# Patient Record
Sex: MALE | Race: WHITE | NOT HISPANIC OR LATINO | URBAN - METROPOLITAN AREA
[De-identification: names, ages, dates, MRNs, and addresses within clinical notes are randomized per-mention and may not be internally consistent; named-entity substitution may affect disease eponyms.]

---

## 2017-05-16 ENCOUNTER — APPOINTMENT (OUTPATIENT)
Dept: URBAN - METROPOLITAN AREA CLINIC 200 | Age: 67
Setting detail: DERMATOLOGY
End: 2017-05-24

## 2017-05-16 DIAGNOSIS — L57.8 OTHER SKIN CHANGES DUE TO CHRONIC EXPOSURE TO NONIONIZING RADIATION: ICD-10-CM

## 2017-05-16 DIAGNOSIS — L57.0 ACTINIC KERATOSIS: ICD-10-CM

## 2017-05-16 DIAGNOSIS — B07.8 OTHER VIRAL WARTS: ICD-10-CM

## 2017-05-16 DIAGNOSIS — D22 MELANOCYTIC NEVI: ICD-10-CM

## 2017-05-16 DIAGNOSIS — D485 NEOPLASM OF UNCERTAIN BEHAVIOR OF SKIN: ICD-10-CM

## 2017-05-16 PROBLEM — D22.5 MELANOCYTIC NEVI OF TRUNK: Status: ACTIVE | Noted: 2017-05-16

## 2017-05-16 PROBLEM — D48.5 NEOPLASM OF UNCERTAIN BEHAVIOR OF SKIN: Status: ACTIVE | Noted: 2017-05-16

## 2017-05-16 PROCEDURE — 99213 OFFICE O/P EST LOW 20 MIN: CPT | Mod: 25

## 2017-05-16 PROCEDURE — OTHER COUNSELING: OTHER

## 2017-05-16 PROCEDURE — OTHER BIOPSY BY SHAVE METHOD: OTHER

## 2017-05-16 PROCEDURE — 17000 DESTRUCT PREMALG LESION: CPT | Mod: 59

## 2017-05-16 PROCEDURE — OTHER LIQUID NITROGEN: OTHER

## 2017-05-16 PROCEDURE — 69100 BIOPSY OF EXTERNAL EAR: CPT | Mod: 59

## 2017-05-16 PROCEDURE — 17110 DESTRUCT B9 LESION 1-14: CPT

## 2017-05-16 ASSESSMENT — LOCATION SIMPLE DESCRIPTION DERM
LOCATION SIMPLE: RIGHT ZYGOMA
LOCATION SIMPLE: CHEST
LOCATION SIMPLE: LEFT PRETIBIAL REGION
LOCATION SIMPLE: RIGHT EAR
LOCATION SIMPLE: LEFT THIGH

## 2017-05-16 ASSESSMENT — LOCATION ZONE DERM
LOCATION ZONE: EAR
LOCATION ZONE: FACE
LOCATION ZONE: TRUNK
LOCATION ZONE: LEG

## 2017-05-16 ASSESSMENT — LOCATION DETAILED DESCRIPTION DERM
LOCATION DETAILED: RIGHT CENTRAL ZYGOMA
LOCATION DETAILED: LEFT DISTAL PRETIBIAL REGION
LOCATION DETAILED: UPPER STERNUM
LOCATION DETAILED: RIGHT SUPERIOR HELIX
LOCATION DETAILED: LEFT ANTERIOR DISTAL THIGH

## 2017-05-16 NOTE — PROCEDURE: LIQUID NITROGEN
Number Of Freeze-Thaw Cycles: 2 freeze-thaw cycles
Consent: The patient's consent was obtained including but not limited to risks of crusting, scabbing, blistering, scarring, darker or lighter pigmentary change, recurrence, incomplete removal and infection.
Post-Care Instructions: I reviewed with the patient in detail post-care instructions. Patient is to wear sunprotection, and avoid picking at any of the treated lesions. Pt may apply Vaseline to crusted or scabbing areas.
Medical Necessity Clause: This procedure was medically necessary because the lesions that were treated were: causing pain
Add 52 Modifier (Optional): no
Medical Necessity Information: It is in your best interest to select a reason for this procedure from the list below. All of these items fulfill various CMS LCD requirements except the new and changing color options.
Detail Level: Detailed
Include Z78.9 (Other Specified Conditions Influencing Health Status) As An Associated Diagnosis?: Yes
Duration Of Freeze Thaw-Cycle (Seconds): 10

## 2017-05-16 NOTE — PROCEDURE: BIOPSY BY SHAVE METHOD
X Size Of Lesion In Cm: 0
Destruction After The Procedure: No
Biopsy Type: H and E
Wound Care: Vaseline
Biopsy Method: Personna blade
Post-Care Instructions: I reviewed with the patient in detail post-care instructions. Patient is to keep the biopsy site dry overnight, and then apply bacitracin twice daily until healed. Patient may apply hydrogen peroxide soaks to remove any crusting.
Hemostasis: Drysol
Anesthesia Volume In Cc (Will Not Render If 0): 0.1
Size Of Lesion In Cm: 0.2
Billing Type: Third-Party Bill
Dressing: bandage
Detail Level: Detailed
Type Of Destruction Used: Curettage
Notification Instructions: Patient will be notified of biopsy results. However, patient instructed to call the office if not contacted within 2 weeks.
consent was obtained and risks were reviewed including but not limited to scarring, infection, bleeding, scabbing, incomplete removal, nerve damage and allergy to anesthesia.
Anesthesia Type: 1% lidocaine with epinephrine

## 2017-11-14 ENCOUNTER — APPOINTMENT (OUTPATIENT)
Dept: URBAN - METROPOLITAN AREA CLINIC 200 | Age: 67
Setting detail: DERMATOLOGY
End: 2017-11-17

## 2017-11-14 DIAGNOSIS — L57.0 ACTINIC KERATOSIS: ICD-10-CM

## 2017-11-14 DIAGNOSIS — L82.0 INFLAMED SEBORRHEIC KERATOSIS: ICD-10-CM

## 2017-11-14 DIAGNOSIS — L57.8 OTHER SKIN CHANGES DUE TO CHRONIC EXPOSURE TO NONIONIZING RADIATION: ICD-10-CM

## 2017-11-14 PROBLEM — D48.5 NEOPLASM OF UNCERTAIN BEHAVIOR OF SKIN: Status: ACTIVE | Noted: 2017-11-14

## 2017-11-14 PROCEDURE — OTHER BIOPSY BY SHAVE METHOD: OTHER

## 2017-11-14 PROCEDURE — 99213 OFFICE O/P EST LOW 20 MIN: CPT | Mod: 25

## 2017-11-14 PROCEDURE — OTHER LIQUID NITROGEN: OTHER

## 2017-11-14 PROCEDURE — 11100: CPT | Mod: 59

## 2017-11-14 PROCEDURE — OTHER COUNSELING: OTHER

## 2017-11-14 PROCEDURE — 17000 DESTRUCT PREMALG LESION: CPT

## 2017-11-14 ASSESSMENT — LOCATION DETAILED DESCRIPTION DERM
LOCATION DETAILED: RIGHT LATERAL UPPER BACK
LOCATION DETAILED: LEFT CENTRAL ZYGOMA
LOCATION DETAILED: LEFT MID DORSAL INDEX FINGER

## 2017-11-14 ASSESSMENT — LOCATION SIMPLE DESCRIPTION DERM
LOCATION SIMPLE: RIGHT UPPER BACK
LOCATION SIMPLE: LEFT INDEX FINGER
LOCATION SIMPLE: LEFT ZYGOMA

## 2017-11-14 ASSESSMENT — LOCATION ZONE DERM
LOCATION ZONE: FINGER
LOCATION ZONE: FACE
LOCATION ZONE: TRUNK

## 2017-11-14 NOTE — PROCEDURE: LIQUID NITROGEN
Render Post-Care Instructions In Note?: no
Consent: The patient's consent was obtained including but not limited to risks of crusting, scabbing, blistering, scarring, darker or lighter pigmentary change, recurrence, incomplete removal and infection.
Duration Of Freeze Thaw-Cycle (Seconds): 10
Post-Care Instructions: I reviewed with the patient in detail post-care instructions. Patient is to wear sunprotection, and avoid picking at any of the treated lesions. Pt may apply Vaseline to crusted or scabbing areas.
Detail Level: Detailed
Number Of Freeze-Thaw Cycles: 2 freeze-thaw cycles

## 2017-11-14 NOTE — PROCEDURE: BIOPSY BY SHAVE METHOD
Type Of Destruction Used: Curettage
Wound Care: Vaseline
Anesthesia Type: 1% lidocaine with epinephrine
consent was obtained and risks were reviewed including but not limited to scarring, infection, bleeding, scabbing, incomplete removal, nerve damage and allergy to anesthesia.
X Size Of Lesion In Cm: 0
Render Post-Care Instructions In Note?: no
Size Of Lesion In Cm: 0.6
Biopsy Method: Personna blade
Notification Instructions: Patient will be notified of biopsy results. However, patient instructed to call the office if not contacted within 2 weeks.
Anesthesia Volume In Cc (Will Not Render If 0): 0.1
Biopsy Type: H and E
Detail Level: Detailed
Billing Type: Third-Party Bill
Hemostasis: Drysol
Dressing: bandage
Post-Care Instructions: I reviewed with the patient in detail post-care instructions. Patient is to keep the biopsy site dry overnight, and then apply bacitracin twice daily until healed. Patient may apply hydrogen peroxide soaks to remove any crusting.

## 2018-05-15 ENCOUNTER — APPOINTMENT (OUTPATIENT)
Dept: URBAN - METROPOLITAN AREA CLINIC 200 | Age: 68
Setting detail: DERMATOLOGY
End: 2018-05-15

## 2018-05-15 DIAGNOSIS — Z85.828 PERSONAL HISTORY OF OTHER MALIGNANT NEOPLASM OF SKIN: ICD-10-CM

## 2018-05-15 DIAGNOSIS — L57.0 ACTINIC KERATOSIS: ICD-10-CM

## 2018-05-15 DIAGNOSIS — L57.8 OTHER SKIN CHANGES DUE TO CHRONIC EXPOSURE TO NONIONIZING RADIATION: ICD-10-CM

## 2018-05-15 PROBLEM — Z85.46 PERSONAL HISTORY OF MALIGNANT NEOPLASM OF PROSTATE: Status: ACTIVE | Noted: 2018-05-15

## 2018-05-15 PROCEDURE — 99213 OFFICE O/P EST LOW 20 MIN: CPT | Mod: 25

## 2018-05-15 PROCEDURE — OTHER LIQUID NITROGEN: OTHER

## 2018-05-15 PROCEDURE — 17003 DESTRUCT PREMALG LES 2-14: CPT

## 2018-05-15 PROCEDURE — 17000 DESTRUCT PREMALG LESION: CPT

## 2018-05-15 PROCEDURE — OTHER COUNSELING: OTHER

## 2018-05-15 ASSESSMENT — LOCATION ZONE DERM
LOCATION ZONE: FACE
LOCATION ZONE: NECK
LOCATION ZONE: TRUNK

## 2018-05-15 ASSESSMENT — LOCATION SIMPLE DESCRIPTION DERM
LOCATION SIMPLE: LEFT FOREHEAD
LOCATION SIMPLE: RIGHT LOWER BACK
LOCATION SIMPLE: LEFT ANTERIOR NECK
LOCATION SIMPLE: LEFT TEMPLE

## 2018-05-15 ASSESSMENT — LOCATION DETAILED DESCRIPTION DERM
LOCATION DETAILED: RIGHT SUPERIOR MEDIAL MIDBACK
LOCATION DETAILED: LEFT SUPERIOR MEDIAL FOREHEAD
LOCATION DETAILED: LEFT SUPERIOR TEMPLE
LOCATION DETAILED: LEFT INFERIOR ANTERIOR NECK

## 2018-05-15 ASSESSMENT — PAIN INTENSITY VAS: HOW INTENSE IS YOUR PAIN 0 BEING NO PAIN, 10 BEING THE MOST SEVERE PAIN POSSIBLE?: NO PAIN

## 2018-11-13 ENCOUNTER — APPOINTMENT (OUTPATIENT)
Dept: URBAN - METROPOLITAN AREA CLINIC 200 | Age: 68
Setting detail: DERMATOLOGY
End: 2018-11-13

## 2018-11-13 DIAGNOSIS — L57.0 ACTINIC KERATOSIS: ICD-10-CM

## 2018-11-13 DIAGNOSIS — Z85.828 PERSONAL HISTORY OF OTHER MALIGNANT NEOPLASM OF SKIN: ICD-10-CM

## 2018-11-13 DIAGNOSIS — L57.8 OTHER SKIN CHANGES DUE TO CHRONIC EXPOSURE TO NONIONIZING RADIATION: ICD-10-CM

## 2018-11-13 PROBLEM — E78.5 HYPERLIPIDEMIA, UNSPECIFIED: Status: ACTIVE | Noted: 2018-11-13

## 2018-11-13 PROBLEM — Z85.46 PERSONAL HISTORY OF MALIGNANT NEOPLASM OF PROSTATE: Status: ACTIVE | Noted: 2018-11-13

## 2018-11-13 PROCEDURE — OTHER MIPS QUALITY: OTHER

## 2018-11-13 PROCEDURE — OTHER COUNSELING: OTHER

## 2018-11-13 PROCEDURE — 17003 DESTRUCT PREMALG LES 2-14: CPT

## 2018-11-13 PROCEDURE — 99213 OFFICE O/P EST LOW 20 MIN: CPT | Mod: 25

## 2018-11-13 PROCEDURE — OTHER LIQUID NITROGEN: OTHER

## 2018-11-13 PROCEDURE — 17000 DESTRUCT PREMALG LESION: CPT

## 2018-11-13 ASSESSMENT — LOCATION ZONE DERM
LOCATION ZONE: NECK
LOCATION ZONE: ARM
LOCATION ZONE: FACE
LOCATION ZONE: TRUNK

## 2018-11-13 ASSESSMENT — LOCATION SIMPLE DESCRIPTION DERM
LOCATION SIMPLE: LEFT ANTERIOR NECK
LOCATION SIMPLE: LEFT TEMPLE
LOCATION SIMPLE: CHEST
LOCATION SIMPLE: LEFT FOREHEAD
LOCATION SIMPLE: RIGHT ZYGOMA
LOCATION SIMPLE: RIGHT FOREARM

## 2018-11-13 ASSESSMENT — LOCATION DETAILED DESCRIPTION DERM
LOCATION DETAILED: LEFT SUPERIOR TEMPLE
LOCATION DETAILED: LEFT MEDIAL SUPERIOR CHEST
LOCATION DETAILED: LEFT INFERIOR ANTERIOR NECK
LOCATION DETAILED: RIGHT PROXIMAL RADIAL DORSAL FOREARM
LOCATION DETAILED: RIGHT CENTRAL ZYGOMA
LOCATION DETAILED: LEFT SUPERIOR FOREHEAD

## 2019-05-14 ENCOUNTER — APPOINTMENT (OUTPATIENT)
Dept: URBAN - METROPOLITAN AREA CLINIC 200 | Age: 69
Setting detail: DERMATOLOGY
End: 2019-05-20

## 2019-05-14 DIAGNOSIS — L57.8 OTHER SKIN CHANGES DUE TO CHRONIC EXPOSURE TO NONIONIZING RADIATION: ICD-10-CM

## 2019-05-14 DIAGNOSIS — Z85.828 PERSONAL HISTORY OF OTHER MALIGNANT NEOPLASM OF SKIN: ICD-10-CM

## 2019-05-14 DIAGNOSIS — L57.0 ACTINIC KERATOSIS: ICD-10-CM

## 2019-05-14 PROBLEM — Z85.46 PERSONAL HISTORY OF MALIGNANT NEOPLASM OF PROSTATE: Status: ACTIVE | Noted: 2019-05-14

## 2019-05-14 PROBLEM — I10 ESSENTIAL (PRIMARY) HYPERTENSION: Status: ACTIVE | Noted: 2019-05-14

## 2019-05-14 PROCEDURE — OTHER MIPS QUALITY: OTHER

## 2019-05-14 PROCEDURE — 99213 OFFICE O/P EST LOW 20 MIN: CPT | Mod: 25

## 2019-05-14 PROCEDURE — OTHER LIQUID NITROGEN: OTHER

## 2019-05-14 PROCEDURE — 17003 DESTRUCT PREMALG LES 2-14: CPT

## 2019-05-14 PROCEDURE — 17000 DESTRUCT PREMALG LESION: CPT

## 2019-05-14 PROCEDURE — OTHER COUNSELING: OTHER

## 2019-05-14 ASSESSMENT — LOCATION ZONE DERM
LOCATION ZONE: FACE
LOCATION ZONE: NECK
LOCATION ZONE: TRUNK
LOCATION ZONE: SCALP

## 2019-05-14 ASSESSMENT — LOCATION SIMPLE DESCRIPTION DERM
LOCATION SIMPLE: LEFT ANTERIOR NECK
LOCATION SIMPLE: SCALP
LOCATION SIMPLE: LEFT TEMPLE
LOCATION SIMPLE: CHEST
LOCATION SIMPLE: LEFT FOREHEAD
LOCATION SIMPLE: RIGHT CHEEK

## 2019-05-14 ASSESSMENT — LOCATION DETAILED DESCRIPTION DERM
LOCATION DETAILED: LEFT SUPERIOR FOREHEAD
LOCATION DETAILED: LEFT MEDIAL SUPERIOR CHEST
LOCATION DETAILED: LEFT MEDIAL FOREHEAD
LOCATION DETAILED: LEFT INFERIOR ANTERIOR NECK
LOCATION DETAILED: LEFT CENTRAL TEMPLE
LOCATION DETAILED: RIGHT SUPERIOR LATERAL MALAR CHEEK
LOCATION DETAILED: RIGHT INFERIOR POSTAURICULAR SKIN
LOCATION DETAILED: LEFT FOREHEAD

## 2019-05-14 NOTE — PROCEDURE: LIQUID NITROGEN
Render Post-Care Instructions In Note?: no
Consent: The patient's consent was obtained including but not limited to risks of crusting, scabbing, blistering, scarring, darker or lighter pigmentary change, recurrence, incomplete removal and infection.
Detail Level: Simple
Duration Of Freeze Thaw-Cycle (Seconds): 2
Post-Care Instructions: I reviewed with the patient in detail post-care instructions. Patient is to wear sunprotection, and avoid picking at any of the treated lesions. Pt may apply Vaseline to crusted or scabbing areas.

## 2019-09-24 ENCOUNTER — APPOINTMENT (OUTPATIENT)
Dept: URBAN - METROPOLITAN AREA CLINIC 200 | Age: 69
Setting detail: DERMATOLOGY
End: 2019-09-29

## 2019-09-24 DIAGNOSIS — Z85.828 PERSONAL HISTORY OF OTHER MALIGNANT NEOPLASM OF SKIN: ICD-10-CM

## 2019-09-24 DIAGNOSIS — L57.8 OTHER SKIN CHANGES DUE TO CHRONIC EXPOSURE TO NONIONIZING RADIATION: ICD-10-CM

## 2019-09-24 PROCEDURE — OTHER COUNSELING: OTHER

## 2019-09-24 PROCEDURE — OTHER MIPS QUALITY: OTHER

## 2019-09-24 PROCEDURE — 99213 OFFICE O/P EST LOW 20 MIN: CPT

## 2019-09-24 ASSESSMENT — LOCATION DETAILED DESCRIPTION DERM
LOCATION DETAILED: LEFT MEDIAL SUPERIOR CHEST
LOCATION DETAILED: LEFT INFERIOR ANTERIOR NECK

## 2019-09-24 ASSESSMENT — LOCATION ZONE DERM
LOCATION ZONE: NECK
LOCATION ZONE: TRUNK

## 2019-09-24 ASSESSMENT — LOCATION SIMPLE DESCRIPTION DERM
LOCATION SIMPLE: LEFT ANTERIOR NECK
LOCATION SIMPLE: CHEST

## 2019-10-16 ENCOUNTER — OFFICE VISIT (OUTPATIENT)
Dept: CARDIOLOGY | Facility: CLINIC | Age: 69
End: 2019-10-16
Payer: MEDICARE

## 2019-10-16 ENCOUNTER — TRANSCRIBE ORDERS (OUTPATIENT)
Dept: CARDIOLOGY | Facility: HOSPITAL | Age: 69
End: 2019-10-16

## 2019-10-16 ENCOUNTER — HOSPITAL ENCOUNTER (OUTPATIENT)
Dept: CARDIOLOGY | Facility: HOSPITAL | Age: 69
Discharge: HOME | End: 2019-10-16
Attending: ORTHOPAEDIC SURGERY
Payer: MEDICARE

## 2019-10-16 ENCOUNTER — APPOINTMENT (OUTPATIENT)
Dept: PREADMISSION TESTING | Facility: HOSPITAL | Age: 69
End: 2019-10-16
Attending: ORTHOPAEDIC SURGERY
Payer: MEDICARE

## 2019-10-16 ENCOUNTER — TRANSCRIBE ORDERS (OUTPATIENT)
Dept: LAB | Facility: HOSPITAL | Age: 69
End: 2019-10-16

## 2019-10-16 ENCOUNTER — APPOINTMENT (OUTPATIENT)
Dept: LAB | Facility: HOSPITAL | Age: 69
End: 2019-10-16
Attending: ORTHOPAEDIC SURGERY
Payer: MEDICARE

## 2019-10-16 VITALS
BODY MASS INDEX: 35.03 KG/M2 | HEIGHT: 66 IN | HEART RATE: 63 BPM | DIASTOLIC BLOOD PRESSURE: 80 MMHG | SYSTOLIC BLOOD PRESSURE: 150 MMHG | OXYGEN SATURATION: 97 % | WEIGHT: 218 LBS

## 2019-10-16 VITALS
BODY MASS INDEX: 34.84 KG/M2 | SYSTOLIC BLOOD PRESSURE: 172 MMHG | WEIGHT: 216.8 LBS | HEART RATE: 63 BPM | RESPIRATION RATE: 16 BRPM | TEMPERATURE: 98 F | HEIGHT: 66 IN | DIASTOLIC BLOOD PRESSURE: 90 MMHG

## 2019-10-16 DIAGNOSIS — I10 ESSENTIAL (PRIMARY) HYPERTENSION: ICD-10-CM

## 2019-10-16 DIAGNOSIS — M17.0 BILATERAL PRIMARY OSTEOARTHRITIS OF KNEE: Primary | ICD-10-CM

## 2019-10-16 DIAGNOSIS — Z01.810 ENCOUNTER FOR PRE-OPERATIVE CARDIOVASCULAR CLEARANCE: ICD-10-CM

## 2019-10-16 DIAGNOSIS — Z01.818 PREOP TESTING: Primary | ICD-10-CM

## 2019-10-16 DIAGNOSIS — M17.0 BILATERAL PRIMARY OSTEOARTHRITIS OF KNEE: ICD-10-CM

## 2019-10-16 DIAGNOSIS — Z71.89 COUNSELING ON HEALTH PROMOTION AND DISEASE PREVENTION: ICD-10-CM

## 2019-10-16 DIAGNOSIS — E78.5 HYPERLIPIDEMIA, UNSPECIFIED HYPERLIPIDEMIA TYPE: Primary | ICD-10-CM

## 2019-10-16 LAB
ABO + RH BLD: NORMAL
ANION GAP SERPL CALC-SCNC: 7 MEQ/L (ref 3–15)
APTT PPP: 28 SEC (ref 23–35)
ATRIAL RATE: 65
BLD GP AB SCN SERPL QL: NEGATIVE
BUN SERPL-MCNC: 15 MG/DL (ref 8–20)
CALCIUM SERPL-MCNC: 9.3 MG/DL (ref 8.9–10.3)
CHLORIDE SERPL-SCNC: 104 MEQ/L (ref 98–109)
CO2 SERPL-SCNC: 26 MEQ/L (ref 22–32)
CREAT SERPL-MCNC: 0.9 MG/DL
D AG BLD QL: POSITIVE
ERYTHROCYTE [DISTWIDTH] IN BLOOD BY AUTOMATED COUNT: 13.1 % (ref 11.6–14.4)
EST. AVERAGE GLUCOSE BLD GHB EST-MCNC: 114 MG/DL
GFR SERPL CREATININE-BSD FRML MDRD: >60 ML/MIN/1.73M*2
GLUCOSE SERPL-MCNC: 131 MG/DL (ref 70–99)
HBA1C MFR BLD HPLC: 5.6 %
HCT VFR BLDCO AUTO: 41.8 % (ref 40.1–51)
HGB BLD-MCNC: 14.2 G/DL
INR PPP: 1 INR
LABORATORY COMMENT REPORT: NORMAL
MCH RBC QN AUTO: 31.9 PG (ref 28–33.2)
MCHC RBC AUTO-ENTMCNC: 34 G/DL (ref 32.2–36.5)
MCV RBC AUTO: 93.9 FL (ref 83–98)
P AXIS: 53
PDW BLD AUTO: 10.3 FL (ref 9.4–12.4)
PLATELET # BLD AUTO: 218 K/UL
POTASSIUM SERPL-SCNC: 4 MEQ/L (ref 3.6–5.1)
PR INTERVAL: 154
PROTHROMBIN TIME: 13 SEC (ref 12.2–14.5)
QRS DURATION: 96
QT INTERVAL: 380
QTC CALCULATION(BAZETT): 395
R AXIS: 21
RBC # BLD AUTO: 4.45 M/UL (ref 4.5–5.8)
SODIUM SERPL-SCNC: 137 MEQ/L (ref 136–144)
T WAVE AXIS: 23
VENTRICULAR RATE: 65
WBC # BLD AUTO: 6.14 K/UL

## 2019-10-16 PROCEDURE — 99204 OFFICE O/P NEW MOD 45 MIN: CPT | Performed by: INTERNAL MEDICINE

## 2019-10-16 PROCEDURE — 36415 COLL VENOUS BLD VENIPUNCTURE: CPT | Mod: GA

## 2019-10-16 PROCEDURE — 85027 COMPLETE CBC AUTOMATED: CPT | Mod: GA

## 2019-10-16 PROCEDURE — 86900 BLOOD TYPING SEROLOGIC ABO: CPT

## 2019-10-16 PROCEDURE — 85730 THROMBOPLASTIN TIME PARTIAL: CPT | Mod: GZ

## 2019-10-16 PROCEDURE — 80048 BASIC METABOLIC PNL TOTAL CA: CPT

## 2019-10-16 PROCEDURE — 83036 HEMOGLOBIN GLYCOSYLATED A1C: CPT | Mod: GZ

## 2019-10-16 PROCEDURE — 93005 ELECTROCARDIOGRAM TRACING: CPT

## 2019-10-16 PROCEDURE — 85610 PROTHROMBIN TIME: CPT | Mod: GA

## 2019-10-16 PROCEDURE — 86850 RBC ANTIBODY SCREEN: CPT

## 2019-10-16 RX ORDER — IBUPROFEN 200 MG
400 TABLET ORAL EVERY 8 HOURS PRN
COMMUNITY
End: 2019-10-31 | Stop reason: HOSPADM

## 2019-10-16 RX ORDER — MUPIROCIN 20 MG/G
OINTMENT TOPICAL
Refills: 0 | COMMUNITY
Start: 2019-09-12 | End: 2019-10-31 | Stop reason: HOSPADM

## 2019-10-16 RX ORDER — ATORVASTATIN CALCIUM 10 MG/1
10 TABLET, FILM COATED ORAL DAILY
COMMUNITY
Start: 2017-05-10

## 2019-10-16 RX ORDER — VALSARTAN 80 MG/1
360 TABLET ORAL DAILY
COMMUNITY

## 2019-10-16 SDOH — HEALTH STABILITY: MENTAL HEALTH: HOW OFTEN DO YOU HAVE A DRINK CONTAINING ALCOHOL?: 2-3 TIMES A WEEK

## 2019-10-16 SDOH — HEALTH STABILITY: MENTAL HEALTH: HOW OFTEN DO YOU HAVE SIX OR MORE DRINKS ON ONE OCCASION?: LESS THAN MONTHLY

## 2019-10-16 SDOH — HEALTH STABILITY: MENTAL HEALTH: HOW MANY DRINKS CONTAINING ALCOHOL DO YOU HAVE ON A TYPICAL DAY WHEN YOU ARE DRINKING?: 3 OR 4

## 2019-10-16 ASSESSMENT — ENCOUNTER SYMPTOMS
DIFFICULTY URINATING: 0
APNEA: 0
PALPITATIONS: 0
INSOMNIA: 0
ABDOMINAL PAIN: 0
LIGHT-HEADEDNESS: 0
FEVER: 0
SHORTNESS OF BREATH: 0
ORTHOPNEA: 0
COUGH: 0
PALPITATIONS: 0
HEARTBURN: 0
NAUSEA: 0
WEAKNESS: 0
CHEST TIGHTNESS: 0
ARTHRALGIAS: 1
VOMITING: 0
HEMOPTYSIS: 0
HEMATURIA: 0
HEMATURIA: 0
DIZZINESS: 0
FREQUENCY: 0
JOINT SWELLING: 0
PND: 0
DYSURIA: 0
FEVER: 0
NUMBNESS: 0
DYSPNEA ON EXERTION: 0
BRUISES/BLEEDS EASILY: 0
SNORING: 1
DECREASED APPETITE: 0
SHORTNESS OF BREATH: 0
WOUND: 0
CHILLS: 0

## 2019-10-16 ASSESSMENT — PAIN SCALES - GENERAL: PAINLEVEL: 1

## 2019-10-16 NOTE — ASSESSMENT & PLAN NOTE
Tolerating statin.  Continue atorvastatin.  Recommend followup with primary care physician for LFTs, lipid profile check and medication adjustments.

## 2019-10-16 NOTE — H&P (VIEW-ONLY)
Chief complaint: bilateral knee pain  HPI: 68 year old male with bilateral knee pain. He states that his knee pain began about 6-7 years ago without an inciting event.He states that the pain has progressively worsened. Treatment has included Synvisc injections without relief.He states that cortisone injection provided short term relief. He rates the pain 1/5 today. He states that he takes Advil prn for pain. He states that he has difficulty walking down stairs/hills.        Allergies: No Known Allergies  Patient's Meds:   Current Outpatient Medications:   •  atorvastatin (LIPITOR) 10 mg tablet, Take 10 mg by mouth daily., Disp: , Rfl:   •  docosahexanoic acid/epa (FISH OIL ORAL), Take 1 tablet by mouth daily., Disp: , Rfl:   •  ibuprofen (ADVIL) 200 mg tablet, Take 400 mg by mouth every 8 (eight) hours as needed for mild pain., Disp: , Rfl:   •  multivitamin/iron/folic acid (CENTRUM ORAL), Take 1 tablet by mouth daily., Disp: , Rfl:   •  valsartan (DIOVAN) 80 mg tablet, Take 80 mg by mouth daily., Disp: , Rfl:   •  mupirocin (BACTROBAN) 2 % ointment, APPLY TWICE DAILY 5 DAYS PRIOR TO SURGERY INSIDE BOTH NOSTRILS, Disp: , Rfl: 0  Medical History:   Past Medical History:   Diagnosis Date   • Essential (primary) hypertension    • Hyperlipidemia, unspecified    • Impaired fasting glucose    • Osteoarthritis     Knees, hands    • Prostate cancer (CMS/[facility]) 2011    Prostatectomy     Surgical History:   Past Surgical History:   Procedure Laterality Date   • COLONOSCOPY W/ POLYPECTOMY  2015   • KNEE SURGERY Right     MCL Repair   • MOHS SURGERY      Right finger   • PROSTATECTOMY  2011     Social History:   Social History     Socioeconomic History   • Marital status:      Spouse name: None   • Number of children: None   • Years of education: None   • Highest education level: None   Occupational History   • Occupation: retired   Social Needs   • Financial resource strain: None   • Food insecurity:     Worry: None      Inability: None   • Transportation needs:     Medical: None     Non-medical: None   Tobacco Use   • Smoking status: Former Smoker     Last attempt to quit: 1979     Years since quittin.8   • Smokeless tobacco: Never Used   • Tobacco comment: quit 40 years ago   Substance and Sexual Activity   • Alcohol use: Yes     Frequency: 2-3 times a week     Drinks per session: 3 or 4     Binge frequency: Less than monthly   • Drug use: Never   • Sexual activity: None   Lifestyle   • Physical activity:     Days per week: None     Minutes per session: None   • Stress: None   Relationships   • Social connections:     Talks on phone: None     Gets together: None     Attends Orthodox service: None     Active member of club or organization: None     Attends meetings of clubs or organizations: None     Relationship status: None   • Intimate partner violence:     Fear of current or ex partner: None     Emotionally abused: None     Physically abused: None     Forced sexual activity: None   Other Topics Concern   • None   Social History Narrative   • None     Family History:   Family History   Problem Relation Age of Onset   • Liver cancer Biological Mother    • Early death Biological Father    • Heart disease Biological Father    • No Known Problems Biological Sister    • No Known Problems Biological Brother      Review of Systems   Constitutional: Negative for chills and fever.   HENT: Negative for dental problem and hearing loss.    Eyes: Negative for visual disturbance.   Respiratory: Negative for apnea, chest tightness and shortness of breath.         Stop bang-3   Cardiovascular: Negative for chest pain, palpitations and leg swelling.        HTN,HLD>4MET activity   Gastrointestinal: Negative for abdominal pain, nausea and vomiting.        Colon polyp history   Endocrine:        BMI-34.9. History of elevated BG;reports HgbA1c 3/2019-5.4   Genitourinary: Negative for difficulty urinating, dysuria, frequency and hematuria.         Prostate cancer s/p prostatectomy 2011   Musculoskeletal: Positive for arthralgias (bilateral knee) and gait problem (occasional limp). Negative for joint swelling.        History of right knee meniscus surgery 1960's. OA-knees/hands   Skin: Negative for wound.        History of Moh's   Neurological: Negative for weakness and numbness.     Vitals:   Vitals:    10/16/19 0911   BP: (!) 172/90   Pulse:    Resp:    Temp:      Body mass index is 34.99 kg/m².  Physical Exam   Constitutional: He is oriented to person, place, and time. He appears well-developed and well-nourished.   HENT:   Head: Normocephalic.   Eyes: Pupils are equal, round, and reactive to light.   Neck: Normal range of motion. Neck supple.   Cardiovascular: Normal rate, regular rhythm, normal heart sounds and intact distal pulses.   Pulmonary/Chest: Effort normal and breath sounds normal.   Abdominal: Soft. Bowel sounds are normal. There is no tenderness.   Genitourinary:   Genitourinary Comments: deferred   Musculoskeletal: Normal range of motion. He exhibits no edema or tenderness.   BLE DF/PF/EHL 5/5   Neurological: He is alert and oriented to person, place, and time. No sensory deficit.   Skin: Skin is warm and dry.   Psychiatric: He has a normal mood and affect.     Patient Active Problem List   Diagnosis   • Hyperlipidemia, unspecified   • Essential (primary) hypertension   • Osteoarthritis of both knees     Assessment/Plan:bilateral knee DJD  Plan: Bilateral total knee replacement  Cardiac clearance Dr Ku

## 2019-10-16 NOTE — H&P
Chief complaint: bilateral knee pain  HPI: 68 year old male with bilateral knee pain. He states that his knee pain began about 6-7 years ago without an inciting event.He states that the pain has progressively worsened. Treatment has included Synvisc injections without relief.He states that cortisone injection provided short term relief. He rates the pain 1/5 today. He states that he takes Advil prn for pain. He states that he has difficulty walking down stairs/hills.        Allergies: No Known Allergies  Patient's Meds:   Current Outpatient Medications:   •  atorvastatin (LIPITOR) 10 mg tablet, Take 10 mg by mouth daily., Disp: , Rfl:   •  docosahexanoic acid/epa (FISH OIL ORAL), Take 1 tablet by mouth daily., Disp: , Rfl:   •  ibuprofen (ADVIL) 200 mg tablet, Take 400 mg by mouth every 8 (eight) hours as needed for mild pain., Disp: , Rfl:   •  multivitamin/iron/folic acid (CENTRUM ORAL), Take 1 tablet by mouth daily., Disp: , Rfl:   •  valsartan (DIOVAN) 80 mg tablet, Take 80 mg by mouth daily., Disp: , Rfl:   •  mupirocin (BACTROBAN) 2 % ointment, APPLY TWICE DAILY 5 DAYS PRIOR TO SURGERY INSIDE BOTH NOSTRILS, Disp: , Rfl: 0  Medical History:   Past Medical History:   Diagnosis Date   • Essential (primary) hypertension    • Hyperlipidemia, unspecified    • Impaired fasting glucose    • Osteoarthritis     Knees, hands    • Prostate cancer (CMS/[facility]) 2011    Prostatectomy     Surgical History:   Past Surgical History:   Procedure Laterality Date   • COLONOSCOPY W/ POLYPECTOMY  2015   • KNEE SURGERY Right     MCL Repair   • MOHS SURGERY      Right finger   • PROSTATECTOMY  2011     Social History:   Social History     Socioeconomic History   • Marital status:      Spouse name: None   • Number of children: None   • Years of education: None   • Highest education level: None   Occupational History   • Occupation: retired   Social Needs   • Financial resource strain: None   • Food insecurity:     Worry: None      Inability: None   • Transportation needs:     Medical: None     Non-medical: None   Tobacco Use   • Smoking status: Former Smoker     Last attempt to quit: 1979     Years since quittin.8   • Smokeless tobacco: Never Used   • Tobacco comment: quit 40 years ago   Substance and Sexual Activity   • Alcohol use: Yes     Frequency: 2-3 times a week     Drinks per session: 3 or 4     Binge frequency: Less than monthly   • Drug use: Never   • Sexual activity: None   Lifestyle   • Physical activity:     Days per week: None     Minutes per session: None   • Stress: None   Relationships   • Social connections:     Talks on phone: None     Gets together: None     Attends Muslim service: None     Active member of club or organization: None     Attends meetings of clubs or organizations: None     Relationship status: None   • Intimate partner violence:     Fear of current or ex partner: None     Emotionally abused: None     Physically abused: None     Forced sexual activity: None   Other Topics Concern   • None   Social History Narrative   • None     Family History:   Family History   Problem Relation Age of Onset   • Liver cancer Biological Mother    • Early death Biological Father    • Heart disease Biological Father    • No Known Problems Biological Sister    • No Known Problems Biological Brother      Review of Systems   Constitutional: Negative for chills and fever.   HENT: Negative for dental problem and hearing loss.    Eyes: Negative for visual disturbance.   Respiratory: Negative for apnea, chest tightness and shortness of breath.         Stop bang-3   Cardiovascular: Negative for chest pain, palpitations and leg swelling.        HTN,HLD>4MET activity   Gastrointestinal: Negative for abdominal pain, nausea and vomiting.        Colon polyp history   Endocrine:        BMI-34.9. History of elevated BG;reports HgbA1c 3/2019-5.4   Genitourinary: Negative for difficulty urinating, dysuria, frequency and hematuria.         Prostate cancer s/p prostatectomy 2011   Musculoskeletal: Positive for arthralgias (bilateral knee) and gait problem (occasional limp). Negative for joint swelling.        History of right knee meniscus surgery 1960's. OA-knees/hands   Skin: Negative for wound.        History of Moh's   Neurological: Negative for weakness and numbness.     Vitals:   Vitals:    10/16/19 0911   BP: (!) 172/90   Pulse:    Resp:    Temp:      Body mass index is 34.99 kg/m².  Physical Exam   Constitutional: He is oriented to person, place, and time. He appears well-developed and well-nourished.   HENT:   Head: Normocephalic.   Eyes: Pupils are equal, round, and reactive to light.   Neck: Normal range of motion. Neck supple.   Cardiovascular: Normal rate, regular rhythm, normal heart sounds and intact distal pulses.   Pulmonary/Chest: Effort normal and breath sounds normal.   Abdominal: Soft. Bowel sounds are normal. There is no tenderness.   Genitourinary:   Genitourinary Comments: deferred   Musculoskeletal: Normal range of motion. He exhibits no edema or tenderness.   BLE DF/PF/EHL 5/5   Neurological: He is alert and oriented to person, place, and time. No sensory deficit.   Skin: Skin is warm and dry.   Psychiatric: He has a normal mood and affect.     Patient Active Problem List   Diagnosis   • Hyperlipidemia, unspecified   • Essential (primary) hypertension   • Osteoarthritis of both knees     Assessment/Plan:bilateral knee DJD  Plan: Bilateral total knee replacement  Cardiac clearance Dr Ku

## 2019-10-16 NOTE — ASSESSMENT & PLAN NOTE
There is no evidence of any active or ongoing cardiac condition at this time.   The proposed procedure in general carries an intermediate risk of cardiac complications.  The patient has intermediate clinical predictors of increased risk of periprocedural cardiac complications. There have been no recent episodes of angina, CHF, or clinical arrhythmias. The overall risk is intermediate but acceptable. Further testing or interventions are unlikely to improve the patient's risk. Routine perioperative care. The patient was instructed not to take any meds the morning of surgery. We will resume the routine medications when oral intake is tolerated post-operatively. DVT prophylaxis according to the surgical service's protocol. We will assist you with the management of any medical problems during the hospitalization.

## 2019-10-16 NOTE — PROGRESS NOTES
Pre-Operative   Consult Note         Reason for visit:   Chief Complaint   Patient presents with   • preoperative clearance       HPI     Chapincito Yoon comes to the office today for preoperative cardiac evaluation prior to a bilateral total knee replacement with Dr. Ted Zheng on 10/29/19.    He has been having bilateral knee pain within the last 5-6 years. It progressively worsened. He has difficulty going down stairs. He conitinues to play golf but he uses the golf cart. He will take Advil to be able to do the activity.     Patient denies any history of cardiac disease. Patient denies any chest pain, shortness of breath, dizziness or palpitations. Patient denies any PND or orthopnea, he uses one pillow at night for comfort.  He reports being able to easily walk more than 5 blocks without any cardiac symptoms.    Outside records reviewed..    Past Medical History:   Diagnosis Date   • Essential (primary) hypertension    • Hyperlipidemia, unspecified    • Impaired fasting glucose    • Osteoarthritis     Knees, hands    • Prostate cancer (CMS/HCC)     Prostatectomy       Past Surgical History:   Procedure Laterality Date   • COLONOSCOPY W/ POLYPECTOMY     • KNEE SURGERY Right     MCL Repair   • MOHS SURGERY      Right finger   • PROSTATECTOMY         Social History     Tobacco Use   Smoking Status Former Smoker   • Last attempt to quit:    • Years since quittin.8   Tobacco Comment    quit 40 years ago     Social History     Tobacco Use   • Smoking status: Former Smoker     Last attempt to quit:      Years since quittin.8   • Smokeless tobacco: Never Used   • Tobacco comment: quit 40 years ago   Substance Use Topics   • Alcohol use: Yes     Frequency: 2-3 times a week     Drinks per session: 3 or 4     Binge frequency: Less than monthly   • Drug use: Never       Family History   Problem Relation Age of Onset   • Liver cancer Biological Mother    • Early death Biological Father    •  Heart disease Biological Father    • No Known Problems Biological Sister    • No Known Problems Biological Brother        Allergies:  Patient has no known allergies.    Current Outpatient Medications   Medication Sig Dispense Refill   • atorvastatin (LIPITOR) 10 mg tablet Take 10 mg by mouth daily.     • docosahexanoic acid/epa (FISH OIL ORAL) Take 1 tablet by mouth daily.     • ibuprofen (ADVIL) 200 mg tablet Take 400 mg by mouth every 8 (eight) hours as needed for mild pain.     • multivitamin/iron/folic acid (CENTRUM ORAL) Take 1 tablet by mouth daily.     • valsartan (DIOVAN) 80 mg tablet Take 80 mg by mouth daily.     • mupirocin (BACTROBAN) 2 % ointment APPLY TWICE DAILY 5 DAYS PRIOR TO SURGERY INSIDE BOTH NOSTRILS  0     No current facility-administered medications for this visit.        Review of Systems   Constitution: Negative for decreased appetite and fever.   Cardiovascular: Negative for chest pain, dyspnea on exertion, leg swelling, orthopnea, palpitations and paroxysmal nocturnal dyspnea.   Respiratory: Positive for snoring. Negative for cough, hemoptysis and shortness of breath.    Hematologic/Lymphatic: Does not bruise/bleed easily.   Skin: Negative.    Musculoskeletal: Positive for joint pain.   Gastrointestinal: Negative for heartburn and melena.   Genitourinary: Negative for hematuria and nocturia.   Neurological: Negative for dizziness and light-headedness.   Psychiatric/Behavioral: The patient does not have insomnia.        Objective     Vitals:    10/16/19 0937   BP: (!) 150/80   Pulse: 63   SpO2: 97%       Wt Readings from Last 1 Encounters:   10/16/19 98.9 kg (218 lb)       Physical Exam   Constitutional: He is oriented to person, place, and time. He appears well-nourished.   Eyes: Lids are normal.   Neck: Normal range of motion.   Cardiovascular: Normal rate and regular rhythm.   Pulmonary/Chest: Effort normal and breath sounds normal.   Abdominal: Soft. Bowel sounds are normal.    Musculoskeletal: He exhibits no edema.   Neurological: He is alert and oriented to person, place, and time.   Skin: Skin is warm and dry.   Psychiatric: He has a normal mood and affect.        ECG   Normal sinus rhythm  Normal ECG    Labs   Lab Results   Component Value Date    WBC 6.14 10/16/2019    HGB 14.2 10/16/2019    HCT 41.8 10/16/2019     10/16/2019       Assessment/Plan     Hyperlipidemia, unspecified  Tolerating statin.  Continue atorvastatin.  Recommend followup with primary care physician for LFTs, lipid profile check and medication adjustments.    Encounter for pre-operative cardiovascular clearance  There is no evidence of any active or ongoing cardiac condition at this time.   The proposed procedure in general carries an intermediate risk of cardiac complications.  The patient has intermediate clinical predictors of increased risk of periprocedural cardiac complications. There have been no recent episodes of angina, CHF, or clinical arrhythmias. The overall risk is intermediate but acceptable. Further testing or interventions are unlikely to improve the patient's risk. Routine perioperative care. The patient was instructed not to take any meds the morning of surgery. We will resume the routine medications when oral intake is tolerated post-operatively. DVT prophylaxis according to the surgical service's protocol. We will assist you with the management of any medical problems during the hospitalization.       Essential (primary) hypertension  Blood pressure slightly elevated today.  Continue efforts for pain control.  Minimize the use of NSAIDs.  Follow with his primary care physician after surgery for monitoring blood pressure.    Counseling on health promotion and disease prevention  We discussed about heart disease and cardiac risk factors.  Patient was extensively counseled regarding lifestyle changes and risk factor modifications. Patient understands that noncompliance with these  recommendations may increase the risk of cardiovascular complications.  Follow-up with primary care physician to continue preventive measures for coronary artery disease, risk factor modification and lifestyle changes.  Recommend follow a cardiac diet and exercise as tolerated.           I, Ameena Hamilton , am scribing for, and in the presence of, Nestor Ku MD.    INestor MD, personally performed the services described in this documentation as scribed by Ameena Hamilton  in my presence, and it is both accurate and complete.       Nestor Ku MD  10/16/2019

## 2019-10-16 NOTE — ASSESSMENT & PLAN NOTE
Blood pressure slightly elevated today.  Continue efforts for pain control.  Minimize the use of NSAIDs.  Follow with his primary care physician after surgery for monitoring blood pressure.

## 2019-10-16 NOTE — ASSESSMENT & PLAN NOTE
We discussed about heart disease and cardiac risk factors.  Patient was extensively counseled regarding lifestyle changes and risk factor modifications. Patient understands that noncompliance with these recommendations may increase the risk of cardiovascular complications.  Follow-up with primary care physician to continue preventive measures for coronary artery disease, risk factor modification and lifestyle changes.  Recommend follow a cardiac diet and exercise as tolerated.

## 2019-10-29 ENCOUNTER — ANESTHESIA EVENT (OUTPATIENT)
Dept: OPERATING ROOM | Facility: HOSPITAL | Age: 69
Setting detail: SURGERY ADMIT
DRG: 462 | End: 2019-10-29
Payer: MEDICARE

## 2019-10-29 ENCOUNTER — HOSPITAL ENCOUNTER (INPATIENT)
Facility: HOSPITAL | Age: 69
LOS: 2 days | DRG: 462 | End: 2019-10-31
Attending: ORTHOPAEDIC SURGERY | Admitting: ORTHOPAEDIC SURGERY
Payer: MEDICARE

## 2019-10-29 ENCOUNTER — BMR PREADMISSION ASSESSMENT (OUTPATIENT)
Dept: ADMISSIONS | Facility: REHABILITATION | Age: 69
End: 2019-10-29

## 2019-10-29 ENCOUNTER — APPOINTMENT (INPATIENT)
Dept: RADIOLOGY | Facility: HOSPITAL | Age: 69
DRG: 462 | End: 2019-10-29
Attending: NURSE PRACTITIONER
Payer: MEDICARE

## 2019-10-29 PROBLEM — M19.90 OSTEOARTHROSIS: Status: ACTIVE | Noted: 2019-10-29

## 2019-10-29 LAB
ABO + RH BLD: NORMAL
BLD GP AB SCN SERPL QL: NEGATIVE
D AG BLD QL: POSITIVE
LABORATORY COMMENT REPORT: NORMAL

## 2019-10-29 PROCEDURE — 25000000 HC PHARMACY GENERAL: Performed by: ANESTHESIOLOGY

## 2019-10-29 PROCEDURE — 12000000 HC ROOM AND CARE MED/SURG

## 2019-10-29 PROCEDURE — 27200000 HC STERILE SUPPLY: Performed by: ORTHOPAEDIC SURGERY

## 2019-10-29 PROCEDURE — 37000002 HC ANESTHESIA MAC: Performed by: ORTHOPAEDIC SURGERY

## 2019-10-29 PROCEDURE — 25000000 HC PHARMACY GENERAL: Performed by: NURSE ANESTHETIST, CERTIFIED REGISTERED

## 2019-10-29 PROCEDURE — 99232 SBSQ HOSP IP/OBS MODERATE 35: CPT | Performed by: INTERNAL MEDICINE

## 2019-10-29 PROCEDURE — 25800000 HC PHARMACY IV SOLUTIONS: Performed by: NURSE PRACTITIONER

## 2019-10-29 PROCEDURE — 0SRC0JZ REPLACEMENT OF RIGHT KNEE JOINT WITH SYNTHETIC SUBSTITUTE, OPEN APPROACH: ICD-10-PCS | Performed by: ORTHOPAEDIC SURGERY

## 2019-10-29 PROCEDURE — 63700000 HC SELF-ADMINISTRABLE DRUG: Performed by: NURSE PRACTITIONER

## 2019-10-29 PROCEDURE — 71000001 HC PACU PHASE 1 INITIAL 30MIN: Performed by: ORTHOPAEDIC SURGERY

## 2019-10-29 PROCEDURE — 97161 PT EVAL LOW COMPLEX 20 MIN: CPT

## 2019-10-29 PROCEDURE — 63600000 HC DRUGS/DETAIL CODE: Performed by: NURSE PRACTITIONER

## 2019-10-29 PROCEDURE — 63600000 HC DRUGS/DETAIL CODE: Mod: JW | Performed by: NURSE PRACTITIONER

## 2019-10-29 PROCEDURE — 36415 COLL VENOUS BLD VENIPUNCTURE: CPT | Performed by: ORTHOPAEDIC SURGERY

## 2019-10-29 PROCEDURE — 25000000 HC PHARMACY GENERAL: Performed by: NURSE PRACTITIONER

## 2019-10-29 PROCEDURE — 63600000 HC DRUGS/DETAIL CODE: Performed by: ORTHOPAEDIC SURGERY

## 2019-10-29 PROCEDURE — 36000015 HC OR LEVEL 5 EA ADDL MIN: Performed by: ORTHOPAEDIC SURGERY

## 2019-10-29 PROCEDURE — C1713 ANCHOR/SCREW BN/BN,TIS/BN: HCPCS | Performed by: ORTHOPAEDIC SURGERY

## 2019-10-29 PROCEDURE — 86901 BLOOD TYPING SEROLOGIC RH(D): CPT

## 2019-10-29 PROCEDURE — 71000011 HC PACU PHASE 1 EA ADDL MIN: Performed by: ORTHOPAEDIC SURGERY

## 2019-10-29 PROCEDURE — 73560 X-RAY EXAM OF KNEE 1 OR 2: CPT | Mod: 50

## 2019-10-29 PROCEDURE — 25800000 HC PHARMACY IV SOLUTIONS: Performed by: ORTHOPAEDIC SURGERY

## 2019-10-29 PROCEDURE — 36000005 HC OR LEVEL 5 INITIAL 30MIN: Performed by: ORTHOPAEDIC SURGERY

## 2019-10-29 PROCEDURE — C1776 JOINT DEVICE (IMPLANTABLE): HCPCS | Performed by: ORTHOPAEDIC SURGERY

## 2019-10-29 PROCEDURE — 63600000 HC DRUGS/DETAIL CODE: Performed by: NURSE ANESTHETIST, CERTIFIED REGISTERED

## 2019-10-29 PROCEDURE — 0SRD0JZ REPLACEMENT OF LEFT KNEE JOINT WITH SYNTHETIC SUBSTITUTE, OPEN APPROACH: ICD-10-PCS | Performed by: ORTHOPAEDIC SURGERY

## 2019-10-29 PROCEDURE — 63600000 HC DRUGS/DETAIL CODE: Performed by: ANESTHESIOLOGY

## 2019-10-29 PROCEDURE — 25000000 HC PHARMACY GENERAL: Performed by: ORTHOPAEDIC SURGERY

## 2019-10-29 DEVICE — LOSPA FEMORAL COMPONENT (CR)(LEFT)
Type: IMPLANTABLE DEVICE | Site: KNEE | Status: FUNCTIONAL
Brand: LOSPA TOTAL KNEE SYSTEM

## 2019-10-29 DEVICE — LOSPA TIBIAL BASEPLATE(STD)
Type: IMPLANTABLE DEVICE | Site: KNEE | Status: FUNCTIONAL
Brand: LOSPA TOTAL KNEE SYSTEM

## 2019-10-29 DEVICE — CEMENT BONE SIMPLEX FULL DOSE: Type: IMPLANTABLE DEVICE | Site: KNEE | Status: FUNCTIONAL

## 2019-10-29 DEVICE — LOSPA TIBIAL INSERT (CR)
Type: IMPLANTABLE DEVICE | Site: KNEE | Status: FUNCTIONAL
Brand: LOSPA TOTAL KNEE SYSTEM

## 2019-10-29 DEVICE — LOSPA FEMORAL COMPONENT (CR)(RIGHT)
Type: IMPLANTABLE DEVICE | Site: KNEE | Status: FUNCTIONAL
Brand: LOSPA TOTAL KNEE SYSTEM

## 2019-10-29 RX ORDER — VANCOMYCIN HYDROCHLORIDE 1 G/20ML
INJECTION, POWDER, LYOPHILIZED, FOR SOLUTION INTRAVENOUS AS NEEDED
Status: DISCONTINUED | OUTPATIENT
Start: 2019-10-29 | End: 2019-10-29 | Stop reason: HOSPADM

## 2019-10-29 RX ORDER — ONDANSETRON HYDROCHLORIDE 2 MG/ML
4 INJECTION, SOLUTION INTRAVENOUS EVERY 8 HOURS PRN
Status: DISCONTINUED | OUTPATIENT
Start: 2019-10-29 | End: 2019-10-31 | Stop reason: HOSPADM

## 2019-10-29 RX ORDER — SODIUM CHLORIDE, SODIUM LACTATE, POTASSIUM CHLORIDE, CALCIUM CHLORIDE 600; 310; 30; 20 MG/100ML; MG/100ML; MG/100ML; MG/100ML
INJECTION, SOLUTION INTRAVENOUS CONTINUOUS
Status: ACTIVE | OUTPATIENT
Start: 2019-10-29 | End: 2019-10-30

## 2019-10-29 RX ORDER — MIDAZOLAM HYDROCHLORIDE 2 MG/2ML
INJECTION, SOLUTION INTRAMUSCULAR; INTRAVENOUS
Status: COMPLETED
Start: 2019-10-29 | End: 2019-10-29

## 2019-10-29 RX ORDER — AMOXICILLIN 250 MG
1 CAPSULE ORAL 2 TIMES DAILY
Status: DISCONTINUED | OUTPATIENT
Start: 2019-10-29 | End: 2019-10-31 | Stop reason: HOSPADM

## 2019-10-29 RX ORDER — ATORVASTATIN CALCIUM 10 MG/1
10 TABLET, FILM COATED ORAL
Status: DISCONTINUED | OUTPATIENT
Start: 2019-10-29 | End: 2019-10-31 | Stop reason: HOSPADM

## 2019-10-29 RX ORDER — NAPROXEN SODIUM 220 MG/1
81 TABLET, FILM COATED ORAL 2 TIMES DAILY
Status: DISCONTINUED | OUTPATIENT
Start: 2019-10-29 | End: 2019-10-31 | Stop reason: HOSPADM

## 2019-10-29 RX ORDER — FENTANYL CITRATE 50 UG/ML
INJECTION, SOLUTION INTRAMUSCULAR; INTRAVENOUS
Status: COMPLETED
Start: 2019-10-29 | End: 2019-10-29

## 2019-10-29 RX ORDER — SODIUM CHLORIDE, SODIUM LACTATE, POTASSIUM CHLORIDE, CALCIUM CHLORIDE 600; 310; 30; 20 MG/100ML; MG/100ML; MG/100ML; MG/100ML
INJECTION, SOLUTION INTRAVENOUS CONTINUOUS
Status: ACTIVE | OUTPATIENT
Start: 2019-10-29 | End: 2019-10-29

## 2019-10-29 RX ORDER — VALSARTAN 80 MG/1
80 TABLET ORAL DAILY
Status: DISCONTINUED | OUTPATIENT
Start: 2019-10-30 | End: 2019-10-31 | Stop reason: HOSPADM

## 2019-10-29 RX ORDER — FENTANYL CITRATE 50 UG/ML
INJECTION, SOLUTION INTRAMUSCULAR; INTRAVENOUS AS NEEDED
Status: DISCONTINUED | OUTPATIENT
Start: 2019-10-29 | End: 2019-10-29 | Stop reason: SURG

## 2019-10-29 RX ORDER — DIPHENHYDRAMINE HCL 25 MG
25 CAPSULE ORAL EVERY 6 HOURS PRN
Status: DISCONTINUED | OUTPATIENT
Start: 2019-10-29 | End: 2019-10-31 | Stop reason: HOSPADM

## 2019-10-29 RX ORDER — ALUMINUM HYDROXIDE, MAGNESIUM HYDROXIDE, AND SIMETHICONE 1200; 120; 1200 MG/30ML; MG/30ML; MG/30ML
30 SUSPENSION ORAL EVERY 4 HOURS PRN
Status: DISCONTINUED | OUTPATIENT
Start: 2019-10-29 | End: 2019-10-31 | Stop reason: HOSPADM

## 2019-10-29 RX ORDER — DEXAMETHASONE SODIUM PHOSPHATE 4 MG/ML
INJECTION, SOLUTION INTRA-ARTICULAR; INTRALESIONAL; INTRAMUSCULAR; INTRAVENOUS; SOFT TISSUE AS NEEDED
Status: DISCONTINUED | OUTPATIENT
Start: 2019-10-29 | End: 2019-10-29 | Stop reason: SURG

## 2019-10-29 RX ORDER — OXYCODONE HYDROCHLORIDE 5 MG/1
5-10 TABLET ORAL EVERY 4 HOURS PRN
Status: DISCONTINUED | OUTPATIENT
Start: 2019-10-29 | End: 2019-10-31 | Stop reason: HOSPADM

## 2019-10-29 RX ORDER — ACETAMINOPHEN 325 MG/1
650 TABLET ORAL
Status: COMPLETED | OUTPATIENT
Start: 2019-10-29 | End: 2019-10-31

## 2019-10-29 RX ORDER — BUPIVACAINE HYDROCHLORIDE 7.5 MG/ML
INJECTION, SOLUTION INTRASPINAL
Status: COMPLETED | OUTPATIENT
Start: 2019-10-29 | End: 2019-10-29

## 2019-10-29 RX ORDER — HYDROMORPHONE HYDROCHLORIDE 1 MG/ML
0.5 INJECTION, SOLUTION INTRAMUSCULAR; INTRAVENOUS; SUBCUTANEOUS
Status: DISCONTINUED | OUTPATIENT
Start: 2019-10-29 | End: 2019-10-29 | Stop reason: HOSPADM

## 2019-10-29 RX ORDER — KETAMINE HYDROCHLORIDE 10 MG/ML
INJECTION, SOLUTION INTRAMUSCULAR; INTRAVENOUS AS NEEDED
Status: DISCONTINUED | OUTPATIENT
Start: 2019-10-29 | End: 2019-10-29 | Stop reason: SURG

## 2019-10-29 RX ORDER — ONDANSETRON 4 MG/1
4 TABLET, ORALLY DISINTEGRATING ORAL EVERY 8 HOURS PRN
Status: DISCONTINUED | OUTPATIENT
Start: 2019-10-29 | End: 2019-10-31 | Stop reason: HOSPADM

## 2019-10-29 RX ORDER — CEFAZOLIN SODIUM 2 G/50ML
2 SOLUTION INTRAVENOUS
Status: COMPLETED | OUTPATIENT
Start: 2019-10-29 | End: 2019-10-30

## 2019-10-29 RX ORDER — CEFAZOLIN SODIUM 2 G/50ML
2 SOLUTION INTRAVENOUS
Status: DISCONTINUED | OUTPATIENT
Start: 2019-10-29 | End: 2019-10-29

## 2019-10-29 RX ORDER — DIPHENHYDRAMINE HCL 50 MG/ML
12.5 VIAL (ML) INJECTION
Status: DISCONTINUED | OUTPATIENT
Start: 2019-10-29 | End: 2019-10-29 | Stop reason: HOSPADM

## 2019-10-29 RX ORDER — DIPHENHYDRAMINE HCL 50 MG/ML
25 VIAL (ML) INJECTION EVERY 6 HOURS PRN
Status: DISCONTINUED | OUTPATIENT
Start: 2019-10-29 | End: 2019-10-31 | Stop reason: HOSPADM

## 2019-10-29 RX ORDER — DEXAMETHASONE SODIUM PHOSPHATE 4 MG/ML
10 INJECTION, SOLUTION INTRA-ARTICULAR; INTRALESIONAL; INTRAMUSCULAR; INTRAVENOUS; SOFT TISSUE ONCE
Status: COMPLETED | OUTPATIENT
Start: 2019-10-30 | End: 2019-10-30

## 2019-10-29 RX ORDER — PANTOPRAZOLE SODIUM 40 MG/1
40 TABLET, DELAYED RELEASE ORAL
Status: DISCONTINUED | OUTPATIENT
Start: 2019-10-30 | End: 2019-10-31 | Stop reason: HOSPADM

## 2019-10-29 RX ORDER — SODIUM CHLORIDE 0.9 G/100ML
INJECTION, SOLUTION IRRIGATION AS NEEDED
Status: DISCONTINUED | OUTPATIENT
Start: 2019-10-29 | End: 2019-10-29 | Stop reason: HOSPADM

## 2019-10-29 RX ORDER — PROPOFOL 10 MG/ML
INJECTION, EMULSION INTRAVENOUS CONTINUOUS PRN
Status: DISCONTINUED | OUTPATIENT
Start: 2019-10-29 | End: 2019-10-29 | Stop reason: SURG

## 2019-10-29 RX ORDER — MIDAZOLAM HYDROCHLORIDE 2 MG/2ML
INJECTION, SOLUTION INTRAMUSCULAR; INTRAVENOUS AS NEEDED
Status: DISCONTINUED | OUTPATIENT
Start: 2019-10-29 | End: 2019-10-29 | Stop reason: SURG

## 2019-10-29 RX ORDER — HEPARIN SODIUM 1000 [USP'U]/ML
INJECTION, SOLUTION INTRAVENOUS; SUBCUTANEOUS AS NEEDED
Status: DISCONTINUED | OUTPATIENT
Start: 2019-10-29 | End: 2019-10-29 | Stop reason: SURG

## 2019-10-29 RX ORDER — ACETAMINOPHEN 325 MG/1
975 TABLET ORAL ONCE
Status: COMPLETED | OUTPATIENT
Start: 2019-10-29 | End: 2019-10-29

## 2019-10-29 RX ORDER — KETOROLAC TROMETHAMINE 30 MG/ML
15 INJECTION, SOLUTION INTRAMUSCULAR; INTRAVENOUS EVERY 6 HOURS
Status: COMPLETED | OUTPATIENT
Start: 2019-10-29 | End: 2019-10-31

## 2019-10-29 RX ORDER — CEFAZOLIN SODIUM/WATER 2 G/20 ML
2 SYRINGE (ML) INTRAVENOUS
Status: COMPLETED | OUTPATIENT
Start: 2019-10-29 | End: 2019-10-29

## 2019-10-29 RX ORDER — FENTANYL CITRATE 50 UG/ML
50 INJECTION, SOLUTION INTRAMUSCULAR; INTRAVENOUS
Status: DISCONTINUED | OUTPATIENT
Start: 2019-10-29 | End: 2019-10-29 | Stop reason: HOSPADM

## 2019-10-29 RX ORDER — MORPHINE SULFATE 2 MG/ML
1 INJECTION, SOLUTION INTRAMUSCULAR; INTRAVENOUS
Status: DISCONTINUED | OUTPATIENT
Start: 2019-10-29 | End: 2019-10-31 | Stop reason: HOSPADM

## 2019-10-29 RX ORDER — SODIUM CHLORIDE 9 MG/ML
INJECTION, SOLUTION INTRAVENOUS CONTINUOUS
Status: ACTIVE | OUTPATIENT
Start: 2019-10-29 | End: 2019-10-30

## 2019-10-29 RX ORDER — ONDANSETRON HYDROCHLORIDE 2 MG/ML
4 INJECTION, SOLUTION INTRAVENOUS
Status: DISCONTINUED | OUTPATIENT
Start: 2019-10-29 | End: 2019-10-29 | Stop reason: HOSPADM

## 2019-10-29 RX ORDER — BISACODYL 10 MG/1
10 SUPPOSITORY RECTAL DAILY PRN
Status: DISCONTINUED | OUTPATIENT
Start: 2019-10-29 | End: 2019-10-31 | Stop reason: HOSPADM

## 2019-10-29 RX ORDER — POLYETHYLENE GLYCOL 3350 17 G/17G
17 POWDER, FOR SOLUTION ORAL DAILY
Status: DISCONTINUED | OUTPATIENT
Start: 2019-10-29 | End: 2019-10-31 | Stop reason: HOSPADM

## 2019-10-29 RX ORDER — ONDANSETRON HYDROCHLORIDE 2 MG/ML
INJECTION, SOLUTION INTRAVENOUS AS NEEDED
Status: DISCONTINUED | OUTPATIENT
Start: 2019-10-29 | End: 2019-10-29 | Stop reason: SURG

## 2019-10-29 RX ADMIN — ACETAMINOPHEN 650 MG: 325 TABLET ORAL at 13:00

## 2019-10-29 RX ADMIN — Medication 25 MG: at 08:45

## 2019-10-29 RX ADMIN — SENNOSIDES AND DOCUSATE SODIUM 1 TABLET: 8.6; 5 TABLET ORAL at 19:46

## 2019-10-29 RX ADMIN — ACETAMINOPHEN 650 MG: 325 TABLET ORAL at 19:45

## 2019-10-29 RX ADMIN — ONDANSETRON 4 MG: 2 INJECTION INTRAMUSCULAR; INTRAVENOUS at 08:44

## 2019-10-29 RX ADMIN — MIDAZOLAM HYDROCHLORIDE 2 MG: 1 INJECTION, SOLUTION INTRAMUSCULAR; INTRAVENOUS at 08:45

## 2019-10-29 RX ADMIN — OXYCODONE HYDROCHLORIDE 10 MG: 5 TABLET ORAL at 17:42

## 2019-10-29 RX ADMIN — SENNOSIDES AND DOCUSATE SODIUM 1 TABLET: 8.6; 5 TABLET ORAL at 13:00

## 2019-10-29 RX ADMIN — SODIUM CHLORIDE 250 ML: 9 INJECTION, SOLUTION INTRAVENOUS at 15:47

## 2019-10-29 RX ADMIN — PROPOFOL 50 MCG/KG/MIN: 10 INJECTION, EMULSION INTRAVENOUS at 08:45

## 2019-10-29 RX ADMIN — Medication 2 G: at 08:32

## 2019-10-29 RX ADMIN — SODIUM CHLORIDE, SODIUM LACTATE, POTASSIUM CHLORIDE, CALCIUM CHLORIDE: 600; 310; 30; 20 INJECTION, SOLUTION INTRAVENOUS at 08:06

## 2019-10-29 RX ADMIN — MIDAZOLAM HYDROCHLORIDE 2 MG: 1 INJECTION, SOLUTION INTRAMUSCULAR; INTRAVENOUS at 07:54

## 2019-10-29 RX ADMIN — FENTANYL CITRATE 50 MCG: 50 INJECTION INTRAMUSCULAR; INTRAVENOUS at 07:54

## 2019-10-29 RX ADMIN — SODIUM CHLORIDE: 9 INJECTION, SOLUTION INTRAVENOUS at 12:32

## 2019-10-29 RX ADMIN — SODIUM CHLORIDE, SODIUM LACTATE, POTASSIUM CHLORIDE, CALCIUM CHLORIDE: 600; 310; 30; 20 INJECTION, SOLUTION INTRAVENOUS at 10:44

## 2019-10-29 RX ADMIN — THERA TABS 1 TABLET: TAB at 13:00

## 2019-10-29 RX ADMIN — POLYETHYLENE GLYCOL 3350 17 G: 17 POWDER, FOR SOLUTION ORAL at 13:00

## 2019-10-29 RX ADMIN — BUPIVACAINE HYDROCHLORIDE IN DEXTROSE 2 ML: 7.5 INJECTION, SOLUTION SUBARACHNOID at 08:03

## 2019-10-29 RX ADMIN — OXYCODONE HYDROCHLORIDE 10 MG: 5 TABLET ORAL at 21:52

## 2019-10-29 RX ADMIN — ASPIRIN 81 MG 81 MG: 81 TABLET ORAL at 17:43

## 2019-10-29 RX ADMIN — FENTANYL CITRATE 50 MCG: 50 INJECTION INTRAMUSCULAR; INTRAVENOUS at 08:34

## 2019-10-29 RX ADMIN — OXYCODONE HYDROCHLORIDE 5 MG: 5 TABLET ORAL at 14:24

## 2019-10-29 RX ADMIN — ACETAMINOPHEN 975 MG: 325 TABLET ORAL at 07:06

## 2019-10-29 RX ADMIN — TRANEXAMIC ACID 1 G: 100 INJECTION, SOLUTION INTRAVENOUS at 08:06

## 2019-10-29 RX ADMIN — ATORVASTATIN CALCIUM 10 MG: 10 TABLET, FILM COATED ORAL at 17:43

## 2019-10-29 RX ADMIN — DEXAMETHASONE SODIUM PHOSPHATE 10 MG: 4 INJECTION, SOLUTION INTRA-ARTICULAR; INTRALESIONAL; INTRAMUSCULAR; INTRAVENOUS; SOFT TISSUE at 08:44

## 2019-10-29 RX ADMIN — TRANEXAMIC ACID 1 G: 100 INJECTION, SOLUTION INTRAVENOUS at 11:22

## 2019-10-29 RX ADMIN — Medication 25 MG: at 08:59

## 2019-10-29 RX ADMIN — SODIUM CHLORIDE, SODIUM LACTATE, POTASSIUM CHLORIDE, CALCIUM CHLORIDE: 600; 310; 30; 20 INJECTION, SOLUTION INTRAVENOUS at 09:13

## 2019-10-29 RX ADMIN — CEFAZOLIN SODIUM 2 G: 2 SOLUTION INTRAVENOUS at 16:36

## 2019-10-29 RX ADMIN — HEPARIN SODIUM 1000 UNITS: 1000 INJECTION, SOLUTION INTRAVENOUS; SUBCUTANEOUS at 08:44

## 2019-10-29 RX ADMIN — KETOROLAC TROMETHAMINE 15 MG: 30 INJECTION, SOLUTION INTRAMUSCULAR at 17:43

## 2019-10-29 RX ADMIN — OXYCODONE HYDROCHLORIDE 5 MG: 5 TABLET ORAL at 13:09

## 2019-10-29 RX ADMIN — KETOROLAC TROMETHAMINE 15 MG: 30 INJECTION, SOLUTION INTRAMUSCULAR at 12:32

## 2019-10-29 ASSESSMENT — COGNITIVE AND FUNCTIONAL STATUS - GENERAL
DRESSING REGULAR LOWER BODY CLOTHING: 3 - A LITTLE
DRESSING REGULAR UPPER BODY CLOTHING: 4 - NONE
MOVING TO AND FROM BED TO CHAIR: 3 - A LITTLE
CLIMB 3 TO 5 STEPS WITH RAILING: 1 - TOTAL
AFFECT: WFL
WALKING IN HOSPITAL ROOM: 3 - A LITTLE
HELP NEEDED FOR BATHING: 3 - A LITTLE
STANDING UP FROM CHAIR USING ARMS: 2 - A LOT
DRESSING REGULAR LOWER BODY CLOTHING: 2 - A LOT
HELP NEEDED FOR PERSONAL GROOMING: 4 - NONE
MOVING TO AND FROM BED TO CHAIR: 2 - A LOT
STANDING UP FROM CHAIR USING ARMS: 3 - A LITTLE
EATING MEALS: 4 - NONE
HELP NEEDED FOR BATHING: 3 - A LITTLE
EATING MEALS: 4 - NONE
WALKING IN HOSPITAL ROOM: 2 - A LOT
HELP NEEDED FOR PERSONAL GROOMING: 4 - NONE
DRESSING REGULAR UPPER BODY CLOTHING: 4 - NONE
TOILETING: 4 - NONE
CLIMB 3 TO 5 STEPS WITH RAILING: 3 - A LITTLE
TOILETING: 3 - A LITTLE

## 2019-10-29 NOTE — PERIOPERATIVE NURSING NOTE
Vancomycin 2gram with tranexamic acid 3 gram in 70ml nss pour bottle, 100ml total.  50ml administer to right knee    Pericapsular injection 50 ml administered to right knee

## 2019-10-29 NOTE — PLAN OF CARE
Problem: Adult Inpatient Plan of Care  Goal: Plan of Care Review  Outcome: Progressing  Flowsheets (Taken 10/29/2019 1127)  Plan of Care Reviewed With: patient  Outcome Summary: Min/mod assist of 2 for mobility, recommend acute rehab     Problem: Joint Function Impaired (Knee Arthroplasty)  Goal: Optimal Functional Ability  Outcome: Progressing

## 2019-10-29 NOTE — ANESTHESIA PROCEDURE NOTES
Spinal Block    Patient location during procedure: pre-op  Start time: 10/29/2019 7:54 AM  End time: 10/29/2019 8:00 AM  Staffing  Anesthesiologist: Dorene Sepulveda MD  Performed: anesthesiologist   Reason for block: primary anesthetic  Preanesthetic Checklist  Completed: patient identified, surgical consent, pre-op evaluation, timeout performed, IV checked, risks and benefits discussed, monitors and equipment checked and sterile field maintained during procedure  Spinal Block  Patient position: sitting  Prep: Betadine and site prepped and draped  Patient monitoring: heart rate, cardiac monitor, continuous pulse ox and blood pressure  Approach: midline  Location: L3-4  Injection technique: single-shot  Needle  Needle type: Sprotte   Needle gauge: 24 G  Needle length: 3.5 in  Assessment  Sensory level: T4  Events: cerebrospinal fluid  Additional Notes  Procedure well tolerated. Vital signs stable.    Medications Administered  Bupivacaine PF (MARCAINE SPINAL) 0.75% intrathecal injection, 2 mL

## 2019-10-29 NOTE — NURSING NOTE
Pt out of bed to chair with PT/OT. Per PT, BP 70 systolic upon transfer to chair. Pt reclined with legs elevated. /55. RODRIGUE Skelton notified. Orders given and carried out.

## 2019-10-29 NOTE — PROGRESS NOTES
Inpatient Cardiology   Daily Progress Note       Overview:    - S/P bilateral TKA on 10/29/19     Assessment/Plan   Essential (primary) hypertension  Assessment & Plan  - Valsartan on 10/30 AM with holds    Hyperlipidemia, unspecified  Assessment & Plan  - Atorvastatin  - Hold Fish Oil. Resume post op when ok per ortho    Osteoarthrosis  Overview  - S/P bilateral TKA on 10/29/19    Assessment & Plan  - DVT prophylaxis and pain management per ortho service  - Pulmonary toilet  - Ambulate  - Hold all natural supplements. Resume post op when ok to do so per ortho             Subjective   Bilateral knee pain. Otherwise no complaints. No cp, dyspnea, headache, nausea. Planning on d/c to [facility] after acute hospitalization.       Current Medications:    Scheduled:  • acetaminophen  650 mg oral q6h INT   • aspirin  81 mg oral BID   • atorvastatin  10 mg oral Daily (6p)   • ceFAZolin  2 g intravenous q8h INT   • [START ON 10/30/2019] dexamethasone  10 mg intravenous Once   • ketorolac  15 mg intravenous q6h ELIZABETH   • multivitamin  1 tablet oral Daily   • [START ON 10/30/2019] pantoprazole  40 mg oral Daily before breakfast   • polyethylene glycol  17 g oral Daily   • sennosides-docusate sodium  1 tablet oral BID   • [START ON 10/30/2019] valsartan  80 mg oral Daily       Infusions:  • lactated ringer's   125 mL/hr at 10/29/19 0806   • lactated ringer's   Stopped (10/29/19 1234)   • sodium chloride 0.9 %   125 mL/hr at 10/29/19 1232       PRN:  alum-mag hydroxide-simeth  •  bisacodyl  •  diphenhydrAMINE **OR** diphenhydrAMINE  •  oxyCODONE **OR** morphine  •  ondansetron ODT **OR** ondansetron     Objective   Vital signs in last 24 hours:  Temp:  [36.6 °C (97.8 °F)-36.8 °C (98.3 °F)] 36.6 °C (97.8 °F)  Heart Rate:  [66-86] 86  Resp:  [16-30] 18  BP: (120-141)/() 131/62    Wt Readings from Last 3 Encounters:   10/29/19 97 kg (213 lb 12.8 oz)   10/16/19 98.9 kg (218 lb)   10/16/19 98.3 kg (216 lb 12.8 oz)       Physical  Exam   Constitutional: He is oriented to person, place, and time. He appears well-developed and well-nourished.   HENT:   Head: Normocephalic.   Eyes: No scleral icterus.   Neck: No JVD present.   Cardiovascular: Normal rate, regular rhythm and normal heart sounds.   No murmur heard.  + pedal pulses   Pulmonary/Chest: Effort normal and breath sounds normal. He has no wheezes.   Abdominal: Soft. Bowel sounds are normal. There is no tenderness.   Musculoskeletal: He exhibits no edema.   Neurological: He is alert and oriented to person, place, and time.   Skin: Skin is warm and dry.   B/l knee aquacel c/d/i    Psychiatric: He has a normal mood and affect.          Labs and Data:      Hematology  Lab Results   Component Value Date    WBC 6.14 10/16/2019    HGB 14.2 10/16/2019    HCT 41.8 10/16/2019     10/16/2019    INR 1.0 10/16/2019       Chemistries  Lab Results   Component Value Date     10/16/2019    K 4.0 10/16/2019     10/16/2019    CREATININE 0.9 10/16/2019    BUN 15 10/16/2019    CO2 26 10/16/2019    GLUCOSE 131 (H) 10/16/2019    CALCIUM 9.3 10/16/2019       Biomarkers  No results found for: CKTOTAL, TROPONINI, BNP    Cholesterol  No results found for: CHOL, TRIG, HDL, LDLCALC, NONHDLCALC    Endocrine  Lab Results   Component Value Date    HGBA1C 5.6 10/16/2019      Radiology:  I have independently reviewed the pertinent imaging from the last 24 hrs.    X-ray Knee Bilateral 1 Or 2 Views    Result Date: 10/29/2019  IMPRESSION: Bilateral total knee SD see in good position. COMMENT: AP and lateral portable views of the right and left knee show a total knee prosthesis in place. The components are in good position and alignment. No abnormalities are seen.                      RODRIGUE Briggs  10/29/2019

## 2019-10-29 NOTE — HOSPITAL COURSE
68 year old male with bilateral knee pain x 5-6 years. It has progressively worsened.  Pt s/p  bilateral TKA's on 10/29/19.  WBAT B/L LE's.   Decadron 10mg IV x1 POD 1.      At baseline, pt is independent. He now requires assistance with PT/OT.  Pt will benefit from acute rehab at d/c prior to returning home.

## 2019-10-29 NOTE — HOSPITAL COURSE
Chris is a 68 y.o. male admitted on 10/29/2019 with Osteoarthritis of both knees, unspecified osteoarthritis type [M17.0]  Osteoarthrosis [M19.90]. Principal problem is No Principal Problem: There is no principal problem currently on the Problem List. Please update the Problem List and refresh..    Chris has a past medical history of Essential (primary) hypertension, Hyperlipidemia, unspecified, Impaired fasting glucose, Osteoarthritis, and Prostate cancer (CMS/[facility]) (2011).     B/L TKA

## 2019-10-29 NOTE — OR SURGEON
Pre-Procedure patient identification:  I am the primary operating surgeon/proceduralist and I have identified the patient on 10/29/19 at 6:20 AM Ted Zheng MD  Phone Number: [phone]

## 2019-10-29 NOTE — ANESTHESIA PREPROCEDURE EVALUATION
Relevant Problems   ANESTHESIA (within normal limits)      CARDIOVASCULAR   (+) Essential (primary) hypertension      GASTROINTESTINAL (within normal limits)      MUSCULOSKELETAL   (+) Osteoarthritis of both knees       Past Surgical History:   Procedure Laterality Date   • COLONOSCOPY W/ POLYPECTOMY  2015   • KNEE SURGERY Right     MCL Repair   • MOHS SURGERY      Right finger   • PROSTATECTOMY  2011       Physical Exam    Airway   Mallampati: II   TM distance: >3 FB   Neck ROM: full  Cardiovascular - normal   Rhythm: regular   Rate: normalPulmonary - normal   clear to auscultation  Dental - normal        Anesthesia Plan    Plan: spinal and MAC    Technique: spinal and MAC     Lines and Monitors: PIV     Airway: natural airway / supplemental oxygen   ASA 3  Anesthetic plan and risks discussed with: patient  Postop Plan:   Patient Disposition: inpatient floor planned admission   Pain Management: spinal and IV analgesics

## 2019-10-29 NOTE — PROGRESS NOTES
Pt seen & examined in PACU  No complaints, pain well controlled  Denies Cp, SOB, palpitations, nausea & vomiting    Alert, VSS, NAD  BLE aquacell dressing c/d/i with ice pack in place  BLE DP/PT intact, DF/PF/EHL intact, SILT  Calves soft, nontender    A/P: s/p  Bilateral TKA POD 0 d/w Attending  Pain control  Post op xray  DVT ppx ASA 81mg BID x4 weeks  PT/OT  WBAT  Med management: LHG  IVF/plata until POD1   Decadron 10mg IV x1 POD 1   dispo planing likely to rehab thursday

## 2019-10-29 NOTE — ANESTHESIA POSTPROCEDURE EVALUATION
Patient: Chapincito Yoon    Procedure Summary     Date:  10/29/19 Room / Location:   OR 6 /  OR    Anesthesia Start:  0833 Anesthesia Stop:  1116    Procedure:  Bilateral total knee replacement (Bilateral Knee) Diagnosis:       Osteoarthritis of both knees, unspecified osteoarthritis type      (bilateral knee djd)    Surgeon:  Ted Zheng MD Responsible Provider:  Dorene Sepulveda MD    Anesthesia Type:  spinal, MAC ASA Status:  3          Anesthesia Type: spinal, MAC  PACU Vitals  10/29/2019 1103 - 10/29/2019 1156      10/29/2019  1110 10/29/2019  1115 10/29/2019  1120 10/29/2019  1130    BP:  --  120/73  (!) 141/71  138/63    Temp:  --  36.7 °C (98.1 °F)  --  --    Pulse:  70  75  68  68    Resp:  (!) 30  19  19  18    SpO2:  98 %  99 %  99 %  99 %              10/29/2019  1140 10/29/2019  1150          BP:  134/75  (!) 132/118      Temp:  --  --      Pulse:  73  68      Resp:  20  (!) 22      SpO2:  100 %  100 %              Anesthesia Post Evaluation    Pain management: adequate  Mode of pain management: IV medication and additional block medications  Patient participation: complete - patient participated  Level of consciousness: awake and alert  Cardiovascular status: acceptable  Airway Patency: adequate  Respiratory status: acceptable  Hydration status: acceptable  Anesthetic complications: no

## 2019-10-29 NOTE — PROGRESS NOTES
Patient: Chapincito Yoon  Location: [place] [address]  MRN: [id number]  Today's date: 10/29/2019    Pt received supine.  Pt left in chair on sheet and incontinence pad with alarm on.  Needs in place and call bell in reach.  RN aware.    Hospital Course  Chris is a 68 y.o. male admitted on 10/29/2019 with Osteoarthritis of both knees, unspecified osteoarthritis type [M17.0]  Osteoarthrosis [M19.90]. Principal problem is No Principal Problem: There is no principal problem currently on the Problem List. Please update the Problem List and refresh..    Chris has a past medical history of Essential (primary) hypertension, Hyperlipidemia, unspecified, Impaired fasting glucose, Osteoarthritis, and Prostate cancer (CMS/[facility]) (2011).        10/29/19 1520   Pain/Comfort/Sleep   Pain Charting Type Pain Assessment   Preferred Pain Scale number (Numeric Rating Pain Scale)   Pain Body Location - Side Bilateral   Pain Body Location knee   Pain Rating (0-10): Rest 4   Pain Rating (0-10): Activity 5   Pain Management Interventions position adjusted;relaxation techniques promoted   Vital Signs   Heart Rate 70   SpO2 98 %   Patient Activity At rest   Oxygen Therapy None (Room air)   BP (!) 163/82   Patient Position Lying         Therapy Pain/Vitals - 10/29/19 1530        Vital Signs    BP  (!) 73/37     Patient Position  Sitting        Patient Observation    Observations  Post-ambulation           Prior Living Environment      Most Recent Value   Living Arrangements  house   Living Environment Comment  spouse, 2SH, steps to enter, B&B 2fl, tub/shower          Prior Level of Function      Most Recent Value   Ambulation  independent   Transferring  independent   Toileting  independent   Bathing  independent   Dressing  independent   Eating  independent   Communication  understands/communicates without difficulty   Swallowing  swallows foods/liquids without difficulty   Equipment Currently Used at Home  none          OT Evaluation and  Treatment - 10/29/19 1525        Time Calculation    Start Time  1525     Stop Time  1545     Time Calculation (min)  20 min        Session Details    Document Type  initial evaluation     Mode of Treatment  occupational therapy        General Information    Patient Profile Reviewed?  yes     Onset of Illness/Injury or Date of Surgery  10/29/19     Referring Physician  Dr. Zheng     Existing Precautions/Restrictions  fall;weight bearing        Weight-Bearing Status    Left LE Weight-Bearing Status  weight-bearing as tolerated (WBAT)     Right LE Weight-Bearing Status  weight-bearing as tolerated (WBAT)        Cognition/Psychosocial    Affect/Mental Status (Cognitive)  WFL        Range of Motion (ROM)    Range of Motion  bilateral upper extremities;ROM is WFL        Strength (Manual Muscle Testing)    Strength (Manual Muscle Testing)  bilateral upper extremities;strength is WFL        Bed Mobility    [place], Supine to Sit  supervision     Assistive Device (Bed Mobility)  bed rails;head of bed elevated        Transfers    Transfers  stand pivot/stand step transfer        Sit to Stand Transfer    [place], Sit to Stand Transfer  moderate assist (50% patient effort);2 person assist     Verbal Cues  hand placement;safety;technique     Assistive Device  walker, front-wheeled        Stand to Sit Transfer    [place], Stand to Sit Transfer  minimum assist (75% patient effort)     Verbal Cues  safety;hand placement;technique     Assistive Device  walker, front-wheeled        Stand Pivot/Stand Step Transfer    [place], Stand Pivot/Stand Step Transfer  minimum assist (75% or more patient effort)     Assistive Device  walker, front-wheeled        AM-PAC (TM) - ADL (Current Function)    Putting on and taking off regular lower body clothing?  2 - A Lot     Bathing?  3 - A Little     Toileting?  3 - A Little     Putting on/taking off regular upper body clothing?  4 - None     How much help for taking care of  personal grooming?  4 - None     Eating meals?  4 - None     AM-PAC (TM) ADL Score  20        Therapy Assessment/Plan (OT)    Rehab Potential (OT)  good, to achieve stated therapy goals     Therapy Frequency (OT)  5-7 times/wk     Activity Limitations Related to Problem List  BADLs not performed adequately or safely;unable to ambulate safely;unable to transfer safely;IADLs not performed adequately or safely        Progress Summary (OT)    Daily Outcome Statement (OT)  Min A- Mod A x2 for transfers, Min A for funx mob via RW.  Rec IRF.        Therapy Plan Review/Discharge Plan (OT)    Anticipated Equipment Needs At Discharge (OT)  --    TBD at IRF    OT Recommended Discharge Disposition  inpatient rehabilitation facility                    Education provided this session. See the Patient Education summary report for full details.         OT Goals      Most Recent Value   Transfer Goal 1   Activity/Assistive Device  toilet at 10/29/2019 1525   [place]  supervision required at 10/29/2019 1525   Time Frame  by discharge at 10/29/2019 1525   Progress/Outcome  goal ongoing at 10/29/2019 1525   Transfer Goal 2   Activity/Assistive Device  sit-to-stand/stand-to-sit at 10/29/2019 1525   [place]  supervision required at 10/29/2019 1525   Time Frame  by discharge at 10/29/2019 1525   Progress/Outcome  goal ongoing at 10/29/2019 1525   Dressing Goal 1   Activity/Adaptive Equipment  lower body dressing at 10/29/2019 1525   [place]  minimum assist (75% or more patient effort) at 10/29/2019 1525   Time Frame  by discharge at 10/29/2019 1525   Progress/Outcome  goal ongoing at 10/29/2019 1525

## 2019-10-29 NOTE — PLAN OF CARE
Problem: Adult Inpatient Plan of Care  Goal: Readiness for Transition of Care  Intervention: Mutually Develop Transition Plan  Flowsheets (Taken 10/29/2019 1401)  Anticipated Discharge Disposition: inpatient rehabilitation facility/acute rehab (interested in [facility])  Equipment Needed After Discharge: walker, rolling  Discharge Coordination/Progress: See CM note below,  Equipment Currently Used at Home: none  Anticipated Changes Related to Illness: none  Readmission Within the Last 30 Days: no previous admission in last 30 days  Patient/Family Anticipated Services at Transition: rehabilitation services  Patient/Family Anticipates Transition to: inpatient rehabilitation facility  Transportation Anticipated: family or friend will provide  Concerns to be Addressed: adjustment to diagnosis/illness; discharge planning  Offered/Gave Vendor List: no   Notes:  CURRENT LIVING SITUATION: met patient and several family members in the room, introduced self verified ID, PCP Dr Kelley and demographic info  They live in a two story home with powder room on the first floor  PLOF: independent and ambulatory  DME/OXYGEN: has no DME, will need walker prior to d/c to home  Home Care Services: N/A  Transportation: discussed with family, will determine later  Disposition: plan is to [facility] per navigator log and per patient who believes he will benefit from acute rehab prior to returning to home, will make the appropriate referrals.           Explained the role of the care coordination team, business card given, will follow and assist as necessary.

## 2019-10-29 NOTE — PLAN OF CARE
Problem: Adult Inpatient Plan of Care  Goal: Plan of Care Review  Outcome: Progressing  Flowsheets  Taken 10/29/2019 1551 by Casa Starks PT  Plan of Care Reviewed With: patient  Taken 10/29/2019 1601 by Garima Mejia OT  Outcome Summary: Min A- Mod A x2 for transfers, Min A for funx mob via RW.  Rec IRF.     Problem: Adult Inpatient Plan of Care  Goal: Patient-Specific Goal (Individualization)  Outcome: Progressing  Flowsheets (Taken 10/29/2019 1601)  Patient-Specific Goals (Include Timeframe): I want to go home     Problem: Joint Function Impaired (Knee Arthroplasty)  Goal: Optimal Functional Ability  Outcome: Progressing

## 2019-10-29 NOTE — PROGRESS NOTES
Patient: Chapincito Yoon  Location: [place] [address]  MRN: [id number]  Today's date: 10/29/2019    Left patient supine in recliner, call bell within reach, vss/NAD, SCD's applied, RN aware      Hospital Course  Chris is a 68 y.o. male admitted on 10/29/2019 with Osteoarthritis of both knees, unspecified osteoarthritis type [M17.0]  Osteoarthrosis [M19.90]. Principal problem is No Principal Problem: There is no principal problem currently on the Problem List. Please update the Problem List and refresh..    Chris has a past medical history of Essential (primary) hypertension, Hyperlipidemia, unspecified, Impaired fasting glucose, Osteoarthritis, and Prostate cancer (CMS/HCC) (2011).    Therapy Pain/Vitals - 10/29/19 1530        Vital Signs    BP  (!) 73/37     Patient Position  Sitting        Patient Observation    Observations  Post-ambulation           Prior Living Environment      Most Recent Value   Living Arrangements  house   Living Environment Comment  2SH with GENE          Prior Level of Function      Most Recent Value   Ambulation  independent   Transferring  independent   Toileting  independent   Bathing  independent   Dressing  independent   Eating  independent   Communication  understands/communicates without difficulty   Swallowing  swallows foods/liquids without difficulty   Prior Level of Function Comment  No DME use PTA   Equipment Currently Used at Home  none          PT Evaluation and Treatment - 10/29/19 1537        Time Calculation    Start Time  1518     Stop Time  1537     Time Calculation (min)  19 min        Session Details    Document Type  initial evaluation     Mode of Treatment  physical therapy        General Information    Patient Profile Reviewed?  yes     Onset of Illness/Injury or Date of Surgery  10/29/19     Referring Physician  Marce     General Observations of Patient  Presented supine in bed     Existing Precautions/Restrictions  fall;weight bearing        Range of Motion  Comprehensive    Comment: Range of Motion  AROM knees 10-70 degrees        Bed Mobility    [place], Supine to Sit  minimum assist (75% patient effort)        Sit to Stand Transfer    [place], Sit to Stand Transfer  moderate assist (50% patient effort);2 person assist     Verbal Cues  hand placement;preparatory posture;proper use of assistive device;safety;technique     Assistive Device  walker, front-wheeled        Stand to Sit Transfer    [place], Stand to Sit Transfer  moderate assist (50% patient effort);2 person assist     Verbal Cues  hand placement;preparatory posture;proper use of assistive device;safety;technique     Assistive Device  walker, front-wheeled        Gait Training    [place], Gait  minimum assist (75% or more patient effort);2 person assist     Assistive Device  walker, front-wheeled     Distance in Feet  15 feet     Gait Pattern Utilized  step-through     Deviations/Abnormal Patterns (Gait)  base of support, wide;willian decreased;festinating/shuffling;gait speed decreased     Comment  Distance limited by lightheadedness, vitals as noted, RN aware        AM-PAC (TM) - Mobility (Current Function)    Turning from your back to your side while in a flat bed without using bedrails?  3 - A Little     Moving from lying on your back to sitting on the side of a flat bed without using bedrails?  3 - A Little     Moving to and from a bed to a chair?  2 - A Lot     Standing up from a chair using your arms?  2 - A Lot     To walk in a hospital room?  2 - A Lot     Climbing 3-5 steps with a railing?  1 - Total     AM-PAC (TM) Mobility Score  13        Therapy Assessment/Plan (PT)    Patient/Family Therapy Goals Statement (PT)  To go to [facility]     Functional Level at Time of Evaluation (PT)  Min/mod assist     PT Diagnosis  Knee pain     Rehab Potential (PT)  good, to achieve stated therapy goals     Criteria for Skilled Interventions Met (PT)  yes     Therapy Frequency (PT)  5-7  times/wk     Planned Therapy Interventions (PT)  balance training;gait training;home exercise program;ROM (range of motion)     Problem List  problems related to;balance;pain     Activity Limitations Related to Problem List  unable to ambulate safely;unable to transfer safely        Therapy Plan Review/Discharge Plan (PT)    Therapy Plan Review (PT)  evaluation/treatment results reviewed;care plan/treatment goals reviewed;risks/benefits reviewed;current/potential barriers reviewed;participants voiced agreement with care plan;participants included;patient     Anticipated Equipment Needs at Discharge (PT Eval)  walker, front-wheeled     PT Recommended Discharge Disposition  inpatient rehabilitation facility        Plan of Care Review    Plan of Care Reviewed With  patient           Pain Assessment/Intervention  Pain Charting Type: Pain Assessment             Education provided this session. See the Patient Education summary report for full details.    PT Goals      Most Recent Value   Bed Mobility Goal 1   Activity/Assistive Device  sit to supine/supine to sit at 10/29/2019 1537   [place]  modified independence at 10/29/2019 1537   Time Frame  2 weeks at 10/29/2019 1537   Transfer Goal 1   Activity/Assistive Device  sit-to-stand/stand-to-sit, car transfer, walker, front-wheeled at 10/29/2019 1537   [place]  modified independence at 10/29/2019 1537   Time Frame  2 weeks at 10/29/2019 1537   Gait Training Goal 1   Activity/Assistive Device  gait (walking locomotion), walker, rolling at 10/29/2019 1537   [place]  modified independence at 10/29/2019 1537   Distance  200 at 10/29/2019 1537   Time Frame  2 weeks at 10/29/2019 1537   Stairs Goal 1   Activity/Assistive Device  ascending stairs, descending stairs, step-to-step, cane, straight at 10/29/2019 1537   [place]  modified independence at 10/29/2019 1537   Number of Stairs  12 at 10/29/2019 1537   Time Frame  2 weeks at 10/29/2019 1537

## 2019-10-30 LAB
ANION GAP SERPL CALC-SCNC: 6 MEQ/L (ref 3–15)
BUN SERPL-MCNC: 13 MG/DL (ref 8–20)
CALCIUM SERPL-MCNC: 8.1 MG/DL (ref 8.9–10.3)
CHLORIDE SERPL-SCNC: 105 MEQ/L (ref 98–109)
CO2 SERPL-SCNC: 23 MEQ/L (ref 22–32)
CREAT SERPL-MCNC: 0.9 MG/DL
ERYTHROCYTE [DISTWIDTH] IN BLOOD BY AUTOMATED COUNT: 12.7 % (ref 11.6–14.4)
GFR SERPL CREATININE-BSD FRML MDRD: >60 ML/MIN/1.73M*2
GLUCOSE SERPL-MCNC: 134 MG/DL (ref 70–99)
HCT VFR BLDCO AUTO: 32.9 % (ref 40.1–51)
HGB BLD-MCNC: 11.2 G/DL
MCH RBC QN AUTO: 31.5 PG (ref 28–33.2)
MCHC RBC AUTO-ENTMCNC: 34 G/DL (ref 32.2–36.5)
MCV RBC AUTO: 92.7 FL (ref 83–98)
PDW BLD AUTO: 10.6 FL (ref 9.4–12.4)
PLATELET # BLD AUTO: 167 K/UL
POTASSIUM SERPL-SCNC: 3.8 MEQ/L (ref 3.6–5.1)
RBC # BLD AUTO: 3.55 M/UL (ref 4.5–5.8)
SODIUM SERPL-SCNC: 134 MEQ/L (ref 136–144)
WBC # BLD AUTO: 7.65 K/UL

## 2019-10-30 PROCEDURE — 97110 THERAPEUTIC EXERCISES: CPT | Mod: GP

## 2019-10-30 PROCEDURE — 63700000 HC SELF-ADMINISTRABLE DRUG: Performed by: NURSE PRACTITIONER

## 2019-10-30 PROCEDURE — 36415 COLL VENOUS BLD VENIPUNCTURE: CPT | Performed by: NURSE PRACTITIONER

## 2019-10-30 PROCEDURE — 97535 SELF CARE MNGMENT TRAINING: CPT | Mod: GO

## 2019-10-30 PROCEDURE — 97116 GAIT TRAINING THERAPY: CPT | Mod: GP

## 2019-10-30 PROCEDURE — 80048 BASIC METABOLIC PNL TOTAL CA: CPT | Performed by: NURSE PRACTITIONER

## 2019-10-30 PROCEDURE — 12000000 HC ROOM AND CARE MED/SURG

## 2019-10-30 PROCEDURE — 63600000 HC DRUGS/DETAIL CODE: Mod: JW | Performed by: NURSE PRACTITIONER

## 2019-10-30 PROCEDURE — 99232 SBSQ HOSP IP/OBS MODERATE 35: CPT | Performed by: INTERNAL MEDICINE

## 2019-10-30 PROCEDURE — 85027 COMPLETE CBC AUTOMATED: CPT | Performed by: NURSE PRACTITIONER

## 2019-10-30 RX ADMIN — ACETAMINOPHEN 650 MG: 325 TABLET ORAL at 09:12

## 2019-10-30 RX ADMIN — OXYCODONE HYDROCHLORIDE 10 MG: 5 TABLET ORAL at 06:20

## 2019-10-30 RX ADMIN — ACETAMINOPHEN 650 MG: 325 TABLET ORAL at 02:03

## 2019-10-30 RX ADMIN — THERA TABS 1 TABLET: TAB at 09:13

## 2019-10-30 RX ADMIN — ACETAMINOPHEN 650 MG: 325 TABLET ORAL at 20:59

## 2019-10-30 RX ADMIN — ATORVASTATIN CALCIUM 10 MG: 10 TABLET, FILM COATED ORAL at 17:56

## 2019-10-30 RX ADMIN — SENNOSIDES AND DOCUSATE SODIUM 1 TABLET: 8.6; 5 TABLET ORAL at 21:00

## 2019-10-30 RX ADMIN — ACETAMINOPHEN 650 MG: 325 TABLET ORAL at 13:30

## 2019-10-30 RX ADMIN — OXYCODONE HYDROCHLORIDE 10 MG: 5 TABLET ORAL at 02:03

## 2019-10-30 RX ADMIN — PANTOPRAZOLE SODIUM 40 MG: 40 TABLET, DELAYED RELEASE ORAL at 06:20

## 2019-10-30 RX ADMIN — SENNOSIDES AND DOCUSATE SODIUM 1 TABLET: 8.6; 5 TABLET ORAL at 11:42

## 2019-10-30 RX ADMIN — KETOROLAC TROMETHAMINE 15 MG: 30 INJECTION, SOLUTION INTRAMUSCULAR at 00:17

## 2019-10-30 RX ADMIN — CEFAZOLIN SODIUM 2 G: 2 SOLUTION INTRAVENOUS at 00:20

## 2019-10-30 RX ADMIN — ONDANSETRON 4 MG: 4 TABLET, ORALLY DISINTEGRATING ORAL at 08:34

## 2019-10-30 RX ADMIN — KETOROLAC TROMETHAMINE 15 MG: 30 INJECTION, SOLUTION INTRAMUSCULAR at 11:39

## 2019-10-30 RX ADMIN — DEXAMETHASONE SODIUM PHOSPHATE 10 MG: 4 INJECTION, SOLUTION INTRA-ARTICULAR; INTRALESIONAL; INTRAMUSCULAR; INTRAVENOUS; SOFT TISSUE at 09:13

## 2019-10-30 RX ADMIN — KETOROLAC TROMETHAMINE 15 MG: 30 INJECTION, SOLUTION INTRAMUSCULAR at 06:20

## 2019-10-30 RX ADMIN — KETOROLAC TROMETHAMINE 15 MG: 30 INJECTION, SOLUTION INTRAMUSCULAR at 17:57

## 2019-10-30 RX ADMIN — ASPIRIN 81 MG 81 MG: 81 TABLET ORAL at 20:59

## 2019-10-30 RX ADMIN — VALSARTAN 80 MG: 80 TABLET, FILM COATED ORAL at 09:13

## 2019-10-30 RX ADMIN — OXYCODONE HYDROCHLORIDE 10 MG: 5 TABLET ORAL at 20:59

## 2019-10-30 RX ADMIN — ASPIRIN 81 MG 81 MG: 81 TABLET ORAL at 09:13

## 2019-10-30 ASSESSMENT — COGNITIVE AND FUNCTIONAL STATUS - GENERAL
DRESSING REGULAR UPPER BODY CLOTHING: 4 - NONE
EATING MEALS: 4 - NONE
WALKING IN HOSPITAL ROOM: 3 - A LITTLE
CLIMB 3 TO 5 STEPS WITH RAILING: 2 - A LOT
DRESSING REGULAR LOWER BODY CLOTHING: 3 - A LITTLE
HELP NEEDED FOR BATHING: 3 - A LITTLE
HELP NEEDED FOR PERSONAL GROOMING: 4 - NONE
STANDING UP FROM CHAIR USING ARMS: 3 - A LITTLE
TOILETING: 3 - A LITTLE
MOVING TO AND FROM BED TO CHAIR: 3 - A LITTLE

## 2019-10-30 NOTE — PLAN OF CARE
Problem: Adult Inpatient Plan of Care  Goal: Plan of Care Review  Flowsheets (Taken 10/30/2019 1211)  Progress: improving  Plan of Care Reviewed With: patient  Outcome Summary: RUCHI spoke with [facility] liasion, she was informed of referral and there will be a bed on Thursday. SW following.

## 2019-10-30 NOTE — PROGRESS NOTES
Pt seen & examined  With Dr. Zheng this AM.   No complaints except for nausea. Took pain mediation on empty stomach, otherwise pain well controlled     Alert, VSS, NAD  BLE aquacell dressing c/d/i with ice pack in place  BLE DP/PT intact, DF/PF/EHL intact, SILT  Calves soft, nontender   Hg 11.2  A/P: s/p  Bilateral TKA POD 1 d/w Attending  Pain control  Post op xray reviewed by Dr. Zheng   DVT ppx ASA 81mg BID x4 weeks  PT/OT  WBAT  Med management: [facility]  PMR consult for rehab placement  IVF/plata until POD1   Decadron 10mg IV x1 POD 1   dispo planing likely to [facility] thursday

## 2019-10-30 NOTE — CONSULTS
REPORT TYPE:  Consult Note    DATE OF CONSULTATION:  10/30/2019      PHYSICAL MEDICINE AND REHABILITATION CONSULTAION    REQUESTING PHYSICIAN:  Dr. Ted Zheng of orthopedics.    REASON FOR CONSULTATION:  Rehabilitation.    The patient was seen and examined today, 10/30/2019 at 1:30 p.m.    IMPRESSION:  Mobility and activities of daily living dysfunction secondary to severe degenerative joint disease of the bilateral knees, status post elective bilateral total knee arthroplasty performed by Dr. Ted Zheng of orthopedics on 10/29/2019   (postop day #1).    PLAN OF CARE:  The patient will be enrolled in a comprehensive inpatient physical therapy and occupational therapy program.  PT and OT will focus on bed mobility, trunk stability, transfer training, gait training, ADLs, as well as gait and balance   activities.  The patient is cleared for weightbearing as tolerated and placed on DVT prophylaxis as per orthopedics.  Medicine consultation has been noted.  The patient would benefit from continued inpatient rehabilitation at the acute rehabilitation   level to maximize his current functional status.  Transfer to ARU will occur when the patient has been deemed medically and surgically stable.    HISTORY OF PRESENT ILLNESS:  This is a pleasant 68-year-old gentleman admitted to the [facility] secondary to severe bilateral knee pain and functional decline, which has failed outpatient conservative treatment.  The patient underwent   elective bilateral total knee arthroplasty performed by Dr. Ted Zheng of orthopedics on 10/29/2019.  The patient is allowed weightbearing as tolerated and placed on DVT prophylaxis as per orthopedics.  Medicine consultation has been noted and   reviewed.  PT and OT evaluations and treatment program have been initiated.    MEDICATIONS:  Please see computer.    PAST MEDICAL HISTORY:  Includes osteoarthritis/degenerative joint disease, hyperlipidemia, hypertension, history of  prostate cancer.    ALLERGIES:  NO KNOWN DRUG ALLERGIES.    SOCIAL HISTORY:  The patient lives in a 2-story home with his spouse.  He does have steps to enter.    FAMILY HISTORY:  Noncontributory to this current admission.    REVIEW OF SYSTEMS:  Twelve-system review of system was completed and reviewed and essentially negative except for the above-stated history of present illness.    PHYSICAL EXAMINATION:  GENERAL:  We have a pleasant 68-year-old gentleman who is out of bed in a chair, resting comfortably.  He is awake, alert, and oriented x3.  VITAL SIGNS:  Stable.  HEENT:  Normocephalic, atraumatic.  HEART:  Regular rate and rhythm.  LUNGS:  Clear to auscultation bilaterally.  ABDOMEN:  Soft, nontender, nondistended.  EXTREMITIES:  Exam reveals bilateral knee incisions with postoperative dressings intact.  There is no calf tenderness bilaterally.  Homans sign is negative bilaterally.  Distal pulses are intact in the lower extremities and there is no evidence of distal   swelling noted today.  NEUROLOGIC:  The patient is awake, alert, and oriented x3.  Motor exam of the bilateral ankles reveals dorsiflexion and plantarflexion strength to be 5/5.  Dermatomal patterns are intact to light touch.  His speech is fluent today.      WINDY ESCOBAR DO        CC:     DD: 10/30/2019 15:45  DT: 10/30/2019 17:40  Voice ID: [id number]/Report ID: [id number]  [email or web address]

## 2019-10-30 NOTE — PROGRESS NOTES
Patient: Chapincito Yoon  Location: [place] [address]  MRN: [id number]  Today's date: 10/30/2019  Patient left in gym with reclining chair   Hospital Course  Chris is a 68 y.o. male admitted on 10/29/2019 with Osteoarthritis of both knees, unspecified osteoarthritis type [M17.0]  Osteoarthrosis [M19.90]. Principal problem is No Principal Problem: There is no principal problem currently on the Problem List. Please update the Problem List and refresh..    Chris has a past medical history of Essential (primary) hypertension, Hyperlipidemia, unspecified, Impaired fasting glucose, Osteoarthritis, and Prostate cancer (CMS/HCC) (2011).    Therapy Pain/Vitals - 10/30/19 1050        Pain/Comfort/Sleep    Pain Charting Type  Pain Assessment     Preferred Pain Scale  number (Numeric Rating Pain Scale)     Pain Body Location - Side  Bilateral     Pain Body Location  knee     Pain Rating (0-10): Rest  4     Pain Rating (0-10): Activity  7     Pain Management Interventions  position adjusted        Vital Signs    Pulse  74     SpO2  96 %     BP  (!) 171/80     BP Location  Left upper arm     BP Method  Automatic     Patient Position  Sitting           Prior Living Environment      Most Recent Value   Living Arrangements  house   Living Environment Comment  2SH with GENE          Prior Level of Function      Most Recent Value   Ambulation  independent   Transferring  independent   Toileting  independent   Bathing  independent   Dressing  independent   Eating  independent   Communication  understands/communicates without difficulty   Swallowing  swallows foods/liquids without difficulty   Prior Level of Function Comment  No DME use PTA   Equipment Currently Used at Home  none          OT Evaluation and Treatment - 10/30/19 1050        Time Calculation    Start Time  1050     Stop Time  1105     Time Calculation (min)  15 min        Session Details    Document Type  daily treatment/progress note     Mode of Treatment  individual  therapy;occupational therapy        General Information    Patient Profile Reviewed?  yes     Onset of Illness/Injury or Date of Surgery  10/29/19     Referring Physician  sangeeta     General Observations of Patient  patient in gym in recliner chair     Existing Precautions/Restrictions  fall;weight bearing        Sit to Stand Transfer    [place], Sit to Stand Transfer  verbal cues;minimum assist (75% patient effort)     Verbal Cues  hand placement     Assistive Device  walker, front-wheeled        Stand to Sit Transfer    [place], Stand to Sit Transfer  supervision     Verbal Cues  hand placement     Assistive Device  walker, front-wheeled        Lower Body Dressing Assessment/Training    [place]  verbal cues;minimum assist (75% patient effort)     Adaptive Equipment  dressing stick;reacher;sock-aid     Position  supported sitting        AM-PAC (TM) - ADL (Current Function)    Putting on and taking off regular lower body clothing?  3 - A Little     Bathing?  3 - A Little     Toileting?  3 - A Little     Putting on/taking off regular upper body clothing?  4 - None     How much help for taking care of personal grooming?  4 - None     Eating meals?  4 - None     AM-PAC (TM) ADL Score  21        Progress Summary (OT)    Symptoms Noted During/After Treatment  increased pain     Progress Toward Functional Goals (OT)  progressing toward functional goals as expected           Pain Assessment/Intervention  Pain Charting Type: Pain Assessment        Education provided this session. See the Patient Education summary report for full details.         OT Goals      Most Recent Value   Transfer Goal 1   Activity/Assistive Device  toilet at 10/29/2019 1525   [place]  supervision required at 10/29/2019 1525   Time Frame  by discharge at 10/29/2019 1525   Progress/Outcome  goal ongoing at 10/29/2019 1525   Transfer Goal 2   Activity/Assistive Device  sit-to-stand/stand-to-sit at 10/29/2019 1525   [place]   supervision required at 10/29/2019 1525   Time Frame  by discharge at 10/29/2019 1525   Progress/Outcome  goal ongoing at 10/29/2019 1525   Dressing Goal 1   Activity/Adaptive Equipment  lower body dressing at 10/29/2019 1525   [place]  minimum assist (75% or more patient effort) at 10/29/2019 1525   Time Frame  by discharge at 10/29/2019 1525   Progress/Outcome  goal ongoing at 10/29/2019 1525

## 2019-10-30 NOTE — PLAN OF CARE
Problem: Adult Inpatient Plan of Care  Goal: Plan of Care Review  Outcome: Progressing  Flowsheets (Taken 10/30/2019 1830)  Progress: improving  Plan of Care Reviewed With: patient; spouse  Outcome Summary: Pt is optimistic, has good pain control. appetite is good and mobility coming along.

## 2019-10-30 NOTE — PROGRESS NOTES
Inpatient Cardiology   Daily Progress Note       Overview:   Assessment/Plan   Osteoarthrosis  Overview  - S/P bilateral TKA on 10/29/19    Assessment & Plan  - DVT prophylaxis and pain management per ortho service  - Pulmonary toilet  - Ambulate  - Hold all natural supplements. Resume post op when ok to do so per ortho     Essential (primary) hypertension  Assessment & Plan  - Valsartan on 10/30 AM with holds    Hyperlipidemia, unspecified  Assessment & Plan  - Atorvastatin  - Hold Fish Oil. Resume post op when ok per ortho            Subjective   Feels well.  Expected postop pain.  No shortness of breath, chest pain, dizziness, nausea, or palpitations.      Current Medications:    Scheduled:  • acetaminophen  650 mg oral q6h INT   • aspirin  81 mg oral BID   • atorvastatin  10 mg oral Daily (6p)   • ketorolac  15 mg intravenous q6h [place]   • multivitamin  1 tablet oral Daily   • pantoprazole  40 mg oral Daily before breakfast   • polyethylene glycol  17 g oral Daily   • sennosides-docusate sodium  1 tablet oral BID   • valsartan  80 mg oral Daily       Infusions:  • sodium chloride 0.9 %   125 mL/hr at 10/29/19 1232       PRN:  alum-mag hydroxide-simeth  •  bisacodyl  •  diphenhydrAMINE **OR** diphenhydrAMINE  •  oxyCODONE **OR** morphine  •  ondansetron ODT **OR** ondansetron     Objective   Vital signs in last 24 hours:  Temp:  [36.6 °C (97.8 °F)-37.4 °C (99.4 °F)] 37.4 °C (99.4 °F)  Heart Rate:  [63-86] 74  Resp:  [16-30] 18  BP: ()/() 137/53    Wt Readings from Last 3 Encounters:   10/29/19 97 kg (213 lb 12.8 oz)   10/16/19 98.9 kg (218 lb)   10/16/19 98.3 kg (216 lb 12.8 oz)       Physical Exam  Physical Exam:  General Appearance:  No distress   Head:  Normocephalic, without obvious abnormality, atraumatic   Eyes:  Conjunctiva/corneas clear, EOM's intact   Neck: Jugular veins not well visualized. No bruits.     Lungs:   Clear to auscultation bilaterally.   Heart:  Regular rhythm,Normal rate,No  murmur heard.   Abdomen:   Soft, non-tender, no masses, no hepatomegaly, normal bowel sounds   Vascular: Pulses 2+ and symmetric all extremities   Musculoskeletal:  Skin: No injury or deformity  Skin color and texture normal.  No cyanosis    Extremities: No edema.   Behavior/Emotional: Appropriate, cooperative   Neurologic:                    Awake and alert. No focal deficit.       Labs and Data:      Hematology  Lab Results   Component Value Date    WBC 7.65 10/30/2019    HGB 11.2 (L) 10/30/2019    HCT 32.9 (L) 10/30/2019     10/30/2019    INR 1.0 10/16/2019       Chemistries  Lab Results   Component Value Date     (L) 10/30/2019    K 3.8 10/30/2019     10/30/2019    CREATININE 0.9 10/30/2019    BUN 13 10/30/2019    CO2 23 10/30/2019    GLUCOSE 134 (H) 10/30/2019    CALCIUM 8.1 (L) 10/30/2019       Biomarkers  No results found for: CKTOTAL, TROPONINI, BNP    Cholesterol  No results found for: CHOL, TRIG, HDL, LDLCALC, NONHDLCALC    Endocrine  Lab Results   Component Value Date    HGBA1C 5.6 10/16/2019      Radiology:      X-ray Knee Bilateral 1 Or 2 Views    Result Date: 10/29/2019  IMPRESSION: Bilateral total knee SD see in good position. COMMENT: AP and lateral portable views of the right and left knee show a total knee prosthesis in place. The components are in good position and alignment. No abnormalities are seen.       Cardiology:            David Grande MD  10/30/2019

## 2019-10-30 NOTE — PROGRESS NOTES
Patient: Chapincito Yoon  Location: [place] [address]  MRN: [id number]  Today's date: 10/30/2019     Pt left via volunteer     Hospital Course  Chris is a 68 y.o. male admitted on 10/29/2019 with Osteoarthritis of both knees, unspecified osteoarthritis type [M17.0]  Osteoarthrosis [M19.90]. Principal problem is No Principal Problem: There is no principal problem currently on the Problem List. Please update the Problem List and refresh..    Chris has a past medical history of Essential (primary) hypertension, Hyperlipidemia, unspecified, Impaired fasting glucose, Osteoarthritis, and Prostate cancer (CMS/[facility]) (2011).    Therapy Pain/Vitals - 10/30/19 1050        Pain/Comfort/Sleep    Pain Charting Type  Pain Assessment     Preferred Pain Scale  number (Numeric Rating Pain Scale)     Pain Body Location - Side  Bilateral     Pain Body Location  knee     Pain Rating (0-10): Rest  4     Pain Rating (0-10): Activity  7     Pain Management Interventions  position adjusted        Vital Signs    Pulse  74     SpO2  96 %     BP  (!) 171/80     BP Location  Left upper arm     BP Method  Automatic     Patient Position  Sitting           Prior Living Environment      Most Recent Value   Living Arrangements  house   Living Environment Comment  2SH with GENE          Prior Level of Function      Most Recent Value   Ambulation  independent   Transferring  independent   Toileting  independent   Bathing  independent   Dressing  independent   Eating  independent   Communication  understands/communicates without difficulty   Swallowing  swallows foods/liquids without difficulty   Prior Level of Function Comment  No DME use PTA   Equipment Currently Used at Home  none          PT Evaluation and Treatment - 10/30/19 1051        Time Calculation    Start Time  1051     Stop Time  1129     Time Calculation (min)  38 min        Session Details    Document Type  daily treatment/progress note     Mode of Treatment  individual  therapy;physical therapy        General Information    Existing Precautions/Restrictions  fall;weight bearing        Weight-Bearing Status    Left LE Weight-Bearing Status  weight-bearing as tolerated (WBAT)     Right LE Weight-Bearing Status  weight-bearing as tolerated (WBAT)        Sit to Stand Transfer    [place], Sit to Stand Transfer  minimum assist (75% patient effort);1 person assist;verbal cues     Verbal Cues  hand placement;technique        Stand to Sit Transfer    [place], Stand to Sit Transfer  minimum assist (75% patient effort);1 person assist;verbal cues     Verbal Cues  hand placement;technique        Gait Training    [place], Gait  minimum assist (75% or more patient effort);1 person assist;verbal cues     Assistive Device  walker, front-wheeled     Distance in Feet  40 feet     Gait Pattern Utilized  step-through     Maintains Weight-Bearing Status  able to maintain     Comment  40' X 2 RW min A, pt needs cues to take his time as well as occ cues to keep his head up, while trying to amb heel to toe if able        Therapeutic Exercise    Therapeutic Exercise  lower extremity        Lower Extremity (Therapeutic Exercise)    Exercise Position/Type (LE Therapeutic Exercise)  seated;supine     General Exercise (LE Therapeutic Exercise)  bilateral;quad sets;LAQ (long arc quad);gluteal sets;other (see comments)    AP's    Reps and Sets (LE Therapeutic Exercise)  15x        AM-PAC (TM) - Mobility (Current Function)    Turning from your back to your side while in a flat bed without using bedrails?  3 - A Little     Moving from lying on your back to sitting on the side of a flat bed without using bedrails?  3 - A Little     Moving to and from a bed to a chair?  3 - A Little     Standing up from a chair using your arms?  3 - A Little     To walk in a hospital room?  3 - A Little     Climbing 3-5 steps with a railing?  2 - A Lot     AM-PAC (TM) Mobility Score  17        Therapy Assessment/Plan  (PT)    Rehab Potential (PT)  good, to achieve stated therapy goals     Therapy Frequency (PT)  5-7 times/wk        Progress Summary (PT)    Daily Outcome Statement (PT)  pt min A with transfers and gait, cont PT POC        Therapy Plan Review/Discharge Plan (PT)    Anticipated Equipment Needs at Discharge (PT Eval)  walker, front-wheeled     PT Recommended Discharge Disposition  inpatient rehabilitation facility        Plan of Care Review    Plan of Care Reviewed With  patient                         Education provided this session. See the Patient Education summary report for full details.    PT Goals      Most Recent Value   Bed Mobility Goal 1   Activity/Assistive Device  sit to supine/supine to sit at 10/29/2019 1537   [place]  modified independence at 10/29/2019 1537   Time Frame  2 weeks at 10/29/2019 1537   Transfer Goal 1   Activity/Assistive Device  sit-to-stand/stand-to-sit, car transfer, walker, front-wheeled at 10/29/2019 1537   [place]  modified independence at 10/29/2019 1537   Time Frame  2 weeks at 10/29/2019 1537   Gait Training Goal 1   Activity/Assistive Device  gait (walking locomotion), walker, rolling at 10/29/2019 1537   [place]  modified independence at 10/29/2019 1537   Distance  200 at 10/29/2019 1537   Time Frame  2 weeks at 10/29/2019 1537   Stairs Goal 1   Activity/Assistive Device  ascending stairs, descending stairs, step-to-step, cane, straight at 10/29/2019 1537   [place]  modified independence at 10/29/2019 1537   Number of Stairs  12 at 10/29/2019 1537   Time Frame  2 weeks at 10/29/2019 1537

## 2019-10-30 NOTE — PATIENT CARE CONFERENCE
Care Progression Rounds Note  Date: 10/30/2019  Time: 11:48 AM     Patient Name: Chapincito Yoon     Medical Record Number: [id number]   YOB: 1950  Sex: Male      Room/Bed: 4610    Admitting Diagnosis: Osteoarthritis of both knees, unspecified osteoarthritis type [M17.0]  Osteoarthrosis [M19.90]   Admit Date/Time: 10/29/2019  6:07 AM    Primary Diagnosis: No Principal Problem: There is no principal problem currently on the Problem List. Please update the Problem List and refresh.  Principal Problem: No Principal Problem: There is no principal problem currently on the Problem List. Please update the Problem List and refresh.    GMLOS: pending  Anticipated Discharge Date: 10/31/2019    AM-PAC  Mobility Score: 17    Discharge Planning:  Living Arrangements: house  Anticipated Discharge Disposition: inpatient rehabilitation facility/acute rehab(interested in [facility])    Barriers to Discharge:  Barriers to Discharge: Medical issues not resolved  Comment: [facility] tomorrow    Participants:  advanced practice provider, , nursing, occupational therapy, physical therapy, social work/services

## 2019-10-30 NOTE — PLAN OF CARE
Problem: Adult Inpatient Plan of Care  Goal: Plan of Care Review  Outcome: Progressing  Flowsheets (Taken 10/30/2019 1136)  Progress: improving  Plan of Care Reviewed With: patient  Outcome Summary: pt min A of 1 with transfers and gait, cont PT POC     Problem: Joint Function Impaired (Knee Arthroplasty)  Goal: Optimal Functional Ability  Outcome: Progressing

## 2019-10-30 NOTE — BMR PREADMISSION NOTE
[facility]  Preadmission Assessment    Patient Name: Chapincito Yoon  YOB: 1950    Referral Date: 10/29/19  Evaluation Date: 10/30/19  Referring Facility Admission Date: 10/28/19  Referring Facility: [facility]   ReferringProvider: Ted Zheng MD     Reason for Referral: Chapincito Yoon is a 68 y.o. male whose primary indication for inpatient rehabilitation is Ortho.   Pertinent History of Current Functional Problem:  68 year old male with bilateral knee pain x 5-6 years. It has progressively worsened.   Pt s/p   bilateral TKA's on 10/29/19.  WBAT B/L LE's.    Decadron 10mg IV x1 POD 1.      At baseline, pt is independent. He now requires assistance with PT/OT.  Pt will benefit from acute rehab at d/c prior to returning home.    Active Medical Conditions:  Patient Active Problem List   Diagnosis   • Hyperlipidemia, unspecified   • Essential (primary) hypertension   • Osteoarthritis of both knees   • Encounter for pre-operative cardiovascular clearance   • Counseling on health promotion and disease prevention   • Osteoarthrosis       Active Medications:  Facility-Administered Medications Ordered in Other Visits   Medication Dose Route Frequency Provider Last Rate Last Dose   • acetaminophen (TYLENOL) tablet 650 mg  650 mg oral q6h INT Skinny Giron CRNP   650 mg at 10/30/19 1330   • alum-mag hydroxide-simeth (MAALOX) 200-200-20 mg/5 mL suspension 30 mL  30 mL oral q4h PRN Skinny Giron CRNP       • aspirin chewable tablet 81 mg  81 mg oral BID Skinny Giron CRNP   81 mg at 10/30/19 0913   • atorvastatin (LIPITOR) tablet 10 mg  10 mg oral Daily (6p) Janeth Pradhan CRNP   10 mg at 10/29/19 1743   • bisacodyl (DULCOLAX) 10 mg suppository 10 mg  10 mg rectal Daily PRN Skinny Giron CRNP       • diphenhydrAMINE (BENADRYL) capsule 25 mg  25 mg oral q6h PRN Skinny Giron CRNP        Or   • diphenhydrAMINE (BENADRYL) injection  25 mg  25 mg intravenous q6h PRN Skinny Giron CRNP       • ketorolac (TORADOL) injection 15 mg  15 mg intravenous q6h ELIZABETH Skinny Giron CRNP   15 mg at 10/30/19 1139   • oxyCODONE (ROXICODONE) immediate release tablet 5-10 mg  5-10 mg oral q4h PRN Skinny Giron CRNP   10 mg at 10/30/19 0620    Or   • morphine injection 1 mg  1 mg intravenous q3h PRN Skinny Giron CRNP       • multivitamin tablet 1 tablet  1 tablet oral Daily Skinny Giron CRNP   1 tablet at 10/30/19 0913   • ondansetron ODT (ZOFRAN-ODT) disintegrating tablet 4 mg  4 mg oral q8h PRN Skinny Giron CRNP   4 mg at 10/30/19 0834    Or   • ondansetron (ZOFRAN) injection 4 mg  4 mg intravenous q8h PRN Skinny Giorn CRNP       • pantoprazole (PROTONIX) tablet,delayed release (DR/EC) 40 mg  40 mg oral Daily before breakfast Skinny Giron CRNP   40 mg at 10/30/19 0620   • polyethylene glycol (MIRALAX) 17 gram packet 17 g  17 g oral Daily Skinny Giron CRNP   17 g at 10/29/19 1300   • sennosides-docusate sodium (SENOKOT-S) 8.6-50 mg per tablet 1 tablet  1 tablet oral BID Skinny Giron CRNP   1 tablet at 10/30/19 1142   • valsartan (DIOVAN) tablet 80 mg  80 mg oral Daily Janeth Pradhan CRNP   80 mg at 10/30/19 0913   No medication comments found.    HISTORY:    Past Medical History:   Diagnosis Date   • Essential (primary) hypertension    • Hyperlipidemia, unspecified    • Impaired fasting glucose    • Osteoarthritis     Knees, hands    • Prostate cancer (CMS/HCC) 2011    Prostatectomy     Past Surgical History:   Procedure Laterality Date   • COLONOSCOPY W/ POLYPECTOMY  2015   • KNEE SURGERY Right     MCL Repair   • MOHS SURGERY      Right finger   • PROSTATECTOMY  2011     Tobacco Use as of 10/29/2019     Smoking Status Smoking Start Date Smoking Quit Date Packs/Day Years Used    Former Smoker -- 1979 -- --    Types Comments  Smokeless Tobacco Status Smokeless Tobacco Quit Date Source    -- quit 40 years ago Never Used -- Provider            Alcohol Use as of 10/29/2019     Alcohol Use Drinks/Week Alcohol/Week Comments Source    Yes -- -- -- Provider    Frequency Standard Drinks Binge Drinking        2-3 times a week 3 or 4 Less than monthly              Drug Use as of 10/29/2019     Drug Use Types Frequency Comments Source    Never -- -- -- Provider            Sexual Activity as of 10/29/2019     Sexually Active Birth Control Partners Comments Source    -- -- -- -- Provider            Occupational as of 10/29/2019     Occupation Employer Comments Source    retired -- -- Provider            Socioeconomic as of 10/29/2019     Marital Status Spouse Name Number of Children Years Education Education Level Preferred Language Ethnicity Race Source     Christianne [phone] -- -- -- English Not , /a, or [place] origin White Provider    Financial Resource Strain Food Insecurity: Worry Food Insecurity: Inability Transportation Needs: Medical Transportation Needs: Non-medical    -- -- -- -- --             Allergies  No Known Allergies         Premorbid Functional Status:   Ambulation: independent  Transferring: independent  Toileting: independent  Bathing: independent  Dressing: independent  Eating: independent  Communication: understands/communicates without difficulty  Swallowing: swallows foods/liquids without difficulty  Baseline Diet/Method of Nutritional Intake: thin liquids;regular solids  Equipment Currently Used at Home: none  Prior Level of Function Comment: No DME use PTA           Living Environment:  Lives With: spouse  Living Arrangements: house  Living Environment Comment: 2SH with GENE      TEST RESULTS:     Chemistry (Up to last 3 results from the past 720 hours)      10/16 0926 10/30 0444    Sodium       137          134       Potassium       4.0          3.8       BUN       15          13       Creatinine        0.9          0.9       Glucose       131          134       CO2       26          23       Chloride       104          105         Hepatic (Up to last 3 results from the past 720 hours)    None      Metabolic (Up to last 3 results from the past 720 hours)      10/16 0926    Hemoglobin A1C       5.6         Hematologic (Up to last 3 results from the past 720 hours)      10/16 0926 10/30 0444    WBC       6.14          7.65       Hemoglobin       14.2          11.2       Hematocrit       41.8          32.9       Platelets       218          167       Protime       13.0          --       INR       1.0  Comment:  INR has no defined significance when PT is within Reference Range.          --         Other (Up to last 3 results from the past 720 hours)      10/16 0926    INR       1.0  Comment:  INR has no defined significance when PT is within Reference Range.                 ASSESSMENT:       Vitals: Temp:  [36.9 °C (98.4 °F)-37.4 °C (99.4 °F)] 37.2 °C (98.9 °F)  Heart Rate:  [63-86] 74  Resp:  [18-20] 18  BP: ()/(37-82) 141/71         Lines/Drains/Airways:             Risk for Clinical Complications:  Constipation: Moderate  Dehydration/Malnutrition: Moderate  DVT: Moderate  Falls: Moderate         Precautions:  Existing Precautions/Restrictions: fall;weight bearing  Left LE Weight-Bearing Status: weight-bearing as tolerated (WBAT)  Right LE Weight-Bearing Status: weight-bearing as tolerated (WBAT)           Current Diet:   Diet: thin liquids, regular solids         Current Functional Status:  Preadmission Current Function     Row Name 10/29/19 1525       Cognition/Psychosocial    Affect/Mental Status (Cognitive)  WFL       Bed Mobility    [place], Supine to Sit  supervision    Assistive Device (Bed Mobility)  bed rails;head of bed elevated       Sit to Stand Transfer    [place], Sit to Stand Transfer  moderate assist (50% patient effort);2 person assist    Verbal Cues  hand placement;safety;technique     Assistive Device  walker, front-wheeled       Stand to Sit Transfer    [place], Stand to Sit Transfer  minimum assist (75% patient effort)    Verbal Cues  safety;hand placement;technique    Assistive Device  walker, front-wheeled       Stand Pivot/Stand Step Transfer    [place], Stand Pivot/Stand Step Transfer  minimum assist (75% or more patient effort)    Assistive Device  walker, front-wheeled    Row Name 10/29/19 1537       Bed Mobility    [place], Supine to Sit  minimum assist (75% patient effort)       Sit to Stand Transfer    [place], Sit to Stand Transfer  moderate assist (50% patient effort);2 person assist    Verbal Cues  hand placement;preparatory posture;proper use of assistive device;safety;technique    Assistive Device  walker, front-wheeled       Stand to Sit Transfer    [place], Stand to Sit Transfer  moderate assist (50% patient effort);2 person assist    Verbal Cues  hand placement;preparatory posture;proper use of assistive device;safety;technique    Assistive Device  walker, front-wheeled       Gait Training    [place], Gait  minimum assist (75% or more patient effort);2 person assist    Assistive Device  walker, front-wheeled    Distance in Feet  15 feet    Gait Pattern Utilized  step-through    Deviations/Abnormal Patterns (Gait)  base of support, wide;willian decreased;festinating/shuffling;gait speed decreased    Comment  Distance limited by lightheadedness, vitals as noted, RN aware    Row Name 10/30/19 1050       Lower Body Dressing    Position  supported sitting       Sit to Stand Transfer    [place], Sit to Stand Transfer  verbal cues;minimum assist (75% patient effort)    Verbal Cues  hand placement    Assistive Device  walker, front-wheeled       Stand to Sit Transfer    [place], Stand to Sit Transfer  supervision    Verbal Cues  hand placement    Assistive Device  walker, front-wheeled    Row Name 10/30/19 1051       Sit to Stand Transfer     [place], Sit to Stand Transfer  minimum assist (75% patient effort);1 person assist;verbal cues    Verbal Cues  hand placement;technique       Stand to Sit Transfer    [place], Stand to Sit Transfer  minimum assist (75% patient effort);1 person assist;verbal cues    Verbal Cues  hand placement;technique       Gait Training    [place], Gait  minimum assist (75% or more patient effort);1 person assist;verbal cues    Assistive Device  walker, front-wheeled    Distance in Feet  40 feet    Gait Pattern Utilized  step-through    Maintains Weight-Bearing Status  able to maintain    Comment  40' X 2 RW min A, pt needs cues to take his time as well as occ cues to keep his head up, while trying to amb heel to toe if able               Support System:   Designated Primary Caregiver: Wife           Patient/Family Goals:             Educational Background:      RECOMMENDATIONS / PLAN:       Special Needs:  Is an  Needed/Used?: No  Spiritual, Cultural Beliefs, Caodaism Practices, Values that Affect Care: no           Plan:  Identified Referral Needs: physical therapy, occupational therapy  OT Frequency: 5-7 times per week  OT Intensity: 1.5 hours  PT Frequency: 5-7 times per week  PT Intensity: 1.5 hours  Impairments to be addressed: mobility, motor dysfunction, safety, self-care    Medical Necessity Admission Criteria: other active medical conditions (see comments), uncontrolled pain  Therapy Intensity: Requires, can tolerate and will benefit from 3 hours of therapy at least 5 days per week  Projected Length of Stay (days): 5 days  Patient is willing to participate in rehab program: yes         Expected Level of Function at Discharge:  Expected Functional Improvement: mobility;motor dysfunction;safety;self-care  Self-Care: Independent  Sphincter Control: Independent  Transfers: Independent  Locomotion: Independent  Communication: Independent  Social Cognition: Independent           Post-Discharge  Needs:  Anticipated Discharge Disposition: home with outpatient services  Type of Outpatient Services: physical therapy  Equipment Needed After Discharge: walker, rolling  Discharge Coordination/Progress: See CM note below,

## 2019-10-30 NOTE — PLAN OF CARE
Pt pain is consistent, but kept within manageable levels, pt is otherwise doing well.    Problem: Adult Inpatient Plan of Care  Goal: Plan of Care Review  Outcome: Progressing

## 2019-10-31 ENCOUNTER — HOSPITAL ENCOUNTER (INPATIENT)
Facility: REHABILITATION | Age: 69
LOS: 12 days | Discharge: HOME | DRG: 560 | End: 2019-11-12
Attending: PHYSICAL MEDICINE & REHABILITATION | Admitting: PHYSICAL MEDICINE & REHABILITATION
Payer: MEDICARE

## 2019-10-31 VITALS
DIASTOLIC BLOOD PRESSURE: 58 MMHG | HEART RATE: 81 BPM | SYSTOLIC BLOOD PRESSURE: 136 MMHG | BODY MASS INDEX: 33.56 KG/M2 | WEIGHT: 213.8 LBS | HEIGHT: 67 IN | RESPIRATION RATE: 16 BRPM | OXYGEN SATURATION: 98 % | TEMPERATURE: 98.4 F

## 2019-10-31 DIAGNOSIS — F43.22 ADJUSTMENT DISORDER WITH ANXIETY: ICD-10-CM

## 2019-10-31 PROBLEM — D72.823 LEUKEMOID REACTION: Status: ACTIVE | Noted: 2019-10-31

## 2019-10-31 PROBLEM — Z01.810 ENCOUNTER FOR PRE-OPERATIVE CARDIOVASCULAR CLEARANCE: Status: RESOLVED | Noted: 2019-10-16 | Resolved: 2019-10-31

## 2019-10-31 PROBLEM — Z96.653 S/P TKR (TOTAL KNEE REPLACEMENT), BILATERAL: Status: ACTIVE | Noted: 2019-10-31

## 2019-10-31 PROBLEM — D50.0 ANEMIA DUE TO BLOOD LOSS: Status: ACTIVE | Noted: 2019-10-31

## 2019-10-31 PROBLEM — Z96.653 STATUS POST BILATERAL KNEE REPLACEMENTS: Status: ACTIVE | Noted: 2019-10-31

## 2019-10-31 LAB
ANION GAP SERPL CALC-SCNC: 6 MEQ/L (ref 3–15)
BACTERIA URNS QL MICRO: ABNORMAL /HPF
BILIRUB UR QL STRIP.AUTO: NEGATIVE MG/DL
BUN SERPL-MCNC: 15 MG/DL (ref 8–20)
CALCIUM SERPL-MCNC: 8.8 MG/DL (ref 8.9–10.3)
CHLORIDE SERPL-SCNC: 108 MEQ/L (ref 98–109)
CLARITY UR REFRACT.AUTO: CLEAR
CO2 SERPL-SCNC: 24 MEQ/L (ref 22–32)
COLOR UR AUTO: YELLOW
CREAT SERPL-MCNC: 0.8 MG/DL
ERYTHROCYTE [DISTWIDTH] IN BLOOD BY AUTOMATED COUNT: 13 % (ref 11.6–14.4)
GFR SERPL CREATININE-BSD FRML MDRD: >60 ML/MIN/1.73M*2
GLUCOSE SERPL-MCNC: 142 MG/DL (ref 70–99)
GLUCOSE UR STRIP.AUTO-MCNC: NEGATIVE MG/DL
HCT VFR BLDCO AUTO: 31.3 % (ref 40.1–51)
HGB BLD-MCNC: 10.7 G/DL
HGB UR QL STRIP.AUTO: NEGATIVE
HYALINE CASTS #/AREA URNS LPF: ABNORMAL /LPF
KETONES UR STRIP.AUTO-MCNC: NEGATIVE MG/DL
LEUKOCYTE ESTERASE UR QL STRIP.AUTO: NEGATIVE
MCH RBC QN AUTO: 31.9 PG (ref 28–33.2)
MCHC RBC AUTO-ENTMCNC: 34.2 G/DL (ref 32.2–36.5)
MCV RBC AUTO: 93.4 FL (ref 83–98)
NITRITE UR QL STRIP.AUTO: NEGATIVE
PDW BLD AUTO: 11 FL (ref 9.4–12.4)
PH UR STRIP.AUTO: 6 [PH]
PLATELET # BLD AUTO: 190 K/UL
POTASSIUM SERPL-SCNC: 4.2 MEQ/L (ref 3.6–5.1)
PROT UR QL STRIP.AUTO: ABNORMAL
RBC # BLD AUTO: 3.35 M/UL (ref 4.5–5.8)
RBC #/AREA URNS HPF: ABNORMAL /HPF
SODIUM SERPL-SCNC: 138 MEQ/L (ref 136–144)
SP GR UR REFRACT.AUTO: 1.03
SQUAMOUS URNS QL MICRO: 1 /HPF
UROBILINOGEN UR STRIP-ACNC: 0.2 EU/DL
WBC # BLD AUTO: 11.05 K/UL
WBC #/AREA URNS HPF: ABNORMAL /HPF

## 2019-10-31 PROCEDURE — 97110 THERAPEUTIC EXERCISES: CPT | Mod: GP

## 2019-10-31 PROCEDURE — 36415 COLL VENOUS BLD VENIPUNCTURE: CPT | Performed by: NURSE PRACTITIONER

## 2019-10-31 PROCEDURE — 63700000 HC SELF-ADMINISTRABLE DRUG: Performed by: NURSE PRACTITIONER

## 2019-10-31 PROCEDURE — 80048 BASIC METABOLIC PNL TOTAL CA: CPT | Performed by: NURSE PRACTITIONER

## 2019-10-31 PROCEDURE — 85027 COMPLETE CBC AUTOMATED: CPT | Performed by: NURSE PRACTITIONER

## 2019-10-31 PROCEDURE — 99232 SBSQ HOSP IP/OBS MODERATE 35: CPT | Performed by: INTERNAL MEDICINE

## 2019-10-31 PROCEDURE — 97116 GAIT TRAINING THERAPY: CPT | Mod: GP

## 2019-10-31 PROCEDURE — 12800000 HC ROOM AND CARE SEMIPRIVATE REHAB

## 2019-10-31 PROCEDURE — 63700000 HC SELF-ADMINISTRABLE DRUG: Performed by: PHYSICAL MEDICINE & REHABILITATION

## 2019-10-31 PROCEDURE — 81003 URINALYSIS AUTO W/O SCOPE: CPT | Performed by: PHYSICAL MEDICINE & REHABILITATION

## 2019-10-31 PROCEDURE — 97535 SELF CARE MNGMENT TRAINING: CPT | Mod: GO

## 2019-10-31 PROCEDURE — 63600000 HC DRUGS/DETAIL CODE: Mod: JW | Performed by: NURSE PRACTITIONER

## 2019-10-31 PROCEDURE — 87086 URINE CULTURE/COLONY COUNT: CPT | Performed by: PHYSICAL MEDICINE & REHABILITATION

## 2019-10-31 RX ORDER — DOCUSATE SODIUM 100 MG/1
100 CAPSULE, LIQUID FILLED ORAL 2 TIMES DAILY
Status: DISCONTINUED | OUTPATIENT
Start: 2019-10-31 | End: 2019-11-10

## 2019-10-31 RX ORDER — ONDANSETRON 4 MG/1
4 TABLET, ORALLY DISINTEGRATING ORAL EVERY 6 HOURS PRN
Status: DISCONTINUED | OUTPATIENT
Start: 2019-10-31 | End: 2019-11-10

## 2019-10-31 RX ORDER — OXYCODONE HYDROCHLORIDE 5 MG/1
5-10 TABLET ORAL EVERY 4 HOURS PRN
Status: DISCONTINUED | OUTPATIENT
Start: 2019-10-31 | End: 2019-11-12 | Stop reason: HOSPADM

## 2019-10-31 RX ORDER — ACETAMINOPHEN 325 MG/1
650 TABLET ORAL EVERY 4 HOURS PRN
Status: DISCONTINUED | OUTPATIENT
Start: 2019-10-31 | End: 2019-11-12 | Stop reason: HOSPADM

## 2019-10-31 RX ORDER — ACETAMINOPHEN 325 MG/1
650 TABLET ORAL EVERY 4 HOURS PRN
Status: DISCONTINUED | OUTPATIENT
Start: 2019-10-31 | End: 2019-10-31 | Stop reason: SDUPTHER

## 2019-10-31 RX ORDER — ADHESIVE BANDAGE
30 BANDAGE TOPICAL DAILY PRN
Status: DISCONTINUED | OUTPATIENT
Start: 2019-10-31 | End: 2019-11-10

## 2019-10-31 RX ORDER — ATORVASTATIN CALCIUM 20 MG/1
20 TABLET, FILM COATED ORAL
Status: DISCONTINUED | OUTPATIENT
Start: 2019-10-31 | End: 2019-10-31

## 2019-10-31 RX ORDER — SENNOSIDES 8.6 MG/1
2 TABLET ORAL
Status: DISCONTINUED | OUTPATIENT
Start: 2019-11-01 | End: 2019-11-10

## 2019-10-31 RX ORDER — BISACODYL 10 MG/1
10 SUPPOSITORY RECTAL DAILY PRN
Status: DISCONTINUED | OUTPATIENT
Start: 2019-10-31 | End: 2019-11-10

## 2019-10-31 RX ORDER — ASPIRIN 81 MG/1
81 TABLET ORAL 2 TIMES DAILY
Status: DISCONTINUED | OUTPATIENT
Start: 2019-10-31 | End: 2019-11-12 | Stop reason: HOSPADM

## 2019-10-31 RX ORDER — OXYCODONE HYDROCHLORIDE 5 MG/1
5-10 TABLET ORAL EVERY 4 HOURS PRN
Refills: 0 | Status: ON HOLD
Start: 2019-10-31 | End: 2019-11-11

## 2019-10-31 RX ORDER — NAPROXEN SODIUM 220 MG/1
81 TABLET, FILM COATED ORAL 2 TIMES DAILY
Qty: 60 TABLET | Refills: 0 | Status: SHIPPED | OUTPATIENT
Start: 2019-10-31 | End: 2025-02-13

## 2019-10-31 RX ORDER — VALSARTAN 80 MG/1
80 TABLET ORAL DAILY
Status: DISCONTINUED | OUTPATIENT
Start: 2019-11-01 | End: 2019-11-12 | Stop reason: HOSPADM

## 2019-10-31 RX ORDER — ATORVASTATIN CALCIUM 10 MG/1
10 TABLET, FILM COATED ORAL
Status: DISCONTINUED | OUTPATIENT
Start: 2019-10-31 | End: 2019-11-12 | Stop reason: HOSPADM

## 2019-10-31 RX ORDER — ALUMINUM HYDROXIDE, MAGNESIUM HYDROXIDE, AND SIMETHICONE 1200; 120; 1200 MG/30ML; MG/30ML; MG/30ML
30 SUSPENSION ORAL EVERY 4 HOURS PRN
Status: DISCONTINUED | OUTPATIENT
Start: 2019-10-31 | End: 2019-11-10

## 2019-10-31 RX ORDER — AMOXICILLIN 250 MG
1 CAPSULE ORAL 2 TIMES DAILY
Qty: 60 TABLET | Refills: 0 | Status: ON HOLD | OUTPATIENT
Start: 2019-10-31 | End: 2019-11-11 | Stop reason: SDUPTHER

## 2019-10-31 RX ADMIN — ASPIRIN 81 MG 81 MG: 81 TABLET ORAL at 08:11

## 2019-10-31 RX ADMIN — ACETAMINOPHEN 650 MG: 325 TABLET, FILM COATED ORAL at 20:03

## 2019-10-31 RX ADMIN — ATORVASTATIN CALCIUM 10 MG: 10 TABLET, FILM COATED ORAL at 20:02

## 2019-10-31 RX ADMIN — OXYCODONE HYDROCHLORIDE 10 MG: 5 TABLET ORAL at 22:03

## 2019-10-31 RX ADMIN — OXYCODONE HYDROCHLORIDE 10 MG: 5 TABLET ORAL at 17:58

## 2019-10-31 RX ADMIN — ASPIRIN 81 MG: 81 TABLET, COATED ORAL at 20:02

## 2019-10-31 RX ADMIN — VALSARTAN 80 MG: 80 TABLET, FILM COATED ORAL at 08:12

## 2019-10-31 RX ADMIN — POLYETHYLENE GLYCOL 3350 17 G: 17 POWDER, FOR SOLUTION ORAL at 08:12

## 2019-10-31 RX ADMIN — PANTOPRAZOLE SODIUM 40 MG: 40 TABLET, DELAYED RELEASE ORAL at 08:11

## 2019-10-31 RX ADMIN — KETOROLAC TROMETHAMINE 15 MG: 30 INJECTION, SOLUTION INTRAMUSCULAR at 01:03

## 2019-10-31 RX ADMIN — SENNOSIDES AND DOCUSATE SODIUM 1 TABLET: 8.6; 5 TABLET ORAL at 08:12

## 2019-10-31 RX ADMIN — THERA TABS 1 TABLET: TAB at 08:11

## 2019-10-31 RX ADMIN — DOCUSATE SODIUM 100 MG: 100 CAPSULE, LIQUID FILLED ORAL at 20:02

## 2019-10-31 RX ADMIN — ACETAMINOPHEN 650 MG: 325 TABLET ORAL at 01:04

## 2019-10-31 RX ADMIN — OXYCODONE HYDROCHLORIDE 10 MG: 5 TABLET ORAL at 13:22

## 2019-10-31 RX ADMIN — OXYCODONE HYDROCHLORIDE 10 MG: 5 TABLET ORAL at 08:12

## 2019-10-31 ASSESSMENT — COGNITIVE AND FUNCTIONAL STATUS - GENERAL
CLIMB 3 TO 5 STEPS WITH RAILING: 2 - A LOT
TOILETING: 3 - A LITTLE
STANDING UP FROM CHAIR USING ARMS: 3 - A LITTLE
HELP NEEDED FOR BATHING: 2 - A LOT
EATING MEALS: 4 - NONE
WALKING IN HOSPITAL ROOM: 3 - A LITTLE
DRESSING REGULAR UPPER BODY CLOTHING: 4 - NONE
DRESSING REGULAR LOWER BODY CLOTHING: 2 - A LOT
HELP NEEDED FOR PERSONAL GROOMING: 4 - NONE
MOVING TO AND FROM BED TO CHAIR: 3 - A LITTLE

## 2019-10-31 NOTE — PROGRESS NOTES
Pt seen & examined his AM.   Notes overall improvement compared to yesterday  No complaints except for nausea,  pain well controlled     Alert, VSS, NAD  BLE aquacell dressing c/d/i with ice pack in place  BLE DP/PT intact, DF/PF/EHL intact, SILT  Calves soft, nontender   Hg 10.7    A/P: s/p  Bilateral TKA POD 2 d/w Attending  Pain control, oxy IR prn   Post op xray reviewed by Dr. Zheng   DVT ppx ASA 81mg BID x4 weeks  PT/OT  WBAT  Med management: [facility]  PMR consult for rehab placement    dispo planing likely to [facility] Thursday  Follow up with Dr. Zheng in 6 weeks, call for appointment [phone]

## 2019-10-31 NOTE — ASSESSMENT & PLAN NOTE
DVT prophylaxis and pain management per ortho service  - Pulmonary toilet  - Ambulate  - Hold all natural supplements. Resume post op when ok to do so per ortho

## 2019-10-31 NOTE — PLAN OF CARE
Problem: Joint Function Impaired (Knee Arthroplasty)  Goal: Optimal Functional Ability  Outcome: Progressing     Problem: Adult Inpatient Plan of Care  Goal: Plan of Care Review  Flowsheets (Taken 10/31/2019 1218)  Progress: improving  Plan of Care Reviewed With: patient  Outcome Summary: Min A with transfers and Mod/Max A with transfers

## 2019-10-31 NOTE — PROGRESS NOTES
Patient: Chapincito Yoon  Location: [place] [address]  MRN: [id number]  Today's date: 10/31/2019    Hospital Course  Chris is a 68 y.o. male admitted on 10/29/2019 with Osteoarthritis of both knees, unspecified osteoarthritis type [M17.0]  Osteoarthrosis [M19.90]. Principal problem is No Principal Problem: There is no principal problem currently on the Problem List. Please update the Problem List and refresh..    Chris has a past medical history of Essential (primary) hypertension, Hyperlipidemia, unspecified, Impaired fasting glucose, Osteoarthritis, and Prostate cancer (CMS/[facility]) (2011).     B/L TKA  Returned to room in w/c with volunteer, WINIFRED elevated.    Therapy Pain/Vitals - 10/31/19 1024        Pain/Comfort/Sleep    Pain Charting Type  Pain Assessment     Preferred Pain Scale  number (Numeric Rating Pain Scale)     Pain Body Location  knee     Pain Rating (0-10): Rest  5     Pain Rating (0-10): Activity  6     Pain Management Interventions  position adjusted;prescribed exercises encouraged        Vital Signs    Pulse  81     SpO2  98 %     Oxygen Therapy  None (Room air)     BP  (!) 136/58     BP Location  Right upper arm     BP Method  Automatic     Patient Position  Sitting           Prior Living Environment      Most Recent Value   Living Arrangements  house   Living Environment Comment  2SH with GENE          Prior Level of Function      Most Recent Value   Ambulation  independent   Transferring  independent   Toileting  independent   Bathing  independent   Dressing  independent   Eating  independent   Communication  understands/communicates without difficulty   Swallowing  swallows foods/liquids without difficulty   Prior Level of Function Comment  No DME use PTA   Equipment Currently Used at Home  none          PT Evaluation and Treatment - 10/31/19 1016        Time Calculation    Start Time  1016     Stop Time  1055     Time Calculation (min)  39 min        Session Details    Document Type  daily  treatment/progress note     Mode of Treatment  individual therapy;physical therapy        General Information    Patient Profile Reviewed?  yes     Existing Precautions/Restrictions  fall;weight bearing        Bed Mobility    Comment (Bed Mobility)  recieved in dept in recliner        Sit to Stand Transfer    [place], Sit to Stand Transfer  minimum assist (75% patient effort)     Verbal Cues  hand placement;safety;technique     Assistive Device  walker, front-wheeled        Stand to Sit Transfer    [place], Stand to Sit Transfer  minimum assist (75% patient effort)     Verbal Cues  hand placement;safety     Assistive Device  walker, front-wheeled        Gait Training    [place], Gait  minimum assist (75% or more patient effort)     Assistive Device  walker, front-wheeled     Gait Pattern Utilized  step-to     Deviations/Abnormal Patterns (Gait)  gait speed decreased     Maintains Weight-Bearing Status  able to maintain     Comment  40ft, slow, antalgic, RW, v/c for breathing        Stairs Training    [place], Stairs  not tested        Therapeutic Exercise    Therapeutic Exercise  lower extremity        Lower Extremity (Therapeutic Exercise)    Exercise Position/Type (LE Therapeutic Exercise)  supine     General Exercise (LE Therapeutic Exercise)  heel slides;gluteal sets;quad sets     Range of Motion Exercises (LE Therapeutic Exercise)  knee flexion/extension     Reps and Sets (LE Therapeutic Exercise)  1/15     Comment (LE Therapeutic Exercise)  good knee flexion         AM-PAC (TM) - Mobility (Current Function)    Turning from your back to your side while in a flat bed without using bedrails?  3 - A Little     Moving from lying on your back to sitting on the side of a flat bed without using bedrails?  3 - A Little     Moving to and from a bed to a chair?  3 - A Little     Standing up from a chair using your arms?  3 - A Little     To walk in a hospital room?  3 - A Little     Climbing 3-5 steps  with a railing?  2 - A Lot     AM-PAC (TM) Mobility Score  17        Therapy Assessment/Plan (PT)    Rehab Potential (PT)  good, to achieve stated therapy goals     Therapy Frequency (PT)  5-7 times/wk     Planned Therapy Interventions (PT)  balance training;gait training;transfer training;stair training     Problem List  range of motion (ROM);pain;strength     Activity Limitations Related to Problem List  unable to ambulate safely        Progress Summary (PT)    Symptoms Noted During/After Treatment  increased pain     Daily Outcome Statement (PT)  slow , antalgic, good safety awareness, MIN A  , 40 ft        Therapy Plan Review/Discharge Plan (PT)    Anticipated Equipment Needs at Discharge (PT Eval)  walker, front-wheeled     PT Recommended Discharge Disposition  inpatient rehabilitation facility        Plan of Care Review    Plan of Care Reviewed With  patient           Pain Assessment/Intervention  Pain Charting Type: Pain Assessment             Education provided this session. See the Patient Education summary report for full details.    PT Goals      Most Recent Value   Bed Mobility Goal 1   Activity/Assistive Device  sit to supine/supine to sit at 10/29/2019 1537   [place]  modified independence at 10/29/2019 1537   Time Frame  2 weeks at 10/29/2019 1537   Transfer Goal 1   Activity/Assistive Device  sit-to-stand/stand-to-sit, car transfer, walker, front-wheeled at 10/29/2019 1537   [place]  modified independence at 10/29/2019 1537   Time Frame  2 weeks at 10/29/2019 1537   Gait Training Goal 1   Activity/Assistive Device  gait (walking locomotion), walker, rolling at 10/29/2019 1537   [place]  modified independence at 10/29/2019 1537   Distance  200 at 10/29/2019 1537   Time Frame  2 weeks at 10/29/2019 1537   Stairs Goal 1   Activity/Assistive Device  ascending stairs, descending stairs, step-to-step, cane, straight at 10/29/2019 1537   [place]  modified independence at 10/29/2019 1537    Number of Stairs  12 at 10/29/2019 1537   Time Frame  2 weeks at 10/29/2019 1531

## 2019-10-31 NOTE — PLAN OF CARE
Problem: Rehabilitation (IRF) Plan of Care  Goal: Plan of Care Review  Outcome: Progressing  Flowsheets (Taken 10/31/2019 1707)  Plan of Care Reviewed With: patient  IRF Plan of Care Review: progress ongoing, continue  Outcome Summary: Oriented patient to rehab and fall prevention. Call bell use reviewed, pain management reviewed

## 2019-10-31 NOTE — PLAN OF CARE
Problem: Adult Inpatient Plan of Care  Goal: Plan of Care Review  Flowsheets (Taken 10/31/2019 1227)  Progress: improving  Plan of Care Reviewed With: patient; spouse  Outcome Summary: SW aware pt cleared for [facility] today, met with pt and spouse, would like larry rabago, aware of cost to be billed to him. SW requested larry rabago.  1241-Given 1400 [facility] larry rabago, Monica informed Mora with [facility], 4W UC and RN, RN updated pt.

## 2019-10-31 NOTE — DISCHARGE INSTRUCTIONS
POST-OPERATIVE INSTRUCTIONS -KNEE REPLACEMENT  Surgeon:  Dr. Ted Zheng Nurse:  Tiffany Rudolph    Phone: [phone]  [facility] Phone: [phone]   Use for emergencies (including after business hours and weekends)     Congratulations!  You’ve made a big step!  Our focus now is on your recovery and getting you back to a healthy, happy, pain-free lifestyle.  Every day, you will find you can do a bit more.  You are literally teaching yourself to walk normally again! Here are answers to some common questions:    1. ACTIVITY/REHABILITATION/PHYSICALTHERAPY (PT)  Now that your knee replacement surgery is complete, we focus on the rehabilitation of you and your new knee(s).  Your main goal is to get up and be active.  If you simply walk as much as you can and work on some simple stretches (the knee joint only straightens and bends), you will do great.  The plan is for you to go home directly from the hospital (there may be an exception, but it is rare).  Most people will be able to go home in 1 or 2 days after surgery.  The timing of your discharge will be determined by how well you do with the hospital physical therapy.  We want to make sure that you are safe to go home.  If you can move around the hospital without assistance, it’s unlikely that you’ll need extra assistance when you get home.  Home care services; such as physical therapy and nursing are rarely required, even if you live alone. Remember, we simply want you to be able to safely get around your home and WALK!  Inpatient rehabilitation is almost never required in this day and age. In the unusual situation that it is necessary, it will be arranged by the hospital .      When you get home, you will make arrangements to go to an outpatient physical therapy program.  Call a facility that is close to home and start your program 2 weeks after surgery.  The prescription for therapy can be found in your pre-op instruction packet on the  very last page (remember write your name and the date you are starting treatment on the prescription or it will not be valid).      Until then, the PT program for you to do at home is to get up and get going on your own.  We have provided 2 important stretches in these instructions for you to do on your own. Many people are also benefiting from a web based PT program (called [employer]) that you can do on a daily basis to supplement outpatient physical therapy.     We’ve learned that patients “taking it easy” for the first couple of weeks after surgery may have better outcomes. Doing intense PT right after surgery can cause pain and swelling.  If you avoid overdoing it, you will have less pain and swelling.  It is important, however, that you stretch your knee so it doesn’t tighten up.    Do the following…    1. Walk as much as you can without overdoing it.  2. Stretch your knee 5x/day: straighten and bend (see exercise pictures).  3. Take time to elevate your legs (see picture).  4. Never put a pillow just under your knee (this can lead to a “flexion contracture”)  5. Try to avoid sleeping with your knee in a bent position.          Every day you will feel stronger and more stable. You will use a walker at first and you should plan to advance to a cane sometime within a few weeks after surgery - you can do this whenever you feel comfortable.  When you go to outpatient PT, the physical therapist can guide you.  You can resume upper body exercises or start using a stationary bike after 2 weeks.    Knee Extension Stretches  Knee extension stretching will get your leg straight!    ? Sit forward on the edge of a chair.    ? Place your heel on the top of a step or a stool.    ? Place both hands on your knee just above your kneecap.    ? Push smoothly and firmly - put your weight into it!    ? Feel the stretch in the back of your knee!    ? Count to 12.    ? Do 5 sets at least 5 times/day = 25 times a day    Chair  Slide Exercise  This is the most important exercise to get your knee to bend properly!      ? Sit back in a chair with arm rests.      ? Slide your foot back and bend your knee as much as possible.      ? Keep your knee in the bent position with your foot on the ground.      ? Slide your bottom forward on the chair and lean into your knee.      ? Feel the stretch! Count to 12!      ? Do 5 sets at least 5 times/day = 25 times a day            2. SWELLING   It is NORMAL for your leg to swell after surgery.  If you’re having a lot of swelling, you must spend more time elevating your leg well above your heart (see image below).  You may need to elevate your leg on 4 or 5 pillows for 15-30 minutes at a time, 5 times a day.  If your leg swelling is not improving, please let us know. Some swelling can persist for weeks to months after the procedure.    This is How to Elevate Your Legs above Heart Level:        To Reduce Swelling and pain  Do this 30-60 mins at a time!  Do this 4 to 5 times a day!                3. MEDICINES  Typically, you are given prescriptions at discharge from the hospital. Let us know if you have any allergies to these medicines.  Your nurse will review your medicines upon your discharge.    Pain Medicine: Typically you will be given a prescription for oxycodone, hydrocodone, or tramadol.  Take this as necessary, but wean off of it to Tylenol as soon as you feel comfortable.  You should notice that you need less narcotic pain medicine as time passes. It is important, however, to have adequate pain control so that you can participate in PT and perform your stretches - proper pain control will make your recovery easier and faster!   Do NOT drive while you are taking narcotic pain medicines.  Stop taking the pain medicine if you become dizzy or disoriented.  Constipation is a major side effect of narcotics and many patients will avoid narcotics, as they prefer to deal with a bit of pain rather than  constipation.  If you stop taking the pain medicine, you can typically stop taking the stool softener. Gradually weaning off the narcotic pain medicines may be safer than abruptly stopping them.     Stool Softener: (usually Colace).  This helps to avoid constipation caused by the other medications. Take it 2 times per day for 2-4 weeks, or as long as you are taking the narcotics. It is fine to take over-the-counter laxatives in addition to Colace for constipation management.  If you are not constipated or you experience diarrhea, stop taking the stool softener!            Blood Thinners: Aspirin, Coumadin (warfarin), or Lovenox (enoxaparin) - ONLY ONE - you should not be on more than one of these medicines.  These medicines will help to reduce the risk of blood clots.  However, if your blood becomes too “thin”, you may see bleeding (at the surgical site, gums, in urine, rectum, etc.), severe bruising, or stomach upset.  Please call the office if this occurs.  Do not take vitamin K, vitamin E, or supplements until seen in the office.  It is fine to take a multivitamin. Please remember that the most important way to reduce the risk of clot formation is to get up and get going!        ASPIRIN/ECOTRIN (nearly all patients):     If you are discharged with aspirin (either 81mg or 325mg), take it twice a day x 4 weeks.       COUMADIN (Warfarin):  If you are taking Coumadin, you must get a blood test (INR) drawn 2x/week (you’ll have a prescription for this).  Your dose will be adjusted based on the lab values.  Stay on Coumadin for 4 weeks.  If you were taking Coumadin before surgery you may resume your normal dose at this time.  Typically the cardiologist or your medical doctor will follow your labs and adjust the Coumadin dose.  If you do not receive instruction from them or cannot get in touch with them, call our office.      LOVENOX (Enoxaparin):    This is an injection you will give to yourself for 14 days after  surgery.  NOTE: If you notice any excessive bruising or bleeding from your surgical wound (or elsewhere), call our office.    Other Blood Thinners:  If you are taking other types of blood thinners such as Xarelto, Eliquis, Plavix, Aggrenox, Arixtra, Heparin, etc. please confirm your specific dosing and when to resume these medicines prior to your discharge.  Please DO NOT leave the hospital without a clear understanding of the instructions regarding blood thinners!    If you are discharged on Aspirin, you may resume Non-Steroidal Anti-Inflammatory Drugs (NSAID’s) such as ibuprofen (Motrin), naproxen (Aleve), etc. at 2 weeks post op. Wait at least 1 hour after aspirin dose before taking the NSAID. Please be careful when taking NSAID’s and while taking aspirin because it can cause damage to your stomach or lead to bleeding. Do not take NSAID’s if you are discharged on Coumadin, Lovenox, or other blood thinners. Call our office if you have questions.    4. SURGICAL DRESSING/INCISION  A special dressing was placed over your wound at the time of surgery.  This has been shown to reduce the risk of infection. This protects your wound and can remain on for up to 1 week.  Blood spotting is normal, but should be contained in the padding of the dressing. You can shower without covering it. Remove the dressing 7 days after your surgery. It may be helpful to warm up the dressing by placing a warm towel over the dressing for 10 minutes prior to removal, or get in the shower and let warm water run over dressing.  This is done in an attempt to reduce the pull on your skin. Once removed, simply use soap and water to clean the incision daily and leave the incision open to air. Do not apply any lotions, creams, or ointments to your incision until fully closed and healed (4-6 weeks). Do not soak in a tub or swim until incision fully closed and healed (4-6 weeks). There usually are no staples or stitches that will need to be removed  from your incision (if you have them, call for an appointment to remove at 2 weeks post op).  There are deep sutures under the skin that will dissolve over time.                5. SLEEPING   It is NORMAL to have difficulty sleeping after orthopaedic surgery.  It may take several months until you are sleeping normally. Elevating your leg throughout the day will reduce swelling that builds up at night - this can help you get a better night’s rest. We generally advise against taking sleeping medication postoperatively, unless you were doing so prior to the surgery.    6. SLEEPING POSITION   There are no restrictions regarding position.  When lying on your side, you may place a pillow between your knees for comfort, if necessary. During the first few months, avoid placing a pillow under your knee when reclining or sleeping as this may cause your knee to stiffen in that position. Also, to keep the knee straight, it may be helpful to sleep on your back and avoid recliners that allow your knee to stay slightly bent.    7. DRIVING  You must have advanced to a cane, be off narcotics, and feel comfortable and safe to drive! If it was your left knee, you may be ready after 1 week.  For the right knee, it usually takes longer; you may be ready as early as 3 weeks. You should drive only if you feel safe! If in doubt, call us.     8. INFECTION PRECAUTIONS  You will need to take an antibiotic approximately one hour before any dental appointments. You will be given a prescription at your first post-op appointment. Current recommendations are to continue to do this for the first 2 years after your surgery. We recommend waiting 3 months from the date of your surgery before having any routine dental work done.    9. FLYING/TRIPS    By 6 weeks after surgery, you should be able to take just about any trip. Prior to that, it is likely that it will be uncomfortable, especially for longer trips, so we advise you to plan your travel  accordingly.  For patients who come from far away, you can typically fly home within the first week. During any long trip (plane, train, auto), we suggest that you to take time to get up, stretch your legs, and walk around. Take a 325mg Aspirin on the morning and night of your trip (if not on other blood thinners). Special cards stating that you have a knee replacement are no longer provided, as the [facility] no longer accepts them.  Plan to spend some extra time at airport security in case you set off the alarm when you pass through the metal detector!    10. DISABILITY FORMS  Please contact my nurse if you have any disability, work, or other forms that need to be completed. Forms can be processed via the  at our [facility]. Form processing requires a signed release of information form on file and form payment.    11. POST-OP APPOINTMENT: call [phone] if you do not have one scheduled.  Your appointment was made when you scheduled surgery. Routine post op visits are between 4- 6 weeks post op with my Fellow Doctor or Physician Assistant Jonnie Bustillo.      12. RETURN TO WORK   I expect my patients to return to work within 3 months from surgery.  Most patients return around 4-6 weeks post op. You can return when you feel ready. You can return with restrictions (if your job allows you to do so).  Call my nurse Allen if you need a note.                 Dr Zheng Team Tips:    • Call my Nurse Allen if you have questions or concerns when you are home.  Please call at any time if you experience worsening pain, new numbness/tingling or weakness, wound redness or drainage, bleeding, loss of motion, fevers (temp above 101 F), or chills.  Never hesitate to call if you have a concern.  [phone] for Non-Emergent needs: (Mon-Fri 8:30am to 4:00pm)  [phone] for Emergency needs during or after business hours     • Pain medication refills- Call my nurse at least 3 business days in advance of  running out of medication. Most narcotics can’t be called into or faxed to your pharmacy. Patients will need to make arrangements to  a prescription in an office (where my nurse is) or have it mailed to their home. We do not refill pain medication after 3 months from your surgery date. To prevent narcotic dependency, we recommend you start decreasing the amount of narcotic medication you are taking at 1 month from surgery (many patients do it sooner) and rely on just Tylenol or NSAID’s for pain management.    • It’s normal to have an elevated temperature or low grade fever that may persist for 6 to 8 weeks after surgery. Call if 101 degrees or higher.    • It’s normal for the knee to feel warm and can persist for up to 6 months.    • It’s normal to feel numbness around the incisional area (may resolve in 6 months to 1 year, but may be permanent). It’s normal to feel a burning pain around the knee or near the incision (this will calm down as the swelling and inflammation lessens in your leg).    • You might find it hard to raise or lift your leg on your own right after surgery. It might take up to 4 weeks before you regain full strength. The exercises you do daily will help strengthen the leg.    • It can be normal for some redness/pinkness to appear around the incision. Angry or bright redness should be a concern and call my nurse if this occurs.    • It is normal to have bruising up and down the operative leg and may take a long time to go away.    • It’s good to apply ice for about 20 minutes 3-4 times a day (not directly on your skin though) to help with pain and swelling.    • It’s normal to hear or feel clicking in the knee (which may go away as inflammation goes down).    • If you have increased swelling, you are likely overdoing it.    • It might take a few weeks to “turn the corner” after surgery. Try to stay positive during this time. You will be able to return to your normal activities at a gradual  pace in due time.      ENJOY YOUR NEW KNEE!

## 2019-10-31 NOTE — NURSING NOTE
Report called to RADHA Whitaker at [facility].  Pt is scheduled for 2pm pickup by [facility] van for transfer to [facility].  Peripheral IV removed prior to discharge.  Hard copy of pt's MAR, dc instructions, and interfacility documents printed.  Pt's wife packed his belongings at [facility].    1500Pt discharged to [facility] via [facility] van, accompanied by his wife.

## 2019-10-31 NOTE — PROGRESS NOTES
Inpatient Cardiology   Daily Progress Note       Overview: s/p elective BTKR. Medically stable for transfer to [facility]     Assessment/Plan   * Osteoarthritis of both knees  Overview  - s/p elective BTKR on October 29, 2019    Assessment & Plan   DVT prophylaxis and pain management per ortho service  - Pulmonary toilet  - Ambulate  - Hold all natural supplements. Resume post op when ok to do so per ortho     Essential (primary) hypertension  Assessment & Plan  - Continue routine dose of Valsartan on transfer to [facility]    Hyperlipidemia, unspecified  Assessment & Plan  - Continue Atorvastatin  - Hold Fish Oil. Resume post op when ok per ortho    Osteoarthrosis  Overview  - S/P bilateral TKA on 10/29/19    Assessment & Plan  -            Subjective   Patient seen and examined.  Surgical pain controlled.  Patient denies any chest pain, dyspnea, palpitations, or nausea.      Current Medications:    Scheduled:  • aspirin  81 mg oral BID   • atorvastatin  10 mg oral Daily (6p)   • multivitamin  1 tablet oral Daily   • pantoprazole  40 mg oral Daily before breakfast   • polyethylene glycol  17 g oral Daily   • sennosides-docusate sodium  1 tablet oral BID   • valsartan  80 mg oral Daily       Infusions:      PRN:  •  alum-mag hydroxide-simeth  •  bisacodyl  •  diphenhydrAMINE **OR** diphenhydrAMINE  •  oxyCODONE **OR** morphine  •  ondansetron ODT **OR** ondansetron     Objective   Vital signs in last 24 hours:  Temp:  [36.4 °C (97.6 °F)-37.2 °C (98.9 °F)] 36.9 °C (98.4 °F)  Heart Rate:  [74-82] 82  Resp:  [16-18] 16  BP: (124-155)/(47-71) 152/70    Wt Readings from Last 3 Encounters:   10/29/19 97 kg (213 lb 12.8 oz)   10/16/19 98.9 kg (218 lb)   10/16/19 98.3 kg (216 lb 12.8 oz)       Physical Exam   Constitutional: He is oriented to person, place, and time. He appears well-developed and well-nourished.   HENT:   Head: Normocephalic and atraumatic.   Eyes: EOM are normal. No scleral icterus.   Neck: No JVD present.    Cardiovascular: Normal rate, regular rhythm, S1 normal and S2 normal.   No murmur heard.  Pulses:       Dorsalis pedis pulses are 2+ on the right side, and 2+ on the left side.   Pulmonary/Chest: He has no wheezes. He has no rales.   Abdominal: Soft. Bowel sounds are normal.   Musculoskeletal: He exhibits no edema.        Right knee: He exhibits decreased range of motion.        Left knee: He exhibits decreased range of motion.   aquacel dsg dry   Neurological: He is alert and oriented to person, place, and time.   Skin: Skin is warm and dry.   Psychiatric: He has a normal mood and affect.          Labs and Data:      Hematology  Lab Results   Component Value Date    WBC 11.05 (H) 10/31/2019    HGB 10.7 (L) 10/31/2019    HCT 31.3 (L) 10/31/2019     10/31/2019    INR 1.0 10/16/2019       Chemistries  Lab Results   Component Value Date     10/31/2019    K 4.2 10/31/2019     10/31/2019    CREATININE 0.8 10/31/2019    BUN 15 10/31/2019    CO2 24 10/31/2019    GLUCOSE 142 (H) 10/31/2019    CALCIUM 8.8 (L) 10/31/2019              RODRIGUE Zapien  10/31/2019

## 2019-10-31 NOTE — PROGRESS NOTES
Patient: Chapincito Yoon  Location: [place] [address]  MRN: [id number]  Today's date: 10/31/2019  Patient returned to room in recliner chair via therapy volunteer  Hospital Course  Chris is a 68 y.o. male admitted on 10/29/2019 with Osteoarthritis of both knees, unspecified osteoarthritis type [M17.0]  Osteoarthrosis [M19.90]. Principal problem is No Principal Problem: There is no principal problem currently on the Problem List. Please update the Problem List and refresh..    Chris has a past medical history of Essential (primary) hypertension, Hyperlipidemia, unspecified, Impaired fasting glucose, Osteoarthritis, and Prostate cancer (CMS/[facility]) (2011).     B/L TKA    Therapy Pain/Vitals - 10/31/19 1020        Pain/Comfort/Sleep    Pain Charting Type  Pain Assessment     Preferred Pain Scale  number (Numeric Rating Pain Scale)     Pain Body Location - Side  Bilateral     Pain Body Location  knee     Pain Rating (0-10): Rest  5     Pain Rating (0-10): Activity  6        Vital Signs    Pulse  84     SpO2  97 %     BP  (!) 148/72           Prior Living Environment      Most Recent Value   Living Arrangements  house   Living Environment Comment  2SH with GENE          Prior Level of Function      Most Recent Value   Ambulation  independent   Transferring  independent   Toileting  independent   Bathing  independent   Dressing  independent   Eating  independent   Communication  understands/communicates without difficulty   Swallowing  swallows foods/liquids without difficulty   Prior Level of Function Comment  No DME use PTA   Equipment Currently Used at Home  none          OT Evaluation and Treatment - 10/31/19 1020        Time Calculation    Start Time  1020     Stop Time  1035     Time Calculation (min)  15 min        Session Details    Document Type  daily treatment/progress note     Mode of Treatment  individual therapy;occupational therapy        General Information    Patient Profile Reviewed?  yes     Onset of  Illness/Injury or Date of Surgery  10/29/19     Referring Physician  Marce        Weight-Bearing Status    Left LE Weight-Bearing Status  weight-bearing as tolerated (WBAT)     Right LE Weight-Bearing Status  weight-bearing as tolerated (WBAT)        Sit to Stand Transfer    [place], Sit to Stand Transfer  minimum assist (75% patient effort)     Verbal Cues  technique;safety;hand placement     Assistive Device  walker, front-wheeled        Stand to Sit Transfer    [place], Stand to Sit Transfer  minimum assist (75% patient effort)     Verbal Cues  hand placement;technique;safety     Assistive Device  walker, front-wheeled        Toilet Transfer    [place], Toilet Transfer  minimum assist (75% patient effort)     Assistive Device  commode, 3-in-1        AM-PAC (TM) - ADL (Current Function)    Putting on and taking off regular lower body clothing?  2 - A Lot     Bathing?  2 - A Lot     Toileting?  3 - A Little     Putting on/taking off regular upper body clothing?  4 - None     How much help for taking care of personal grooming?  4 - None     Eating meals?  4 - None     AM-PAC (TM) ADL Score  19        Therapy Assessment/Plan (OT)    Rehab Potential (OT)  good, to achieve stated therapy goals     Therapy Frequency (OT)  5 times/wk        Progress Summary (OT)    Daily Outcome Statement (OT)  Min A with transfers and Mod/Max A with LE ADLS due ROM limitations and pain        Therapy Plan Review/Discharge Plan (OT)    Anticipated Equipment Needs At Discharge (OT)  commode, 3-in-1;shower chair     OT Recommended Discharge Disposition  inpatient rehabilitation facility           Pain Assessment/Intervention  Pain Charting Type: Pain Assessment        Education provided this session. See the Patient Education summary report for full details.         OT Goals      Most Recent Value   Transfer Goal 1   Activity/Assistive Device  toilet at 10/29/2019 1525   [place]  supervision required at 10/29/2019  1525   Time Frame  by discharge at 10/29/2019 1525   Progress/Outcome  goal ongoing at 10/29/2019 1525   Transfer Goal 2   Activity/Assistive Device  sit-to-stand/stand-to-sit at 10/29/2019 1525   [place]  supervision required at 10/29/2019 1525   Time Frame  by discharge at 10/29/2019 1525   Progress/Outcome  goal ongoing at 10/29/2019 1525   Dressing Goal 1   Activity/Adaptive Equipment  lower body dressing at 10/29/2019 1525   [place]  minimum assist (75% or more patient effort) at 10/29/2019 1525   Time Frame  by discharge at 10/29/2019 1525   Progress/Outcome  goal ongoing at 10/29/2019 1525

## 2019-10-31 NOTE — PLAN OF CARE
Problem: Adult Inpatient Plan of Care  Goal: Plan of Care Review  Outcome: Progressing  Flowsheets (Taken 10/31/2019 1156)  Progress: improving  Plan of Care Reviewed With: patient  Outcome Summary: Pt oob, attended PT.  Pain controlled.  Pt for prob dc to [facility] today. Call light within reach.

## 2019-10-31 NOTE — PATIENT CARE CONFERENCE
Care Progression Rounds Note  Date: 10/31/2019  Time: 12:04 PM     Patient Name: Chapincito Yoon     Medical Record Number: [id number]   YOB: 1950  Sex: Male      Room/Bed: 4610    Admitting Diagnosis: Osteoarthritis of both knees, unspecified osteoarthritis type [M17.0]  Osteoarthrosis [M19.90]   Admit Date/Time: 10/29/2019  6:07 AM    Primary Diagnosis: Osteoarthritis of both knees  Principal Problem: Osteoarthritis of both knees    GMLOS: pending  Anticipated Discharge Date: 10/31/2019    AM-PAC  Mobility Score: 17    Discharge Planning:  Living Arrangements: house  Anticipated Discharge Disposition: inpatient rehabilitation facility/acute rehab(interested in [facility])    Barriers to Discharge:  Barriers to Discharge: None  Comment: discharged to [facility] today    Participants:  advanced practice provider, , physical therapy, social work/services

## 2019-10-31 NOTE — UM PHYSICIAN REVIEW NOTE
Utilization Secondary Review Note      Patient Name: Chapincito Yoon    MRN: [id number]    Insurance:  MEDICARE  Date of Admission:  10/29/2019  Initial order:    Inpatient  Planned admission: Yes  Post Discharge Review: Yes            Inpatient services remain appropriate for this 68 y.o. year old male with 2 midnight hospital stay s/p TKA requiring acute rehab on discharge.    Genny Miranda, DO  10/31/19

## 2019-11-01 ENCOUNTER — APPOINTMENT (INPATIENT)
Dept: OCCUPATIONAL THERAPY | Facility: REHABILITATION | Age: 69
DRG: 560 | End: 2019-11-01
Payer: MEDICARE

## 2019-11-01 ENCOUNTER — APPOINTMENT (INPATIENT)
Dept: CARDIOLOGY | Facility: REHABILITATION | Age: 69
DRG: 560 | End: 2019-11-01
Attending: PHYSICAL MEDICINE & REHABILITATION
Payer: MEDICARE

## 2019-11-01 ENCOUNTER — APPOINTMENT (INPATIENT)
Dept: PHYSICAL THERAPY | Facility: REHABILITATION | Age: 69
DRG: 560 | End: 2019-11-01
Payer: MEDICARE

## 2019-11-01 LAB
ALBUMIN SERPL-MCNC: 2.9 G/DL (ref 3.4–5)
ALP SERPL-CCNC: 32 IU/L (ref 35–126)
ALT SERPL-CCNC: 12 IU/L (ref 16–63)
ANION GAP SERPL CALC-SCNC: 9 MEQ/L (ref 3–15)
AST SERPL-CCNC: 17 IU/L (ref 15–41)
BASOPHILS # BLD: 0.04 K/UL (ref 0.01–0.1)
BASOPHILS NFR BLD: 0.4 %
BILIRUB SERPL-MCNC: 1 MG/DL (ref 0.3–1.2)
BSA FOR ECHO PROCEDURE: 2.2 M2
BUN SERPL-MCNC: 14 MG/DL (ref 8–20)
CALCIUM SERPL-MCNC: 8.3 MG/DL (ref 8.9–10.3)
CHLORIDE SERPL-SCNC: 103 MEQ/L (ref 98–109)
CO2 SERPL-SCNC: 25 MEQ/L (ref 22–32)
CREAT SERPL-MCNC: 1 MG/DL
DIFFERENTIAL METHOD BLD: NORMAL
EOSINOPHIL # BLD: 0.35 K/UL (ref 0.04–0.54)
EOSINOPHIL NFR BLD: 3.7 %
ERYTHROCYTE [DISTWIDTH] IN BLOOD BY AUTOMATED COUNT: 13 % (ref 11.6–14.4)
GFR SERPL CREATININE-BSD FRML MDRD: >60 ML/MIN/1.73M*2
GLUCOSE SERPL-MCNC: 90 MG/DL (ref 70–99)
HCT VFR BLDCO AUTO: 30.5 % (ref 40.1–51)
HGB BLD-MCNC: 10.7 G/DL
IMM GRANULOCYTES # BLD AUTO: 0.06 K/UL (ref 0–0.08)
IMM GRANULOCYTES NFR BLD AUTO: 0.6 %
LYMPHOCYTES # BLD: 1.44 K/UL (ref 1.2–3.5)
LYMPHOCYTES NFR BLD: 15.3 %
MCH RBC QN AUTO: 32.7 PG (ref 28–33.2)
MCHC RBC AUTO-ENTMCNC: 35.1 G/DL (ref 32.2–36.5)
MCV RBC AUTO: 93.3 FL (ref 83–98)
MONOCYTES # BLD: 0.96 K/UL (ref 0.3–1)
MONOCYTES NFR BLD: 10.2 %
NEUTROPHILS # BLD: 6.56 K/UL (ref 1.7–7)
NEUTS SEG NFR BLD: 69.8 %
NRBC BLD-RTO: 0 %
PDW BLD AUTO: 10.8 FL (ref 9.4–12.4)
PLATELET # BLD AUTO: 224 K/UL
POTASSIUM SERPL-SCNC: 4 MEQ/L (ref 3.6–5.1)
PROT SERPL-MCNC: 5.4 G/DL (ref 6–8.2)
RBC # BLD AUTO: 3.27 M/UL (ref 4.5–5.8)
SODIUM SERPL-SCNC: 137 MEQ/L (ref 136–144)
WBC # BLD AUTO: 9.41 K/UL

## 2019-11-01 PROCEDURE — 85025 COMPLETE CBC W/AUTO DIFF WBC: CPT | Performed by: PHYSICAL MEDICINE & REHABILITATION

## 2019-11-01 PROCEDURE — 97116 GAIT TRAINING THERAPY: CPT | Mod: GP

## 2019-11-01 PROCEDURE — 97535 SELF CARE MNGMENT TRAINING: CPT | Mod: GO

## 2019-11-01 PROCEDURE — 80053 COMPREHEN METABOLIC PANEL: CPT | Performed by: PHYSICAL MEDICINE & REHABILITATION

## 2019-11-01 PROCEDURE — 63700000 HC SELF-ADMINISTRABLE DRUG: Performed by: PHYSICAL MEDICINE & REHABILITATION

## 2019-11-01 PROCEDURE — 97530 THERAPEUTIC ACTIVITIES: CPT | Mod: GO

## 2019-11-01 PROCEDURE — 12800000 HC ROOM AND CARE SEMIPRIVATE REHAB

## 2019-11-01 PROCEDURE — 36415 COLL VENOUS BLD VENIPUNCTURE: CPT | Performed by: PHYSICAL MEDICINE & REHABILITATION

## 2019-11-01 PROCEDURE — 93970 EXTREMITY STUDY: CPT | Mod: 50

## 2019-11-01 PROCEDURE — 90791 PSYCH DIAGNOSTIC EVALUATION: CPT | Performed by: CLINICAL NEUROPSYCHOLOGIST

## 2019-11-01 PROCEDURE — 97166 OT EVAL MOD COMPLEX 45 MIN: CPT

## 2019-11-01 PROCEDURE — 97110 THERAPEUTIC EXERCISES: CPT | Mod: GP

## 2019-11-01 PROCEDURE — 97163 PT EVAL HIGH COMPLEX 45 MIN: CPT

## 2019-11-01 PROCEDURE — 97530 THERAPEUTIC ACTIVITIES: CPT | Mod: GP

## 2019-11-01 RX ADMIN — OXYCODONE HYDROCHLORIDE 10 MG: 5 TABLET ORAL at 02:12

## 2019-11-01 RX ADMIN — DOCUSATE SODIUM 100 MG: 100 CAPSULE, LIQUID FILLED ORAL at 07:53

## 2019-11-01 RX ADMIN — MULTIPLE VITAMINS W/ MINERALS TAB 1 TABLET: TAB at 07:54

## 2019-11-01 RX ADMIN — ACETAMINOPHEN 650 MG: 325 TABLET, FILM COATED ORAL at 15:58

## 2019-11-01 RX ADMIN — ASPIRIN 81 MG: 81 TABLET, COATED ORAL at 07:52

## 2019-11-01 RX ADMIN — ATORVASTATIN CALCIUM 10 MG: 10 TABLET, FILM COATED ORAL at 16:00

## 2019-11-01 RX ADMIN — VALSARTAN 80 MG: 80 TABLET, FILM COATED ORAL at 07:51

## 2019-11-01 RX ADMIN — OXYCODONE HYDROCHLORIDE 10 MG: 5 TABLET ORAL at 19:02

## 2019-11-01 RX ADMIN — OXYCODONE HYDROCHLORIDE 10 MG: 5 TABLET ORAL at 10:36

## 2019-11-01 RX ADMIN — ACETAMINOPHEN 650 MG: 325 TABLET, FILM COATED ORAL at 21:28

## 2019-11-01 RX ADMIN — DOCUSATE SODIUM 100 MG: 100 CAPSULE, LIQUID FILLED ORAL at 19:04

## 2019-11-01 RX ADMIN — OXYCODONE HYDROCHLORIDE 10 MG: 5 TABLET ORAL at 06:32

## 2019-11-01 RX ADMIN — OXYCODONE HYDROCHLORIDE 10 MG: 5 TABLET ORAL at 14:32

## 2019-11-01 RX ADMIN — ASPIRIN 81 MG: 81 TABLET, COATED ORAL at 19:02

## 2019-11-01 ASSESSMENT — COGNITIVE AND FUNCTIONAL STATUS - GENERAL: AFFECT: WFL

## 2019-11-01 NOTE — PROGRESS NOTES
11/01/19 1100   Discharge Needs Assessment (IRF)   Equipment Currently Used at Home none   Anticipated Changes Related to Illness none   Anticipated Discharge Disposition home with outpatient services   Offered/Gave Vendor List no   Patient's Choice of Community Agency(s) [facility]    Discharge Coordination/Progress None at this time    Team Conference   Next Team Conference Date 11/05/19   Final Note   Final Note Per patient wife is able to help with needs but would be limited pending on status. CM reviewed HHA and outpatient services. Patient eager to return to independ[facility].

## 2019-11-01 NOTE — PLAN OF CARE
Problem: Rehabilitation (IRF) Plan of Care  Goal: Plan of Care Review  Outcome: Progressing  Flowsheets (Taken 11/1/2019 1151)  Plan of Care Reviewed With: patient  IRF Plan of Care Review: progress ongoing, continue  Outcome Summary: PT evaluation completed.

## 2019-11-01 NOTE — CONSULTS
Internal Medicine Consult Note      Patient interviewed and examined  Attestation Notes: Face to face encounter completed      Chapincito Yoon is a 68 y.o. male who was admitted for Status post bilateral knee replacements [Z96.653].    Patient was referred by Dr Rob for Medical Evaluation and Management    Outside records reviewed.      HPI  Mr Yoon is a 69 yo M with hypertension, dyslipidemia, prostate CA, osteoarthritis who was evaluated as an outpatient for chronic progressive severe bilateral knee pain not alleviated by conservative measures; he was recommended bilateral total knee replacement  On 10/29, he was admitted to [facility], where he underwent bilateral total knee replacement secondary to osteoarthritis by Dr. Zheng  He utilized twice daily aspirin 81 mg for DVT prophylaxis  He continued on Diovan for blood pressure management  He is now referred to [facility] for acute inpatient rehabilitation    Subjective   Awake, alert, in no distress; multipel family members in visiting  Slept fairly well last night, fair control of knee pain  Good appetite, has been able to move bowels, denies GI distress  No fever, HA, orthostasis, cough, CP, dyspnea  +BLE swelling  We reviewed current medications and their indications,   we discussed plan for therapy    Review of Systems  All other systems reviewed and negative except as noted in the HPI.    Medical History:   Past Medical History:   Diagnosis Date   • Essential (primary) hypertension    • Hyperlipidemia, unspecified    • Impaired fasting glucose    • Osteoarthritis     Knees, hands    • Prostate cancer (CMS/HCC) 2011    Prostatectomy       Surgical History:   Past Surgical History:   Procedure Laterality Date   • COLONOSCOPY W/ POLYPECTOMY  2015   • KNEE SURGERY Right     MCL Repair   • MOHS SURGERY      Right finger   • PROSTATECTOMY  2011   • TOTAL KNEE ARTHROPLASTY Bilateral 10/29/2019       Allergies: Patient has no known  allergies.    Current Medications:  • aspirin  81 mg oral BID   • atorvastatin  10 mg oral Daily (6p)   • docusate sodium  100 mg oral BID   • multivitamin  1 tablet oral Daily   • senna  2 tablet oral Daily with lunch   • valsartan  80 mg oral Daily       Social History:   Social History     Socioeconomic History   • Marital status:      Spouse name: Christianne [phone]   • Number of children: None   • Years of education: None   • Highest education level: None   Occupational History   • Occupation: retired   Social Needs   • Financial resource strain: None   • Food insecurity:     Worry: None     Inability: None   • Transportation needs:     Medical: None     Non-medical: None   Tobacco Use   • Smoking status: Former Smoker     Last attempt to quit:      Years since quittin.8   • Smokeless tobacco: Never Used   • Tobacco comment: quit 40 years ago   Substance and Sexual Activity   • Alcohol use: Yes     Alcohol/week: 3.0 standard drinks     Types: 3 Cans of beer per week     Frequency: 2-3 times a week     Drinks per session: 3 or 4     Binge frequency: Less than monthly   • Drug use: Never   • Sexual activity: None   Lifestyle   • Physical activity:     Days per week: None     Minutes per session: None   • Stress: None   Relationships   • Social connections:     Talks on phone: None     Gets together: None     Attends Druze service: None     Active member of club or organization: None     Attends meetings of clubs or organizations: None     Relationship status: None   • Intimate partner violence:     Fear of current or ex partner: None     Emotionally abused: None     Physically abused: None     Forced sexual activity: None   Other Topics Concern   • None   Social History Narrative   • None       Family History:   Family History   Problem Relation Age of Onset   • Liver cancer Biological Mother    • Early death Biological Father    • Heart disease Biological Father    • No Known Problems  Biological Sister    • No Known Problems Biological Brother        Objective     Vital signs in last 24 hours:  Temp:  [36.8 °C (98.2 °F)-37.5 °C (99.5 °F)] 36.8 °C (98.2 °F)  Heart Rate:  [] 101  Resp:  [18-20] 20  BP: (134-173)/(64-79) 134/64    Physical Exam   Constitutional: He is oriented to person, place, and time. He appears well-developed and well-nourished. No distress.   HENT:   Head: Normocephalic and atraumatic.   Mouth/Throat: Oropharynx is clear and moist. No oropharyngeal exudate.   Eyes: Pupils are equal, round, and reactive to light. Conjunctivae and EOM are normal. No scleral icterus.   Neck: Normal range of motion. Neck supple.   Cardiovascular: Normal rate, regular rhythm, normal heart sounds and intact distal pulses.   Pulmonary/Chest: Effort normal and breath sounds normal. No respiratory distress. He has no wheezes.   Abdominal: Soft. Bowel sounds are normal. He exhibits no distension. There is no tenderness.   Musculoskeletal: He exhibits edema (Bilateral lower extremity edema) and deformity (s/p bilateral TKR).   Neurological: He is alert and oriented to person, place, and time. No cranial nerve deficit.   Skin: Skin is warm and dry. He is not diaphoretic.   Bilateral knee incisions dressed, without apparent drainage   Psychiatric: He has a normal mood and affect.   Nursing note and vitals reviewed.      Labs   Lab results reviewed, discussed with patient  Lab Results   Component Value Date    WBC 9.41 11/01/2019    HGB 10.7 (L) 11/01/2019    HCT 30.5 (L) 11/01/2019     11/01/2019     11/01/2019    K 4.0 11/01/2019     11/01/2019    CREATININE 1.0 11/01/2019    BUN 14 11/01/2019    CO2 25 11/01/2019    ALT 12 (L) 11/01/2019    AST 17 11/01/2019    INR 1.0 10/16/2019    HGBA1C 5.6 10/16/2019       Imaging    X-ray bilateral knees 10/29:  Prosthesis in place; the components are in good position and alignment  No abnormalities are seen      ECG/Telemetry    ECG  10/16:  Normal sinus rhythm, ventricular rate 65  Normal ECG        Assessment/Plan   68 y.o. male being consulted for medical evaluation and management    Patient Active Problem List    Diagnosis Date Noted   • Anemia due to blood loss 10/31/2019   • Leukemoid reaction 10/31/2019   • S/P TKR (total knee replacement), bilateral 10/31/2019   • Status post bilateral knee replacements 10/31/2019   • Osteoarthrosis 10/29/2019   • Osteoarthritis of both knees 10/16/2019   • Counseling on health promotion and disease prevention 10/16/2019   • Hyperlipidemia, unspecified    • Essential (primary) hypertension      Mr Yoon is a 67 yo M with hypertension, dyslipidemia, prostate CA, osteoarthritis who is admitted to [facility] on 10/31 from [facility] for acute inpatient rehabilitation s/p bilateral TKR by Dr. Zheng on 10/29    Ortho:  S/p B TKR-assess tolerance for, participation in, progress with rehab  Assess for pain relief with current pain management regimen  Local wound care    Cardiovascular:  Essential hypertension-monitor blood pressure control on Diovan  Maintain hold parameters and cardiac precautions    Pulmonary:  Encourage use of spirometry for atelectasis  Monitor for respiratory distress    Endocrine:  Dyslipidemia-continue statin therapy, low-fat diet  Monitor LFT    Renal:  BUN/Cr 14/1.0, GFR> 60  Monitor renal function, avoid nephrotoxins    GI:  Constipation-continue bowel program    :  Hx prostate CA  Monitor PVR, CIC PRN  Urinalysis 10/31 clear, leukocyte esterase negative, nitrite negative; culture not indicated at present    Heme:  Postoperative anemia-preop hemoglobin 14.2; hemoglobin today 10.7.  Normochromic, normocytic  Monitor trend hemoglobin and symptom complaint    F/E/N:  Regular/thin diet  Monitor electrolytes, correct as needed  Monitor hydration and nutritional status    Derm:  Local wound care    Prophylaxis:  -BID 81mg ASA, SCD for DVT prophylaxis  -Spirometry for  atelectasis  -Maintain fall, cardiac precautions    Disposition  -Expected length of stay 7-10 days  -I have queried the state Prescription Drug Monitoring Program [PDMP] and found no suspicious activity    Shalini Torres MD  11/1/2019

## 2019-11-01 NOTE — PLAN OF CARE
Problem: Rehabilitation (IRF) Plan of Care  Goal: Plan of Care Review  Outcome: Progressing  Flowsheets (Taken 10/31/2019 4791)  Plan of Care Reviewed With: patient  IRF Plan of Care Review: progress ongoing, continue  Outcome Summary: reviewed plan for pain management throughout evening and use of ice to assist with pain relief

## 2019-11-01 NOTE — H&P
Patient Name: Chapincito Yoon  MRN: [id number]  : 1950  Admission Date: 10/31/2019    Chief Complaint:    Dysfunction in ambulation, transfers and self-care status post bilateral total knee replacements. (Patient was admitted to [facility] on 10/31/19. Records reviewed on 10/31/19. Patient evaluated on 10/31/19 at about 6:05 PM. Full H&P done on 10/31/19. Orders put in CPOE on 10/31/19. Plan of care discussed with patient.)    History of Present Illness:    Mr. Chapincito Yoon  is a 68-year-old right-handed white male with chronic conditions significant for degenerative arthritis, hypertension, prostate cancer status post prostatectomy, dyslipidemia who had progressively worsening bilateral knee pain which failed conservative management and subsequently the patient underwent elective bilateral total knee replacements on 10/29/19 at [facility] by Dr. Ted Zheng.  Postoperatively, he is allowed weightbearing as tolerated on both lower extremities.  He is on Aspirin for deep vein thrombus prophylaxis and also on pneumatic compression boots. He has put on Oxycodone for pain control.  Postoperative course was significant for anemia, but he did not need transfusion. On 10/30/19, hemoglobin was 11.2, WBC count 7.65, BUN 13, creatinine 0.9.  He has been needing assistance for mobility, transfers, self-care postoperatively. I have reviewed the preadmission screening and concur with that information and there are no significant changes from patient’s preadmission screening. He is transferred to [facility] on 10/31/19 for further rehabilitation care.     Past Medical History:    Past Medical History:   Diagnosis Date   • Essential (primary) hypertension    • Hyperlipidemia, unspecified    • Impaired fasting glucose    • Osteoarthritis     Knees, hands    • Prostate cancer (CMS/[facility])     Prostatectomy       Past Surgical History:    Past Surgical History:   Procedure Laterality Date   •  COLONOSCOPY W/ POLYPECTOMY  2015   • KNEE SURGERY Right     MCL Repair   • MOHS SURGERY      Right finger   • PROSTATECTOMY  2011   • TOTAL KNEE ARTHROPLASTY Bilateral 10/29/2019       Medications:    Current Facility-Administered Medications   Medication Dose Route Frequency   • acetaminophen  650 mg oral q4h PRN   • alum-mag hydroxide-simeth  30 mL oral q4h PRN   • aspirin  81 mg oral BID   • atorvastatin  10 mg oral Daily (6p)   • bisacodyl  10 mg rectal Daily PRN   • docusate sodium  100 mg oral BID   • magnesium hydroxide  30 mL oral Daily PRN   • [START ON 11/1/2019] multivitamin  1 tablet oral Daily   • ondansetron ODT  4 mg oral q6h PRN   • oxyCODONE  5-10 mg oral q4h PRN   • [START ON 11/1/2019] senna  2 tablet oral Daily with lunch   • [START ON 11/1/2019] valsartan  80 mg oral Daily       Allergies:    No Known Allergies    Family History:    Family History   Problem Relation Age of Onset   • Liver cancer Biological Mother    • Early death Biological Father    • Heart disease Biological Father    • No Known Problems Biological Sister    • No Known Problems Biological Brother        Social History:    The patient is  and lives with his wife in a 2 level home. 4 steps to enter, bedroom and bathroom on the second level, full flight of stairs to reach up there. 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  He stopped smoking, drinks alcohol socially and denies using recreational drugs.    Functional History:    The patient is a right-hand dominant person. He was independent in ADLs and ambulation prior to his hospitalization. He drives a car.    Review Of Systems:    A complete and comprehensive review of systems was performed. The patient denies any chest pain, shortness of breath. He indicates last bowel movement was today.  He reports he is able to void.  We will monitor post void residuals  He has some pain in his knees. Otherwise, a complete and  "comprehensive review of systems is negative    Physical Examination:    Blood pressure (!) 173/79, pulse 95, temperature 37.5 °C (99.5 °F), temperature source Oral, resp. rate 18, height 1.702 m (5' 7\"), weight 102 kg (225 lb 1.4 oz), SpO2 95 %.    Body mass index is 35.25 kg/m².    General Appearance: Not in acute distress  Head/Ear/Nose/Throat: Normocephalic; Atraumatic.   Eye: EOMI; PERRL.   Neck: No JVD; No Bruits.   Respiratory: Decreased breath sounds at bases.   Cardiovascular: RRR; Normal S1, S2.   Gastrointestinal: Soft; NT; +BS.   Extremities: Bilateral lower extremity edema noted.  Healing incisions noted on anterior aspect of both knees.  Aquacel dressing in place on both incisions.    Musculoskeletal: Functional active range of motion in both upper extremities.  Some limitation of active range of motion in both hips and knees, limited by pain.    Neurological: AAO ×3. Speech is fluent. Cranial nerve examination does not reveal any gross facial asymmetry. Strength testing about 4+/5 strength in both upper extremities.  Bilateral hip flexion is less than 3/5.  Bilateral quadriceps are less than 3/5.  Bilateral ankle dorsi and plantar flexion are 4/5.  He is grossly able to localize touch and position sense in great toes.  Deep tendon reflexes are hypoactive and symmetric bilaterally.  Plantars are flexor.  Coordination is functional upper extremities.  Both knee jerks were not tested.  Behavior/Emotional: Appropriate; Cooperative.   Skin: No obvious rashes noted.  Bilateral knee incisions healing.  Aquacel dressing in place.      Assessment and Plan:    ASSESSMENT PLAN:  1. 68-year-old right-handed white male with chronic conditions significant for degenerative arthritis, hypertension, prostate cancer status post prostatectomy, dyslipidemia who had progressively worsening bilateral knee pain which failed conservative management and subsequently the patient underwent elective bilateral total knee " replacements on 10/29/19 at [facility] by Dr. Ted Zheng.  Postoperatively, he is allowed weightbearing as tolerated on both lower extremities.  He is on Aspirin for deep vein thrombus prophylaxis and also on pneumatic compression boots. He has put on Oxycodone for pain control.  Postoperative course was significant for anemia, but he did not need transfusion. On 10/30/19, hemoglobin was 11.2, WBC count 7.65, BUN 13, creatinine 0.9.  He has been needing assistance for mobility, transfers, self-care postoperatively. He is transferred to [facility] on 10/31/19 for further rehabilitation care.      2. DVT prophylaxis - on Aspirin.  On SCDs.      3.  Orthopedics - status post bilateral total knee replacements.  Continue PT, OT.  Monitor healing of incisions.    4. GI - On Colace, Senokot.  On Dulcolax.  On Zofran ODT for nausea.  On when necessary MOM, Maalox.    5.  - voiding.  Denies dysuria.  Monitor post void residuals.    6.  Hypertension - on Diovan.    7. Pain - on when necessary Oxycodone.  On Tylenol PRN.  Ice to knees when necessary.    8.  Hematology - monitor hemoglobin, platelets.  Anemia - on MVI.    9.  Dyslipidemia - on Lipitor.    10. Rehabilitation medicine - Continue comprehensive rehabilitation care. Continue PT, OT.  We will follow falls precautions, cardiac precautions, monitor pulse oximetry in therapy.    11. Reviewed labs today.    12. Consultations - Dr. Torres will be consulted from internal medicine standpoint.  Nutrition will be consulted.  Will consult psychology.      Estimated Length Of Stay:    Will be about 7 days, and will be adjusted in team meetings depending upon functional status and progress in therapy.     Goals Of This Stay:    Goals of this stay would be to increase his strength; improve his endurance; maximize his independence in transfers, ambulation, self care, keeping him weight-bearing as tolerated on both lower extremities; evaluate the need for  assistive devices and adaptive equipment; do patient and family education; work on improving knee range of motion, do caregiver training as appropriate; monitor him medically and try to control his pain.       Post Admission Physician Evaluation:    Chapincito Yoon is admitted to [facility] for comprehensive inpatient rehabilitation for Ortho status post bilateral total knee replacements with functional deficits in mobility;motor dysfunction;safety;self-care. Patient is receiving the following services: physical therapy;occupational therapy.    Active medical management is required for   Patient Active Problem List   Diagnosis   • Hyperlipidemia, unspecified   • Essential (primary) hypertension   • Osteoarthritis of both knees   • Counseling on health promotion and disease prevention   • Osteoarthrosis   • Anemia due to blood loss   • Leukemoid reaction   • S/P TKR (total knee replacement), bilateral   • Status post bilateral knee replacements       Premorbid Function  Ambulation: independent  Transferring: independent  Toileting: independent  Bathing: independent  Dressing: independent  Eating: independent  Communication: understands/communicates without difficulty  Swallowing: swallows foods/liquids without difficulty  Baseline Diet/Method of Nutritional Intake: thin liquids;regular solids  Equipment Currently Used at Home: none  Prior Level of Function Comment: No DME use PTA      Current Function  Mobility  Gait  [place], Gait: minimum assist (75% or more patient effort)  Assistive Device: walker, front-wheeled  Comment: 40ft, slow, antalgic, RW, v/c for breathing    Stairs  [place], Stairs: not tested    Wheelchair  No data recorded  Transfers  Assistive Device (Bed Mobility): bed rails;head of bed elevated  [place], Supine to Sit: minimum assist (75% patient effort)  [place], Sit to Stand Transfer: minimum assist (75% patient effort)  [place], Stand to Sit Transfer:  minimum assist (75% patient effort)  [place], Stand Pivot/Stand Step Transfer: minimum assist (75% or more patient effort)  Comment (Bed Mobility): recieved in dept in recliner    [place], Toilet Transfer: minimum assist (75% patient effort)  Assistive Device: commode, 3-in-1      Self Care     Cognition  Affect/Mental Status (Cognitive): WFL    Communication       Risk for Complications  Constipation: Moderate  Dehydration/Malnutrition: Moderate  DVT: Moderate  Falls: Moderate      Expected Level of Function  Expected Functional Improvement: mobility;motor dysfunction;safety;self-care  Self-Care: Independent  Sphincter Control: Independent  Transfers: Independent  Locomotion: Independent  Communication: Independent  Social Cognition: Independent      Anticipated Discharge Plan  Anticipated Discharge Disposition: home with outpatient services  Type of Outpatient Services: physical therapy      I have reviewed the pre-admission screening and there are no relevant changes.    Expected length of stay: 7 days      Vernon Rob MD  10/31/2019

## 2019-11-01 NOTE — PROGRESS NOTES
Inpatient Psychology Initial Intake    Duration:  30 minutes    Chapincito Yoon, : 1950, a 68 y.o. male, for initial evaluation visit to discuss Adjustment to Disability.    HPI: No chief complaint on file.         Past Medical History:   Diagnosis Date   • Essential (primary) hypertension    • Hyperlipidemia, unspecified    • Impaired fasting glucose    • Osteoarthritis     Knees, hands    • Prostate cancer (CMS/HCC)     Prostatectomy     Past Surgical History:   Procedure Laterality Date   • COLONOSCOPY W/ POLYPECTOMY     • KNEE SURGERY Right     MCL Repair   • MOHS SURGERY      Right finger   • PROSTATECTOMY     • TOTAL KNEE ARTHROPLASTY Bilateral 10/29/2019     Family History   Problem Relation Age of Onset   • Liver cancer Biological Mother    • Early death Biological Father    • Heart disease Biological Father    • No Known Problems Biological Sister    • No Known Problems Biological Brother      Social History     Socioeconomic History   • Marital status:      Spouse name: Christianne [phone]   • Number of children: None   • Years of education: None   • Highest education level: None   Occupational History   • Occupation: retired   Social Needs   • Financial resource strain: None   • Food insecurity:     Worry: None     Inability: None   • Transportation needs:     Medical: None     Non-medical: None   Tobacco Use   • Smoking status: Former Smoker     Last attempt to quit: 1979     Years since quittin.8   • Smokeless tobacco: Never Used   • Tobacco comment: quit 40 years ago   Substance and Sexual Activity   • Alcohol use: Yes     Alcohol/week: 3.0 standard drinks     Types: 3 Cans of beer per week     Frequency: 2-3 times a week     Drinks per session: 3 or 4     Binge frequency: Less than monthly   • Drug use: Never   • Sexual activity: None   Lifestyle   • Physical activity:     Days per week: None     Minutes per session: None   • Stress: None   Relationships   • Social  Notified of below   connections:     Talks on phone: None     Gets together: None     Attends Yazdanism service: None     Active member of club or organization: None     Attends meetings of clubs or organizations: None     Relationship status: None   • Intimate partner violence:     Fear of current or ex partner: None     Emotionally abused: None     Physically abused: None     Forced sexual activity: None   Other Topics Concern   • None   Social History Narrative   • None       Current Legal Status:       Number of Arrests:      Previous Mental Health History:   Previous Mental Health Treatment:  N/A  Previous Suicidal Behavior:  N/A  Previous Self-Injurious Behavior:  N/A  Previous Homicidal Behavior:  N/A  Previous Substance Abuse Treatment: N/A    Current Facility-Administered Medications   Medication Dose Route Frequency Provider Last Rate Last Dose   • acetaminophen (TYLENOL) tablet 650 mg  650 mg oral q4h PRN Vernon Rob MD   650 mg at 10/31/19 2003   • alum-mag hydroxide-simeth (MAALOX) 200-200-20 mg/5 mL suspension 30 mL  30 mL oral q4h PRN Vernon Rob MD       • aspirin enteric coated tablet 81 mg  81 mg oral BID Vernon Rob MD   81 mg at 11/01/19 0752   • atorvastatin (LIPITOR) tablet 10 mg  10 mg oral Daily (6p) Vernon Rob MD   10 mg at 10/31/19 2002   • bisacodyl (DULCOLAX) 10 mg suppository 10 mg  10 mg rectal Daily PRN Vernon Rob MD       • docusate sodium (COLACE) capsule 100 mg  100 mg oral BID Vernon Rob MD   100 mg at 11/01/19 0753   • magnesium hydroxide (M.O.M.) 400 mg/5 mL suspension 30 mL  30 mL oral Daily PRN Vernon Rob MD       • multivitamin tablet 1 tablet  1 tablet oral Daily Vernon Rob MD   1 tablet at 11/01/19 0754   • ondansetron ODT (ZOFRAN-ODT) disintegrating tablet 4 mg  4 mg oral q6h PRN Vernon Rob MD       • oxyCODONE (ROXICODONE) immediate release tablet 5-10 mg  5-10 mg oral q4h PRN Vernon Rob MD   10 mg at  11/01/19 1036   • senna (SENOKOT) tablet 2 tablet  2 tablet oral Daily with lunch Vernon Rob MD       • valsartan (DIOVAN) tablet 80 mg  80 mg oral Daily Vernon Rob MD   80 mg at 11/01/19 0751       Current Evaluation:   Mental Status Exam:       Assessments done this visit:       Comments:     Risk Assessment:   Suicidal Ideation: Not Present  Self Injurious Behavior:  Not Present  Irritability:  Not Present  Homicidal Behavior: Not Present  Estimate of Risk:  None  If risk identified have suicide precautions been implemented? N/A    Interventions  Acceptance & Adjustment  Pain Management    Psychoeducation provided on:  Orthopedic Recovery and pain management     Recommendations:   Individual Therapy   30 minutes 1 times weekly    Goals     • Increase adjustment to rehabilitation facility.           Visit Diagnosis:   No diagnosis found.    Diagnostic Impression:   Weekly Outcome Statement:  Pt presents to [facility] following bilateral TKR. He is pleasant and cooperative.  He does report anxiety since surgery. He denies any history of depression or anxiety. He reports pain and sleep disturbance since surgery.. Appetite is reduced but he is eating something at each meal. Highest level of education was college. 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 He enjoys traveling with his wife. He also enjoys golf and grandchildren (6 total). Mood is motivated and positive and affect congruent. Time was spent on rehab and recovery as well as pain management techniques. Psychology will follow. Pt presents to [facility] following bilateral TKR. He is pleasant and cooperative.  He does report anxiety since surgery. He denies any history of depression or anxiety. He reports pain and sleep disturbance since surgery.. Appetite is reduced but he is eating something at each meal. Highest level of education was college. 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 He enjoys traveling with his wife. He also  enjoys golf and grandchildren (6 total). Mood is motivated and positive and affect congruent. Time was spent on rehab and recovery as well as pain management techniques. Psychology will follow.         Angie Rosa PSY.D @ 2:30 PM

## 2019-11-01 NOTE — PLAN OF CARE
Problem: Ambulation and Stair Mobility Impairment (Functional Mobility Impairment)  Goal: Functional Mobility Impairment Goal: Ambulation and Stair Mobility [place] Increased  Outcome: Progressing     Problem: Bed Mobility Impairment (Functional Mobility Impairment)  Goal: Functional Mobility Impairment Goal: Bed Mobility [place] Increased  Outcome: Progressing     Problem: Functional Transfer Ability Impairment (Functional Mobility Impairment)  Goal: Functional Mobility Impairment Goal: Functional Transfer [place] Increased  Outcome: Progressing

## 2019-11-01 NOTE — CONSULTS
"Nutrition Note    Nutrition Charting Type: Nutrition Brief Assessment    Clinical Course: Patient is a 68 y.o. male who was admitted on 10/31/2019 with a diagnosis of Status post bilateral knee replacements [Z96.653].     Nutrition Interventions/ Recommendations:   1. Counseled nutrition needs for healing  2. Provide alternate meal list  3.monitor PO,weight, labs, skin    Nutrition risk level 1      Past Medical History:   Diagnosis Date   • Essential (primary) hypertension    • Hyperlipidemia, unspecified    • Impaired fasting glucose    • Osteoarthritis     Knees, hands    • Prostate cancer (CMS/HCC) 2011    Prostatectomy     Past Surgical History:   Procedure Laterality Date   • COLONOSCOPY W/ POLYPECTOMY  2015   • KNEE SURGERY Right     MCL Repair   • MOHS SURGERY      Right finger   • PROSTATECTOMY  2011   • TOTAL KNEE ARTHROPLASTY Bilateral 10/29/2019            Dietary Orders   (From admission, onward)             Start     Ordered    10/31/19 1732  Adult Diet Regular; Thin Liquids  Diet effective now     Question Answer Comment   Diet Texture Regular    Fluid Consistency: Thin Liquids        10/31/19 1731                Reason for Assessment  Reason For Assessment: physician consult  Reason for Consult: Provider order    MST Nutrition Screen Tool  Has patient lost weight without trying?: 0-->No  Has patient been eating poorly due to decreased appetite?: 0-->No  MST Nutrition Screen Score: 0    Nutrition/Diet History  Typical Food/Fluid Intake: (general diet, PO > 50%)  Factors Affecting Nutritional Intake: (not usually a big breakfast eater)    Physical Findings  Overall Physical Appearance: obese  Skin: (knee incisions)    RETS18 Physical Appearance  Overall Physical Appearance: obese  Last Bowel Movement: 10/31/19  Skin: (knee incisions)    Nutrition Order  Nutrition Order Review: meets nutritional requirements    Anthropometrics  Height: 170.2 cm (5' 7\")    Wt Readings from Last 3 Encounters:   10/31/19 " 102 kg (225 lb 1.4 oz)   10/29/19 97 kg (213 lb 12.8 oz)   10/16/19 98.9 kg (218 lb)       Weights (last 7 days)     Date/Time   Weight    10/31/19 1615   102 kg (225 lb 1.4 oz)                   Current Weight  Weight Method: Bed scale  Weight: 102 kg (225 lb 1.4 oz)    Ideal Body Weight (IBW)  Ideal Body Weight (IBW) (kg): 68.1  % Ideal Body Weight: 149.93         Body Mass Index (BMI)  BMI (Calculated): 35.2  BMI (kg/m2): 35.33  BMI Assessment: BMI 35-39.9: obesity grade II          Labs/Procedures/Meds  Lab Results Reviewed: reviewed    CMP Results       11/01/19 10/31/19 10/30/19                    0506 0513 0444          138 134         K 4.0 4.2 3.8         Cl 103 108 105         CO2 25 24 23         Glucose 90 142 134         BUN 14 15 13         Creatinine 1.0 0.8 0.9         Calcium 8.3 8.8 8.1         Anion Gap 9 6 6         AST 17 -- --         ALT 12 -- --         Albumin 2.9 -- --         EGFR >60.0 >60.0 >60.0                     Lab Results   Component Value Date    ALT 12 (L) 11/01/2019    AST 17 11/01/2019    ALKPHOS 32 (L) 11/01/2019    BILITOT 1.0 11/01/2019     No results found for: [id number]  Lab Results   Component Value Date    CALCIUM 8.3 (L) 11/01/2019     Lab Results   Component Value Date    WBC 9.41 11/01/2019    HGB 10.7 (L) 11/01/2019    HCT 30.5 (L) 11/01/2019    MCV 93.3 11/01/2019     11/01/2019     Glucose Results       10/16/19                          0926           HBG A1C 5.6           EST AVG GLUCOSE 114           Comment for EST AVG GLUCOSE at 0926 on 10/16/19:    Estimate of average glucose concentration continuously over 24 hours for previous 2 to 3 months(Per ADA Recommendation).            • aspirin  81 mg oral BID   • atorvastatin  10 mg oral Daily (6p)   • docusate sodium  100 mg oral BID   • multivitamin  1 tablet oral Daily   • senna  2 tablet oral Daily with lunch   • valsartan  80 mg oral Daily     Medications  Pertinent Medications Reviewed:  reviewed     PES  Statement: PES Statement  Nutritional Needs Met?: Yes  Nutrition Status Classification: Mild nutritional compromise(level 1)                                   Clinical Comments:     Pt seen at bedside, voices no nutrition related  concerns or special diet needs.      Date: 11/01/19  Signature: EVENS Brasher

## 2019-11-01 NOTE — PLAN OF CARE
Problem: Rehabilitation (IRF) Plan of Care  Goal: Plan of Care Review  Outcome: Progressing  Flowsheets (Taken 11/1/2019 1611)  Plan of Care Reviewed With: patient  IRF Plan of Care Review: progress ongoing, continue  Outcome Summary: IP OT IE completed. DME introduced for safe completion of transfers, pt able to perform STS c Mod A and SPTs c Mod A to/from DME surfaces. Improved noted in bathing using AD.

## 2019-11-01 NOTE — PLAN OF CARE
Problem: Rehabilitation (IRF) Plan of Care  Goal: Plan of Care Review  Outcome: Progressing  Flowsheets (Taken 11/1/2019 0557)  Plan of Care Reviewed With: patient  IRF Plan of Care Review: progress ongoing, continue  Outcome Summary: Slept well between care. Pain managed prn.

## 2019-11-01 NOTE — PROGRESS NOTES
Patient: Chapincito Yoon  Location: [facility] [address]  MRN: [id number]  Today's date: 11/1/2019    Hospital Course  Chris is a 68 y.o. male admitted on 10/31/2019 with Status post bilateral knee replacements [Z96.653]. Principal problem is S/P TKR (total knee replacement), bilateral.    Chris has a past medical history of Essential (primary) hypertension, Hyperlipidemia, unspecified, Impaired fasting glucose, Osteoarthritis, and Prostate cancer (CMS/[facility]) (2011).     Pt is a 68 year old male admitted to [facility] on 10/31/19 with h/o bilateral knee pain x 5-6 years. It has progressively worsened. Pt is now s/p bilateral TKA's on 10/29/19 and no complications post op.      Therapy Pain/Vitals     Row Name 11/01/19 1105        Pain/Comfort/Sleep    Pain Charting Type  Pain Assessment     Preferred Pain Scale  number (Numeric Rating Pain Scale)     Pain Body Location - Side  Bilateral     Pain Body Location - Orientation  incisional     Pain Body Location  knee     Pain Rating (0-10): Rest  7     Pain Rating (0-10): Activity  7     Pain Management Interventions  premedicated for activity        Vital Signs    Pulse  (!) 101     SpO2  97 %     BP  134/64     BP Location  Right upper arm     BP Method  Automatic     Patient Position  Sitting        Patient Observation    Observations  asymptomatic           Prior Living Environment      Most Recent Value   Lives With  spouse   Living Arrangements  house   Home Accessibility  stairs to enter home (Group), stairs within home (Group)   Living Environment Comment  2 SH, 3 GENE + 1 GENE without HR,  1st floor [place], FF to bed/bath   Number of Stairs, Main Entrance  4   Surface of Stairs, Main Entrance  concrete   Stair Railings, Main Entrance  none   Location, Patient Bedroom  second floor, must negotiate stairs to access   Location, Bathroom  first (main) floor, second floor, must negotiate stairs to access   Bathroom Access Comment  [place] 1st floor, stnd height toilet,   full bath on 2nd c walk-in shower with a low threshold entry and built-in seat, no grab bars,  stnd height toilet   Number of Stairs, Within Home, Primary  other (see comments) [15]   Surface of Stairs, Within Home, Primary  carpeting, hardwood   Stair Railings, Within Home, Primary  railings on both sides of stairs, railing on left side (ascending)   Stairs Comment, Within Home, Primary  B rails for 12 steps & 3          Prior Level of Function      Most Recent Value   Dominant Hand  right   Ambulation  independent   Transferring  independent   Toileting  independent   Bathing  independent   Dressing  independent   Eating  independent   Communication  understands/communicates without difficulty   Prior Level of Function Comment  No DME   Equipment Currently Used at Home  none          IRF PT Evaluation and Treatment - 11/01/19 1106        Time Calculation    Start Time  1105     Stop Time  1150     Time Calculation (min)  45 min        Session Details    Document Type  initial evaluation     Mode of Treatment  physical therapy;individual therapy        General Information    Patient Profile Reviewed?  yes     Existing Precautions/Restrictions  cardiac;fall;weight bearing;aspiration        Weight-Bearing Status    Left LE Weight-Bearing Status  weight-bearing as tolerated (WBAT)     Right LE Weight-Bearing Status  weight-bearing as tolerated (WBAT)        Sensory Assessment (Somatosensory)    Sensory Assessment (Somatosensory)  left-sided sensation intact;right-sided sensation intact;LE sensation intact    light touch & proprioception intact bilaterally       Range of Motion (ROM)    Range of Motion  bilateral upper extremities;ROM is WFL;left lower extremity ROM deficit;right lower extremity ROM deficit     Left Lower Extremity (ROM)  left LE ROM is WFL except;knee     Knee, Left (ROM)  10-60 degrees AROM in supine     Right Lower Extremity (ROM)  right LE ROM is WFL except;knee     Knee, Right (ROM)  7-67 degrees  AROM in supine        Strength (Manual Muscle Testing)    Strength (Manual Muscle Testing)  bilateral upper extremities;strength is WFL;left lower extremity strength deficit;right lower extremity strength deficit     Left Lower Extremity Strength  left LE strength is WFL except;hip;knee;ankle     Hip, Left (Strength)  ER 2+/5, IR 2+/5, 2+/5 flexion, 2+/5 extension, 2+/5 adduction, 2+/5 abduction     Knee, Left (Strength)  2+/5 ext, 2+/5 flex     Ankle, Left (Strength)  4/5 DF, 2/5 PF, 4/5 INV/EV     Right Lower Extremity Strength  right LE strength is WFL except;hip;knee;ankle     Hip, Right (Strength)  ER 2+/5, IR 2+/5, 2+/5 flexion, 2+/5 extension, 2+/5 adduction, 2+/5 abduction     Knee, Right (Strength)  2+/5 ext, 2+/5 flex     Ankle, Right (Strength)  4/5 DF, 2/5 PF, 4/5 INV/EV        Bed Mobility    [place], Roll Left  moderate assist (50% patient effort);verbal cues     Verbal Cues (Roll Left)  technique     [place], Roll Right  moderate assist (50% patient effort);verbal cues     Verbal Cues (Roll Right)  technique     [place], Supine to Sit  maximum assist (25% patient effort);verbal cues     Verbal Cues (Supine to Sit)  technique     [place], Sit to Supine  moderate assist (50% patient effort);verbal cues     Verbal Cues (Sit to Supine)  technique     Comment (Bed Mobility)  hospital bed flat; assist at pelvis for rolling R &L, mod A for sit to supine with BLE management; supine to sit with max A of 1 for trunk assist to rise & BLE management        Transfers    Transfers  stand pivot/stand step transfer;car transfer        Bed to Chair Transfer    [place], Bed to Chair  dependent (less than 25% patient effort)     Comment  unsafe to perform without intervention        Chair to Bed Transfer    [place], Chair to Bed  dependent (less than 25% patient effort)     Comment  unsafe to perform without intervention        Sit to Stand Transfer    [place], Sit to Stand Transfer   dependent (less than 25% patient effort)     Comment  unsafe to perform without intervention        Stand to Sit Transfer    [place], Stand to Sit Transfer  dependent (less than 25% patient effort)     Comment  unsafe to perform without intervention        Stand Pivot/Stand Step Transfer    [place], Stand Pivot/Stand Step Transfer  dependent (less than 25% patient effort);other (see comments)    unsafe to perform without intervention       Car Transfer    Type (Car Transfer)  stand pivot/stand step     [place], Car Transfer  unable to assess    due to medical condition       Gait Training    [place], Gait  dependent (less than 25% patient effort)     Distance in Feet  0 feet     Advanced Gait Activity  curb negotiation;sloped surfaces     Comment  unsafe to perform without intervention        Stairs Training    [place], Stairs  moderate assist (50% patient effort);verbal cues     Assistive Device  railing     Handrail Location  both sides     Number of Stairs  2     Stair Height  6 inches     Ascending Stairs Technique  step-to-step     Descending Stairs Technique  step-to-step     Comment  mod A of 1 for LLE management to ascend with hip extension & mod A of 1 for retro descend with maintaining eccentric hip extension control & verbal cues for sequencing        Wheelchair Mobility/Management    Method of Locomotion  bimanual (upper extremity) propulsion     Functional Mobility Training  wheelchair mobility skills, all     [place], All Mobility  dependent (less than 25% patient effort)     Distance Propelled in Feet  0 feet     Comment, Functional Mobility  not a discharge goal        Balance    Balance Assessment  sitting static balance;sitting dynamic balance;standing static balance;standing dynamic balance     Static Sitting Balance  WFL     Dynamic Sitting Balance  WFL     Static Standing Balance  mild impairment;standing;supported     Dynamic Standing Balance  mild  "impairment;standing;supported        Motor Skills    Motor Skills  coordination;muscle tone;postural deviations     Coordination  WFL;bilateral;lower extremity    CRICKET AP    Muscle Tone  bilateral;lower extremity(s);WNL        Postural Deviations    Postural Deviations  head and neck;shoulder;upper back;thorax;low back;pelvis     Head and Neck  forward head     Shoulder  left shoulder forward;right shoulder forward     Upper Back  kyphosis     Thorax  elongated     Low Back  flattened     Pelvis  posterior pelvic tilt        Gait/Walking Locomotion Goal 1    Distance  50 feet        Gait/Walking Locomotion Goal 2    Distance  200 feet        Patient/Family Goals    Patient's Goals For Discharge  take care of myself at home;return to all previous roles/activities;return home        Therapy Assessment/Plan (PT)    Functional Level at Time of Evaluation (PT)  Dependent     PT Diagnosis  Pt is a 68 year old male admitted to [facility] on 10/31/19 with h/o bilateral knee pain x 5-6 years. It has progressively worsened. Pt is now s/p bilateral TKA's on 10/29/19 and no complications post op.  PTA pt was independent without AD for ambulation & IADLs. Pt presents at max A for bed mobility, D level for transfer & ambulation this time, and 2 - 6\" steps with mod A for B rails. Pt presents with decreased functional mobility & increased dependence secondary to decreased BLE strength, decreased BLE ROM, increased BLE pain, decreased standing balance with both static & dynamic and decreased activity endurance. Pt would benefit from skilled inpatient rehabilitation in order to address functional deficits and impairments in order to promote functional independence along with safety training, family training, & falls education in order to return patient to least restrictive environment & to meet goals. ELOS to be approximately 2 weeks  & based upon discharge disposition.     Rehab Potential/Prognosis (PT)  good, to achieve stated therapy " goals     Frequency of Treatment (PT)  5-7 times per week;60-90 minutes per day     Problem List  problems related to;balance;mobility;motor control;range of motion (ROM);pain;strength     Activity Limitations Related to Problem List  IADLs not performed adequately or safely;unable to transfer safely;unable to ambulate safely;community activities not performed adequately or safely     Planned Therapy Interventions (PT)  balance training;gait training;bed mobility training;home exercise program;patient/family education;transfer training;stretching;ROM (range of motion);stair training;strengthening           Pain Assessment/Intervention  Pain Charting Type: Pain Reassessment             Education provided this session. See the Patient Education summary report for full details.    IRF PT Goals      Most Recent Value   Bed Mobility Goal 1   Activity/Assistive Device  rolling to left, rolling to right, sit to supine, supine to sit at 11/01/2019 1106   [place]  minimum assist (75% or more patient effort) at 11/01/2019 1106   Time Frame  5 - 7 days at 11/01/2019 1106   Bed Mobility Goal 2   Activity/Assistive Device  rolling to right, rolling to left, supine to sit, sit to supine at 11/01/2019 1106   [place]  modified independence at 11/01/2019 1106   Time Frame  14 days or less at 11/01/2019 1106   Transfer Goal 1   Activity/Assistive Device  sit-to-stand/stand-to-sit, bed-to-chair/chair-to-bed, car transfer at 11/01/2019 1106   [place]  minimum assist (75% or more patient effort) at 11/01/2019 1106   Time Frame  5 - 7 days at 11/01/2019 1106   Transfer Goal 2   Activity/Assistive Device  sit-to-stand/stand-to-sit, bed-to-chair/chair-to-bed at 11/01/2019 1106   [place]  modified independence at 11/01/2019 1106   Time Frame  14 days or less at 11/01/2019 1106   Gait/Walking Locomotion Goal 1   Activity/Assistive Device  gait (walking locomotion), assistive device use, walker, front-wheeled, increase  endurance/gait distance at 11/01/2019 1106   Distance  50 feet at 11/01/2019 1106   [place]  minimum assist (75% or more patient effort) at 11/01/2019 1106   Time Frame  5 - 7 days at 11/01/2019 1106   Gait/Walking Locomotion Goal 2   Activity/Assistive Device  gait (walking locomotion), assistive device use, improve balance and speed, increase endurance/gait distance, walker, front-wheeled at 11/01/2019 1106   Distance  200 feet at 11/01/2019 1106   [place]  modified independence at 11/01/2019 1106   Time Frame  14 days or less at 11/01/2019 1106   Stairs Goal 1   Activity/Assistive Device  stairs, all skills, assistive device use, ascending stairs, descending stairs, using handrail, left, cane, straight at 11/01/2019 1106   Number of Stairs  8 at 11/01/2019 1106   [place]  minimum assist (75% or more patient effort), verbal cues required at 11/01/2019 1106   Time Frame  5 - 7 days at 11/01/2019 1106   Stairs Goal 2   Activity/Assistive Device  assistive device use, stairs, all skills, ascending stairs, descending stairs, using handrail, left, cane, straight at 11/01/2019 1106   Number of Stairs  15 at 11/01/2019 1106   [place]  supervision required, set-up required at 11/01/2019 1106   Time Frame  14 days or less at 11/01/2019 1106

## 2019-11-01 NOTE — PROGRESS NOTES
Patient: Chapincito Yoon  Location: [facility] [address]  MRN: [id number]  Today's date: 11/1/2019    Hospital Course  Chris is a 68 y.o. male admitted on 10/31/2019 with Status post bilateral knee replacements [Z96.653]. Principal problem is S/P TKR (total knee replacement), bilateral.    Chris has a past medical history of Essential (primary) hypertension, Hyperlipidemia, unspecified, Impaired fasting glucose, Osteoarthritis, and Prostate cancer (CMS/[facility]) (2011).     Pt is a 68 year old male admitted to [facility] on 10/31/19 with h/o bilateral knee pain x 5-6 years. It has progressively worsened. Pt is now s/p bilateral TKA's on 10/29/19 and no complications post op.      Therapy Pain/Vitals - 11/01/19 1158        Pain/Comfort/Sleep    Pain Charting Type  Pain Reassessment     Preferred Pain Scale  number (Numeric Rating Pain Scale)     Pain Body Location - Side  Bilateral     Pain Body Location - Orientation  incisional     Pain Body Location  knee     Pain Rating (0-10): Rest  7     Pain Rating (0-10): Activity  7     Pain Management Interventions  position adjusted        Vital Signs    Pulse  (!) 112     BP  (!) 142/74     BP Location  Right upper arm     BP Method  Automatic     Patient Position  Sitting        Patient Observation    Observations  asymptomatic           Prior Living Environment      Most Recent Value   Lives With  spouse   Living Arrangements  house   Home Accessibility  stairs to enter home (Group), stairs within home (Group)   Living Environment Comment  2 SH, 3 GENE + 1 GENE without HR,  1st floor [place], FF to bed/bath   Number of Stairs, Main Entrance  4   Surface of Stairs, Main Entrance  concrete   Stair Railings, Main Entrance  none   Location, Patient Bedroom  second floor, must negotiate stairs to access   Location, Bathroom  first (main) floor, second floor, must negotiate stairs to access   Bathroom Access Comment  [place] 1st floor, stnd height toilet,  full bath on 2nd c walk-in  shower with a low threshold entry and built-in seat, no grab bars,  stnd height toilet   Number of Stairs, Within Home, Primary  other (see comments) [15]   Surface of Stairs, Within Home, Primary  carpeting, hardwood   Stair Railings, Within Home, Primary  railings on both sides of stairs, railing on left side (ascending)   Stairs Comment, Within Home, Primary  B rails for 12 steps & 3          Prior Level of Function      Most Recent Value   Dominant Hand  right   Ambulation  independent   Transferring  independent   Toileting  independent   Bathing  independent   Dressing  independent   Eating  independent   Communication  understands/communicates without difficulty   Prior Level of Function Comment  No DME   Equipment Currently Used at Home  none          IRF PT Evaluation and Treatment - 11/01/19 1249        Time Calculation    Start Time  1150     Stop Time  1205     Time Calculation (min)  15 min        Session Details    Document Type  daily treatment/progress note     Mode of Treatment  physical therapy;individual therapy        General Information    Patient Profile Reviewed?  yes     Existing Precautions/Restrictions  aspiration;cardiac;fall;weight bearing        Weight-Bearing Status    Left LE Weight-Bearing Status  weight-bearing as tolerated (WBAT)     Right LE Weight-Bearing Status  weight-bearing as tolerated (WBAT)        Transfers    Transfers  stand pivot/stand step transfer        Bed to Chair Transfer    [place], Bed to Chair  moderate assist (50% patient effort)     Assistive Device  walker, front-wheeled     Comment  for hip extension to rise & lateral weightshift at pelvis        Chair to Bed Transfer    [place], Chair to Bed  moderate assist (50% patient effort)     Verbal Cues  technique     Assistive Device  walker, front-wheeled     Comment  for eccentric hip extension to rise & lateral weightshift at pelvis        Sit to Stand Transfer    [place], Sit to Stand Transfer   moderate assist (50% patient effort);verbal cues     Verbal Cues  technique;hand placement     Assistive Device  walker, front-wheeled     Comment  mod A for hip extension to stand & verbal cues for hand placement and anterior weightshift        Stand to Sit Transfer    [place], Stand to Sit Transfer  minimum assist (75% patient effort)     Verbal Cues  technique     Assistive Device  walker, front-wheeled     Comment  for controlled posterior weightshift and verbal cues for LE sequencing        Stand Pivot/Stand Step Transfer    [place], Stand Pivot/Stand Step Transfer  moderate assist (50-74% patient effort)    lateral weightshift at pelvis    Assistive Device  walker, front-wheeled        Gait Training    [place], Gait  minimum assist (75% or more patient effort)     Assistive Device  walker, front-wheeled     Distance in Feet  4 feet     Gait Pattern Utilized  step-to     Deviations/Abnormal Patterns (Gait)  bilateral deviations;antalgic;stride length decreased;gait speed decreased;base of support, narrow     Bilateral Gait Deviations  heel strike decreased     Comment  for hip extension and lateral weightshift        Daily Progress Summary (PT)    Symptoms Noted During/After Treatment  fatigue;increased pain     Progress Toward Functional Goals (PT)  progressing toward functional goals as expected     Daily Outcome Statement (PT)  Completed interventions for IE. Perform mat program next session.           Pain Assessment/Intervention  Pain Charting Type: Pain Reassessment             Education provided this session. See the Patient Education summary report for full details.    IRF PT Goals      Most Recent Value   Bed Mobility Goal 1   Activity/Assistive Device  rolling to left, rolling to right, sit to supine, supine to sit at 11/01/2019 1106   [place]  minimum assist (75% or more patient effort) at 11/01/2019 1106   Time Frame  5 - 7 days at 11/01/2019 1106   Bed Mobility Goal 2    Activity/Assistive Device  rolling to right, rolling to left, supine to sit, sit to supine at 11/01/2019 1106   [place]  modified independence at 11/01/2019 1106   Time Frame  14 days or less at 11/01/2019 1106   Transfer Goal 1   Activity/Assistive Device  sit-to-stand/stand-to-sit, bed-to-chair/chair-to-bed, car transfer at 11/01/2019 1106   [place]  minimum assist (75% or more patient effort) at 11/01/2019 1106   Time Frame  5 - 7 days at 11/01/2019 1106   Transfer Goal 2   Activity/Assistive Device  sit-to-stand/stand-to-sit, bed-to-chair/chair-to-bed at 11/01/2019 1106   [place]  modified independence at 11/01/2019 1106   Time Frame  14 days or less at 11/01/2019 1106   Gait/Walking Locomotion Goal 1   Activity/Assistive Device  gait (walking locomotion), assistive device use, walker, front-wheeled, increase endurance/gait distance at 11/01/2019 1106   Distance  50 feet at 11/01/2019 1106   [place]  minimum assist (75% or more patient effort) at 11/01/2019 1106   Time Frame  5 - 7 days at 11/01/2019 1106   Gait/Walking Locomotion Goal 2   Activity/Assistive Device  gait (walking locomotion), assistive device use, improve balance and speed, increase endurance/gait distance, walker, front-wheeled at 11/01/2019 1106   Distance  200 feet at 11/01/2019 1106   [place]  modified independence at 11/01/2019 1106   Time Frame  14 days or less at 11/01/2019 1106   Stairs Goal 1   Activity/Assistive Device  stairs, all skills, assistive device use, ascending stairs, descending stairs, using handrail, left, cane, straight at 11/01/2019 1106   Number of Stairs  8 at 11/01/2019 1106   [place]  minimum assist (75% or more patient effort), verbal cues required at 11/01/2019 1106   Time Frame  5 - 7 days at 11/01/2019 1106   Stairs Goal 2   Activity/Assistive Device  assistive device use, stairs, all skills, ascending stairs, descending stairs, using handrail, left, cane, straight at 11/01/2019  1106   Number of Stairs  15 at 11/01/2019 1106   [place]  supervision required, set-up required at 11/01/2019 1106   Time Frame  14 days or less at 11/01/2019 1106

## 2019-11-01 NOTE — PROGRESS NOTES
Patient: Chapincito Yoon  Location: [facility] [address]  MRN: [id number]  Today's date: 11/1/2019    Hospital Course  Chris is a 68 y.o. male admitted on 10/31/2019 with Status post bilateral knee replacements [Z96.653]. Principal problem is S/P TKR (total knee replacement), bilateral.    Chris has a past medical history of Essential (primary) hypertension, Hyperlipidemia, unspecified, Impaired fasting glucose, Osteoarthritis, and Prostate cancer (CMS/[facility]) (2011).     Pt is a 68 year old male admitted to [facility] on 10/31/19 with h/o bilateral knee pain x 5-6 years. It has progressively worsened. Pt is now s/p bilateral TKA's on 10/29/19 and no complications post op.       Row Name 11/01/19 1301 11/01/19 1330       Pain/Comfort/Sleep    Pain Charting Type  Pain Assessment  Post Activity    Preferred Pain Scale  number (Numeric Rating Pain Scale)  number (Numeric Rating Pain Scale)    Pain Body Location - Side  Bilateral  Bilateral    Pain Body Location - Orientation  incisional  incisional    Pain Body Location  knee  knee    Pain Rating (0-10): Rest  5  7    Pain Management Interventions  premedicated for activity;position adjusted  pillow support provided;position adjusted       Vital Signs    Pulse  (!) 108  (!) 102    SpO2  98 %  96 %    BP  (!) 148/72  (!) 142/86    BP Location  Right upper arm  Right upper arm    BP Method  Automatic  Manual    Patient Position  Sitting  Sitting          Prior Living Environment      Most Recent Value   Lives With  spouse   Living Arrangements  house   Home Accessibility  stairs to enter home (Group), stairs within home (Group)   Living Environment Comment  2 SH, 3 GENE + 1 GENE without HR,  1st floor [place], FF to bed/bath   Number of Stairs, Main Entrance  4   Surface of Stairs, Main Entrance  concrete   Stair Railings, Main Entrance  none   Location, Patient Bedroom  second floor, must negotiate stairs to access   Location, Bathroom  first (main) floor, second floor,  must negotiate stairs to access   Bathroom Access Comment  NM 1st floor, stnd height toilet,  full bath on 2nd c walk-in shower with a low threshold entry and built-in seat, no grab bars,  stnd height toilet   Number of Stairs, Within Home, Primary  other (see comments) [15]   Surface of Stairs, Within Home, Primary  carpeting, hardwood   Stair Railings, Within Home, Primary  railings on both sides of stairs, railing on left side (ascending)   Stairs Comment, Within Home, Primary  B rails for 12 steps & 3          Prior Level of Function      Most Recent Value   Dominant Hand  right   Ambulation  independent   Transferring  independent   Toileting  independent   Bathing  independent   Dressing  independent   Eating  independent   Communication  understands/communicates without difficulty   Prior Level of Function Comment  No DME   Equipment Currently Used at Home  none          IRF PT Evaluation and Treatment - 11/01/19 1300        Time Calculation    Start Time  1300     Stop Time  1400     Time Calculation (min)  60 min        Session Details    Document Type  daily treatment/progress note     Mode of Treatment  physical therapy;individual therapy        General Information    Patient Profile Reviewed?  yes     Existing Precautions/Restrictions  aspiration;cardiac;fall;weight bearing        Weight-Bearing Status    Left LE Weight-Bearing Status  weight-bearing as tolerated (WBAT)     Right LE Weight-Bearing Status  weight-bearing as tolerated (WBAT)        Sit to Stand Transfer    [place], Sit to Stand Transfer  moderate assist (50% patient effort);1 person assist;verbal cues     Verbal Cues  hand placement;safety;technique;proper use of assistive device;preparatory posture     Assistive Device  walker, front-wheeled        Stand to Sit Transfer    [place], Stand to Sit Transfer  minimum assist (75% patient effort);verbal cues;1 person assist     Verbal Cues  proper use of assistive  device;safety;technique     Assistive Device  walker, front-wheeled        Stand Pivot/Stand Step Transfer    [place], Stand Pivot/Stand Step Transfer  moderate assist (50-74% patient effort);1 person assist;verbal cues     Verbal Cues  proper use of assistive device;safety;technique     Assistive Device  walker, front-wheeled    ambulatory approach to mat and w/c with RW       Gait Training    [place], Gait  minimum assist (75% or more patient effort);verbal cues;1 person assist     Assistive Device  walker, front-wheeled     Distance in Feet  25 feet    x 15    Gait Pattern Utilized  step-through     Deviations/Abnormal Patterns (Gait)  bilateral deviations;antalgic;base of support, narrow;gait speed decreased     Bilateral Gait Deviations  heel strike decreased    B    Maintains Weight-Bearing Status  able to maintain        Lower Extremity (Therapeutic Exercise)    Exercise Position/Type (LE Therapeutic Exercise)  supine;seated;AROM (active range of motion)     General Exercise (LE Therapeutic Exercise)  bilateral;quad sets;SAQ (short arc quad);heel slides;gluteal sets    hip abd/ add ( supine)    Reps and Sets (LE Therapeutic Exercise)  10 reps x 1 set B        Daily Progress Summary (PT)    Daily Outcome Statement (PT)  Pt tolerating increased distance with amb and the initiation of TKA mat program. Need to continue with POC as outlined.            Pain Assessment/Intervention  Pain Charting Type: Post Activity             Education provided this session. See the Patient Education summary report for full details.    IRF PT Goals      Most Recent Value   Bed Mobility Goal 1   Activity/Assistive Device  rolling to left, rolling to right, sit to supine, supine to sit at 11/01/2019 1106   [place]  minimum assist (75% or more patient effort) at 11/01/2019 1106   Time Frame  5 - 7 days at 11/01/2019 1106   Bed Mobility Goal 2   Activity/Assistive Device  rolling to right, rolling to left, supine to  sit, sit to supine at 11/01/2019 1106   [place]  modified independence at 11/01/2019 1106   Time Frame  14 days or less at 11/01/2019 1106   Transfer Goal 1   Activity/Assistive Device  sit-to-stand/stand-to-sit, bed-to-chair/chair-to-bed, car transfer at 11/01/2019 1106   [place]  minimum assist (75% or more patient effort) at 11/01/2019 1106   Time Frame  5 - 7 days at 11/01/2019 1106   Transfer Goal 2   Activity/Assistive Device  sit-to-stand/stand-to-sit, bed-to-chair/chair-to-bed at 11/01/2019 1106   [place]  modified independence at 11/01/2019 1106   Time Frame  14 days or less at 11/01/2019 1106   Gait/Walking Locomotion Goal 1   Activity/Assistive Device  gait (walking locomotion), assistive device use, walker, front-wheeled, increase endurance/gait distance at 11/01/2019 1106   Distance  50 feet at 11/01/2019 1106   [place]  minimum assist (75% or more patient effort) at 11/01/2019 1106   Time Frame  5 - 7 days at 11/01/2019 1106   Gait/Walking Locomotion Goal 2   Activity/Assistive Device  gait (walking locomotion), assistive device use, improve balance and speed, increase endurance/gait distance, walker, front-wheeled at 11/01/2019 1106   Distance  200 feet at 11/01/2019 1106   [place]  modified independence at 11/01/2019 1106   Time Frame  14 days or less at 11/01/2019 1106   Stairs Goal 1   Activity/Assistive Device  stairs, all skills, assistive device use, ascending stairs, descending stairs, using handrail, left, cane, straight at 11/01/2019 1106   Number of Stairs  8 at 11/01/2019 1106   [place]  minimum assist (75% or more patient effort), verbal cues required at 11/01/2019 1106   Time Frame  5 - 7 days at 11/01/2019 1106   Stairs Goal 2   Activity/Assistive Device  assistive device use, stairs, all skills, ascending stairs, descending stairs, using handrail, left, cane, straight at 11/01/2019 1106   Number of Stairs  15 at 11/01/2019 1106   [place]  supervision  required, set-up required at 11/01/2019 1106   Time Frame  14 days or less at 11/01/2019 1106

## 2019-11-01 NOTE — PROGRESS NOTES
Patient: Chapincito Yoon  Location: [facility] [address]  MRN: [id number]  Today's date: 11/1/2019    Hospital Course  Chris is a 68 y.o. male admitted on 10/31/2019 with Status post bilateral knee replacements [Z96.653]. Principal problem is S/P TKR (total knee replacement), bilateral.    Chris has a past medical history of Essential (primary) hypertension, Hyperlipidemia, unspecified, Impaired fasting glucose, Osteoarthritis, and Prostate cancer (CMS/[facility]) (2011).     Pt is a 68 year old male admitted to [facility] on 10/31/19 with h/o bilateral knee pain x 5-6 years. It has progressively worsened. Pt is now s/p bilateral TKA's on 10/29/19 and no complications post op.      Therapy Pain/Vitals     Row Name 11/01/19 0905 11/01/19 0958       Pain/Comfort/Sleep    Pain Charting Type  Pain Assessment  Pain Reassessment    Preferred Pain Scale  number (Numeric Rating Pain Scale)  number (Numeric Rating Pain Scale)    Pain Body Location - Side  Bilateral  Bilateral    Pain Body Location - Orientation  incisional  incisional    Pain Body Location  knee  knee    Pain Rating (0-10): Rest  4  7    Pain Management Interventions  premedicated for activity  premedicated for activity       Vital Signs    Pulse  86  (!) 111    Heart Rate Source  Monitor  Monitor    BP  (!) 153/68  (!) 154/67    BP Location  Left upper arm  Left upper arm    BP Method  Automatic  Automatic    Patient Position  Lying  Sitting       Prior Living Environment      Most Recent Value   Lives With  spouse   Living Arrangements  house   Home Accessibility  stairs to enter home (Group), stairs within home (Group)   Living Environment Comment  2 SH, 3 GENE + 1 GENE without HR,  1st floor [place], FF to bed/bath   Number of Stairs, Main Entrance  4   Surface of Stairs, Main Entrance  concrete   Stair Railings, Main Entrance  none   Location, Patient Bedroom  second floor, must negotiate stairs to access   Location, Bathroom  first (main) floor, second  "floor, must negotiate stairs to access   Bathroom Access Comment  SD 1st floor, stnd height toilet,  full bath on 2nd c walk-in shower with a low threshold entry and built-in seat, no grab bars,  stnd height toilet   Number of Stairs, Within Home, Primary  other (see comments) [15]   Surface of Stairs, Within Home, Primary  carpeting, hardwood   Stair Railings, Within Home, Primary  railings on both sides of stairs, railing on left side (ascending)   Stairs Comment, Within Home, Primary  B rails for 12 steps & 3          Prior Level of Function      Most Recent Value   Dominant Hand  right   Ambulation  independent   Transferring  independent   Toileting  independent   Bathing  independent   Dressing  independent   Eating  independent   Communication  understands/communicates without difficulty   Prior Level of Function Comment  No DME   Equipment Currently Used at Home  none          IRF OT Evaluation and Treatment - 11/01/19 0905        Time Calculation    Start Time  0900     Stop Time  0930     Time Calculation (min)  30 min        Session Details    Document Type  initial evaluation     Mode of Treatment  occupational therapy;individual therapy        General Information    Patient Profile Reviewed?  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; enjoys [employer]     Environmental Supports and Barriers (Occupational Profile)  , wife works part-time     Patient Goals (Occupational Profile)  \"My goal is that I can go home and function as normal as I can.\"        Cognition/Psychosocial    Affect/Mental Status (Cognitive)  WFL        Vision Assessment/Intervention    " Visual Impairment/Limitations  WFL        Sensory Assessment (Somatosensory)    Sensory Assessment (Somatosensory)  UE sensation intact        Range of Motion (ROM)    Range of Motion  bilateral upper extremities;ROM is WFL;bilateral lower extremities;left lower extremity ROM deficit;right lower extremity ROM deficit     Left Lower Extremity (ROM)  knee     Knee, Left (ROM)  limited in flexion and extension; pending formal PT eval     Right Lower Extremity (ROM)  knee     Knee, Right (ROM)  limited in flexion and extension; pending formal PT eval        Strength (Manual Muscle Testing)    Strength (Manual Muscle Testing)  bilateral upper extremities;strength is WFL        Mobility    Left LE Weight-Bearing Status  weight-bearing as tolerated (WBAT)     Right LE Weight-Bearing Status  weight-bearing as tolerated (WBAT)        Bed Mobility    [place], Supine to Sit  maximum assist (25% patient effort);verbal cues     Comment (Bed Mobility)  hospital bed flat, pt requires assist to manage BLE OOB and Mod A at trunk to rise        Transfers    Transfers  stand pivot/stand step transfer;toilet transfer;shower transfer     Comment  Transfers assessed, pt unable to attempt without intervention of RW; from EOB, pt requires UE support through footboard and Mod A x1 to rise to RW; toilet tx unable to be attempted without intervention of 3:1 commode; shower tx unable to be completed without intervention of grab bars and tub bench        Sit to Stand Transfer    [place], Sit to Stand Transfer  dependent (less than 25% patient effort)     Comment  unsafe to attempt without intervention of RW        Stand to Sit Transfer    [place], Stand to Sit Transfer  moderate assist (50% patient effort)     Comment  lowering assist required to sit on EOB        Stand Pivot/Stand Step Transfer    [place], Stand Pivot/Stand Step Transfer  dependent (less than 25% patient effort)        Toilet Transfer    Type (Toilet  Transfer)  stand pivot/stand step     [place], Toilet Transfer  dependent (less than 25% patient effort)        Shower Transfer    Type (Shower Transfer)  stand pivot/stand step     [place], Shower Transfer  dependent (less than 25% patient effort)        Balance    Balance Assessment  standing static balance;standing dynamic balance;sitting static balance;sitting dynamic balance     Static Sitting Balance  WFL     Dynamic Sitting Balance  WFL     Static Standing Balance  mild impairment;supported;standing     Dynamic Standing Balance  mild impairment;supported;standing        Motor Skills    Motor Skills  coordination;functional endurance     Coordination  bilateral;upper extremity;WFL     Functional Endurance  Decr fxl endurance overall, pt remarking fatigue from completion of AM self-care; fair overall.     Muscle Tone  bilateral;upper extremity(s);WNL        Basic Activities of Daily Living (BADLs)    Basic Activities of Daily Living  bathing;upper body dressing;lower body dressing;grooming;toileting;self-feeding        Bathing    [place]  minimum assist (75% patient effort);verbal cues;setup;bathing skills     Adaptive Equipment  tub bench;grab bar/tub rail;hand held shower spray hose     Position  supported sitting;supported standing     Setup Assistance  obtain supplies;adaptive equipment setup     Comment  Bathing performed seated for safety; pt requires assist to wash/dry bilateral lower LE/feet; pt able to wash all other areas seated. Set up of towels provided, pt standing c Min A for balance to dry buttocks.        Upper Body Dressing    [place]  setup;doff;don;threads left arm, shirt;threads right arm, shirt;pulls shirt over head/around back;pulls shirt down/adjusts     Position  unsupported sitting     Setup Assistance  obtain clothes        Lower Body Dressing    [place]  maximum assist (25% patient effort);minimum assist (75% patient effort);setup     Adaptive Equipment   sock-aid     Position  supported sitting;supported standing     Setup Assistance  obtain clothes;adaptive equipment setup     Comment  UNDRESSING: Min A to manage underwear down from hips, assist to unthread BLE, assist to doff bilateral socks. DRESSING: Assist to thread BLE through underwear and shorts, Min A for balance to hike underwear/shorts over hips. Unable to don slip-on shoes at this time due to edema.        Grooming    [place]  setup;washes, rinses and dries face;washes, rinses and dries hands;brushes/adam hair;oral care (brushing teeth, cleaning dentures);shaves face     Adaptive Equipment  electric razor     Position  supported sitting;sink side     Setup Assistance  obtain supplies     Comment  Set up at sink seated in w/c to complete grooming tasks, unable to complete in stance at this time.        Toileting    [place], Toileting  minimum assist (75% patient effort);adjust/manage clothing;set up;perform bladder hygiene     Adaptive Equipment  commode, 3-in-1;urinal     Position  supported sitting;supported standing     Setup Assistance  obtain supplies;adaptive equipment setup     Comment  Min A for clothing mgmt, set up for seated hygiene and urinal use        Self-Feeding    [place] Level  independent        Patient/Family Goals    Patient's Goals For Discharge  take care of myself at home;return to all previous roles/activities        Therapy Assessment/Plan (OT)    Functional Level at Time of Evaluation (OT)  Pt presents c self-care and fxl mobility deficits, requiring Min-Max A for various self-care tasks, and intervention of AD/DME to safely attempt fxl transfers.     OT Diagnosis  B TKA     Rehab Potential/Prognosis (OT)  good, to achieve stated therapy goals     Frequency of Treatment (OT)  60-90 minutes per day;5-7 times per week     Problem List (OT)  problems related to;balance;mobility;range of motion (ROM);strength;pain     Activity Limitations Related to Problem List   BADLs not performed adequately or safely;unable to ambulate safely;unable to transfer safely;IADLs not performed adequately or safely     Planned Therapy Interventions (OT)  BADL retraining;adaptive equipment training;activity tolerance training;edema control/reduction;functional balance retraining;IADL retraining;occupation/activity based interventions;passive ROM/stretching;ROM/therapeutic exercise;strengthening exercise;transfer/mobility retraining     Comment, Therapy Assessment/Plan (OT)  IP OT IE completed and POC established.        Therapy Plan Review/Discharge Plan (OT)    Therapy Plan Review (OT)  evaluation/treatment results reviewed;participants included;patient     Anticipated Equipment Needs At Discharge (OT)  commode, 3-in-1;shower chair           Pain Assessment/Intervention  Pain Charting Type: Pain Reassessment             Education provided this session. See the Patient Education summary report for full details.    IRF OT Goals      Most Recent Value   Transfer Goal 1   Activity/Assistive Device  toilet, commode, 3-in-1, walker, front-wheeled at 11/01/2019 0905   [place]  minimum assist (75% or more patient effort), set-up required at 11/01/2019 0905   Time Frame  short-term goal (STG), 1 week at 11/01/2019 0905   Strategies/Barriers  amb tx at 11/01/2019 0905   Transfer Goal 2   Activity/Assistive Device  toilet, commode, 3-in-1, walker, front-wheeled at 11/01/2019 0905   [place]  modified independence at 11/01/2019 0905   Time Frame  long-term goal (LTG), by discharge at 11/01/2019 0905   Strategies/Barriers  amb tx at 11/01/2019 0905   Transfer Goal 3   Activity/Assistive Device  walk-in shower, shower chair, walker, front-wheeled at 11/01/2019 0905   [place]  minimum assist (75% or more patient effort), set-up required at 11/01/2019 0905   Time Frame  short-term goal (STG), 1 week at 11/01/2019 0905   Strategies/Barriers  amb approach at 11/01/2019 0905   Transfer Goal 4    Activity/Assistive Device  walk-in shower, shower chair, walker, front-wheeled at 11/01/2019 0905   [place]  modified independence at 11/01/2019 0905   Time Frame  long-term goal (LTG), by discharge at 11/01/2019 0905   Strategies/Barriers  amb tx, step-over at 11/01/2019 0905   Bathing Goal 1   Activity/Assistive Device  bathing skills, all, shower chair, long-handled sponge at 11/01/2019 0905   [place]  supervision required, set-up required at 11/01/2019 0905   Time Frame  short-term goal (STG), 1 week at 11/01/2019 0905   Strategies/Barriers  seated at 11/01/2019 0905   Bathing Goal 2   Activity/Assistive Device  bathing skills, all, shower chair, long-handled sponge at 11/01/2019 0905   [place]  modified independence at 11/01/2019 0905   Time Frame  long-term goal (LTG), by discharge at 11/01/2019 0905   Strategies/Barriers  seated/standing PRN at 11/01/2019 0905   UB Dressing Goal 1   Activity/Assistive Device  upper body dressing at 11/01/2019 0905   [place]  minimum assist (75% or more patient effort) at 11/01/2019 0905   Time Frame  short-term goal (STG), 1 week at 11/01/2019 0905   Strategies/Barriers  Min A clothing retrieval at 11/01/2019 0905   UB Dressing Goal 2   Activity/Assistive Device  upper body dressing at 11/01/2019 0905   [place]  modified independence at 11/01/2019 0905   Time Frame  long-term goal (LTG), by discharge at 11/01/2019 0905   LB Dressing Goal 1   Activity/Assistive Device  lower body dressing, reacher, sock aid, long handled shoe horn at 11/01/2019 0905   [place]  moderate assist (50-74% patient effort), verbal cues required, set-up required at 11/01/2019 0905   Time Frame  short-term goal (STG), 1 week at 11/01/2019 0905   Strategies/Barriers  with LRAD at 11/01/2019 0905   LB Dressing Goal 2   Activity/Assistive Device  lower body dressing at 11/01/2019 0905   [place]  modified independence at 11/01/2019 0905   Time Frame  long-term goal  (LTG), by discharge at 11/01/2019 0905   Strategies/Barriers  with LRAD at 11/01/2019 0905   Wheelchair Locomotion Goal   Distance  50 feet at 11/01/2019 1106   Grooming Goal 1   Activity/Assistive Device  grooming skills, all at 11/01/2019 0905   [place]  supervision required at 11/01/2019 0905   Time Frame  short-term goal (STG), 1 week at 11/01/2019 0905   Strategies/Barriers  standing at sink at 11/01/2019 0905   Grooming Goal 2   Activity/Assistive Device  grooming skills, all at 11/01/2019 0905   [place]  modified independence at 11/01/2019 0905   Time Frame  long-term goal (LTG), by discharge at 11/01/2019 0905   Strategies/Barriers  seated/standing at 11/01/2019 0905   Toileting Goal 1   Activity/Assistive Device  toileting skills, all, commode chair at 11/01/2019 0905   [place]  supervision required at 11/01/2019 0905   Time Frame  short-term goal (STG), 1 week at 11/01/2019 0905   Toileting Goal 2   Activity/Assistive Device  toileting skills, all, commode chair at 11/01/2019 0905   [place]  modified independence at 11/01/2019 0905   Time Frame  long-term goal (LTG), by discharge at 11/01/2019 0905

## 2019-11-01 NOTE — PLAN OF CARE
Problem: Rehabilitation (IRF) Plan of Care  Goal: Plan of Care Review  Flowsheets  Taken 11/1/2019 1158 by Alice Yee, PT  Plan of Care Reviewed With: patient  IRF Plan of Care Review: progress ongoing, continue  Taken 11/1/2019 1443 by Heather Lopez, PT  Outcome Summary: Progressing with amb distance; min A for amb with RW; mod A for stand > sit with RW.

## 2019-11-01 NOTE — HOSPITAL COURSE
Chris is a 68 y.o. male admitted on 10/31/2019 with Status post bilateral knee replacements [Z96.653]. Principal problem is S/P TKR (total knee replacement), bilateral.    Chris has a past medical history of Essential (primary) hypertension, Hyperlipidemia, unspecified, Impaired fasting glucose, Osteoarthritis, and Prostate cancer (CMS/[facility]) (2011).     Pt is a 68 year old male admitted to [facility] on 10/31/19 with h/o bilateral knee pain x 5-6 years. It has progressively worsened. Pt is now s/p bilateral TKA's on 10/29/19 and no complications post op.

## 2019-11-01 NOTE — PROGRESS NOTES
Patient: Chapincito Yoon  Location: [facility] [address]  MRN: [id number]  Today's date: 11/1/2019    Hospital Course  Chris is a 68 y.o. male admitted on 10/31/2019 with Status post bilateral knee replacements [Z96.653]. Principal problem is S/P TKR (total knee replacement), bilateral.    Chris has a past medical history of Essential (primary) hypertension, Hyperlipidemia, unspecified, Impaired fasting glucose, Osteoarthritis, and Prostate cancer (CMS/[facility]) (2011).     Pt is a 68 year old male admitted to [facility] on 10/31/19 with h/o bilateral knee pain x 5-6 years. It has progressively worsened. Pt is now s/p bilateral TKA's on 10/29/19 and no complications post op.      Therapy Pain/Vitals     Row Name 11/01/19 0905 11/01/19 0958       Pain/Comfort/Sleep    Pain Charting Type  Pain Assessment  Pain Reassessment    Preferred Pain Scale  number (Numeric Rating Pain Scale)  number (Numeric Rating Pain Scale)    Pain Body Location - Side  Bilateral  Bilateral    Pain Body Location - Orientation  incisional  incisional    Pain Body Location  knee  knee    Pain Rating (0-10): Rest  4  7    Pain Management Interventions  premedicated for activity  premedicated for activity       Vital Signs    Pulse  86  (!) 111    Heart Rate Source  Monitor  Monitor    BP  (!) 153/68  (!) 154/67    BP Location  Left upper arm  Left upper arm    BP Method  Automatic  Automatic    Patient Position  Lying  Sitting       Prior Living Environment      Most Recent Value   Lives With  spouse   Living Arrangements  house   Home Accessibility  stairs to enter home (Group), stairs within home (Group)   Living Environment Comment  2 SH, 3 GENE + 1 GENE without HR,  1st floor [place], FF to bed/bath   Number of Stairs, Main Entrance  4   Surface of Stairs, Main Entrance  concrete   Stair Railings, Main Entrance  none   Location, Patient Bedroom  second floor, must negotiate stairs to access   Location, Bathroom  first (main) floor, second  floor, must negotiate stairs to access   Bathroom Access Comment  [place] 1st floor, stnd height toilet,  full bath on 2nd c walk-in shower with a low threshold entry and built-in seat, no grab bars,  stnd height toilet   Number of Stairs, Within Home, Primary  other (see comments) [15]   Surface of Stairs, Within Home, Primary  carpeting, hardwood   Stair Railings, Within Home, Primary  railings on both sides of stairs, railing on left side (ascending)   Stairs Comment, Within Home, Primary  B rails for 12 steps & 3          Prior Level of Function      Most Recent Value   Dominant Hand  right   Ambulation  independent   Transferring  independent   Toileting  independent   Bathing  independent   Dressing  independent   Eating  independent   Communication  understands/communicates without difficulty   Prior Level of Function Comment  No DME   Equipment Currently Used at Home  none          IRF OT Evaluation and Treatment - 11/01/19 0930        Time Calculation    Start Time  0930     Stop Time  1000     Time Calculation (min)  30 min        Session Details    Document Type  daily treatment/progress note     Mode of Treatment  occupational therapy;individual therapy        Transfers    Transfers  toilet transfer;shower transfer     Comment  DME introduced for toilet and shower transfers, pt completing amb approach c Min A using RW. 3:1 commode adjusted to max height and placed over toilet, pt completing tx c Mod A overall, requiring light lowering and lifting assist. Ext tub bench and grab bars introduced in barrier-free environment, pt completing tx c Mod A overall incl light lowering and lifting assist.        Sit to Stand Transfer    [place], Sit to Stand Transfer  moderate assist (50% patient effort);verbal cues     Verbal Cues  hand placement;technique;preparatory posture     Assistive Device  walker, front-wheeled     Comment  Mod A from EOB c LUE support on footboard of bed and vcs for technique, pt requiring  lifting assist to rise.        Stand to Sit Transfer    [place], Stand to Sit Transfer  minimum assist (75% patient effort);verbal cues     Verbal Cues  hand placement;technique     Assistive Device  walker, front-wheeled     Comment  for controlled descent        Stand Pivot/Stand Step Transfer    [place], Stand Pivot/Stand Step Transfer  minimum assist (75% or more patient effort)     Verbal Cues  proper use of assistive device;technique;hand placement     Assistive Device  walker, front-wheeled        Toilet Transfer    Type (Toilet Transfer)  stand pivot/stand step     [place], Toilet Transfer  moderate assist (50% patient effort)     Verbal Cues  technique;hand placement;preparatory posture     Assistive Device  commode, 3-in-1;walker, front-wheeled        Shower Transfer    Type (Shower Transfer)  stand pivot/stand step     [place], Shower Transfer  moderate assist (50% patient effort)     Assistive Device  tub bench;walker, front-wheeled;grab bars/tub rail        Safety Issues, Functional Mobility    Impairments Affecting Function (Mobility)  balance;endurance/activity tolerance;pain;range of motion;strength        Basic Activities of Daily Living (BADLs)    Basic Activities of Daily Living  bathing;lower body dressing        Bathing    [place]  minimum assist (75% patient effort)     Adaptive Equipment  tub bench;grab bar/tub rail;hand held shower spray hose;long-handled sponge     Position  supported sitting;supported standing     Setup Assistance  obtain supplies;adaptive equipment setup     Comment  LH sponge introduced, pt able to wash BLE/feet, though requiring assist to dry feet and steadying assist for balance in stance.        Lower Body Dressing    [place]  don;dons/doffs left sock;dons/doffs right sock     Adaptive Equipment  sock-aid     Position  supported sitting     Setup Assistance  obtain clothes;adaptive equipment setup     Comment  Pt presents c molded sock  "aid, stating he \"practiced\" with it prior to surgery. Pt requires Min vcs to successfully implement, pt able to don bilateral slipper socks using molded sock aide.        Daily Progress Summary (OT)    Daily Outcome Statement (OT)  DME introduced for safe completion of transfers, pt able to perform STS c Mod A and SPTs c Mod A to/from DME surfaces. Improved noted in bathing using AD.           Pain Assessment/Intervention  Pain Charting Type: Pain Reassessment             Education provided this session. See the Patient Education summary report for full details.    IRF OT Goals      Most Recent Value   Transfer Goal 1   Activity/Assistive Device  toilet, commode, 3-in-1, walker, front-wheeled at 11/01/2019 0905   [place]  minimum assist (75% or more patient effort), set-up required at 11/01/2019 0905   Time Frame  short-term goal (STG), 1 week at 11/01/2019 0905   Strategies/Barriers  amb tx at 11/01/2019 0905   Transfer Goal 2   Activity/Assistive Device  toilet, commode, 3-in-1, walker, front-wheeled at 11/01/2019 0905   [place]  modified independence at 11/01/2019 0905   Time Frame  long-term goal (LTG), by discharge at 11/01/2019 0905   Strategies/Barriers  amb tx at 11/01/2019 0905   Transfer Goal 3   Activity/Assistive Device  walk-in shower, shower chair, walker, front-wheeled at 11/01/2019 0905   [place]  minimum assist (75% or more patient effort), set-up required at 11/01/2019 0905   Time Frame  short-term goal (STG), 1 week at 11/01/2019 0905   Strategies/Barriers  amb approach at 11/01/2019 0905   Transfer Goal 4   Activity/Assistive Device  walk-in shower, shower chair, walker, front-wheeled at 11/01/2019 0905   [place]  modified independence at 11/01/2019 0905   Time Frame  long-term goal (LTG), by discharge at 11/01/2019 0905   Strategies/Barriers  amb tx, step-over at 11/01/2019 0905   Bathing Goal 1   Activity/Assistive Device  bathing skills, all, shower chair, long-handled " sponge at 11/01/2019 0905   [place]  supervision required, set-up required at 11/01/2019 0905   Time Frame  short-term goal (STG), 1 week at 11/01/2019 0905   Strategies/Barriers  seated at 11/01/2019 0905   Bathing Goal 2   Activity/Assistive Device  bathing skills, all, shower chair, long-handled sponge at 11/01/2019 0905   [place]  modified independence at 11/01/2019 0905   Time Frame  long-term goal (LTG), by discharge at 11/01/2019 0905   Strategies/Barriers  seated/standing PRN at 11/01/2019 0905   UB Dressing Goal 1   Activity/Assistive Device  upper body dressing at 11/01/2019 0905   [place]  minimum assist (75% or more patient effort) at 11/01/2019 0905   Time Frame  short-term goal (STG), 1 week at 11/01/2019 0905   Strategies/Barriers  Min A clothing retrieval at 11/01/2019 0905   UB Dressing Goal 2   Activity/Assistive Device  upper body dressing at 11/01/2019 0905   [place]  modified independence at 11/01/2019 0905   Time Frame  long-term goal (LTG), by discharge at 11/01/2019 0905   LB Dressing Goal 1   Activity/Assistive Device  lower body dressing, reacher, sock aid, long handled shoe horn at 11/01/2019 0905   [place]  moderate assist (50-74% patient effort), verbal cues required, set-up required at 11/01/2019 0905   Time Frame  short-term goal (STG), 1 week at 11/01/2019 0905   Strategies/Barriers  with LRAD at 11/01/2019 0905   LB Dressing Goal 2   Activity/Assistive Device  lower body dressing at 11/01/2019 0905   [place]  modified independence at 11/01/2019 0905   Time Frame  long-term goal (LTG), by discharge at 11/01/2019 0905   Strategies/Barriers  with LRAD at 11/01/2019 0905   Wheelchair Locomotion Goal   Distance  50 feet at 11/01/2019 1106   Grooming Goal 1   Activity/Assistive Device  grooming skills, all at 11/01/2019 0905   [place]  supervision required at 11/01/2019 0905   Time Frame  short-term goal (STG), 1 week at 11/01/2019 0905    Strategies/Barriers  standing at sink at 11/01/2019 0905   Grooming Goal 2   Activity/Assistive Device  grooming skills, all at 11/01/2019 0905   [place]  modified independence at 11/01/2019 0905   Time Frame  long-term goal (LTG), by discharge at 11/01/2019 0905   Strategies/Barriers  seated/standing at 11/01/2019 0905   Toileting Goal 1   Activity/Assistive Device  toileting skills, all, commode chair at 11/01/2019 0905   [place]  supervision required at 11/01/2019 0905   Time Frame  short-term goal (STG), 1 week at 11/01/2019 0905   Toileting Goal 2   Activity/Assistive Device  toileting skills, all, commode chair at 11/01/2019 0905   [place]  modified independence at 11/01/2019 0905   Time Frame  long-term goal (LTG), by discharge at 11/01/2019 0905

## 2019-11-02 ENCOUNTER — APPOINTMENT (INPATIENT)
Dept: PHYSICAL THERAPY | Facility: REHABILITATION | Age: 69
DRG: 560 | End: 2019-11-02
Payer: MEDICARE

## 2019-11-02 ENCOUNTER — APPOINTMENT (INPATIENT)
Dept: OCCUPATIONAL THERAPY | Facility: REHABILITATION | Age: 69
DRG: 560 | End: 2019-11-02
Payer: MEDICARE

## 2019-11-02 LAB — BACTERIA UR CULT: NORMAL

## 2019-11-02 PROCEDURE — 97530 THERAPEUTIC ACTIVITIES: CPT | Mod: GO

## 2019-11-02 PROCEDURE — 97535 SELF CARE MNGMENT TRAINING: CPT | Mod: GO

## 2019-11-02 PROCEDURE — 97110 THERAPEUTIC EXERCISES: CPT | Mod: GO

## 2019-11-02 PROCEDURE — 63700000 HC SELF-ADMINISTRABLE DRUG: Performed by: PHYSICAL MEDICINE & REHABILITATION

## 2019-11-02 PROCEDURE — 97110 THERAPEUTIC EXERCISES: CPT | Mod: GP

## 2019-11-02 PROCEDURE — 97116 GAIT TRAINING THERAPY: CPT | Mod: GP

## 2019-11-02 PROCEDURE — 12800000 HC ROOM AND CARE SEMIPRIVATE REHAB

## 2019-11-02 RX ADMIN — MULTIPLE VITAMINS W/ MINERALS TAB 1 TABLET: TAB at 07:52

## 2019-11-02 RX ADMIN — VALSARTAN 80 MG: 80 TABLET, FILM COATED ORAL at 07:52

## 2019-11-02 RX ADMIN — DOCUSATE SODIUM 100 MG: 100 CAPSULE, LIQUID FILLED ORAL at 21:37

## 2019-11-02 RX ADMIN — SENNOSIDES 2 TABLET: 8.6 TABLET, FILM COATED ORAL at 12:55

## 2019-11-02 RX ADMIN — OXYCODONE HYDROCHLORIDE 10 MG: 5 TABLET ORAL at 02:17

## 2019-11-02 RX ADMIN — OXYCODONE HYDROCHLORIDE 10 MG: 5 TABLET ORAL at 17:03

## 2019-11-02 RX ADMIN — ASPIRIN 81 MG: 81 TABLET, COATED ORAL at 21:36

## 2019-11-02 RX ADMIN — OXYCODONE HYDROCHLORIDE 10 MG: 5 TABLET ORAL at 12:58

## 2019-11-02 RX ADMIN — DOCUSATE SODIUM 100 MG: 100 CAPSULE, LIQUID FILLED ORAL at 07:52

## 2019-11-02 RX ADMIN — OXYCODONE HYDROCHLORIDE 10 MG: 5 TABLET ORAL at 07:52

## 2019-11-02 RX ADMIN — ACETAMINOPHEN 650 MG: 325 TABLET, FILM COATED ORAL at 17:02

## 2019-11-02 RX ADMIN — ACETAMINOPHEN 650 MG: 325 TABLET, FILM COATED ORAL at 12:57

## 2019-11-02 RX ADMIN — OXYCODONE HYDROCHLORIDE 10 MG: 5 TABLET ORAL at 21:37

## 2019-11-02 RX ADMIN — ACETAMINOPHEN 650 MG: 325 TABLET, FILM COATED ORAL at 07:52

## 2019-11-02 RX ADMIN — ASPIRIN 81 MG: 81 TABLET, COATED ORAL at 07:52

## 2019-11-02 RX ADMIN — ATORVASTATIN CALCIUM 10 MG: 10 TABLET, FILM COATED ORAL at 17:02

## 2019-11-02 NOTE — PROGRESS NOTES
Patient: Chapincito Yoon  Location: [facility] [address]  MRN: [id number]  Today's date: 11/2/2019    Hospital Course  Chris is a 68 y.o. male admitted on 10/31/2019 with Status post bilateral knee replacements [Z96.653]. Principal problem is S/P TKR (total knee replacement), bilateral.    Chris has a past medical history of Essential (primary) hypertension, Hyperlipidemia, unspecified, Impaired fasting glucose, Osteoarthritis, and Prostate cancer (CMS/[facility]) (2011).     Pt is a 68 year old male admitted to [facility] on 10/31/19 with h/o bilateral knee pain x 5-6 years. It has progressively worsened. Pt is now s/p bilateral TKA's on 10/29/19 and no complications post op.      Therapy Pain/Vitals - 11/02/19 1039        Pain/Comfort/Sleep    Pain Charting Type  Pain Reassessment     Preferred Pain Scale  number (Numeric Rating Pain Scale)     Pain Body Location - Side  Bilateral     Pain Body Location - Orientation  incisional     Pain Body Location  knee     Pain Rating (0-10): Rest  7     Pain Management Interventions  prescribed exercises encouraged        Vital Signs    Pulse  95     Heart Rate Source  Monitor     BP  (!) 143/64     BP Location  Left upper arm     BP Method  Automatic     Patient Position  Sitting    seated after 2 step          Prior Living Environment      Most Recent Value   Lives With  spouse   Living Arrangements  house   Home Accessibility  stairs to enter home (Group), stairs within home (Group)   Living Environment Comment  2 SH, 3 GENE + 1 GENE without HR,  1st floor [place], FF to bed/bath   Number of Stairs, Main Entrance  4   Surface of Stairs, Main Entrance  concrete   Stair Railings, Main Entrance  none   Location, Patient Bedroom  second floor, must negotiate stairs to access   Location, Bathroom  first (main) floor, second floor, must negotiate stairs to access   Bathroom Access Comment  [place] 1st floor, stnd height toilet,  full bath on 2nd c walk-in shower with a low threshold entry  and built-in seat, no grab bars,  stnd height toilet   Number of Stairs, Within Home, Primary  other (see comments) [15]   Surface of Stairs, Within Home, Primary  carpeting, hardwood   Stair Railings, Within Home, Primary  railings on both sides of stairs, railing on left side (ascending)   Stairs Comment, Within Home, Primary  B rails for 12 steps & 3          Prior Level of Function      Most Recent Value   Dominant Hand  right   Ambulation  independent   Transferring  independent   Toileting  independent   Bathing  independent   Dressing  independent   Eating  independent   Communication  understands/communicates without difficulty   Prior Level of Function Comment  No DME   Equipment Currently Used at Home  none          IRF OT Evaluation and Treatment - 11/02/19 0801        Time Calculation    Start Time  0759     Stop Time  0859     Time Calculation (min)  60 min        Session Details    Document Type  daily treatment/progress note     Mode of Treatment  individual therapy;occupational therapy        General Information    Patient Profile Reviewed?  yes     General Observations of Patient  B/P 143/61- HR 86 - 02  97 % on R.A manual        Sit to Stand Transfer    [place], Sit to Stand Transfer  minimum assist (75% patient effort)     Verbal Cues  safety;technique     Assistive Device  walker, front-wheeled     Comment  from w/c to table + R.W        Stand to Sit Transfer    [place], Stand to Sit Transfer  minimum assist (75% patient effort)     Verbal Cues  safety;technique     Assistive Device  walker, front-wheeled     Comment  to w/c from table + R.W        Stand Pivot/Stand Step Transfer    [place], Stand Pivot/Stand Step Transfer  minimum assist (75% or more patient effort)     Verbal Cues  hand placement;safety;technique     Assistive Device  walker, front-wheeled        Toilet Transfer    Transfer Technique  stand pivot/stand step     [place], Toilet Transfer  minimum assist (75%  patient effort)     Verbal Cues  safety;technique     Assistive Device  commode, 3-in-1;walker, front-wheeled     Comment  Min A SPT w/ R.W to commode over toilet         Safety Issues, Functional Mobility    Comment, Safety Issues/Impairments (Mobility)  OT: Min A w/ R.W short distance in therapy room.        Balance    Balance Assessment  sit to stand dynamic balance     Sit to Stand Dynamic Balance  mild impairment     Static Standing Balance  mild impairment     Comment, Balance  Pt stood at table 4 mins x 2 with unilateral support while engaged in table top activity with Close Supervision + rest break between each stand -standing limited due to B/L knee pain-per pt report feel safer having one hand on table for support.        Therapeutic Interventions    Comment, Therapeutic Intervention  Pt completed UB therapeutic exercises with 4 # dumbbell  3 x10 reps bciep curls ,chest press,and shld raises to increase UB strength + endurance to assist with functional txfers.        Aerobic Exercise    Type (Aerobic Exercise)  arm bike     Time Performed (Aerobic Exercise)  5     Comment (Aerobic Exercise)  Pt performaed UBE 1x 5mins seated in w/c to increase endurance        Upper Body Dressing    Self-Performance  don;threads left arm, shirt;threads right arm, shirt;pulls shirt over head/around back;pulls shirt down/adjusts     [place] Assistance  obtains clothes     Position  supported sitting     Comment  Pt requested to change shirt ,and therapist provided  with pull over shirt -pt able to katerin shirt with Supervision with set up seated in w/c.        Toileting    [place]  perform bowel hygiene;minimum assist (75% or more patient effort);adjust/manage clothing     Position  supported sitting     Setup Assistance  obtain supplies     Adaptive Equipment  commode, 3-in-1     Comment  Min A for balance with clothing mangement with R.W in front        Daily Progress Summary (OT)    Symptoms Noted During/After Treatment   increased pain     Daily Outcome Statement (OT)  Pt tolerated dynamic standing balance 4 mins x 2 w/ activity + rest break between each stand-standing limited due to  B/L knee pain.           Pain Assessment/Intervention  Pain Charting Type: Pain Assessment                  IRF OT Goals      Most Recent Value   Transfer Goal 1   Activity/Assistive Device  toilet, commode, 3-in-1, walker, front-wheeled at 11/01/2019 0905   [place]  minimum assist (75% or more patient effort), set-up required at 11/01/2019 0905   Time Frame  short-term goal (STG), 1 week at 11/01/2019 0905   Strategies/Barriers  amb tx at 11/01/2019 0905   Transfer Goal 2   Activity/Assistive Device  toilet, commode, 3-in-1, walker, front-wheeled at 11/01/2019 0905   [place]  modified independence at 11/01/2019 0905   Time Frame  long-term goal (LTG), by discharge at 11/01/2019 0905   Strategies/Barriers  amb tx at 11/01/2019 0905   Transfer Goal 3   Activity/Assistive Device  walk-in shower, shower chair, walker, front-wheeled at 11/01/2019 0905   [place]  minimum assist (75% or more patient effort), set-up required at 11/01/2019 0905   Time Frame  short-term goal (STG), 1 week at 11/01/2019 0905   Strategies/Barriers  amb approach at 11/01/2019 0905   Transfer Goal 4   Activity/Assistive Device  walk-in shower, shower chair, walker, front-wheeled at 11/01/2019 0905   [place]  modified independence at 11/01/2019 0905   Time Frame  long-term goal (LTG), by discharge at 11/01/2019 0905   Strategies/Barriers  amb tx, step-over at 11/01/2019 0905   Bathing Goal 1   Activity/Assistive Device  bathing skills, all, shower chair, long-handled sponge at 11/01/2019 0905   [place]  supervision required, set-up required at 11/01/2019 0905   Time Frame  short-term goal (STG), 1 week at 11/01/2019 0905   Strategies/Barriers  seated at 11/01/2019 0905   Bathing Goal 2   Activity/Assistive Device  bathing skills, all, shower chair,  long-handled sponge at 11/01/2019 0905   [place]  modified independence at 11/01/2019 0905   Time Frame  long-term goal (LTG), by discharge at 11/01/2019 0905   Strategies/Barriers  seated/standing PRN at 11/01/2019 0905   UB Dressing Goal 1   Activity/Assistive Device  upper body dressing at 11/01/2019 0905   [place]  minimum assist (75% or more patient effort) at 11/01/2019 0905   Time Frame  short-term goal (STG), 1 week at 11/01/2019 0905   Strategies/Barriers  Min A clothing retrieval at 11/01/2019 0905   UB Dressing Goal 2   Activity/Assistive Device  upper body dressing at 11/01/2019 0905   [place]  modified independence at 11/01/2019 0905   Time Frame  long-term goal (LTG), by discharge at 11/01/2019 0905   LB Dressing Goal 1   Activity/Assistive Device  lower body dressing, reacher, sock aid, long handled shoe horn at 11/01/2019 0905   [place]  moderate assist (50-74% patient effort), verbal cues required, set-up required at 11/01/2019 0905   Time Frame  short-term goal (STG), 1 week at 11/01/2019 0905   Strategies/Barriers  with LRAD at 11/01/2019 0905   LB Dressing Goal 2   Activity/Assistive Device  lower body dressing at 11/01/2019 0905   [place]  modified independence at 11/01/2019 0905   Time Frame  long-term goal (LTG), by discharge at 11/01/2019 0905   Strategies/Barriers  with LRAD at 11/01/2019 0905   Grooming Goal 1   Activity/Assistive Device  grooming skills, all at 11/01/2019 0905   [place]  supervision required at 11/01/2019 0905   Time Frame  short-term goal (STG), 1 week at 11/01/2019 0905   Strategies/Barriers  standing at sink at 11/01/2019 0905   Grooming Goal 2   Activity/Assistive Device  grooming skills, all at 11/01/2019 0905   [place]  modified independence at 11/01/2019 0905   Time Frame  long-term goal (LTG), by discharge at 11/01/2019 0905   Strategies/Barriers  seated/standing at 11/01/2019 0905   Toileting Goal 1   Activity/Assistive Device   toileting skills, all, commode chair at 11/01/2019 0905   [place]  supervision required at 11/01/2019 0905   Time Frame  short-term goal (STG), 1 week at 11/01/2019 0905   Toileting Goal 2   Activity/Assistive Device  toileting skills, all, commode chair at 11/01/2019 0905   [place]  modified independence at 11/01/2019 0905   Time Frame  long-term goal (LTG), by discharge at 11/01/2019 0905

## 2019-11-02 NOTE — PLAN OF CARE
Problem: Rehabilitation (IRF) Plan of Care  Goal: Plan of Care Review  Flowsheets (Taken 11/2/2019 5572)  Plan of Care Reviewed With: patient  IRF Plan of Care Review: progress ongoing, continue  Outcome Summary: Pt tolerated dynamic standing balance 4 mins x 2 w/ activity + rest break betwen each stand-standing limited due to B/L knee pain.Continue w/ OT POC.

## 2019-11-02 NOTE — PLAN OF CARE
Problem: Rehabilitation (IRF) Plan of Care  Goal: Plan of Care Review  Outcome: Progressing  Flowsheets (Taken 11/2/2019 1100)  Plan of Care Reviewed With: patient  IRF Plan of Care Review: progress ongoing, continue  Outcome Summary: Pt is able to demo improved functional performance w/ sit-stand and gait activites vs IE, including increased amb distances. Gait remains stiff and guarded against post-op pain, but he is able to inititate some knee flex effforts w/ cueing during gait. He indicates he has 3, 1 step surfaces outside to enter his home but is unsure if surfaces are deep enough to support RW; he will have his wife assess by placing the RW they have at home to see if it will fit. Cont to perform retro descend d/t knee ROM/strength deficits. Cont w/ POC and progress as primo.

## 2019-11-02 NOTE — PLAN OF CARE
Problem: Rehabilitation (IRF) Plan of Care  Goal: Plan of Care Review  Outcome: Progressing  Flowsheets (Taken 11/2/2019 0501)  Plan of Care Reviewed With: patient  IRF Plan of Care Review: progress ongoing, continue  Outcome Summary: P slept well between care, pain managed with prn medication, no disress noted.

## 2019-11-02 NOTE — PROGRESS NOTES
"Subjective    Patient seen and examined on rounds.  Chart reviewed.  Events overnight noted.  History reviewed briefly with patient.    CC:  Deficits in mobility, transfers, self-care status post bilateral total knee replacements    HPI:  Mr. Yoon is a 68-year-old right-handed white male with chronic conditions significant for degenerative arthritis, hypertension, prostate cancer status post prostatectomy, dyslipidemia who had progressively worsening bilateral knee pain which failed conservative management and subsequently the patient underwent elective bilateral total knee replacements on 10/29/19 at [facility] by Dr. Ted Zheng.  Postoperatively, he is allowed weightbearing as tolerated on both lower extremities.  He is on Aspirin for deep vein thrombus prophylaxis and also on pneumatic compression boots. He has put on Oxycodone for pain control.  Postoperative course was significant for anemia, but he did not need transfusion. On 10/30/19, hemoglobin was 11.2, WBC count 7.65, BUN 13, creatinine 0.9.  He has been needing assistance for mobility, transfers, self-care postoperatively. He is transferred to [place] [facility] on 10/31/19 for further rehabilitation care.      SUBJ: Tolerating therapies per endurance.  Reports pain medications are helping.  Reviewed labs today.    ROS: Denies chest pain or shortness of breath. Other ROS negative. Past, family, social history is unchanged.      Physical Exam      Blood pressure (!) 159/74, pulse 90, temperature 36.3 °C (97.4 °F), temperature source Oral, resp. rate 16, height 1.702 m (5' 7\"), weight 102 kg (225 lb 1.4 oz), SpO2 98 %.  Body mass index is 35.25 kg/m².    General Appearance: Not in acute distress  Head/Ear/Nose/Throat: Normocephalic; Atraumatic.   Eye: EOMI; PERRL.   Neck: No JVD; No Bruits.   Respiratory: Decreased breath sounds at bases.   Cardiovascular: RRR; Normal S1, S2.   Gastrointestinal: Soft; NT; +BS.   Extremities: Bilateral lower " extremity edema noted.  Healing incisions noted on anterior aspect of both knees.  Aquacel dressing in place on both incisions.    Musculoskeletal: Functional active range of motion in both upper extremities.  Some limitation of active range of motion in both hips and knees, limited by pain.    Neurological: AAO ×3. Speech is fluent. Cranial nerve examination does not reveal any gross facial asymmetry. Strength testing about 4+/5 strength in both upper extremities.  Bilateral hip flexion is less than 3/5.  Bilateral quadriceps are less than 3/5.  Bilateral ankle dorsi and plantar flexion are 4/5.  He is grossly able to localize touch and position sense in great toes.  Deep tendon reflexes are hypoactive and symmetric bilaterally.  Plantars are flexor.  Coordination is functional upper extremities.  Both knee jerks were not tested.  Neurologically unchanged.  Behavior/Emotional: Appropriate; Cooperative.   Skin: No obvious rashes noted.  Bilateral knee incisions healing.  Aquacel dressing in place.         Current Facility-Administered Medications:   •  acetaminophen (TYLENOL) tablet 650 mg, 650 mg, oral, q4h PRN, Vernon Rob MD, 650 mg at 11/01/19 2128  •  alum-mag hydroxide-simeth (MAALOX) 200-200-20 mg/5 mL suspension 30 mL, 30 mL, oral, q4h PRN, Vernon Rob MD  •  aspirin enteric coated tablet 81 mg, 81 mg, oral, BID, Vernon Rob MD, 81 mg at 11/01/19 1902  •  atorvastatin (LIPITOR) tablet 10 mg, 10 mg, oral, Daily (6p), Vernon Rob MD, 10 mg at 11/01/19 1600  •  bisacodyl (DULCOLAX) 10 mg suppository 10 mg, 10 mg, rectal, Daily PRN, Vernon Rob MD  •  docusate sodium (COLACE) capsule 100 mg, 100 mg, oral, BID, Vernon Rob MD, 100 mg at 11/01/19 1904  •  magnesium hydroxide (M.O.M.) 400 mg/5 mL suspension 30 mL, 30 mL, oral, Daily PRN, Vernon Rob MD  •  multivitamin tablet 1 tablet, 1 tablet, oral, Daily, Vernon Rob MD, 1 tablet at 11/01/19 0754  •   ondansetron ODT (ZOFRAN-ODT) disintegrating tablet 4 mg, 4 mg, oral, q6h PRN, Vernon Rob MD  •  oxyCODONE (ROXICODONE) immediate release tablet 5-10 mg, 5-10 mg, oral, q4h PRN, Vernon Rob MD, 10 mg at 11/01/19 1902  •  senna (SENOKOT) tablet 2 tablet, 2 tablet, oral, Daily with lunch, Vernon Rob MD  •  valsartan (DIOVAN) tablet 80 mg, 80 mg, oral, Daily, Vernon Rob MD, 80 mg at 11/01/19 0751       Labs / Radiology    Lab Results   Component Value Date    WBC 9.41 11/01/2019    HGB 10.7 (L) 11/01/2019    HCT 30.5 (L) 11/01/2019    MCV 93.3 11/01/2019     11/01/2019     Lab Results   Component Value Date    GLUCOSE 90 11/01/2019    CALCIUM 8.3 (L) 11/01/2019     11/01/2019    K 4.0 11/01/2019    CO2 25 11/01/2019     11/01/2019    BUN 14 11/01/2019    CREATININE 1.0 11/01/2019         Assessment and Plan    ASSESSMENT PLAN:  1. 68-year-old right-handed white male with chronic conditions significant for degenerative arthritis, hypertension, prostate cancer status post prostatectomy, dyslipidemia who had progressively worsening bilateral knee pain which failed conservative management and subsequently the patient underwent elective bilateral total knee replacements on 10/29/19 at [facility] by Dr. Ted Zheng.  Postoperatively, he is allowed weightbearing as tolerated on both lower extremities.  He is on Aspirin for deep vein thrombus prophylaxis and also on pneumatic compression boots. He has put on Oxycodone for pain control.  Postoperative course was significant for anemia, but he did not need transfusion. On 10/30/19, hemoglobin was 11.2, WBC count 7.65, BUN 13, creatinine 0.9.  He has been needing assistance for mobility, transfers, self-care postoperatively. He is transferred to [place] [facility] on 10/31/19 for further rehabilitation care.       2. DVT prophylaxis - on Aspirin.  On SCDs.    Platelets 224 on 11/1/2019.     3.  Orthopedics -  status post bilateral total knee replacements.  Continue PT, OT.  Monitor healing of incisions.     4. GI - On Colace, Senokot.  On Dulcolax.  On Zofran ODT for nausea.  On when necessary MOM, Maalox.     5.  - voiding.  Denies dysuria.  Monitor post void residuals.     6.  Hypertension - on Diovan.     7. Pain - on when necessary Oxycodone.  On Tylenol PRN.  Ice to knees when necessary.     8.  Hematology - monitor hemoglobin, platelets.  Anemia - on MVI.  Hemoglobin 10.7 on 11/1/2019.     9.  Dyslipidemia - on Lipitor.     10. Rehabilitation medicine - Continue comprehensive rehabilitation care. Continue PT, OT.  We will follow falls precautions, cardiac precautions, monitor pulse oximetry in therapy.     11. Reviewed labs today.  BUN 14, creatinine 1.0 on 11/1/2019.           Vernon Rob MD  11/1/2019

## 2019-11-02 NOTE — PROGRESS NOTES
Patient: [place]inc[place] Yoon  Location: [facility] [address]  MRN: [id number]  Today's date: 11/2/2019    Hospital Course  Chris is a 68 y.o. male admitted on 10/31/2019 with Status post bilateral knee replacements [Z96.653]. Principal problem is S/P TKR (total knee replacement), bilateral.    Chris has a past medical history of Essential (primary) hypertension, Hyperlipidemia, unspecified, Impaired fasting glucose, Osteoarthritis, and Prostate cancer (CMS/[facility]) (2011).     Pt is a 68 year old male admitted to [facility] on 10/31/19 with h/o bilateral knee pain x 5-6 years. It has progressively worsened. Pt is now s/p bilateral TKA's on 10/29/19 and no complications post op.      Therapy Pain/Vitals - 11/02/19 1520        Pain/Comfort/Sleep    Pain Charting Type  Pain Assessment     Preferred Pain Scale  number (Numeric Rating Pain Scale)     Pain Body Location - Side  Bilateral     Pain Body Location  knee     Pain Rating (0-10): Rest  5     Pain Management Interventions  premedicated for activity        Vital Signs    Pulse  87     Heart Rate Source  Monitor     BP  (!) 142/67     BP Location  Right upper arm     BP Method  Automatic     Patient Position  Sitting           Prior Living Environment      Most Recent Value   Lives With  spouse   Living Arrangements  house   Home Accessibility  stairs to enter home (Group), stairs within home (Group)   Living Environment Comment  2 SH, 3 GENE + 1 GENE without HR,  1st floor HI, FF to bed/bath   Number of Stairs, Main Entrance  4   Surface of Stairs, Main Entrance  concrete   Stair Railings, Main Entrance  none   Location, Patient Bedroom  second floor, must negotiate stairs to access   Location, Bathroom  first (main) floor, second floor, must negotiate stairs to access   Bathroom Access Comment  HI 1st floor, stnd height toilet,  full bath on 2nd c walk-in shower with a low threshold entry and built-in seat, no grab bars,  stnd height toilet   Number of Stairs,  Within Home, Primary  other (see comments) [15]   Surface of Stairs, Within Home, Primary  carpeting, hardwood   Stair Railings, Within Home, Primary  railings on both sides of stairs, railing on left side (ascending)   Stairs Comment, Within Home, Primary  B rails for 12 steps & 3          Prior Level of Function      Most Recent Value   Dominant Hand  right   Ambulation  independent   Transferring  independent   Toileting  independent   Bathing  independent   Dressing  independent   Eating  independent   Communication  understands/communicates without difficulty   Prior Level of Function Comment  No DME   Equipment Currently Used at Home  none          IRF PT Evaluation and Treatment - 11/02/19 1519        Time Calculation    Start Time  1508     Stop Time  1608     Time Calculation (min)  60 min        Session Details    Document Type  daily treatment/progress note     Mode of Treatment  physical therapy;group therapy        Weight-Bearing Status    Left LE Weight-Bearing Status  weight-bearing as tolerated (WBAT)     Right LE Weight-Bearing Status  weight-bearing as tolerated (WBAT)        Range of Motion (ROM)    Range of Motion  left lower extremity;right lower extremity     Knee, Left (ROM)  A: 8-62; P: 4-66     Knee, Right (ROM)  A: 3-72; P: 0-75        Bed Mobility    [place], Supine to Sit  minimum assist (75% patient effort);close supervision;verbal cues     Verbal Cues (Supine to Sit)  preparatory posture;technique     [place], Sit to Supine  close supervision;verbal cues     Verbal Cues (Sit to Supine)  technique    B SLR onto mat long-sit       Sit to Stand Transfer    [place], Sit to Stand Transfer  minimum assist (75% patient effort);moderate assist (50% patient effort);verbal cues     Verbal Cues  preparatory posture;hand placement     Assistive Device  walker, front-wheeled     Comment  Mod A from mat after TKR ther ex        Stand to Sit Transfer    [place], Stand to Sit  "Transfer  minimum assist (75% patient effort);verbal cues     Verbal Cues  hand placement;maintaining center of gravity over base of support     Assistive Device  walker, front-wheeled        Stand Pivot/Stand Step Transfer    [place], Stand Pivot/Stand Step Transfer  minimum assist (75% or more patient effort);close supervision;verbal cues     Verbal Cues  technique;proper use of assistive device     Assistive Device  walker, front-wheeled        Gait Training    [place], Gait  minimum assist (75% or more patient effort)     Assistive Device  walker, front-wheeled     Distance in Feet  10 feet    to/from mat for ther ex       Lower Extremity (Therapeutic Exercise)    Exercise Position/Type (LE Therapeutic Exercise)  supine     General Exercise (LE Therapeutic Exercise)  bilateral;quad sets;SAQ (short arc quad);gluteal sets;heel slides;other (see comments)    Ankle pumps; AAROM Hip ABD slides    Reps and Sets (LE Therapeutic Exercise)  2x10, or 20     Comment (LE Therapeutic Exercise)  TKR protocol exercises        Daily Progress Summary (PT)    Symptoms Noted During/After Treatment  increased pain;fatigue     Progress Toward Functional Goals (PT)  progressing toward functional goals as expected     Daily Outcome Statement (PT)  Pt competing ther ex for TKR protocal as noted, completing reps of 20, or 2 x 10 depending on pain and/or muscle fatigue w/ movements. He tends to demo better knee extension than flexion movements and ranges, but demo impoved range measurements t/o B Knees vs IE.      Barriers to Overall Progress (PT)  pain, strength/ROM deficits B LE\"s     Recommendations  Cont w/ POC and progress as primo.            Pain Assessment/Intervention  Pain Charting Type: Pain Assessment                  IRF PT Goals      Most Recent Value   Bed Mobility Goal 1   Activity/Assistive Device  rolling to left, rolling to right, sit to supine, supine to sit at 11/01/2019 1106   [place]  minimum assist " (75% or more patient effort) at 11/01/2019 1106   Time Frame  5 - 7 days at 11/01/2019 1106   Bed Mobility Goal 2   Activity/Assistive Device  rolling to right, rolling to left, supine to sit, sit to supine at 11/01/2019 1106   [place]  modified independence at 11/01/2019 1106   Time Frame  14 days or less at 11/01/2019 1106   Transfer Goal 1   Activity/Assistive Device  sit-to-stand/stand-to-sit, bed-to-chair/chair-to-bed, car transfer at 11/01/2019 1106   [place]  minimum assist (75% or more patient effort) at 11/01/2019 1106   Time Frame  5 - 7 days at 11/01/2019 1106   Transfer Goal 2   Activity/Assistive Device  sit-to-stand/stand-to-sit, bed-to-chair/chair-to-bed at 11/01/2019 1106   [place]  modified independence at 11/01/2019 1106   Time Frame  14 days or less at 11/01/2019 1106   Gait/Walking Locomotion Goal 1   Activity/Assistive Device  gait (walking locomotion), assistive device use, walker, front-wheeled, increase endurance/gait distance at 11/01/2019 1106   Distance  50 feet at 11/01/2019 1106   [place]  minimum assist (75% or more patient effort) at 11/01/2019 1106   Time Frame  5 - 7 days at 11/01/2019 1106   Gait/Walking Locomotion Goal 2   Activity/Assistive Device  gait (walking locomotion), assistive device use, improve balance and speed, increase endurance/gait distance, walker, front-wheeled at 11/01/2019 1106   Distance  200 feet at 11/01/2019 1106   [place]  modified independence at 11/01/2019 1106   Time Frame  14 days or less at 11/01/2019 1106   Stairs Goal 1   Activity/Assistive Device  stairs, all skills, assistive device use, ascending stairs, descending stairs, using handrail, left, cane, straight at 11/01/2019 1106   Number of Stairs  8 at 11/01/2019 1106   [place]  minimum assist (75% or more patient effort), verbal cues required at 11/01/2019 1106   Time Frame  5 - 7 days at 11/01/2019 1106   Stairs Goal 2   Activity/Assistive Device  assistive device  use, stairs, all skills, ascending stairs, descending stairs, using handrail, left, cane, straight at 11/01/2019 1106   Number of Stairs  15 at 11/01/2019 1106   [place]  supervision required, set-up required at 11/01/2019 1106   Time Frame  14 days or less at 11/01/2019 1106

## 2019-11-02 NOTE — PROGRESS NOTES
Patient: Chapincito Yoon  Location: [facility]  MRN: [id number]  Today's date: 11/2/2019    Hospital Course  Chris is a 68 y.o. male admitted on 10/31/2019 with Status post bilateral knee replacements [Z96.653]. Principal problem is S/P TKR (total knee replacement), bilateral.    Chris has a past medical history of Essential (primary) hypertension, Hyperlipidemia, unspecified, Impaired fasting glucose, Osteoarthritis, and Prostate cancer (CMS/[facility]) (2011).     Pt is a 68 year old male admitted to [facility] on 10/31/19 with h/o bilateral knee pain x 5-6 years. It has progressively worsened. Pt is now s/p bilateral TKA's on 10/29/19 and no complications post op.      Therapy Pain/Vitals     Row Name 11/02/19 0801 11/02/19 1011       Pain/Comfort/Sleep    Pain Charting Type  Pain Assessment  Pain Assessment    Preferred Pain Scale  number (Numeric Rating Pain Scale)  number (Numeric Rating Pain Scale)    Pain Body Location - Side  Bilateral  Bilateral    Pain Body Location - Orientation  --  incisional    Pain Body Location  knee  knee    Pain Rating (0-10): Rest  5  5    Pain Management Interventions  --  premedicated for activity;prescribed exercises encouraged       Vital Signs    Pulse  86  86    Heart Rate Source  --  Monitor    SpO2  97 %  --    Oxygen Therapy  None (Room air)  --    BP  (!) 145/61  (!) 143/57    BP Location  Right upper arm  Left upper arm    BP Method  Manual  Automatic    Patient Position  Sitting  Sitting    Row Name 11/02/19 1039          Pain/Comfort/Sleep    Pain Charting Type  Pain Reassessment     Preferred Pain Scale  number (Numeric Rating Pain Scale)     Pain Body Location - Side  Bilateral     Pain Body Location - Orientation  incisional     Pain Body Location  knee     Pain Rating (0-10): Rest  7     Pain Management Interventions  prescribed exercises encouraged        Vital Signs    Pulse  95     Heart Rate Source  Monitor     BP  (!) 143/64     BP Location  Left  upper arm     BP Method  Automatic     Patient Position  Sitting seated after 2 step           Prior Living Environment      Most Recent Value   Lives With  spouse   Living Arrangements  house   Home Accessibility  stairs to enter home (Group), stairs within home (Group)   Living Environment Comment  2 SH, 3 GENE + 1 GENE without HR,  1st floor DE, FF to bed/bath   Number of Stairs, Main Entrance  4   Surface of Stairs, Main Entrance  concrete   Stair Railings, Main Entrance  none   Location, Patient Bedroom  second floor, must negotiate stairs to access   Location, Bathroom  first (main) floor, second floor, must negotiate stairs to access   Bathroom Access Comment  DE 1st floor, stnd height toilet,  full bath on 2nd c walk-in shower with a low threshold entry and built-in seat, no grab bars,  stnd height toilet   Number of Stairs, Within Home, Primary  other (see comments) [15]   Surface of Stairs, Within Home, Primary  carpeting, hardwood   Stair Railings, Within Home, Primary  railings on both sides of stairs, railing on left side (ascending)   Stairs Comment, Within Home, Primary  B rails for 12 steps & 3          Prior Level of Function      Most Recent Value   Dominant Hand  right   Ambulation  independent   Transferring  independent   Toileting  independent   Bathing  independent   Dressing  independent   Eating  independent   Communication  understands/communicates without difficulty   Prior Level of Function Comment  No DME   Equipment Currently Used at Home  none          IRF PT Evaluation and Treatment - 11/02/19 1010        Time Calculation    Start Time  1000     Stop Time  1100     Time Calculation (min)  60 min        Session Details    Document Type  daily treatment/progress note     Mode of Treatment  physical therapy;individual therapy        Sit to Stand Transfer    [place], Sit to Stand Transfer  minimum assist (75% patient effort);verbal cues     Verbal Cues  preparatory posture;hand  placement     Assistive Device  walker, front-wheeled     Comment  short, stiff-legged steps B LE's        Stand to Sit Transfer    [place], Stand to Sit Transfer  minimum assist (75% patient effort);verbal cues     Verbal Cues  preparatory posture;hand placement     Assistive Device  walker, front-wheeled        Stand Pivot/Stand Step Transfer    [place], Stand Pivot/Stand Step Transfer  minimum assist (75% or more patient effort);verbal cues     Verbal Cues  proper use of assistive device;preparatory posture;maintaining center of gravity over base of support;hand placement     Assistive Device  walker, front-wheeled        Gait Training    [place], Gait  minimum assist (75% or more patient effort)     Assistive Device  walker, front-wheeled     Distance in Feet  110 feet    20' x 2 to/from steps    Gait Pattern Utilized  step-through     Deviations/Abnormal Patterns (Gait)  bilateral deviations;antalgic;base of support, narrow;willian decreased;gait speed decreased;stride length decreased     Bilateral Gait Deviations  heel strike decreased     Maintains Weight-Bearing Status  able to maintain        Stairs Training    [place], Stairs  moderate assist (50% patient effort);verbal cues     Assistive Device  railing     Handrail Location  both sides     Number of Stairs  2     Stair Height  6 inches     Ascending Stairs Technique  step-to-step    R LE lead up    Descending Stairs Technique  step-to-step    L LE lead down    Comment  descends retro        Wheelchair Mobility/Management    Method of Locomotion  bimanual (upper extremity) propulsion     Wheelchair Type  manual, lightweight     Functional Mobility Training  forward propulsion;backward propulsion;steering;stopping;turning     [place], Forward Propulsion  close supervision;verbal cues     [place], Backward Propulsion  close supervision;verbal cues     [place], Steering Activities  minimum assist (75% patient effort);close  supervision;verbal cues     [place], Stopping Activities  close supervision;verbal cues     [place], Turning Activities  close supervision;minimum assist (75% patient effort);verbal cues     Distance Propelled in Feet  106 feet     Activity Tolerance  able to tolerate functional household activity distances (less than 150 feet)     Comment, Functional Mobility  slow UE movements, not yet smooth UE coord for steering     Management Activities  wheel locks/brakes management;legrest management     Wheel Locks/Brake Management  supervision;verbal cues        Daily Progress Summary (PT)    Symptoms Noted During/After Treatment  fatigue;increased pain     Progress Toward Functional Goals (PT)  progressing toward functional goals as expected     Daily Outcome Statement (PT)  Pt is able to demo improved functional performance w/ sit-stand and gait activites vs IE, including increased amb distances. Gait remains stiff and guarded against post-op pain, but he is able to inititate some knee flex effforts w/ cueing during gait. He indicates he has 3, 1 step surfaces outside to enter his home but is unsure if surfaces are deep enough to support RW; he will have his wife assess by placing the RW they have at home to see if it will fit. Cont to perorm retro descend d/t knee ROM/strength deficits. Cont w/ POC and progress as primo.     Barriers to Overall Progress (PT)  pain, decreased strength, ROM B LE's     Recommendations  COnt w/ POC and progress mobility and knee ROM/strength as primo.           Pain Assessment/Intervention  Pain Charting Type: Pain Reassessment                  IRF PT Goals      Most Recent Value   Bed Mobility Goal 1   Activity/Assistive Device  rolling to left, rolling to right, sit to supine, supine to sit at 11/01/2019 1106   [place]  minimum assist (75% or more patient effort) at 11/01/2019 1106   Time Frame  5 - 7 days at 11/01/2019 1106   Bed Mobility Goal 2   Activity/Assistive Device   rolling to right, rolling to left, supine to sit, sit to supine at 11/01/2019 1106   [place]  modified independence at 11/01/2019 1106   Time Frame  14 days or less at 11/01/2019 1106   Transfer Goal 1   Activity/Assistive Device  sit-to-stand/stand-to-sit, bed-to-chair/chair-to-bed, car transfer at 11/01/2019 1106   [place]  minimum assist (75% or more patient effort) at 11/01/2019 1106   Time Frame  5 - 7 days at 11/01/2019 1106   Transfer Goal 2   Activity/Assistive Device  sit-to-stand/stand-to-sit, bed-to-chair/chair-to-bed at 11/01/2019 1106   [place]  modified independence at 11/01/2019 1106   Time Frame  14 days or less at 11/01/2019 1106   Gait/Walking Locomotion Goal 1   Activity/Assistive Device  gait (walking locomotion), assistive device use, walker, front-wheeled, increase endurance/gait distance at 11/01/2019 1106   Distance  50 feet at 11/01/2019 1106   [place]  minimum assist (75% or more patient effort) at 11/01/2019 1106   Time Frame  5 - 7 days at 11/01/2019 1106   Gait/Walking Locomotion Goal 2   Activity/Assistive Device  gait (walking locomotion), assistive device use, improve balance and speed, increase endurance/gait distance, walker, front-wheeled at 11/01/2019 1106   Distance  200 feet at 11/01/2019 1106   [place]  modified independence at 11/01/2019 1106   Time Frame  14 days or less at 11/01/2019 1106   Stairs Goal 1   Activity/Assistive Device  stairs, all skills, assistive device use, ascending stairs, descending stairs, using handrail, left, cane, straight at 11/01/2019 1106   Number of Stairs  8 at 11/01/2019 1106   [place]  minimum assist (75% or more patient effort), verbal cues required at 11/01/2019 1106   Time Frame  5 - 7 days at 11/01/2019 1106   Stairs Goal 2   Activity/Assistive Device  assistive device use, stairs, all skills, ascending stairs, descending stairs, using handrail, left, cane, straight at 11/01/2019 1106   Number of Stairs  15 at  11/01/2019 1106   [place]  supervision required, set-up required at 11/01/2019 1106   Time Frame  14 days or less at 11/01/2019 1106

## 2019-11-02 NOTE — PLAN OF CARE
Problem: Rehabilitation (IRF) Plan of Care  Goal: Plan of Care Review  Outcome: Progressing  Flowsheets (Taken 11/2/2019 5681)  Plan of Care Reviewed With: patient  IRF Plan of Care Review: progress ongoing, continue  Outcome Summary: Pain well controlled with prn medication and prn ice to bilateral knees. Using nursing light appropriately. No bleeding or signs of infection noted. Eating and drinking well. Voiding without difficuly.

## 2019-11-02 NOTE — PLAN OF CARE
Individualized Plan of Care    Chapincito Yoon is admitted to [facility] for comprehensive inpatient rehabilitation for Ortho status post bilateral total knee replacements with functional deficits in mobility;motor dysfunction;safety;self-care. Patient is receiving the following services: physical therapy;occupational therapy.    Frequency of Treatment (OT): 60-90 minutes per day, 5-7 times per week  Frequency of Treatment (PT): 5-7 times per week, 60-90 minutes per day    Active medical management is required for   Patient Active Problem List   Diagnosis   • Hyperlipidemia, unspecified   • Essential (primary) hypertension   • Osteoarthritis of both knees   • Counseling on health promotion and disease prevention   • Osteoarthrosis   • Anemia due to blood loss   • Leukemoid reaction   • S/P TKR (total knee replacement), bilateral   • Status post bilateral knee replacements       Risk for Complications  Constipation: Moderate  Dehydration/Malnutrition: Moderate  DVT: Moderate  Falls: Moderate      The patient's medical prognosis is good to achieve the stated goals below.    Expected Level of Function  Expected Functional Improvement: mobility;motor dysfunction;safety;self-care  Self-Care: Independent  Sphincter Control: Independent  Transfers: Independent  Locomotion: Independent  Communication: Independent  Social Cognition: Independent      Goals  IRF PT Goals      Most Recent Value   Bed Mobility Goal 1   Activity/Assistive Device  rolling to left, rolling to right, sit to supine, supine to sit at 11/01/2019 1106   [place]  minimum assist (75% or more patient effort) at 11/01/2019 1106   Time Frame  5 - 7 days at 11/01/2019 1106   Bed Mobility Goal 2   Activity/Assistive Device  rolling to right, rolling to left, supine to sit, sit to supine at 11/01/2019 1106   [place]  modified independence at 11/01/2019 1106   Time Frame  14 days or less at 11/01/2019 1106   Transfer Goal 1    Activity/Assistive Device  sit-to-stand/stand-to-sit, bed-to-chair/chair-to-bed, car transfer at 11/01/2019 1106   [place]  minimum assist (75% or more patient effort) at 11/01/2019 1106   Time Frame  5 - 7 days at 11/01/2019 1106   Transfer Goal 2   Activity/Assistive Device  sit-to-stand/stand-to-sit, bed-to-chair/chair-to-bed at 11/01/2019 1106   [place]  modified independence at 11/01/2019 1106   Time Frame  14 days or less at 11/01/2019 1106   Gait/Walking Locomotion Goal 1   Activity/Assistive Device  gait (walking locomotion), assistive device use, walker, front-wheeled, increase endurance/gait distance at 11/01/2019 1106   Distance  50 feet at 11/01/2019 1106   [place]  minimum assist (75% or more patient effort) at 11/01/2019 1106   Time Frame  5 - 7 days at 11/01/2019 1106   Gait/Walking Locomotion Goal 2   Activity/Assistive Device  gait (walking locomotion), assistive device use, improve balance and speed, increase endurance/gait distance, walker, front-wheeled at 11/01/2019 1106   Distance  200 feet at 11/01/2019 1106   [place]  modified independence at 11/01/2019 1106   Time Frame  14 days or less at 11/01/2019 1106   Stairs Goal 1   Activity/Assistive Device  stairs, all skills, assistive device use, ascending stairs, descending stairs, using handrail, left, cane, straight at 11/01/2019 1106   Number of Stairs  8 at 11/01/2019 1106   [place]  minimum assist (75% or more patient effort), verbal cues required at 11/01/2019 1106   Time Frame  5 - 7 days at 11/01/2019 1106   Stairs Goal 2   Activity/Assistive Device  assistive device use, stairs, all skills, ascending stairs, descending stairs, using handrail, left, cane, straight at 11/01/2019 1106   Number of Stairs  15 at 11/01/2019 1106   [place]  supervision required, set-up required at 11/01/2019 1106   Time Frame  14 days or less at 11/01/2019 1106        IRF OT Goals      Most Recent Value   Transfer Goal 1    Activity/Assistive Device  toilet, commode, 3-in-1, walker, front-wheeled at 11/01/2019 0905   [place]  minimum assist (75% or more patient effort), set-up required at 11/01/2019 0905   Time Frame  short-term goal (STG), 1 week at 11/01/2019 0905   Strategies/Barriers  amb tx at 11/01/2019 0905   Transfer Goal 2   Activity/Assistive Device  toilet, commode, 3-in-1, walker, front-wheeled at 11/01/2019 0905   [place]  modified independence at 11/01/2019 0905   Time Frame  long-term goal (LTG), by discharge at 11/01/2019 0905   Strategies/Barriers  amb tx at 11/01/2019 0905   Transfer Goal 3   Activity/Assistive Device  walk-in shower, shower chair, walker, front-wheeled at 11/01/2019 0905   [place]  minimum assist (75% or more patient effort), set-up required at 11/01/2019 0905   Time Frame  short-term goal (STG), 1 week at 11/01/2019 0905   Strategies/Barriers  amb approach at 11/01/2019 0905   Transfer Goal 4   Activity/Assistive Device  walk-in shower, shower chair, walker, front-wheeled at 11/01/2019 0905   [place]  modified independence at 11/01/2019 0905   Time Frame  long-term goal (LTG), by discharge at 11/01/2019 0905   Strategies/Barriers  amb tx, step-over at 11/01/2019 0905   Bathing Goal 1   Activity/Assistive Device  bathing skills, all, shower chair, long-handled sponge at 11/01/2019 0905   [place]  supervision required, set-up required at 11/01/2019 0905   Time Frame  short-term goal (STG), 1 week at 11/01/2019 0905   Strategies/Barriers  seated at 11/01/2019 0905   Bathing Goal 2   Activity/Assistive Device  bathing skills, all, shower chair, long-handled sponge at 11/01/2019 0905   [place]  modified independence at 11/01/2019 0905   Time Frame  long-term goal (LTG), by discharge at 11/01/2019 0905   Strategies/Barriers  seated/standing PRN at 11/01/2019 0905   UB Dressing Goal 1   Activity/Assistive Device  upper body dressing at 11/01/2019 0905   [place]  minimum  assist (75% or more patient effort) at 11/01/2019 0905   Time Frame  short-term goal (STG), 1 week at 11/01/2019 0905   Strategies/Barriers  Min A clothing retrieval at 11/01/2019 0905   UB Dressing Goal 2   Activity/Assistive Device  upper body dressing at 11/01/2019 0905   [place]  modified independence at 11/01/2019 0905   Time Frame  long-term goal (LTG), by discharge at 11/01/2019 0905   LB Dressing Goal 1   Activity/Assistive Device  lower body dressing, reacher, sock aid, long handled shoe horn at 11/01/2019 0905   [place]  moderate assist (50-74% patient effort), verbal cues required, set-up required at 11/01/2019 0905   Time Frame  short-term goal (STG), 1 week at 11/01/2019 0905   Strategies/Barriers  with LRAD at 11/01/2019 0905   LB Dressing Goal 2   Activity/Assistive Device  lower body dressing at 11/01/2019 0905   [place]  modified independence at 11/01/2019 0905   Time Frame  long-term goal (LTG), by discharge at 11/01/2019 0905   Strategies/Barriers  with LRAD at 11/01/2019 0905   Grooming Goal 1   Activity/Assistive Device  grooming skills, all at 11/01/2019 0905   [place]  supervision required at 11/01/2019 0905   Time Frame  short-term goal (STG), 1 week at 11/01/2019 0905   Strategies/Barriers  standing at sink at 11/01/2019 0905   Grooming Goal 2   Activity/Assistive Device  grooming skills, all at 11/01/2019 0905   [place]  modified independence at 11/01/2019 0905   Time Frame  long-term goal (LTG), by discharge at 11/01/2019 0905   Strategies/Barriers  seated/standing at 11/01/2019 0905   Toileting Goal 1   Activity/Assistive Device  toileting skills, all, commode chair at 11/01/2019 0905   [place]  supervision required at 11/01/2019 0905   Time Frame  short-term goal (STG), 1 week at 11/01/2019 0905   Toileting Goal 2   Activity/Assistive Device  toileting skills, all, commode chair at 11/01/2019 0905   [place]  modified independence at 11/01/2019 0905    Time Frame  long-term goal (LTG), by discharge at 11/01/2019 0905          Anticipated Discharge Plan  Anticipated Discharge Disposition: home with outpatient services  Type of Outpatient Services: physical therapy      Expected length of stay: 7 days

## 2019-11-02 NOTE — PROGRESS NOTES
"Internal Medicine Progress Note      Patient seen and examined  Attestation Notes: Face to face encounter completed      HPI  Mr Yoon is a 69 yo M with hypertension, dyslipidemia, prostate CA, osteoarthritis who was evaluated as an outpatient for chronic progressive severe bilateral knee pain not alleviated by conservative measures; he was recommended bilateral total knee replacement  On 10/29, he was admitted to [facility], where he underwent bilateral total knee replacement secondary to osteoarthritis by Dr. Zheng  He utilized twice daily aspirin 81 mg for DVT prophylaxis  He continued on Diovan for blood pressure management  [facility]    SUBJECTIVE  Mr. Yoon is awake, alert, pleasant and conversant, in no acute distress  He reports good management of his knee pain so far  Persistent lower extremity swelling  Unsure if he has moved his bowels, does report \"gurgling and a lot of noise\"  He denies nausea, abdominal fullness or distention  No fever, chest pain, orthostasis, dyspnea    Review of Systems as above, otherwise without change      Medical History reviewed:  FH, SH without change since admission  Social History     Tobacco Use   • Smoking status: Former Smoker     Last attempt to quit: 1979     Years since quittin.8   • Smokeless tobacco: Never Used   • Tobacco comment: quit 40 years ago   Substance Use Topics   • Alcohol use: Yes     Alcohol/week: 3.0 standard drinks     Types: 3 Cans of beer per week     Frequency: 2-3 times a week     Drinks per session: 3 or 4     Binge frequency: Less than monthly   • Drug use: Never       PMH, PSH without change since admission      Current medications and allergies reviewed:  Allergies: Patient has no known allergies.    CURRENT MEDICATIONS:    • aspirin  81 mg oral BID   • atorvastatin  10 mg oral Daily (6p)   • docusate sodium  100 mg oral BID   • multivitamin  1 tablet oral " Daily   • senna  2 tablet oral Daily with lunch   • valsartan  80 mg oral Daily           Objective     Vitals:    11/02/19 0633 11/02/19 0801 11/02/19 1011 11/02/19 1039   BP: (!) 154/80 (!) 145/61 (!) 143/57 (!) 143/64   BP Location: Right upper arm Right upper arm Left upper arm Left upper arm   Patient Position: Lying Sitting Sitting Sitting   Pulse: 84 86 86 95   Resp: 18      Temp: 37.2 °C (99 °F)      TempSrc: Oral      SpO2: 95% 97%     Weight:       Height:           Physical Exam   Constitutional: He is oriented to person, place, and time. He appears well-developed and well-nourished. No distress.   HENT:   Head: Normocephalic and atraumatic.   Mouth/Throat: Oropharynx is clear and moist.   Eyes: Pupils are equal, round, and reactive to light. Conjunctivae and EOM are normal. No scleral icterus.   Neck: Normal range of motion. Neck supple.   Cardiovascular: Normal rate, regular rhythm, normal heart sounds and intact distal pulses.   Pulmonary/Chest: Effort normal and breath sounds normal. No respiratory distress. He has no wheezes. He has no rales.   Abdominal: Soft. Bowel sounds are normal. He exhibits no distension. There is no tenderness.   Musculoskeletal: He exhibits edema (Bilateral lower extremity edema) and deformity (s/p bilateral TKR).   Neurological: He is alert and oriented to person, place, and time. No cranial nerve deficit.   Skin: Skin is warm and dry. He is not diaphoretic. No erythema.   Bilateral knee incisions dressed, without apparent drainage   Psychiatric: He has a normal mood and affect.   Nursing note and vitals reviewed.       Labs   I have reviewed the patient's pertinent labs.  Significant abnormals are Anemia, hypoalbuminemia.  Lab results reviewed, discussed with patient:    Lab Results   Component Value Date    WBC 9.41 11/01/2019    HGB 10.7 (L) 11/01/2019    HCT 30.5 (L) 11/01/2019     11/01/2019     11/01/2019    K 4.0 11/01/2019     11/01/2019     CREATININE 1.0 11/01/2019    BUN 14 11/01/2019    CO2 25 11/01/2019    ALT 12 (L) 11/01/2019    AST 17 11/01/2019    INR 1.0 10/16/2019    HGBA1C 5.6 10/16/2019       Imaging    Bilateral lower extremity venous Doppler ultrasound 10/31:  No evidence of deep vein thrombus in either lower extremity  Fluid in right popliteal space likely represents a ruptured Baker's cyst but is not well evaluated on this vascular study        Assessment/Plan       Patient Active Problem List    Diagnosis Date Noted   • Anemia due to blood loss 10/31/2019   • Leukemoid reaction 10/31/2019   • S/P TKR (total knee replacement), bilateral 10/31/2019   • Status post bilateral knee replacements 10/31/2019   • Osteoarthrosis 10/29/2019   • Osteoarthritis of both knees 10/16/2019   • Counseling on health promotion and disease prevention 10/16/2019   • Hyperlipidemia, unspecified    • Essential (primary) hypertension        Mr Yoon is a 67 yo M with hypertension, dyslipidemia, prostate CA, osteoarthritis who is admitted to [facility] on 10/31 from [facility] for acute inpatient rehabilitation s/p bilateral TKR by Dr. Zheng on 10/29     Ortho:  S/p B TKR-continue rehab  Continue current pain management regimen  Local wound care     Cardiovascular:  Essential hypertension-blood pressure 134-159/57-74 on Diovan  Maintain hold parameters and cardiac precautions     Pulmonary:  Encourage use of spirometry for atelectasis  No evidence of respiratory distress     Endocrine:  Dyslipidemia-continue statin therapy, low-fat diet  Monitor LFT     Renal:  BUN/Cr 14/1.0, GFR> 60  Monitor renal function, avoid nephrotoxins  Reassess labs 11/4     GI:  Constipation-continue bowel program     :  Hx prostate CA  Monitor PVR, CIC PRN  Urinalysis 10/31 clear, leukocyte esterase negative, nitrite negative; culture not indicated at present     Heme:  Postoperative anemia-preop hemoglobin 14.2; hemoglobin 11/1 10.7.    Normochromic,  normocytic  Monitor trend hemoglobin and symptom complaint  Repeat CBC 11/4     F/E/N:  Regular/thin diet  Monitor electrolytes, correct as needed  Monitor hydration and nutritional status  Reassess labs 11/4     Derm:  Local wound care     Prophylaxis:  -BID 81mg ASA, SCD for DVT prophylaxis; Doppler ultrasound 11/1 without evidence for DVT  -Spirometry for atelectasis  -Maintain fall, cardiac precautions     Disposition  -Expected discharge date 11/7  .  Shalini Torres MD  11/2/2019  3:10 PM

## 2019-11-03 ENCOUNTER — APPOINTMENT (INPATIENT)
Dept: PHYSICAL THERAPY | Facility: REHABILITATION | Age: 69
DRG: 560 | End: 2019-11-03
Payer: MEDICARE

## 2019-11-03 ENCOUNTER — APPOINTMENT (INPATIENT)
Dept: OCCUPATIONAL THERAPY | Facility: REHABILITATION | Age: 69
DRG: 560 | End: 2019-11-03
Payer: MEDICARE

## 2019-11-03 PROCEDURE — 97116 GAIT TRAINING THERAPY: CPT | Mod: GP

## 2019-11-03 PROCEDURE — 97110 THERAPEUTIC EXERCISES: CPT | Mod: GP

## 2019-11-03 PROCEDURE — 97530 THERAPEUTIC ACTIVITIES: CPT | Mod: GP

## 2019-11-03 PROCEDURE — 12800000 HC ROOM AND CARE SEMIPRIVATE REHAB

## 2019-11-03 PROCEDURE — 63700000 HC SELF-ADMINISTRABLE DRUG: Performed by: PHYSICAL MEDICINE & REHABILITATION

## 2019-11-03 PROCEDURE — 97535 SELF CARE MNGMENT TRAINING: CPT | Mod: GO

## 2019-11-03 RX ADMIN — ATORVASTATIN CALCIUM 10 MG: 10 TABLET, FILM COATED ORAL at 17:32

## 2019-11-03 RX ADMIN — ACETAMINOPHEN 650 MG: 325 TABLET, FILM COATED ORAL at 17:32

## 2019-11-03 RX ADMIN — ASPIRIN 81 MG: 81 TABLET, COATED ORAL at 20:08

## 2019-11-03 RX ADMIN — OXYCODONE HYDROCHLORIDE 10 MG: 5 TABLET ORAL at 17:32

## 2019-11-03 RX ADMIN — MAGNESIUM HYDROXIDE 30 ML: 400 SUSPENSION ORAL at 17:32

## 2019-11-03 RX ADMIN — OXYCODONE HYDROCHLORIDE 10 MG: 5 TABLET ORAL at 22:36

## 2019-11-03 RX ADMIN — OXYCODONE HYDROCHLORIDE 10 MG: 5 TABLET ORAL at 01:36

## 2019-11-03 RX ADMIN — DOCUSATE SODIUM 100 MG: 100 CAPSULE, LIQUID FILLED ORAL at 20:08

## 2019-11-03 RX ADMIN — OXYCODONE HYDROCHLORIDE 10 MG: 5 TABLET ORAL at 08:52

## 2019-11-03 RX ADMIN — ACETAMINOPHEN 650 MG: 325 TABLET, FILM COATED ORAL at 12:49

## 2019-11-03 RX ADMIN — MULTIPLE VITAMINS W/ MINERALS TAB 1 TABLET: TAB at 08:49

## 2019-11-03 RX ADMIN — DOCUSATE SODIUM 100 MG: 100 CAPSULE, LIQUID FILLED ORAL at 08:49

## 2019-11-03 RX ADMIN — ASPIRIN 81 MG: 81 TABLET, COATED ORAL at 08:49

## 2019-11-03 RX ADMIN — OXYCODONE HYDROCHLORIDE 10 MG: 5 TABLET ORAL at 12:49

## 2019-11-03 RX ADMIN — VALSARTAN 80 MG: 80 TABLET, FILM COATED ORAL at 08:49

## 2019-11-03 NOTE — PLAN OF CARE
Problem: Rehabilitation (IRF) Plan of Care  Goal: Plan of Care Review  11/3/2019 0620 by Connie Heard RN  Outcome: Progressing  Flowsheets (Taken 11/3/2019 0620)  Plan of Care Reviewed With: patient  IRF Plan of Care Review: progress ongoing, continue  Outcome Summary: no issues overnight paient slept well and comtrolled pain with PRN pain meds  11/3/2019 0524 by Connie Heard RN  Outcome: Progressing  Flowsheets (Taken 11/3/2019 0524)  Plan of Care Reviewed With: patient  IRF Plan of Care Review: progress ongoing, continue  11/2/2019 2229 by Connie Heard RN  Outcome: Progressing  11/2/2019 2228 by Connie Heard RN  Outcome: Progressing  11/2/2019 2225 by Connie Heard RN  Outcome: Progressing  11/2/2019 2224 by Connie Heard RN  Outcome: Progressing     Problem: Rehabilitation (IRF) Plan of Care  Goal: Plan of Care Review  11/3/2019 0620 by Connie Heard RN  Outcome: Progressing  Flowsheets (Taken 11/3/2019 0620)  Plan of Care Reviewed With: patient  IRF Plan of Care Review: progress ongoing, continue  Outcome Summary: no issues overnight paient slept well and comtrolled pain with PRN pain meds

## 2019-11-03 NOTE — PLAN OF CARE
Problem: Rehabilitation (IRF) Plan of Care  Goal: Plan of Care Review  Flowsheets (Taken 11/3/2019 5105)  Plan of Care Reviewed With: patient  IRF Plan of Care Review: progress ongoing, continue  Outcome Summary: Pt completed functional mobility training and LE TE for ROM and strengthening. Limited by BLE knee pain and fatigue, however has progressed in amb and stair climbing this session. Cont per POC as tolerated.

## 2019-11-03 NOTE — PLAN OF CARE
Problem: Rehabilitation (IRF) Plan of Care  Goal: Plan of Care Review  Outcome: Progressing  Flowsheets (Taken 11/3/2019 0824)  Plan of Care Reviewed With: patient  IRF Plan of Care Review: progress ongoing, continue  Outcome Summary: Completed morning ADL routine including full wet shower. Improved LBD performance with AE, but will benefit from ongoing review.

## 2019-11-03 NOTE — PLAN OF CARE
Problem: Rehabilitation (IRF) Plan of Care  Goal: Plan of Care Review  11/3/2019 0524 by Connie Heard RN  Outcome: Progressing  Flowsheets (Taken 11/3/2019 0524)  Plan of Care Reviewed With: patient  IRF Plan of Care Review: progress ongoing, continue  11/2/2019 2229 by Connie Heard RN  Outcome: Progressing  11/2/2019 2228 by Connie Heard RN  Outcome: Progressing  11/2/2019 2225 by Connie Heard RN  Outcome: Progressing  11/2/2019 2224 by Connie Heard RN  Outcome: Progressing  No issues overnight patient slept well,controled pain PRN with meds

## 2019-11-03 NOTE — PROGRESS NOTES
"Subjective    Patient seen and examined on rounds.  Chart reviewed.  Events overnight noted.  History reviewed briefly with patient.     CC:  Deficits in mobility, transfers, self-care status post bilateral total knee replacements     HPI:  Mr. Yoon is a 68-year-old right-handed white male with chronic conditions significant for degenerative arthritis, hypertension, prostate cancer status post prostatectomy, dyslipidemia who had progressively worsening bilateral knee pain which failed conservative management and subsequently the patient underwent elective bilateral total knee replacements on 10/29/19 at [facility] by Dr. Ted Zheng.  Postoperatively, he is allowed weightbearing as tolerated on both lower extremities.  He is on Aspirin for deep vein thrombus prophylaxis and also on pneumatic compression boots. He has put on Oxycodone for pain control.  Postoperative course was significant for anemia, but he did not need transfusion. On 10/30/19, hemoglobin was 11.2, WBC count 7.65, BUN 13, creatinine 0.9.  He has been needing assistance for mobility, transfers, self-care postoperatively. He is transferred to [place] [facility] on 10/31/19 for further rehabilitation care.       SUBJ: Progressing in therapy.  Reports pain medications are helping.  Tolerating therapies per endurance.     ROS: Denies chest pain or shortness of breath. Other ROS negative. Past, family, social history is unchanged.    Physical Exam      Blood pressure 133/63, pulse 85, temperature 37.1 °C (98.7 °F), temperature source Oral, resp. rate 18, height 1.702 m (5' 7\"), weight 102 kg (225 lb 1.4 oz), SpO2 96 %.  Body mass index is 35.25 kg/m².    General Appearance: Not in acute distress  Head/Ear/Nose/Throat: Normocephalic; Atraumatic.   Eye: EOMI; PERRL.   Neck: No JVD; No Bruits.   Respiratory: Decreased breath sounds at bases.   Cardiovascular: RRR; Normal S1, S2.   Gastrointestinal: Soft; NT; +BS.   Extremities: Bilateral lower " extremity edema noted.  Healing incisions noted on anterior aspect of both knees.  Aquacel dressing in place on both incisions.    Musculoskeletal: Functional active range of motion in both upper extremities.  Some limitation of active range of motion in both hips and knees, limited by pain.    Neurological: AAO ×3. Speech is fluent. Cranial nerve examination does not reveal any gross facial asymmetry. Strength testing about 4+/5 strength in both upper extremities.  Bilateral hip flexion is less than 3/5.  Bilateral quadriceps are less than 3/5.  Bilateral ankle dorsi and plantar flexion are 4/5.  He is grossly able to localize touch and position sense in great toes.  Deep tendon reflexes are hypoactive and symmetric bilaterally.  Plantars are flexor.  Coordination is functional upper extremities.  Both knee jerks were not tested.  Neurologically stable  Behavior/Emotional: Appropriate; Cooperative.   Skin: No obvious rashes noted.  Bilateral knee incisions healing.  Aquacel dressing in place.      Current Facility-Administered Medications:   •  acetaminophen (TYLENOL) tablet 650 mg, 650 mg, oral, q4h PRN, Vernon Rob MD, 650 mg at 11/02/19 1702  •  alum-mag hydroxide-simeth (MAALOX) 200-200-20 mg/5 mL suspension 30 mL, 30 mL, oral, q4h PRN, Vernon Rob MD  •  aspirin enteric coated tablet 81 mg, 81 mg, oral, BID, Vernon Rob MD, 81 mg at 11/02/19 2136  •  atorvastatin (LIPITOR) tablet 10 mg, 10 mg, oral, Daily (6p), Vernon Rob MD, 10 mg at 11/02/19 1702  •  bisacodyl (DULCOLAX) 10 mg suppository 10 mg, 10 mg, rectal, Daily PRN, Vernon Rob MD  •  docusate sodium (COLACE) capsule 100 mg, 100 mg, oral, BID, Vernon Rob MD, 100 mg at 11/02/19 2137  •  magnesium hydroxide (M.O.M.) 400 mg/5 mL suspension 30 mL, 30 mL, oral, Daily PRN, Vernon Rob MD  •  multivitamin tablet 1 tablet, 1 tablet, oral, Daily, Vernon Rob MD, 1 tablet at 11/02/19 0752  •   ondansetron ODT (ZOFRAN-ODT) disintegrating tablet 4 mg, 4 mg, oral, q6h PRN, Vernon Rob MD  •  oxyCODONE (ROXICODONE) immediate release tablet 5-10 mg, 5-10 mg, oral, q4h PRN, Vernon Rob MD, 10 mg at 11/02/19 2137  •  senna (SENOKOT) tablet 2 tablet, 2 tablet, oral, Daily with lunch, Vernon Rob MD, 2 tablet at 11/02/19 1255  •  valsartan (DIOVAN) tablet 80 mg, 80 mg, oral, Daily, Vernon Rob MD, 80 mg at 11/02/19 0752       Labs / Radiology    Lab Results   Component Value Date    WBC 9.41 11/01/2019    HGB 10.7 (L) 11/01/2019    HCT 30.5 (L) 11/01/2019    MCV 93.3 11/01/2019     11/01/2019     Lab Results   Component Value Date    GLUCOSE 90 11/01/2019    CALCIUM 8.3 (L) 11/01/2019     11/01/2019    K 4.0 11/01/2019    CO2 25 11/01/2019     11/01/2019    BUN 14 11/01/2019    CREATININE 1.0 11/01/2019         Assessment and Plan    ASSESSMENT PLAN:  1. 68-year-old right-handed white male with chronic conditions significant for degenerative arthritis, hypertension, prostate cancer status post prostatectomy, dyslipidemia who had progressively worsening bilateral knee pain which failed conservative management and subsequently the patient underwent elective bilateral total knee replacements on 10/29/19 at [facility] by Dr. Ted Zheng.  Postoperatively, he is allowed weightbearing as tolerated on both lower extremities.  He is on Aspirin for deep vein thrombus prophylaxis and also on pneumatic compression boots. He has put on Oxycodone for pain control.  Postoperative course was significant for anemia, but he did not need transfusion. On 10/30/19, hemoglobin was 11.2, WBC count 7.65, BUN 13, creatinine 0.9.  He has been needing assistance for mobility, transfers, self-care postoperatively. He is transferred to [place] [facility] on 10/31/19 for further rehabilitation care.       2. DVT prophylaxis - on Aspirin.  On SCDs.    Platelets 224 on 11/1/2019.   More ambulatory now.      3.  Orthopedics - status post bilateral total knee replacements.  Continue PT, OT.  Monitor healing of incisions.     4. GI - On Colace, Senokot.  On Dulcolax.  On Zofran ODT for nausea.  On when necessary MOM, Maalox.     5.  - voiding.  Denies dysuria.  Monitor post void residuals.     6.  Hypertension - on Diovan.     7. Pain - on when necessary Oxycodone.  On Tylenol PRN.  Ice to knees when necessary.  Pain controlled with current medications.     8.  Hematology - monitor hemoglobin, platelets.  Anemia - on MVI.  Hemoglobin 10.7 on 11/1/2019.     9.  Dyslipidemia - on Lipitor.     10. Rehabilitation medicine - Continue comprehensive rehabilitation care. Continue PT, OT.  We will follow falls precautions, cardiac precautions, monitor pulse oximetry in therapy.     11. Reviewed labs today.  BUN 14, creatinine 1.0 on 11/1/2019.        Vernon Rob MD  11/2/2019

## 2019-11-03 NOTE — PROGRESS NOTES
Fuchs' dystrophy OU Patient: [place]inc[place] Yoon  Location: [facility] [address]  MRN: [id number]  Today's date: 11/3/2019    Hospital Course  Chris is a 68 y.o. male admitted on 10/31/2019 with Status post bilateral knee replacements [Z96.653]. Principal problem is S/P TKR (total knee replacement), bilateral.    Chris has a past medical history of Essential (primary) hypertension, Hyperlipidemia, unspecified, Impaired fasting glucose, Osteoarthritis, and Prostate cancer (CMS/[facility]) (2011).     Pt is a 68 year old male admitted to [facility] on 10/31/19 with h/o bilateral knee pain x 5-6 years. It has progressively worsened. Pt is now s/p bilateral TKA's on 10/29/19 and no complications post op.      Therapy Pain/Vitals - 11/03/19 1303        Pain/Comfort/Sleep    Pain Charting Type  Pain Assessment     Preferred Pain Scale  number (Numeric Rating Pain Scale)     Pain Body Location - Side  Bilateral     Pain Body Location - Orientation  incisional     Pain Body Location  knee     Pain Rating (0-10): Rest  8        Vital Signs    Pulse  97     Heart Rate Source  Monitor     BP  (!) 147/66     BP Location  Right upper arm     BP Method  Automatic     Patient Position  Sitting           Prior Living Environment      Most Recent Value   Lives With  spouse   Living Arrangements  house   Home Accessibility  stairs to enter home (Group), stairs within home (Group)   Living Environment Comment  2 SH, 3 GENE + 1 GENE without HR,  1st floor AR, FF to bed/bath   Number of Stairs, Main Entrance  4   Surface of Stairs, Main Entrance  concrete   Stair Railings, Main Entrance  none   Location, Patient Bedroom  second floor, must negotiate stairs to access   Location, Bathroom  first (main) floor, second floor, must negotiate stairs to access   Bathroom Access Comment  AR 1st floor, stnd height toilet,  full bath on 2nd c walk-in shower with a low threshold entry and built-in seat, no grab bars,  stnd height toilet   Number of Stairs, Within Home,  Primary  other (see comments) [15]   Surface of Stairs, Within Home, Primary  carpeting, hardwood   Stair Railings, Within Home, Primary  railings on both sides of stairs, railing on left side (ascending)   Stairs Comment, Within Home, Primary  B rails for 12 steps & 3          Prior Level of Function      Most Recent Value   Dominant Hand  right   Ambulation  independent   Transferring  independent   Toileting  independent   Bathing  independent   Dressing  independent   Eating  independent   Communication  understands/communicates without difficulty   Prior Level of Function Comment  No DME   Equipment Currently Used at Home  none          IRF PT Evaluation and Treatment - 11/03/19 1301        Time Calculation    Start Time  1300     Stop Time  1400     Time Calculation (min)  60 min        Session Details    Document Type  daily treatment/progress note     Mode of Treatment  physical therapy;individual therapy        General Information    Patient Profile Reviewed?  yes     Existing Precautions/Restrictions  aspiration;cardiac;fall        Weight-Bearing Status    Left LE Weight-Bearing Status  weight-bearing as tolerated (WBAT)     Right LE Weight-Bearing Status  weight-bearing as tolerated (WBAT)        Range of Motion (ROM)    Range of Motion  left lower extremity;right lower extremity     Knee, Left (ROM)  P: 4-70 degrees     Knee, Right (ROM)  P: 0-83 degrees        Bed Mobility    [place], Supine to Sit  close supervision     Verbal Cues (Supine to Sit)  safety;technique     [place], Sit to Supine  close supervision     Verbal Cues (Sit to Supine)  technique;safety        Transfers    Transfers  stand pivot/stand step transfer        Sit to Stand Transfer    [place], Sit to Stand Transfer  minimum assist (75% patient effort)     Verbal Cues  safety;technique     Assistive Device  walker, front-wheeled     Comment  from WC and EOM        Stand to Sit Transfer    [place], Stand to Sit  Transfer  minimum assist (75% patient effort)     Verbal Cues  safety;technique     Assistive Device  walker, front-wheeled     Comment  to WC and EOM        Stand Pivot/Stand Step Transfer    [place], Stand Pivot/Stand Step Transfer  minimum assist (75% or more patient effort)     Verbal Cues  safety;technique     Assistive Device  walker, front-wheeled     Comment  amb approach        Lower Extremity (Therapeutic Exercise)    Exercise Position/Type (LE Therapeutic Exercise)  supine     General Exercise (LE Therapeutic Exercise)  bilateral;quad sets;SAQ (short arc quad);gluteal sets;heel slides    hip abd/add slides    Range of Motion Exercises (LE Therapeutic Exercise)  knee flexion/extension;hip abduction/adduction     Reps and Sets (LE Therapeutic Exercise)  2x10     Comment (LE Therapeutic Exercise)  Supine TKR protocol exercises        Cryotherapy    Location 1  lower extremity treatment area     Indications  pain     Method of Application (Cryotherapy)  cryostatic     Cryogenic Agent (Cryotherapy)  gel pack     Duration (Cryotherapy)  20 minutes     Response to Treatment  pain decreased     Comment  Gel pack to B knees in supine during TE treatment with improved tolerance to ROM as a result. Skin in tact pre and post treatment.        Daily Progress Summary (PT)    Daily Outcome Statement (PT)  Pt completes supine TE program and tolerated with rest periods provided due to pain and fatigue. Progressing in knee ROM BLEs. Cont per POC as tolerated.           Pain Assessment/Intervention  Pain Charting Type: Pain Assessment                  IRF PT Goals      Most Recent Value   Bed Mobility Goal 1   Activity/Assistive Device  rolling to left, rolling to right, sit to supine, supine to sit at 11/01/2019 1106   [place]  minimum assist (75% or more patient effort) at 11/01/2019 1106   Time Frame  5 - 7 days at 11/01/2019 1106   Bed Mobility Goal 2   Activity/Assistive Device  rolling to right, rolling to  left, supine to sit, sit to supine at 11/01/2019 1106   [place]  modified independence at 11/01/2019 1106   Time Frame  14 days or less at 11/01/2019 1106   Transfer Goal 1   Activity/Assistive Device  sit-to-stand/stand-to-sit, bed-to-chair/chair-to-bed, car transfer at 11/01/2019 1106   [place]  minimum assist (75% or more patient effort) at 11/01/2019 1106   Time Frame  5 - 7 days at 11/01/2019 1106   Transfer Goal 2   Activity/Assistive Device  sit-to-stand/stand-to-sit, bed-to-chair/chair-to-bed at 11/01/2019 1106   [place]  modified independence at 11/01/2019 1106   Time Frame  14 days or less at 11/01/2019 1106   Gait/Walking Locomotion Goal 1   Activity/Assistive Device  gait (walking locomotion), assistive device use, walker, front-wheeled, increase endurance/gait distance at 11/01/2019 1106   Distance  50 feet at 11/01/2019 1106   [place]  minimum assist (75% or more patient effort) at 11/01/2019 1106   Time Frame  5 - 7 days at 11/01/2019 1106   Gait/Walking Locomotion Goal 2   Activity/Assistive Device  gait (walking locomotion), assistive device use, improve balance and speed, increase endurance/gait distance, walker, front-wheeled at 11/01/2019 1106   Distance  200 feet at 11/01/2019 1106   [place]  modified independence at 11/01/2019 1106   Time Frame  14 days or less at 11/01/2019 1106   Stairs Goal 1   Activity/Assistive Device  stairs, all skills, assistive device use, ascending stairs, descending stairs, using handrail, left, cane, straight at 11/01/2019 1106   Number of Stairs  8 at 11/01/2019 1106   [place]  minimum assist (75% or more patient effort), verbal cues required at 11/01/2019 1106   Time Frame  5 - 7 days at 11/01/2019 1106   Stairs Goal 2   Activity/Assistive Device  assistive device use, stairs, all skills, ascending stairs, descending stairs, using handrail, left, cane, straight at 11/01/2019 1106   Number of Stairs  15 at 11/01/2019 1106    [place]  supervision required, set-up required at 11/01/2019 1106   Time Frame  14 days or less at 11/01/2019 1106

## 2019-11-03 NOTE — PLAN OF CARE
Problem: Rehabilitation (IRF) Plan of Care  Goal: Plan of Care Review  Outcome: Progressing  Flowsheets (Taken 11/3/2019 1522)  Plan of Care Reviewed With: patient  IRF Plan of Care Review: progress ongoing, continue  Outcome Summary: patient using call bell apporiately, pain managed using multimodal approach

## 2019-11-03 NOTE — PROGRESS NOTES
Patient: Chapincito Yoon  Location: [facility] [address]  MRN: [id number]  Today's date: 11/3/2019    Hospital Course  Chris is a 68 y.o. male admitted on 10/31/2019 with Status post bilateral knee replacements [Z96.653]. Principal problem is S/P TKR (total knee replacement), bilateral.    Chris has a past medical history of Essential (primary) hypertension, Hyperlipidemia, unspecified, Impaired fasting glucose, Osteoarthritis, and Prostate cancer (CMS/[facility]) (2011).     Pt is a 68 year old male admitted to [facility] on 10/31/19 with h/o bilateral knee pain x 5-6 years. It has progressively worsened. Pt is now s/p bilateral TKA's on 10/29/19 and no complications post op.          Prior Living Environment      Most Recent Value   Lives With  spouse   Living Arrangements  house   Home Accessibility  stairs to enter home (Group), stairs within home (Group)   Living Environment Comment  2 SH, 3 GENE + 1 GENE without HR,  1st floor SC, FF to bed/bath   Number of Stairs, Main Entrance  4   Surface of Stairs, Main Entrance  concrete   Stair Railings, Main Entrance  none   Location, Patient Bedroom  second floor, must negotiate stairs to access   Location, Bathroom  first (main) floor, second floor, must negotiate stairs to access   Bathroom Access Comment  SC 1st floor, stnd height toilet,  full bath on 2nd c walk-in shower with a low threshold entry and built-in seat, no grab bars,  stnd height toilet   Number of Stairs, Within Home, Primary  other (see comments) [15]   Surface of Stairs, Within Home, Primary  carpeting, hardwood   Stair Railings, Within Home, Primary  railings on both sides of stairs, railing on left side (ascending)   Stairs Comment, Within Home, Primary  B rails for 12 steps & 3          Prior Level of Function      Most Recent Value   Dominant Hand  right   Ambulation  independent   Transferring  independent   Toileting  independent   Bathing  independent   Dressing  independent   Eating   independent   Communication  understands/communicates without difficulty   Prior Level of Function Comment  No DME   Equipment Currently Used at Home  none          IRF OT Evaluation and Treatment - 11/03/19 0810        Time Calculation    Start Time  0730     Stop Time  0830     Time Calculation (min)  60 min        Session Details    Document Type  daily treatment/progress note     Mode of Treatment  occupational therapy;individual therapy        General Information    Patient Profile Reviewed?  yes     Existing Precautions/Restrictions  aspiration;cardiac;fall;weight bearing    WBAT BLE       Bed Mobility    [place], Supine to Sit  minimum assist (75% patient effort)     Assistive Device (Bed Mobility)  bed rails;head of bed elevated        Transfers    Comment  OT: short distance min A with RW, EOB > bathroom        Sit to Stand Transfer    [place], Sit to Stand Transfer  minimum assist (75% patient effort)     Verbal Cues  preparatory posture;safety;technique     Assistive Device  walker, front-wheeled     Comment  elevated EOB surface, tub bench'        Stand to Sit Transfer    [place], Stand to Sit Transfer  minimum assist (75% patient effort)     Verbal Cues  safety;technique     Assistive Device  walker, front-wheeled     Comment  to tub bench and w/c        Stand Pivot/Stand Step Transfer    [place], Stand Pivot/Stand Step Transfer  minimum assist (75% or more patient effort)     Verbal Cues  safety;technique;proper use of assistive device     Assistive Device  walker, front-wheeled        Toilet Transfer    Comment  pt denies toileting needs        Shower Transfer    Transfer Technique  squat pivot     [place], Shower Transfer  minimum assist (75% patient effort)     Assistive Device  walker, front-wheeled;tub bench;grab bars/tub rail    amb approach, barrier free shower       Balance    Comment, Balance  min A standing balance with RW to manage clothing, LOB x1, able to recover  with use of RW, improved safety with unilateral support on RW during clothing management        Bathing    Self-Performance  chest;left arm;right arm;abdomen;front perineal area;buttocks;left upper leg;right upper leg     [place] Assistance  left lower leg, including foot;right lower leg, including foot     [place]  minimum assist (75% or more patient effort)     Position  supported sitting     Setup Assistance  adaptive equipment setup;obtain supplies     Adaptive Equipment  grab bar/tub rail;hand-held shower spray hose;long-handled sponge;tub bench     Comment  A to dry BLE/feet        Upper Body Dressing    Tasks  doff;don;pull over garment     Self-Performance  threads left arm, shirt;threads right arm, shirt;pulls shirt over head/around back;pulls shirt down/adjusts     [place] Assistance  obtains clothes     [place]  set up;supervision     Position  unsupported sitting     Comment  seated on tub bench        Lower Body Dressing    Tasks  doff;don;pants/bottoms;socks;underwear     Self-Performance  threads left leg, underpants;threads right leg, underpants;pulls underpants up or down;threads left leg, pants/shorts;threads right leg, pants/shorts;pulls pants/shorts up or down     [place] Assistance  obtains clothes;dons/doffs left sock;dons/doffs right sock     [place]  moderate assist (50-74% patient effort)     Position  unsupported sitting;supported standing     Adaptive Equipment  reacher     Comment  A with socks prior to/after shower, use of reacher with min A and cues to thread BLE through pants/underwear, min A steadying A to manage clothing. Recommend trial of sock aid with standard socks and use of LH shoe horn/elastic laces for sneakers        Grooming    Self-Performance  washes, rinses and dries face;washes, rinses and dries hands;brushes/adam hair;oral care (brushing teeth, cleaning dentures);shaves face;applies deodorant     [place]  set up;supervision     Position  supported  sitting     Setup Assistance  obtain supplies     Comment  seated in w/c at sink, pt denies standing due to need for UE support on RW/sink        Toileting    Comment  denies toileting needs        Daily Progress Summary (OT)    Daily Outcome Statement (OT)  Pt participated in full wet shower and morning ADL routine. Improved perf of LBD with AE, but will benefit from ongoing LBD trials. Recommend standing tolerance and balance activities to improve ability to complete grooming activities in stance. Continue OT POC.                          Education provided this session. See the Patient Education summary report for full details.    IRF OT Goals      Most Recent Value   Transfer Goal 1   Activity/Assistive Device  toilet, commode, 3-in-1, walker, front-wheeled at 11/01/2019 0905   [place]  minimum assist (75% or more patient effort), set-up required at 11/01/2019 0905   Time Frame  short-term goal (STG), 1 week at 11/01/2019 0905   Strategies/Barriers  amb tx at 11/01/2019 0905   Transfer Goal 2   Activity/Assistive Device  toilet, commode, 3-in-1, walker, front-wheeled at 11/01/2019 0905   [place]  modified independence at 11/01/2019 0905   Time Frame  long-term goal (LTG), by discharge at 11/01/2019 0905   Strategies/Barriers  amb tx at 11/01/2019 0905   Transfer Goal 3   Activity/Assistive Device  walk-in shower, shower chair, walker, front-wheeled at 11/01/2019 0905   [place]  minimum assist (75% or more patient effort), set-up required at 11/01/2019 0905   Time Frame  short-term goal (STG), 1 week at 11/01/2019 0905   Strategies/Barriers  amb approach at 11/01/2019 0905   Transfer Goal 4   Activity/Assistive Device  walk-in shower, shower chair, walker, front-wheeled at 11/01/2019 0905   [place]  modified independence at 11/01/2019 0905   Time Frame  long-term goal (LTG), by discharge at 11/01/2019 0905   Strategies/Barriers  amb tx, step-over at 11/01/2019 0905   Bathing Goal 1    Activity/Assistive Device  bathing skills, all, shower chair, long-handled sponge at 11/01/2019 0905   [place]  supervision required, set-up required at 11/01/2019 0905   Time Frame  short-term goal (STG), 1 week at 11/01/2019 0905   Strategies/Barriers  seated at 11/01/2019 0905   Bathing Goal 2   Activity/Assistive Device  bathing skills, all, shower chair, long-handled sponge at 11/01/2019 0905   [place]  modified independence at 11/01/2019 0905   Time Frame  long-term goal (LTG), by discharge at 11/01/2019 0905   Strategies/Barriers  seated/standing PRN at 11/01/2019 0905   UB Dressing Goal 1   Activity/Assistive Device  upper body dressing at 11/01/2019 0905   [place]  minimum assist (75% or more patient effort) at 11/01/2019 0905   Time Frame  short-term goal (STG), 1 week at 11/01/2019 0905   Strategies/Barriers  Min A clothing retrieval at 11/01/2019 0905   UB Dressing Goal 2   Activity/Assistive Device  upper body dressing at 11/01/2019 0905   [place]  modified independence at 11/01/2019 0905   Time Frame  long-term goal (LTG), by discharge at 11/01/2019 0905   LB Dressing Goal 1   Activity/Assistive Device  lower body dressing, reacher, sock aid, long handled shoe horn at 11/01/2019 0905   [place]  moderate assist (50-74% patient effort), verbal cues required, set-up required at 11/01/2019 0905   Time Frame  short-term goal (STG), 1 week at 11/01/2019 0905   Strategies/Barriers  with LRAD at 11/01/2019 0905   LB Dressing Goal 2   Activity/Assistive Device  lower body dressing at 11/01/2019 0905   [place]  modified independence at 11/01/2019 0905   Time Frame  long-term goal (LTG), by discharge at 11/01/2019 0905   Strategies/Barriers  with LRAD at 11/01/2019 0905   Grooming Goal 1   Activity/Assistive Device  grooming skills, all at 11/01/2019 0905   [place]  supervision required at 11/01/2019 0905   Time Frame  short-term goal (STG), 1 week at 11/01/2019 0905    Strategies/Barriers  standing at sink at 11/01/2019 0905   Grooming Goal 2   Activity/Assistive Device  grooming skills, all at 11/01/2019 0905   [place]  modified independence at 11/01/2019 0905   Time Frame  long-term goal (LTG), by discharge at 11/01/2019 0905   Strategies/Barriers  seated/standing at 11/01/2019 0905   Toileting Goal 1   Activity/Assistive Device  toileting skills, all, commode chair at 11/01/2019 0905   [place]  supervision required at 11/01/2019 0905   Time Frame  short-term goal (STG), 1 week at 11/01/2019 0905   Toileting Goal 2   Activity/Assistive Device  toileting skills, all, commode chair at 11/01/2019 0905   [place]  modified independence at 11/01/2019 0905   Time Frame  long-term goal (LTG), by discharge at 11/01/2019 0905

## 2019-11-04 ENCOUNTER — APPOINTMENT (INPATIENT)
Dept: OCCUPATIONAL THERAPY | Facility: REHABILITATION | Age: 69
DRG: 560 | End: 2019-11-04
Payer: MEDICARE

## 2019-11-04 ENCOUNTER — APPOINTMENT (INPATIENT)
Dept: PHYSICAL THERAPY | Facility: REHABILITATION | Age: 69
DRG: 560 | End: 2019-11-04
Payer: MEDICARE

## 2019-11-04 PROCEDURE — 97530 THERAPEUTIC ACTIVITIES: CPT | Mod: GO

## 2019-11-04 PROCEDURE — 97116 GAIT TRAINING THERAPY: CPT | Mod: GP

## 2019-11-04 PROCEDURE — 97530 THERAPEUTIC ACTIVITIES: CPT | Mod: GP

## 2019-11-04 PROCEDURE — 12800000 HC ROOM AND CARE SEMIPRIVATE REHAB

## 2019-11-04 PROCEDURE — 97110 THERAPEUTIC EXERCISES: CPT | Mod: GP

## 2019-11-04 PROCEDURE — 63700000 HC SELF-ADMINISTRABLE DRUG: Performed by: PHYSICAL MEDICINE & REHABILITATION

## 2019-11-04 PROCEDURE — 63700000 HC SELF-ADMINISTRABLE DRUG: Performed by: HOSPITALIST

## 2019-11-04 PROCEDURE — 97110 THERAPEUTIC EXERCISES: CPT | Mod: GO

## 2019-11-04 RX ORDER — POLYETHYLENE GLYCOL 3350 17 G/17G
17 POWDER, FOR SOLUTION ORAL DAILY
Status: DISCONTINUED | OUTPATIENT
Start: 2019-11-04 | End: 2019-11-10

## 2019-11-04 RX ADMIN — OXYCODONE HYDROCHLORIDE 10 MG: 5 TABLET ORAL at 05:21

## 2019-11-04 RX ADMIN — DOCUSATE SODIUM 100 MG: 100 CAPSULE, LIQUID FILLED ORAL at 09:01

## 2019-11-04 RX ADMIN — MULTIPLE VITAMINS W/ MINERALS TAB 1 TABLET: TAB at 09:01

## 2019-11-04 RX ADMIN — SENNOSIDES 2 TABLET: 8.6 TABLET, FILM COATED ORAL at 12:24

## 2019-11-04 RX ADMIN — OXYCODONE HYDROCHLORIDE 10 MG: 5 TABLET ORAL at 18:33

## 2019-11-04 RX ADMIN — DOCUSATE SODIUM 100 MG: 100 CAPSULE, LIQUID FILLED ORAL at 21:24

## 2019-11-04 RX ADMIN — ASPIRIN 81 MG: 81 TABLET, COATED ORAL at 21:23

## 2019-11-04 RX ADMIN — ATORVASTATIN CALCIUM 10 MG: 10 TABLET, FILM COATED ORAL at 17:57

## 2019-11-04 RX ADMIN — OXYCODONE HYDROCHLORIDE 10 MG: 5 TABLET ORAL at 09:52

## 2019-11-04 RX ADMIN — ACETAMINOPHEN 650 MG: 325 TABLET, FILM COATED ORAL at 12:24

## 2019-11-04 RX ADMIN — ACETAMINOPHEN 650 MG: 325 TABLET, FILM COATED ORAL at 18:34

## 2019-11-04 RX ADMIN — ASPIRIN 81 MG: 81 TABLET, COATED ORAL at 09:01

## 2019-11-04 RX ADMIN — POLYETHYLENE GLYCOL 3350 17 G: 17 POWDER, FOR SOLUTION ORAL at 14:21

## 2019-11-04 RX ADMIN — VALSARTAN 80 MG: 80 TABLET, FILM COATED ORAL at 09:01

## 2019-11-04 RX ADMIN — OXYCODONE HYDROCHLORIDE 10 MG: 5 TABLET ORAL at 14:20

## 2019-11-04 NOTE — PLAN OF CARE
Problem: Rehabilitation (IRF) Plan of Care  Goal: Plan of Care Review  Outcome: Progressing  Flowsheets (Taken 11/4/2019 5047)  Plan of Care Reviewed With: patient  IRF Plan of Care Review: progress ongoing, continue  Outcome Summary: no issues overnight patient used urinal. slept well no complaints of pain until 5am

## 2019-11-04 NOTE — PLAN OF CARE
Problem: Rehabilitation (IRF) Plan of Care  Goal: Plan of Care Review  Outcome: Progressing  Flowsheets (Taken 11/4/2019 1102)  Plan of Care Reviewed With: patient  IRF Plan of Care Review: progress ongoing, continue  Outcome Summary: patient using call bell appropriately, pain managed using a multmodal approach

## 2019-11-04 NOTE — PLAN OF CARE
Problem: Rehabilitation (IRF) Plan of Care  Goal: Plan of Care Review  Outcome: Progressing  Flowsheets (Taken 11/4/2019 7034)  Plan of Care Reviewed With: patient  IRF Plan of Care Review: progress ongoing, continue  Outcome Summary: Pt able to return demonstrating of use of AE for safe footwear with min cueing for performance. Pt engages in UB/LB TE's to progress strength and endurance to maximize I in ADL/IADLs and functional mobility.

## 2019-11-04 NOTE — PROGRESS NOTES
Patient: Chapincito Yoon  Location: [facility] [address]  MRN: [id number]  Today's date: 11/4/2019    Hospital Course  Chris is a 68 y.o. male admitted on 10/31/2019 with Status post bilateral knee replacements [Z96.653]. Principal problem is S/P TKR (total knee replacement), bilateral.    Chris has a past medical history of Essential (primary) hypertension, Hyperlipidemia, unspecified, Impaired fasting glucose, Osteoarthritis, and Prostate cancer (CMS/[facility]) (2011).     Pt is a 68 year old male admitted to [facility] on 10/31/19 with h/o bilateral knee pain x 5-6 years. It has progressively worsened. Pt is now s/p bilateral TKA's on 10/29/19 and no complications post op.      Therapy Pain/Vitals     Row Name  11/04/19 1104       Pain/Comfort/Sleep    Pain Charting Type   Pain Assessment    Preferred Pain Scale   number (Numeric Rating Pain Scale)    Pain Body Location - Side   Bilateral    Pain Body Location - Orientation   --    Pain Body Location   knee    Pain Rating (0-10): Rest   8    Pain Rating (0-10): Activity   8    Pain Management Interventions   premedicated for activity       Vital Signs    Pulse   (!) 101    Heart Rate Source   --    BP   140/72    BP Location   Left upper arm    BP Method   Automatic    Patient Position   Sitting       Patient Observation    Observations   asymptomatic    Row Name 11/04/19 1204          Pain/Comfort/Sleep    Pain Charting Type  Pain Reassessment     Preferred Pain Scale  number (Numeric Rating Pain Scale)     Pain Body Location - Side  Bilateral     Pain Body Location  knee     Pain Rating (0-10): Rest  7     Pain Rating (0-10): Activity  7     Pain Management Interventions  prescribed exercises encouraged        Vital Signs    Pulse  99     BP  (!) 143/67     BP Location  Left upper arm     BP Method  Automatic     Patient Position  Sitting        Patient Observation    Observations  asymptomatic           Prior Living Environment      Most Recent Value   Lives  With  spouse   Living Arrangements  house   Home Accessibility  stairs to enter home (Group), stairs within home (Group)   Living Environment Comment  2 SH, 3 GENE + 1 GENE without HR,  1st floor [place], FF to bed/bath   Number of Stairs, Main Entrance  4   Surface of Stairs, Main Entrance  concrete   Stair Railings, Main Entrance  none   Location, Patient Bedroom  second floor, must negotiate stairs to access   Location, Bathroom  first (main) floor, second floor, must negotiate stairs to access   Bathroom Access Comment  [place] 1st floor, stnd height toilet,  full bath on 2nd c walk-in shower with a low threshold entry and built-in seat, no grab bars,  stnd height toilet   Number of Stairs, Within Home, Primary  other (see comments) [15]   Surface of Stairs, Within Home, Primary  carpeting, hardwood   Stair Railings, Within Home, Primary  railings on both sides of stairs, railing on left side (ascending)   Stairs Comment, Within Home, Primary  B rails for 12 steps & 3          Prior Level of Function      Most Recent Value   Dominant Hand  right   Ambulation  independent   Transferring  independent   Toileting  independent   Bathing  independent   Dressing  independent   Eating  independent   Communication  understands/communicates without difficulty   Prior Level of Function Comment  No DME   Equipment Currently Used at Home  none          IRF PT Evaluation and Treatment - 11/04/19 1132        Time Calculation    Start Time  1104     Stop Time  1204     Time Calculation (min)  60 min        Session Details    Document Type  daily treatment/progress note     Mode of Treatment  physical therapy;individual therapy        General Information    Patient Profile Reviewed?  yes     Existing Precautions/Restrictions  aspiration;cardiac;fall        Weight-Bearing Status    Left LE Weight-Bearing Status  weight-bearing as tolerated (WBAT)     Right LE Weight-Bearing Status  weight-bearing as tolerated (WBAT)        Transfers     "Transfers  stand pivot/stand step transfer        Sit to Stand Transfer    [place], Sit to Stand Transfer  minimum assist (75% patient effort);verbal cues     Verbal Cues  hand placement     Assistive Device  walker, front-wheeled     Comment  for anterior weightshift at pelvis        Stand to Sit Transfer    [place], Stand to Sit Transfer  minimum assist (75% patient effort);verbal cues     Verbal Cues  technique     Assistive Device  walker, front-wheeled     Comment  for controlled posterior weightshift at pelvis        Stand Pivot/Stand Step Transfer    [place], Stand Pivot/Stand Step Transfer  minimum assist (75% or more patient effort)     Assistive Device  walker, front-wheeled     Comment  for lateral weightshift at pelvis        Car Transfer    Transfer Technique  stand pivot/stand step     [place], Car Transfer  minimum assist (75% patient effort);verbal cues    for balance throughout    Verbal Cues  technique;hand placement     Assistive Device  walker, front-wheeled        Gait Training    [place], Gait  minimum assist (75% or more patient effort);verbal cues     Assistive Device  walker, front-wheeled     Distance in Feet  150 feet     Gait Pattern Utilized  step-through     Bilateral Gait Deviations  heel strike decreased     Comment  150' x 1, 100' x 2 min A for lateral weightshift & verbal cues for gait mechanics        Curb Negotiation (Mobility)    [place]  minimum assist (75% or more patient effort);verbal cues     Comment  with RW for 6\" curb with  min A for hip extension to rise & min A for lateral weightshift with verbal cues for RW & LE management        Sloped Surface Gait Skills (Mobility)    [place]  minimum assist (75% or more patient effort);verbal cues     Comment  with RW for ramp with min A for lateral weightshift & verbal cues for RW management & step length        Stairs Training    [place], Stairs  moderate assist (50% patient effort);verbal " cues     Assistive Device  railing     Handrail Location  both sides     Number of Stairs  8     Stair Height  6 inches     Ascending Stairs Technique  step-to-step     Descending Stairs Technique  step-to-step     Comment  mod A of 1 for bilateral hip extension, & min-mod A of 1 for anterior weightshift & for controlled quad        Daily Progress Summary (PT)    Daily Outcome Statement (PT)  Progressed with ambulation & stair climbing today with encouragement. Able to progress & evaluate curb/ramp & car transfers as well today. Recommending supine mat program.            Pain Assessment/Intervention  Pain Charting Type: Pain Reassessment             Education provided this session. See the Patient Education summary report for full details.    IRF PT Goals      Most Recent Value   Bed Mobility Goal 1   Activity/Assistive Device  rolling to left, rolling to right, sit to supine, supine to sit at 11/01/2019 1106   [place]  minimum assist (75% or more patient effort) at 11/01/2019 1106   Time Frame  5 - 7 days at 11/01/2019 1106   Bed Mobility Goal 2   Activity/Assistive Device  rolling to right, rolling to left, supine to sit, sit to supine at 11/01/2019 1106   [place]  modified independence at 11/01/2019 1106   Time Frame  14 days or less at 11/01/2019 1106   Transfer Goal 1   Activity/Assistive Device  sit-to-stand/stand-to-sit, bed-to-chair/chair-to-bed, car transfer at 11/01/2019 1106   [place]  minimum assist (75% or more patient effort) at 11/01/2019 1106   Time Frame  5 - 7 days at 11/01/2019 1106   Transfer Goal 2   Activity/Assistive Device  sit-to-stand/stand-to-sit, bed-to-chair/chair-to-bed at 11/01/2019 1106   [place]  modified independence at 11/01/2019 1106   Time Frame  14 days or less at 11/01/2019 1106   Gait/Walking Locomotion Goal 1   Activity/Assistive Device  gait (walking locomotion), assistive device use, walker, front-wheeled, increase endurance/gait distance at 11/01/2019  1106   Distance  50 feet at 11/01/2019 1106   [place]  minimum assist (75% or more patient effort) at 11/01/2019 1106   Time Frame  5 - 7 days at 11/01/2019 1106   Gait/Walking Locomotion Goal 2   Activity/Assistive Device  gait (walking locomotion), assistive device use, improve balance and speed, increase endurance/gait distance, walker, front-wheeled at 11/01/2019 1106   Distance  200 feet at 11/01/2019 1106   [place]  modified independence at 11/01/2019 1106   Time Frame  14 days or less at 11/01/2019 1106   Stairs Goal 1   Activity/Assistive Device  stairs, all skills, assistive device use, ascending stairs, descending stairs, using handrail, left, cane, straight at 11/01/2019 1106   Number of Stairs  8 at 11/01/2019 1106   [place]  minimum assist (75% or more patient effort), verbal cues required at 11/01/2019 1106   Time Frame  5 - 7 days at 11/01/2019 1106   Stairs Goal 2   Activity/Assistive Device  assistive device use, stairs, all skills, ascending stairs, descending stairs, using handrail, left, cane, straight at 11/01/2019 1106   Number of Stairs  15 at 11/01/2019 1106   [place]  supervision required, set-up required at 11/01/2019 1106   Time Frame  14 days or less at 11/01/2019 1106

## 2019-11-04 NOTE — PLAN OF CARE
Problem: Rehabilitation (IRF) Plan of Care  Goal: Plan of Care Review  Outcome: Progressing  Flowsheets (Taken 11/4/2019 1144)  Plan of Care Reviewed With: patient  IRF Plan of Care Review: progress ongoing, continue  Outcome Summary: Worked on stair climbing, & car transfers & curb/ramp today.

## 2019-11-04 NOTE — PROGRESS NOTES
Patient: Chapincito Yoon  Location: [facility] [address]  MRN: [id number]  Today's date: 11/4/2019    Hospital Course  Chris is a 68 y.o. male admitted on 10/31/2019 with Status post bilateral knee replacements [Z96.653]. Principal problem is S/P TKR (total knee replacement), bilateral.    Chris has a past medical history of Essential (primary) hypertension, Hyperlipidemia, unspecified, Impaired fasting glucose, Osteoarthritis, and Prostate cancer (CMS/[facility]) (2011).     Pt is a 68 year old male admitted to [facility] on 10/31/19 with h/o bilateral knee pain x 5-6 years. It has progressively worsened. Pt is now s/p bilateral TKA's on 10/29/19 and no complications post op.      Therapy Pain/Vitals     Row Name  11/04/19 1303       Pain/Comfort/Sleep    Pain Charting Type   Pain Assessment    Preferred Pain Scale   number (Numeric Rating Pain Scale)    Pain Body Location - Side   --    Pain Body Location   knee    Pain Rating (0-10): Rest   7    Pain Rating (0-10): Activity   7    Pain Management Interventions   prescribed exercises encouraged       Vital Signs    Pulse   99    BP   (!) 145/65    BP Location   Left upper arm    BP Method   Automatic    Patient Position   Lying       Patient Observation    Observations   asymptomatic    Row Name 11/04/19 1403          Pain/Comfort/Sleep    Pain Charting Type  Post Activity     Pain Body Location - Side  Bilateral     Pain Body Location  knee     Pain Rating (0-10): Rest  7     Pain Rating (0-10): Activity  7     Pain Management Interventions  position adjusted        Patient Observation    Observations  asymptomatic           Prior Living Environment      Most Recent Value   Lives With  spouse   Living Arrangements  house   Home Accessibility  stairs to enter home (Group), stairs within home (Group)   Living Environment Comment  2 SH, 3 GENE + 1 GENE without HR,  1st floor [place], FF to bed/bath   Number of Stairs, Main Entrance  4   Surface of Stairs, Main Entrance   concrete   Stair Railings, Main Entrance  none   Location, Patient Bedroom  second floor, must negotiate stairs to access   Location, Bathroom  first (main) floor, second floor, must negotiate stairs to access   Bathroom Access Comment  [place] 1st floor, stnd height toilet,  full bath on 2nd c walk-in shower with a low threshold entry and built-in seat, no grab bars,  stnd height toilet   Number of Stairs, Within Home, Primary  other (see comments) [15]   Surface of Stairs, Within Home, Primary  carpeting, hardwood   Stair Railings, Within Home, Primary  railings on both sides of stairs, railing on left side (ascending)   Stairs Comment, Within Home, Primary  B rails for 12 steps & 3          Prior Level of Function      Most Recent Value   Dominant Hand  right   Ambulation  independent   Transferring  independent   Toileting  independent   Bathing  independent   Dressing  independent   Eating  independent   Communication  understands/communicates without difficulty   Prior Level of Function Comment  No DME   Equipment Currently Used at Home  none          IRF PT Evaluation and Treatment - 11/04/19 1312        Time Calculation    Start Time  1303     Stop Time  1403     Time Calculation (min)  60 min        Session Details    Document Type  daily treatment/progress note     Mode of Treatment  physical therapy;individual therapy        General Information    Patient Profile Reviewed?  yes     Existing Precautions/Restrictions  aspiration;cardiac;fall        Weight-Bearing Status    Left LE Weight-Bearing Status  weight-bearing as tolerated (WBAT)     Right LE Weight-Bearing Status  weight-bearing as tolerated (WBAT)        Range of Motion (ROM)    Knee, Left (ROM)  PROM: 4 - 90 degrees     Knee, Right (ROM)  PROM: 0 - 93 degrees        Bed Mobility    [place], Roll Right  close supervision     [place], Supine to Sit  close supervision     [place], Sit to Supine  close supervision     Comment (Bed Mobility)   simulated on mat & performed with increased time        Transfers    Transfers  stand pivot/stand step transfer        Sit to Stand Transfer    [place], Sit to Stand Transfer  close supervision     Assistive Device  walker, front-wheeled     Comment  for balance/safety        Stand to Sit Transfer    [place], Stand to Sit Transfer  close supervision     Assistive Device  walker, front-wheeled     Comment  for balance/safety        Stand Pivot/Stand Step Transfer    [place], Stand Pivot/Stand Step Transfer  minimum assist (75% or more patient effort)     Assistive Device  walker, front-wheeled     Comment  for balance at pelvis        Lower Extremity (Therapeutic Exercise)    Exercise Position/Type (LE Therapeutic Exercise)  supine;AROM (active range of motion);AAROM (active assistive range of motion)     General Exercise (LE Therapeutic Exercise)  bilateral;quad sets;SAQ (short arc quad);gluteal sets;heel slides     Range of Motion Exercises (LE Therapeutic Exercise)  hip abduction/adduction;ankle dorsiflexion/plantarflexion     Reps and Sets (LE Therapeutic Exercise)  3 sets of 10     Comment (LE Therapeutic Exercise)  AP, QS, hip abd, bridging/glute sets with BLE on bolster, heel slides with AAROM via stap        Daily Progress Summary (PT)    Daily Outcome Statement (PT)  Pt continues to show improvements in ROM into flexion. Able to increase LE activation with SAQ with improved AROM & will continue to benefit from daily ROM and supine TE to improve functional tasks. Increased time required for transfers & ROM activities for pain management.           Pain Assessment/Intervention  Pain Charting Type: Post Activity             Education provided this session. See the Patient Education summary report for full details.    IRF PT Goals      Most Recent Value   Bed Mobility Goal 1   Activity/Assistive Device  rolling to left, rolling to right, sit to supine, supine to sit at 11/01/2019 1106    [place]  minimum assist (75% or more patient effort) at 11/01/2019 1106   Time Frame  5 - 7 days at 11/01/2019 1106   Bed Mobility Goal 2   Activity/Assistive Device  rolling to right, rolling to left, supine to sit, sit to supine at 11/01/2019 1106   [place]  modified independence at 11/01/2019 1106   Time Frame  14 days or less at 11/01/2019 1106   Transfer Goal 1   Activity/Assistive Device  sit-to-stand/stand-to-sit, bed-to-chair/chair-to-bed, car transfer at 11/01/2019 1106   [place]  minimum assist (75% or more patient effort) at 11/01/2019 1106   Time Frame  5 - 7 days at 11/01/2019 1106   Transfer Goal 2   Activity/Assistive Device  sit-to-stand/stand-to-sit, bed-to-chair/chair-to-bed at 11/01/2019 1106   [place]  modified independence at 11/01/2019 1106   Time Frame  14 days or less at 11/01/2019 1106   Gait/Walking Locomotion Goal 1   Activity/Assistive Device  gait (walking locomotion), assistive device use, walker, front-wheeled, increase endurance/gait distance at 11/01/2019 1106   Distance  50 feet at 11/01/2019 1106   [place]  minimum assist (75% or more patient effort) at 11/01/2019 1106   Time Frame  5 - 7 days at 11/01/2019 1106   Gait/Walking Locomotion Goal 2   Activity/Assistive Device  gait (walking locomotion), assistive device use, improve balance and speed, increase endurance/gait distance, walker, front-wheeled at 11/01/2019 1106   Distance  200 feet at 11/01/2019 1106   [place]  modified independence at 11/01/2019 1106   Time Frame  14 days or less at 11/01/2019 1106   Stairs Goal 1   Activity/Assistive Device  stairs, all skills, assistive device use, ascending stairs, descending stairs, using handrail, left, cane, straight at 11/01/2019 1106   Number of Stairs  8 at 11/01/2019 1106   [place]  minimum assist (75% or more patient effort), verbal cues required at 11/01/2019 1106   Time Frame  5 - 7 days at 11/01/2019 1106   Stairs Goal 2    Activity/Assistive Device  assistive device use, stairs, all skills, ascending stairs, descending stairs, using handrail, left, cane, straight at 11/01/2019 1106   Number of Stairs  15 at 11/01/2019 1106   [place]  supervision required, set-up required at 11/01/2019 1106   Time Frame  14 days or less at 11/01/2019 1106

## 2019-11-04 NOTE — PROGRESS NOTES
Patient: Chapincito Yoon  Location: [facility] [address]  MRN: [id number]  Today's date: 11/4/2019    Hospital Course  Chris is a 68 y.o. male admitted on 10/31/2019 with Status post bilateral knee replacements [Z96.653]. Principal problem is S/P TKR (total knee replacement), bilateral.    Chris has a past medical history of Essential (primary) hypertension, Hyperlipidemia, unspecified, Impaired fasting glucose, Osteoarthritis, and Prostate cancer (CMS/[facility]) (2011).     Pt is a 68 year old male admitted to [facility] on 10/31/19 with h/o bilateral knee pain x 5-6 years. It has progressively worsened. Pt is now s/p bilateral TKA's on 10/29/19 and no complications post op.      Therapy Pain/Vitals     Row Name 11/04/19 1008          Pain Charting Type  Pain Assessment    Preferred Pain Scale  number (Numeric Rating Pain Scale)    Pain Body Location - Side  Bilateral    Pain Body Location - Orientation  incisional    Pain Body Location  knee    Pain Rating (0-10): Rest  7    Pain Rating (0-10): Activity  8    Pain Management Interventions  premedicated for activity;relaxation techniques promoted;position adjusted          Pulse  93    Heart Rate Source  Monitor    BP  (!) 144/67    BP Location  Left upper arm    BP Method  Automatic    Patient Position  Sitting          Observations  --       Prior Living Environment      Most Recent Value   Lives With  spouse   Living Arrangements  house   Home Accessibility  stairs to enter home (Group), stairs within home (Group)   Living Environment Comment  2 SH, 3 GENE + 1 GENE without HR,  1st floor [place], FF to bed/bath   Number of Stairs, Main Entrance  4   Surface of Stairs, Main Entrance  concrete   Stair Railings, Main Entrance  none   Location, Patient Bedroom  second floor, must negotiate stairs to access   Location, Bathroom  first (main) floor, second floor, must negotiate stairs to access   Bathroom Access Comment  [place] 1st floor, stnd height toilet,  full bath on 2nd  c walk-in shower with a low threshold entry and built-in seat, no grab bars,  stnd height toilet   Number of Stairs, Within Home, Primary  other (see comments) [15]   Surface of Stairs, Within Home, Primary  carpeting, hardwood   Stair Railings, Within Home, Primary  railings on both sides of stairs, railing on left side (ascending)   Stairs Comment, Within Home, Primary  B rails for 12 steps & 3          Prior Level of Function      Most Recent Value   Dominant Hand  right   Ambulation  independent   Transferring  independent   Toileting  independent   Bathing  independent   Dressing  independent   Eating  independent   Communication  understands/communicates without difficulty   Prior Level of Function Comment  No DME   Equipment Currently Used at Home  none          IRF OT Evaluation and Treatment - 11/04/19 1005        Time Calculation    Start Time  1000     Stop Time  1100     Time Calculation (min)  60 min        Session Details    Document Type  daily treatment/progress note     Mode of Treatment  occupational therapy;individual therapy        General Information    Patient Profile Reviewed?  yes     General Observations of Patient  Pt received seated supported in [facility] agreeable to OT session.     Existing Precautions/Restrictions  aspiration;cardiac;fall        Sit to Stand Transfer    [place], Sit to Stand Transfer  minimum assist (75% patient effort)     Verbal Cues  safety;technique     Assistive Device  walker, front-wheeled     Comment  from WC x1        Stand to Sit Transfer    [place], Stand to Sit Transfer  minimum assist (75% patient effort)     Verbal Cues  safety;technique     Assistive Device  walker, front-wheeled     Comment  to WC x1        Balance    Balance Interventions  standing;supported;UE activity with balance activity     Comment, Balance  in supported stance at tabletop engages in pipe tree design for 2:48 mins at min A-CL S level. Pt limited by c/o pain and fatigue.         Upper Extremity (Therapeutic Exercise)    Exercise Position/Type (UE Therapeutic Exercise)  seated;resistive exercises     General Exercise (Upper Extremity Therapeutic Exercise)  bilateral;bicep curl;tricep extension;other (see comments)     Weight/Resistance (Upper Extremity Therapeutic Exercise)  4 pounds     Reps and Sets (Upper Extremity Therapeutic Exercise)  3 x 10     Comment (UE Therapeutic Exercise)  seated in [facility] completes bicep curls, front raises, and OH press to address UB strength and overall endurance. Completes OH tricep extensions and shoulder ER to progress performance of sit > stand transfers        Lower Extremity (Therapeutic Exercise)    Exercise Position/Type (LE Therapeutic Exercise)  supine;AROM (active range of motion)     Range of Motion Exercises (LE Therapeutic Exercise)  bilateral;hip flexion/extension;knee flexion/extension;ankle dorsiflexion/plantarflexion     Reps and Sets (LE Therapeutic Exercise)  2 x 10     Comment (LE Therapeutic Exercise)  seated level active warm-up         Lower Body Dressing    Tasks  don;doff;shoes/slippers;socks     Self-Performance  don;doff;dons/doffs left sock;dons/doffs left shoe;dons/doffs right shoe;dons/doffs right sock     [place] Assistance  obtains clothes     [place]  set up;supervision     Position  supported sitting     Adaptive Equipment  reacher;sock aid;long-handled shoe horn     Comment  seated in WC doffs non-skid socks using reacher, dons R regular sock only using sock aid. Dons B slip-on shoes using reacher and LHSH as needed. Removes shoes and R sock using reacher.        Cryotherapy    Location 1  lower extremity treatment area     Location 2  lower extremity treatment area     Indications  pain     Method of Application (Cryotherapy)  cryostatic     Cryogenic Agent (Cryotherapy)  gel pack     Duration (Cryotherapy)  15 minutes     Response to Treatment  pain decreased     Comment  gel pack to B knees seated in WC during UB TE's.  reports decreased pain post application        Daily Progress Summary (OT)    Symptoms Noted During/After Treatment  increased pain;fatigue     Progress Toward Functional Goals (OT)  progressing toward functional goals as expected     Daily Outcome Statement (OT)  Pt able to return demonstrating of use of AE for safe footwear with min cueing for performance. Pt engages in UB/LB TE's to progress strength and endurance to maximize I in ADL/IADLs and functional mobility.     Barriers to Overall Progress (OT)  c/o pain and fatigue; impaired strength, ROM, balance, endurance, and activity tolerance     Recommendations  cont IP OT to address above mentioned barriers to maximize I in ADL/IADLs and functional mobility           Pain Assessment/Intervention  Pain Charting Type: Pain Assessment             Education provided this session. See the Patient Education summary report for full details.    IRF OT Goals      Most Recent Value   Transfer Goal 1   Activity/Assistive Device  toilet, commode, 3-in-1, walker, front-wheeled at 11/01/2019 0905   [place]  minimum assist (75% or more patient effort), set-up required at 11/01/2019 0905   Time Frame  short-term goal (STG), 1 week at 11/01/2019 0905   Strategies/Barriers  amb tx at 11/01/2019 0905   Transfer Goal 2   Activity/Assistive Device  toilet, commode, 3-in-1, walker, front-wheeled at 11/01/2019 0905   [place]  modified independence at 11/01/2019 0905   Time Frame  long-term goal (LTG), by discharge at 11/01/2019 0905   Strategies/Barriers  amb tx at 11/01/2019 0905   Transfer Goal 3   Activity/Assistive Device  walk-in shower, shower chair, walker, front-wheeled at 11/01/2019 0905   [place]  minimum assist (75% or more patient effort), set-up required at 11/01/2019 0905   Time Frame  short-term goal (STG), 1 week at 11/01/2019 0905   Strategies/Barriers  amb approach at 11/01/2019 0905   Transfer Goal 4   Activity/Assistive Device  walk-in shower, shower  chair, walker, front-wheeled at 11/01/2019 0905   [place]  modified independence at 11/01/2019 0905   Time Frame  long-term goal (LTG), by discharge at 11/01/2019 0905   Strategies/Barriers  amb tx, step-over at 11/01/2019 0905   Bathing Goal 1   Activity/Assistive Device  bathing skills, all, shower chair, long-handled sponge at 11/01/2019 0905   [place]  supervision required, set-up required at 11/01/2019 0905   Time Frame  short-term goal (STG), 1 week at 11/01/2019 0905   Strategies/Barriers  seated at 11/01/2019 0905   Bathing Goal 2   Activity/Assistive Device  bathing skills, all, shower chair, long-handled sponge at 11/01/2019 0905   [place]  modified independence at 11/01/2019 0905   Time Frame  long-term goal (LTG), by discharge at 11/01/2019 0905   Strategies/Barriers  seated/standing PRN at 11/01/2019 0905   UB Dressing Goal 1   Activity/Assistive Device  upper body dressing at 11/01/2019 0905   [place]  minimum assist (75% or more patient effort) at 11/01/2019 0905   Time Frame  short-term goal (STG), 1 week at 11/01/2019 0905   Strategies/Barriers  Min A clothing retrieval at 11/01/2019 0905   UB Dressing Goal 2   Activity/Assistive Device  upper body dressing at 11/01/2019 0905   [place]  modified independence at 11/01/2019 0905   Time Frame  long-term goal (LTG), by discharge at 11/01/2019 0905   LB Dressing Goal 1   Activity/Assistive Device  lower body dressing, reacher, sock aid, long handled shoe horn at 11/01/2019 0905   [place]  moderate assist (50-74% patient effort), verbal cues required, set-up required at 11/01/2019 0905   Time Frame  short-term goal (STG), 1 week at 11/01/2019 0905   Strategies/Barriers  with LRAD at 11/01/2019 0905   LB Dressing Goal 2   Activity/Assistive Device  lower body dressing at 11/01/2019 0905   [place]  modified independence at 11/01/2019 0905   Time Frame  long-term goal (LTG), by discharge at 11/01/2019 0905    Strategies/Barriers  with LRAD at 11/01/2019 0905   Grooming Goal 1   Activity/Assistive Device  grooming skills, all at 11/01/2019 0905   [place]  supervision required at 11/01/2019 0905   Time Frame  short-term goal (STG), 1 week at 11/01/2019 0905   Strategies/Barriers  standing at sink at 11/01/2019 0905   Grooming Goal 2   Activity/Assistive Device  grooming skills, all at 11/01/2019 0905   [place]  modified independence at 11/01/2019 0905   Time Frame  long-term goal (LTG), by discharge at 11/01/2019 0905   Strategies/Barriers  seated/standing at 11/01/2019 0905   Toileting Goal 1   Activity/Assistive Device  toileting skills, all, commode chair at 11/01/2019 0905   [place]  supervision required at 11/01/2019 0905   Time Frame  short-term goal (STG), 1 week at 11/01/2019 0905   Toileting Goal 2   Activity/Assistive Device  toileting skills, all, commode chair at 11/01/2019 0905   [place]  modified independence at 11/01/2019 0905   Time Frame  long-term goal (LTG), by discharge at 11/01/2019 0905

## 2019-11-04 NOTE — PROGRESS NOTES
Internal Medicine Progress Note      Patient seen and examined  Attestation Notes: Face to face encounter completed      HPI  Mr Car is a 67 yo M with hypertension, dyslipidemia, prostate CA, osteoarthritis who was evaluated as an outpatient for chronic progressive severe bilateral knee pain not alleviated by conservative measures; he was recommended bilateral total knee replacement  On 10/29, he was admitted to [facility], where he underwent bilateral total knee replacement secondary to osteoarthritis by Dr. Zheng  He utilized twice daily aspirin 81 mg for DVT prophylaxis  He continued on Diovan for blood pressure management  He was admitted to [facility] on 10/31 for acute inpatient rehabilitation    SUBJECTIVE  Feeling well, maintaining bright affect  Reports good management of knee pain  Seeing some improvement in bilateral lower extremity swelling  Both knees with evidence of scant drainage on dressing  Mild constipation, requests MiraLAX  Reviewed blood pressure trend    Review of Systems as above, otherwise without change      Medical History reviewed:  FH, SH without change since admission  Social History     Tobacco Use   • Smoking status: Former Smoker     Last attempt to quit: 1979     Years since quittin.8   • Smokeless tobacco: Never Used   • Tobacco comment: quit 40 years ago   Substance Use Topics   • Alcohol use: Yes     Alcohol/week: 3.0 standard drinks     Types: 3 Cans of beer per week     Frequency: 2-3 times a week     Drinks per session: 3 or 4     Binge frequency: Less than monthly   • Drug use: Never       PMH, PSH without change since admission      Current medications and allergies reviewed:  Allergies: Patient has no known allergies.    CURRENT MEDICATIONS:      • aspirin  81 mg oral BID   • atorvastatin  10 mg oral Daily (6p)   • docusate sodium  100 mg oral BID   • multivitamin  1 tablet oral Daily   • polyethylene glycol  17 g oral Daily   • senna  2 tablet  oral Daily with lunch   • valsartan  80 mg oral Daily           Objective     Vitals:    11/03/19 2019 11/04/19 0719 11/04/19 1008 11/04/19 1104   BP: 112/67 (!) 142/59 (!) 144/67 140/72   BP Location: Right upper arm Right upper arm Left upper arm Left upper arm   Patient Position: Lying Lying Sitting Sitting   Pulse: 90 81 93 (!) 101   Resp: 18 20     Temp: 36.8 °C (98.2 °F) 36.8 °C (98.2 °F)     TempSrc: Oral Oral     SpO2: 96% 95%     Weight:       Height:           Physical Exam   Constitutional: He is oriented to person, place, and time. He appears well-developed and well-nourished. No distress.   HENT:   Head: Normocephalic and atraumatic.   Mouth/Throat: Oropharynx is clear and moist.   Eyes: Pupils are equal, round, and reactive to light. Conjunctivae and EOM are normal. No scleral icterus.   Neck: Normal range of motion. Neck supple.   Cardiovascular: Normal rate, regular rhythm, normal heart sounds and intact distal pulses.   Pulmonary/Chest: Effort normal and breath sounds normal. No respiratory distress. He has no wheezes. He has no rales.   Abdominal: Soft. Bowel sounds are normal. He exhibits no distension. There is no tenderness.   Musculoskeletal: He exhibits edema (Bilateral lower extremity edema) and deformity (s/p bilateral TKR).   Neurological: He is alert and oriented to person, place, and time. No cranial nerve deficit.   Skin: Skin is warm and dry. He is not diaphoretic. No erythema.   Bilateral knee incisions dressed, scant drainage apparent on upper portions of dressings   Psychiatric: He has a normal mood and affect.   Nursing note and vitals reviewed.       Labs   I have reviewed the patient's pertinent labs.  Significant abnormals are Anemia, hypoalbuminemia.  Lab results reviewed, discussed with patient:    Lab Results   Component Value Date    WBC 9.41 11/01/2019    HGB 10.7 (L) 11/01/2019    HCT 30.5 (L) 11/01/2019     11/01/2019     11/01/2019    K 4.0 11/01/2019    CL  103 11/01/2019    CREATININE 1.0 11/01/2019    BUN 14 11/01/2019    CO2 25 11/01/2019    ALT 12 (L) 11/01/2019    AST 17 11/01/2019    INR 1.0 10/16/2019    HGBA1C 5.6 10/16/2019       Imaging    Bilateral lower extremity venous Doppler ultrasound 10/31:  No evidence of deep vein thrombus in either lower extremity  Fluid in right popliteal space likely represents a ruptured Baker's cyst but is not well evaluated on this vascular study        Assessment/Plan       Patient Active Problem List    Diagnosis Date Noted   • Anemia due to blood loss 10/31/2019   • Leukemoid reaction 10/31/2019   • S/P TKR (total knee replacement), bilateral 10/31/2019   • Status post bilateral knee replacements 10/31/2019   • Osteoarthrosis 10/29/2019   • Osteoarthritis of both knees 10/16/2019   • Counseling on health promotion and disease prevention 10/16/2019   • Hyperlipidemia, unspecified    • Essential (primary) hypertension        Mr Yoon is a 69 yo M with hypertension, dyslipidemia, prostate CA, osteoarthritis who is admitted to [facility] on 10/31 from [facility] for acute inpatient rehabilitation s/p bilateral TKR by Dr. Zheng on 10/29     Ortho:  S/p B TKR-continue to monitor in rehab  Continue current pain management regimen  Local wound care     Cardiovascular:  Essential hypertension-blood pressure 112-164/59-70 on Diovan, no episodes of orthostasis  Maintain hold parameters and cardiac precautions     Pulmonary:  Encourage use of spirometry for atelectasis  No evidence of respiratory distress     Endocrine:  Dyslipidemia-continue statin therapy, low-fat diet  Monitor LFT     Renal:  BUN/Cr 14/1.0, GFR> 60  Monitor renal function, avoid nephrotoxins  Reassess labs 11/5     GI:  Constipation-continue bowel program     :  Hx prostate CA  Monitor PVR, CIC PRN  Urinalysis 10/31 clear, leukocyte esterase negative, nitrite negative; culture not indicated at present     Heme:  Postoperative anemia-preop  hemoglobin 14.2; hemoglobin on 11/1 was 10.7.    Normochromic, normocytic  Monitor trend hemoglobin and symptom complaint  Repeat CBC 11/5     F/E/N:  Regular/thin diet  Monitor electrolytes, correct as needed  Monitor hydration and nutritional status  Reassess labs 11/5     Derm:  Local wound care     Prophylaxis:  -BID 81mg ASA, SCD for DVT prophylaxis; Doppler ultrasound 11/1 without evidence for DVT  -Spirometry for atelectasis  -Maintain fall, cardiac precautions     Disposition  -Expected discharge date 11/7  .  Shalini Torres MD  11/4/2019  12:15 PM

## 2019-11-05 ENCOUNTER — APPOINTMENT (INPATIENT)
Dept: OCCUPATIONAL THERAPY | Facility: REHABILITATION | Age: 69
DRG: 560 | End: 2019-11-05
Payer: MEDICARE

## 2019-11-05 ENCOUNTER — APPOINTMENT (INPATIENT)
Dept: PHYSICAL THERAPY | Facility: REHABILITATION | Age: 69
DRG: 560 | End: 2019-11-05
Payer: MEDICARE

## 2019-11-05 LAB
ANION GAP SERPL CALC-SCNC: 7 MEQ/L (ref 3–15)
BUN SERPL-MCNC: 17 MG/DL (ref 8–20)
CALCIUM SERPL-MCNC: 8.7 MG/DL (ref 8.9–10.3)
CHLORIDE SERPL-SCNC: 102 MEQ/L (ref 98–109)
CO2 SERPL-SCNC: 27 MEQ/L (ref 22–32)
CREAT SERPL-MCNC: 0.8 MG/DL
ERYTHROCYTE [DISTWIDTH] IN BLOOD BY AUTOMATED COUNT: 12.7 % (ref 11.6–14.4)
GFR SERPL CREATININE-BSD FRML MDRD: >60 ML/MIN/1.73M*2
GLUCOSE SERPL-MCNC: 113 MG/DL (ref 70–99)
HCT VFR BLDCO AUTO: 31.3 % (ref 40.1–51)
HGB BLD-MCNC: 10.6 G/DL
MAGNESIUM SERPL-MCNC: 2.2 MG/DL (ref 1.8–2.5)
MCH RBC QN AUTO: 32.3 PG (ref 28–33.2)
MCHC RBC AUTO-ENTMCNC: 33.9 G/DL (ref 32.2–36.5)
MCV RBC AUTO: 95.4 FL (ref 83–98)
PDW BLD AUTO: 9.3 FL (ref 9.4–12.4)
PLATELET # BLD AUTO: 377 K/UL
POTASSIUM SERPL-SCNC: 4.6 MEQ/L (ref 3.6–5.1)
RBC # BLD AUTO: 3.28 M/UL (ref 4.5–5.8)
SODIUM SERPL-SCNC: 136 MEQ/L (ref 136–144)
WBC # BLD AUTO: 8.78 K/UL

## 2019-11-05 PROCEDURE — 97535 SELF CARE MNGMENT TRAINING: CPT | Mod: GO

## 2019-11-05 PROCEDURE — 83735 ASSAY OF MAGNESIUM: CPT | Performed by: HOSPITALIST

## 2019-11-05 PROCEDURE — 36415 COLL VENOUS BLD VENIPUNCTURE: CPT | Performed by: HOSPITALIST

## 2019-11-05 PROCEDURE — 85027 COMPLETE CBC AUTOMATED: CPT | Performed by: HOSPITALIST

## 2019-11-05 PROCEDURE — 80048 BASIC METABOLIC PNL TOTAL CA: CPT | Performed by: HOSPITALIST

## 2019-11-05 PROCEDURE — 63700000 HC SELF-ADMINISTRABLE DRUG: Performed by: PHYSICAL MEDICINE & REHABILITATION

## 2019-11-05 PROCEDURE — 97116 GAIT TRAINING THERAPY: CPT | Mod: GP

## 2019-11-05 PROCEDURE — 97530 THERAPEUTIC ACTIVITIES: CPT | Mod: GP

## 2019-11-05 PROCEDURE — 12800000 HC ROOM AND CARE SEMIPRIVATE REHAB

## 2019-11-05 PROCEDURE — 97110 THERAPEUTIC EXERCISES: CPT | Mod: GP

## 2019-11-05 PROCEDURE — 63700000 HC SELF-ADMINISTRABLE DRUG: Performed by: HOSPITALIST

## 2019-11-05 RX ADMIN — SENNOSIDES 2 TABLET: 8.6 TABLET, FILM COATED ORAL at 11:35

## 2019-11-05 RX ADMIN — ASPIRIN 81 MG: 81 TABLET, COATED ORAL at 20:07

## 2019-11-05 RX ADMIN — OXYCODONE HYDROCHLORIDE 10 MG: 5 TABLET ORAL at 00:04

## 2019-11-05 RX ADMIN — ASPIRIN 81 MG: 81 TABLET, COATED ORAL at 07:34

## 2019-11-05 RX ADMIN — ACETAMINOPHEN 650 MG: 325 TABLET, FILM COATED ORAL at 11:37

## 2019-11-05 RX ADMIN — OXYCODONE HYDROCHLORIDE 10 MG: 5 TABLET ORAL at 20:06

## 2019-11-05 RX ADMIN — OXYCODONE HYDROCHLORIDE 10 MG: 5 TABLET ORAL at 15:49

## 2019-11-05 RX ADMIN — DOCUSATE SODIUM 100 MG: 100 CAPSULE, LIQUID FILLED ORAL at 20:07

## 2019-11-05 RX ADMIN — OXYCODONE HYDROCHLORIDE 10 MG: 5 TABLET ORAL at 07:34

## 2019-11-05 RX ADMIN — ATORVASTATIN CALCIUM 10 MG: 10 TABLET, FILM COATED ORAL at 18:54

## 2019-11-05 RX ADMIN — VALSARTAN 80 MG: 80 TABLET, FILM COATED ORAL at 07:34

## 2019-11-05 RX ADMIN — POLYETHYLENE GLYCOL 3350 17 G: 17 POWDER, FOR SOLUTION ORAL at 07:35

## 2019-11-05 RX ADMIN — MULTIPLE VITAMINS W/ MINERALS TAB 1 TABLET: TAB at 07:34

## 2019-11-05 RX ADMIN — OXYCODONE HYDROCHLORIDE 10 MG: 5 TABLET ORAL at 11:35

## 2019-11-05 RX ADMIN — DOCUSATE SODIUM 100 MG: 100 CAPSULE, LIQUID FILLED ORAL at 07:34

## 2019-11-05 NOTE — PROGRESS NOTES
Patient: Chapincito Yoon  Location: [facility]  MRN: [id number]  Today's date: 11/5/2019    Hospital Course  Chris is a 68 y.o. male admitted on 10/31/2019 with Status post bilateral knee replacements [Z96.653]. Principal problem is S/P TKR (total knee replacement), bilateral.    Chris has a past medical history of Essential (primary) hypertension, Hyperlipidemia, unspecified, Impaired fasting glucose, Osteoarthritis, and Prostate cancer (CMS/Formerly [facility]) (2011).     Pt is a 68 year old male admitted to [facility] on 10/31/19 with h/o bilateral knee pain x 5-6 years. It has progressively worsened. Pt is now s/p bilateral TKA's on 10/29/19 and no complications post op.      Therapy Pain/Vitals     Row Name 11/05/19 0901 11/05/19 0931       Pain/Comfort/Sleep    Pain Charting Type  Pain Assessment  Pain Reassessment    Preferred Pain Scale  number (Numeric Rating Pain Scale)  number (Numeric Rating Pain Scale)    Pain Body Location - Side  Bilateral  Bilateral    Pain Body Location  knee  knee    Pain Rating (0-10): Rest  7  --    Pain Rating (0-10): Activity  7  8    Pain Management Interventions  premedicated for activity  diversional activity provided       Vital Signs    Pulse  97  --    BP  (!) 148/65  --    BP Location  Right upper arm  --    BP Method  Automatic  --    Patient Position  Sitting  --       Patient Observation    Observations  asymptomatic  --    Row Name 11/05/19 0959          Pain/Comfort/Sleep    Pain Charting Type  Post Activity     Preferred Pain Scale  number (Numeric Rating Pain Scale)     Pain Body Location - Side  Bilateral     Pain Body Location  knee     Pain Rating (0-10): Rest  8     Pain Rating (0-10): Activity  8     Pain Management Interventions  position adjusted        Vital Signs    Pulse  (!) 102     BP  137/76     BP Location  Right upper arm     BP Method  Automatic     Patient Position  Sitting        Patient Observation    Observations  asymptomatic            Prior Living Environment      Most Recent Value   Lives With  spouse   Living Arrangements  house   Home Accessibility  stairs to enter home (Group), stairs within home (Group)   Living Environment Comment  2 SH, 3 GENE + 1 GENE without HR,  1st floor NC, FF to bed/bath   Number of Stairs, Main Entrance  4   Surface of Stairs, Main Entrance  concrete   Stair Railings, Main Entrance  none   Location, Patient Bedroom  second floor, must negotiate stairs to access   Location, Bathroom  first (main) floor, second floor, must negotiate stairs to access   Bathroom Access Comment  NC 1st floor, stnd height toilet,  full bath on 2nd c walk-in shower with a low threshold entry and built-in seat, no grab bars,  stnd height toilet   Number of Stairs, Within Home, Primary  other (see comments) [15]   Surface of Stairs, Within Home, Primary  carpeting, hardwood   Stair Railings, Within Home, Primary  railings on both sides of stairs, railing on left side (ascending)   Stairs Comment, Within Home, Primary  B rails for 12 steps & 3          Prior Level of Function      Most Recent Value   Dominant Hand  right   Ambulation  independent   Transferring  independent   Toileting  independent   Bathing  independent   Dressing  independent   Eating  independent   Communication  understands/communicates without difficulty   Prior Level of Function Comment  No DME   Equipment Currently Used at Home  none          IRF PT Evaluation and Treatment - 11/05/19 0904        Time Calculation    Start Time  0900     Stop Time  1000     Time Calculation (min)  60 min        Session Details    Document Type  daily treatment/progress note     Mode of Treatment  physical therapy;individual therapy        General Information    Patient Profile Reviewed?  yes     Existing Precautions/Restrictions  aspiration;cardiac;fall        Weight-Bearing Status    Left LE Weight-Bearing Status  weight-bearing as tolerated (WBAT)     Right LE Weight-Bearing  "Status  weight-bearing as tolerated (WBAT)        Transfers    Transfers  stand pivot/stand step transfer        Sit to Stand Transfer    [place], Sit to Stand Transfer  close supervision     Assistive Device  walker, front-wheeled     Comment  for balance/safety        Stand to Sit Transfer    [place], Stand to Sit Transfer  close supervision;verbal cues     Verbal Cues  technique     Assistive Device  walker, front-wheeled     Comment  cues for encouraging knee flexion to sit        Stand Pivot/Stand Step Transfer    [place], Stand Pivot/Stand Step Transfer  close supervision     Assistive Device  walker, front-wheeled     Comment  for balance/safety        Gait Training    [place], Gait  close supervision;verbal cues     Assistive Device  walker, front-wheeled     Distance in Feet  200 feet     Gait Pattern Utilized  step-through     Deviations/Abnormal Patterns (Gait)  bilateral deviations;antalgic;base of support, wide;willian decreased;gait speed decreased     Bilateral Gait Deviations  heel strike decreased     Advanced Gait Activity  10 Meter Walk Test     Comment  200' x 1 with RW with Cl S with verbal cues for heel strike, knee flexion at toe off & increased step length bilaterally        Curb Negotiation (Mobility)    [place]  minimum assist (75% or more patient effort)     Comment  min A for 6\" curb with RW for steadying assist at pelvis        Sloped Surface Gait Skills (Mobility)    [place]  minimum assist (75% or more patient effort)     Comment  with RW for steadying assist at pelvis        10 Meter Walk Test    Trial One: Ten Meter Walk Test (Self-Selected Velocity)  23.81 seconds     Trial Two: Ten Meter Walk Test (Self-Selected Velocity)  19.58 seconds     Trial Three: Ten Meter Walk Test (Self-Selected Velocity)  20.13 seconds     Mean of 3 Trials: Ten Meter Walk Test (Self-Selected Velocity)  21.17 seconds        Stairs Training    [place], Stairs  minimum " assist (75% patient effort)     Assistive Device  railing     Handrail Location  both sides     Number of Stairs  12     Stair Height  6 inches     Ascending Stairs Technique  step-to-step     Descending Stairs Technique  step-to-step     Comment  for hip extension to rise & balance with lowering        Lower Extremity (Therapeutic Exercise)    Exercise Position/Type (LE Therapeutic Exercise)  seated;AROM (active range of motion)     General Exercise (LE Therapeutic Exercise)  bilateral;LAQ (long arc quad);marching while seated     Range of Motion Exercises (LE Therapeutic Exercise)  ankle dorsiflexion/plantarflexion     Reps and Sets (LE Therapeutic Exercise)  3 sets of 10     Comment (LE Therapeutic Exercise)  increased time to complete for pain management        Daily Progress Summary (PT)    Daily Outcome Statement (PT)  Pt continues to require encouragement for increased step length & gait speed. Pt's CGS falls below age-gender matched norms indicating increased falls risk.  Recommend ongoing encouragement for increased ROM during mat program  next session.           Pain Assessment/Intervention  Pain Charting Type: Post Activity             Education provided this session. See the Patient Education summary report for full details.    IRF PT Goals      Most Recent Value   Bed Mobility Goal 1   Activity/Assistive Device  bed mobility activities, all at 11/05/2019 0731   [place]  modified independence at 11/05/2019 0731   Time Frame  5 - 7 days at 11/05/2019 0731   Strategies/Barriers  pain, decreased BLE strength at 11/05/2019 0731   Progress/Outcome  goal met, goal revised this date, good progress toward goal at 11/05/2019 0731   Bed Mobility Goal 2   Activity/Assistive Device  rolling to right, rolling to left, supine to sit, sit to supine at 11/01/2019 1106   [place]  modified independence at 11/01/2019 1106   Time Frame  14 days or less at 11/01/2019 1106   Strategies/Barriers  pain and decreased  BLE strength at 11/05/2019 0731   Progress/Outcome  good progress toward goal, goal ongoing at 11/05/2019 0731   Transfer Goal 1   Activity/Assistive Device  sit-to-stand/stand-to-sit, bed-to-chair/chair-to-bed, car transfer, walker, front-wheeled at 11/05/2019 0731   [place]  modified independence at 11/05/2019 0731   Time Frame  5 - 7 days at 11/05/2019 0731   Strategies/Barriers  decreased BLE strength, decreased balance at 11/05/2019 0731   Progress/Outcome  goal met, goal revised this date, good progress toward goal at 11/05/2019 0731   Transfer Goal 2   Activity/Assistive Device  sit-to-stand/stand-to-sit, bed-to-chair/chair-to-bed at 11/01/2019 1106   [place]  modified independence at 11/01/2019 1106   Time Frame  14 days or less at 11/01/2019 1106   Strategies/Barriers  decreased BLE strength, decreased balance & decreased ROM at 11/05/2019 0731   Progress/Outcome  goal ongoing, good progress toward goal, continuing progress toward goal at 11/05/2019 0731   Gait/Walking Locomotion Goal 1   Activity/Assistive Device  gait (walking locomotion), assistive device use at 11/05/2019 0731   Distance  200 feet at 11/05/2019 0731   [place]  modified independence at 11/05/2019 0731   Time Frame  5 - 7 days, short-term goal (STG) at 11/05/2019 0731   Strategies/Barriers  decreased BLE strength, decreased balance at 11/05/2019 0731   Progress/Outcome  goal met, goal revised this date, good progress toward goal at 11/05/2019 0731   Gait/Walking Locomotion Goal 2   Activity/Assistive Device  gait (walking locomotion), assistive device use, improve balance and speed, increase endurance/gait distance, walker, front-wheeled at 11/01/2019 1106   Distance  200 feet at 11/01/2019 1106   [place]  modified independence at 11/01/2019 1106   Time Frame  14 days or less at 11/01/2019 1106   Strategies/Barriers  decreased BLE strength, decreased balance at 11/05/2019 0731   Progress/Outcome  good progress toward  goal, goal ongoing at 11/05/2019 0731   Stairs Goal 1   Activity/Assistive Device  stairs, all skills, assistive device use, ascending stairs, descending stairs, using handrail, left, cane, straight at 11/05/2019 0731   Number of Stairs  15 at 11/05/2019 0731   [place]  supervision required, set-up required at 11/05/2019 0731   Time Frame  5 - 7 days at 11/05/2019 0731   Strategies/Barriers  decreased BLE strength, decreased balance at 11/05/2019 0731   Progress/Outcome  goal ongoing, good progress toward goal at 11/05/2019 0731   Stairs Goal 2   Activity/Assistive Device  assistive device use, stairs, all skills, ascending stairs, descending stairs, using handrail, left, cane, straight at 11/01/2019 1106   Number of Stairs  15 at 11/01/2019 1106   [place]  supervision required, set-up required at 11/01/2019 1106   Time Frame  14 days or less at 11/01/2019 1106   Strategies/Barriers  decreased BLE strength, decreased balance at 11/05/2019 0731   Progress/Outcome  good progress toward goal, goal ongoing at 11/05/2019 0731

## 2019-11-05 NOTE — PLAN OF CARE
Problem: Rehabilitation (IRF) Plan of Care  Goal: Plan of Care Review  Outcome: Progressing  Flowsheets (Taken 11/5/2019 0458)  Plan of Care Reviewed With: patient  IRF Plan of Care Review: progress ongoing, continue  Outcome Summary: No signs of distress noted. Pt currently sleeping in bed with call bell within reach.

## 2019-11-05 NOTE — PLAN OF CARE
Problem: Rehabilitation (IRF) Plan of Care  Goal: Plan of Care Review  Flowsheets (Taken 11/5/2019 3847)  Plan of Care Reviewed With: patient  IRF Plan of Care Review: progress ongoing, continue  Outcome Summary: Progressed to standing at sink to complete grooming tasks and donning B shoes. Rest breaks throughout due to B/L knee pain.

## 2019-11-05 NOTE — PATIENT CARE CONFERENCE
Inpatient Rehabilitation Team Conference Note  Date: 11/5/2019  Time: 9:47 AM       Patient Name: Chapincito Yoon     Medical Record Number: [id number]   YOB: 1950  Sex: Male      Room/Bed: 105/105W  Payor Info: Payor: MEDICARE / Plan: MEDICARE PART A & B / Product Type: Medicare /     Admitting Diagnosis: Status post bilateral knee replacements [Z96.653]   Admit Date/Time: 10/31/2019  3:40 PM  Admission Comments: No comment available     Primary Diagnosis: S/P TKR (total knee replacement), bilateral  Principal Problem: S/P TKR (total knee replacement), bilateral    Patient Active Problem List    Diagnosis Date Noted   • Anemia due to blood loss 10/31/2019   • Leukemoid reaction 10/31/2019   • S/P TKR (total knee replacement), bilateral 10/31/2019   • Status post bilateral knee replacements 10/31/2019   • Osteoarthrosis 10/29/2019   • Osteoarthritis of both knees 10/16/2019   • Counseling on health promotion and disease prevention 10/16/2019   • Hyperlipidemia, unspecified    • Essential (primary) hypertension        Premorbid Status:  Dominant Hand: right  Ambulation: independent  Transferring: independent  Toileting: independent  Bathing: independent  Dressing: independent  Eating: independent  Communication: understands/communicates without difficulty  Swallowing: swallows foods/liquids without difficulty  Baseline Diet/Method of Nutritional Intake: thin liquids;regular solids  Equipment Currently Used at Home: none  Prior Level of Function Comment: No DME      Current Functional Status:  Mobility  Gait  [place], Gait: close supervision;verbal cues  Assistive Device: walker, front-wheeled  Comment: 200' x 1 with RW with Cl S with verbal cues for heel strike, knee flexion at toe off & increased step length bilaterally    Stairs  [place], Stairs: moderate assist (50% patient effort);verbal cues  Assistive Device: railing  Handrail Location: both sides  Number of Stairs: 8  Stair Height: 6  inches  Comment: mod A of 1 for bilateral hip extension, & min-mod A of 1 for anterior weightshift & for controlled quad    Wheelchair  Method of Locomotion: bimanual (upper extremity) propulsion  Functional Mobility Training: forward propulsion;backward propulsion;steering;stopping;turning  [place], All Mobility: dependent (less than 25% patient effort)  [place], Forward Propulsion: close supervision;verbal cues  [place], Backward Propulsion: close supervision;verbal cues  [place], Steering Activities: minimum assist (75% patient effort);close supervision;verbal cues  [place], Stopping Activities: close supervision;verbal cues  [place], Turning Activities: close supervision;minimum assist (75% patient effort);verbal cues  Distance Propelled in Feet: 106 feet  Activity Tolerance: able to tolerate functional household activity distances (less than 150 feet)  Comment, Functional Mobility: slow UE movements, not yet smooth UE coord for steering  Wheel Locks/Brake Management: supervision;verbal cues  Management Activities: wheel locks/brakes management;legrest management    Transfers  Assistive Device (Bed Mobility): bed rails;head of bed elevated  [place], Supine to Sit: close supervision  [place], Sit to Supine: close supervision  [place], Bed to Chair: moderate assist (50% patient effort)  [place], Chair to Bed: moderate assist (50% patient effort)  [place], Sit to Stand Transfer: close supervision  [place], Stand to Sit Transfer: close supervision;verbal cues  [place], Stand Pivot/Stand Step Transfer: close supervision  Comment (Bed Mobility): simulated on mat & performed with increased time    [place], Shower Transfer: minimum assist (75% patient effort) (within barrier free shower stall)  Assistive Device: grab bars/tub rail;tub bench;walker, front-wheeled  [place], Toilet Transfer: minimum assist (75% patient effort);close  supervision  Assistive Device: raised toilet seat;grab bars/safety frame  Comment: via amb approach c RW      Self Care  Self-Performance: threads left arm, shirt;threads right arm, shirt;pulls shirt over head/around back;pulls shirt down/adjusts  [place] Assistance: obtains clothes  Comment: seated on tub bench; able to don/doff pull over garment seated on tub bench. Per pt, plans to get dressed in bathroom at home  Tasks: doff;don;pants/bottoms;underwear;socks  Self-Performance: threads left leg, underpants;threads right leg, underpants;pulls underpants up or down;threads left leg, pants/shorts;threads right leg, pants/shorts;pulls pants/shorts up or down;dons/doffs left sock;dons/doffs right sock  [place] Assistance: obtains clothes;adaptive equipment setup  Adaptive Equipment: reacher;sock aid  [place]: close supervision  Comment: seated on tub bench; able to thread BLE through underwear/shorts with CL S for balance to stand to hike at RW level. Able to don B socks c sock aid without vcs. Seated in w/c, able to don B shoes c LHSH and reacher and via bending forward to tie shoes.  Self-Performance: chest;left arm;right arm;abdomen;front perineal area;buttocks;left upper leg;right upper leg;left lower leg, including foot;right lower leg, including foot  Adaptive Equipment: grab bar/tub rail;hand-held shower spray hose;long-handled sponge;tub bench  [place] Assistance: obtain supplies  Comment: seated on tub bench; lateral lean to bathe/dry buttock, utilized LHS to bathe bilateral feet. Able to dry feet via bending forward  [place]: adjust/manage clothing  Adaptive Equipment: reacher  Setup Assistance: obtain supplies  Comment: declined use of commode; CL S for balance to pull pants/underwear down. Prior to wet shower, pt utilized reacher to doff socks/pants/underwear fully.  Self-Performance: brushes/adam hair;oral care (brushing teeth, cleaning dentures)  Adaptive Equipment: electric razor  Setup  "Assistance: obtain supplies  [place]: close supervision  Comment: standing at sink c RW to complete grooming tasks  [place]: independent  Comment: pt finished breakfast prior to ADL session    Cognition  Affect/Mental Status (Cognitive): WFL    Communication       Frequency of Treatment (OT): 60-90 minutes per day, 5-7 times per week  Frequency of Treatment (PT): 5-7 times per week, 60-90 minutes per day    Weekly Outcome Summaries:  Weekly Progress Summary (PT)  Progress Toward Functional Goals (PT): progressing toward functional goals as expected  Weekly Outcome Statement: Pt is a 68 year old male admitted to [facility] on [facility]/19 with h/o bilateral knee pain x 5-6 years. It has progressively worsened. Pt is now s/p bilateral TKA's on 10/29/19 and no complications post op. On eval, pt presented at max A for bed mobility, D level for transfer & ambulation this time, and 2 - 6\" steps with mod A for B rails. He has since progressed to Cl S for bed mobility, min A SPT with RW, amb 150 feet with min A with RW, 8 - 6\" steps with B rails with min A, min A for curb 6\"/ramp negotiation with RW. His current PROM is 0-93 degrees on L and 4-90 degrees on R at his knee joint. Pt presents with decreased functional mobility & increased dependence secondary to decreased BLE strength, decreased BLE ROM, increased BLE pain, decreased standing balance with both static & dynamic and decreased activity endurance. Pt would benefit from skilled inpatient rehabilitation in order to address functional deficits and impairments in order to promote functional independence along with safety training, family training, & falls education in order to return patient to least restrictive environment & to meet goals. ELOS to be approximately 7 days & based upon discharge disposition.  Barriers to Overall Progress (PT): decreased endurance, bilateral knee pain, decreased BLE knee ROM  Impairments Continuing to Limit Function: decreased ROM, " decreased strength, impaired balance, pain, impaired coordination, other (see comments)(decreased endurance)  Recommendations (PT): Continue with 60-90 minutes of PT 5-7 days a week in comprehensive rehab program.  Weekly Progress Summary (OT)  Progress Toward Functional Goals (OT): progressing toward functional goals as expected  Weekly Outcome Statement: Mr. Yoon is currently performing at the following functional levels: UB dressing set-up/S, LB dressing min A, grooming set-up/S seated level, toileting min A, and bathing min A. Pt is limited by c/o pain and fatigue, impaired strength, ROM, balance, endurance, and activity tolerance. Recommend cont IP OT to address above mentioned deficits, determine AE/DME needs, and complete family education for safe d/c to home environment.  Impairments Continuing to Limit Function: decreased flexibility, decreased ROM, decreased strength, impaired balance, impaired coordination, pain  Recommendations (OT): cont IP OT 5-7 days/wk, 60-90 mins/day  Weekly Outcome Summary (Case Management)  Weekly Outcome Statement: intake completed. Family supportive.   Weekly Outcome Summary (Interdisciplinary)  Weekly Outcome Statement:  Pt presents to [facility] following bilateral TKR. He is pleasant and cooperative.  He does report anxiety since surgery. He denies any history of depression or anxiety. He reports pain and sleep disturbance since surgery.. Appetite is reduced but he is eating something at each meal. Highest level of education was college. 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 He enjoys traveling with his wife. He also enjoys golf and grandchildren (6 total). Mood is motivated and positive and affect congruent. Time was spent on rehab and recovery as well as pain management techniques. Psychology will follow.     Problem Resolution:  Coping/Stress/Tolerance  Major Change/Loss/Stressor/Fears: [id number] Newly Identified Problems & Impairments: (not recorded)  [id number]  Comment, Newly Identified Problems: (not recorded)Bladder Management: toileting schedule based on voiding pattern  Strategies/Techniques (Bowel Management): bowel agents for management  Counseling (Coping Strategies): active listening utilized, positive reinforcement provided, personal strengths utilized  Enhance Support System (Coping Strategies): enable family participation in rehab processMedical Instability: cont bowel/bladder, oxycod, eliquis, ASA, reg/thin diet, call bell approp   Self-Care Promotion: independence encouraged  Identified Problems & Impairments: (not recorded)  Comment, Identified Problems & Impairments: (not recorded)  Resolved Problems & Impairments: (not recorded)  Comment, Resolved Problems & Impairments: (not recorded) Team Weekly Outcome Statement: close supverision bed mobility/min A stand pivot transfer, ambulation 200ft , 8 6inch steps min A, min A curb/ramp, knee ROM L 4-90, R 0-93, increased pain during therapy,  UP set-up dressing, min A LB dressing, grooming supervision overall  (11/05/19 0940)        Goals/Support System:  Patient/Family Goals  Patient's Goals For Discharge: take care of myself at home, return to all previous roles/activities, return home  Caregiver Training  Comment, Caregiver Training Plan: will offer to wife     IRF PT Goals      Most Recent Value   Bed Mobility Goal 1   Activity/Assistive Device  bed mobility activities, all at 11/05/2019 0731   [place]  modified independence at 11/05/2019 0731   Time Frame  5 - 7 days at 11/05/2019 0731   Strategies/Barriers  pain, decreased BLE strength at 11/05/2019 0731   Progress/Outcome  goal met, goal revised this date, good progress toward goal at 11/05/2019 0731   Bed Mobility Goal 2   Activity/Assistive Device  rolling to right, rolling to left, supine to sit, sit to supine at 11/01/2019 1106   [place]  modified independence at 11/01/2019 1106   Time Frame  14 days or less at 11/01/2019 1106    Strategies/Barriers  pain and decreased BLE strength at 11/05/2019 0731   Progress/Outcome  good progress toward goal, goal ongoing at 11/05/2019 0731   Transfer Goal 1   Activity/Assistive Device  sit-to-stand/stand-to-sit, bed-to-chair/chair-to-bed, car transfer, walker, front-wheeled at 11/05/2019 0731   [place]  modified independence at 11/05/2019 0731   Time Frame  5 - 7 days at 11/05/2019 0731   Strategies/Barriers  decreased BLE strength, decreased balance at 11/05/2019 0731   Progress/Outcome  goal met, goal revised this date, good progress toward goal at 11/05/2019 0731   Transfer Goal 2   Activity/Assistive Device  sit-to-stand/stand-to-sit, bed-to-chair/chair-to-bed at 11/01/2019 1106   [place]  modified independence at 11/01/2019 1106   Time Frame  14 days or less at 11/01/2019 1106   Strategies/Barriers  decreased BLE strength, decreased balance & decreased ROM at 11/05/2019 0731   Progress/Outcome  goal ongoing, good progress toward goal, continuing progress toward goal at 11/05/2019 0731   Gait/Walking Locomotion Goal 1   Activity/Assistive Device  gait (walking locomotion), assistive device use at 11/05/2019 0731   Distance  200 feet at 11/05/2019 0731   [place]  modified independence at 11/05/2019 0731   Time Frame  5 - 7 days, short-term goal (STG) at 11/05/2019 0731   Strategies/Barriers  decreased BLE strength, decreased balance at 11/05/2019 0731   Progress/Outcome  goal met, goal revised this date, good progress toward goal at 11/05/2019 0731   Gait/Walking Locomotion Goal 2   Activity/Assistive Device  gait (walking locomotion), assistive device use, improve balance and speed, increase endurance/gait distance, walker, front-wheeled at 11/01/2019 1106   Distance  200 feet at 11/01/2019 1106   [place]  modified independence at 11/01/2019 1106   Time Frame  14 days or less at 11/01/2019 1106   Strategies/Barriers  decreased BLE strength, decreased balance at 11/05/2019 0731    Progress/Outcome  good progress toward goal, goal ongoing at 11/05/2019 0731   Stairs Goal 1   Activity/Assistive Device  stairs, all skills, assistive device use, ascending stairs, descending stairs, using handrail, left, cane, straight at 11/05/2019 0731   Number of Stairs  15 at 11/05/2019 0731   [place]  supervision required, set-up required at 11/05/2019 0731   Time Frame  5 - 7 days at 11/05/2019 0731   Strategies/Barriers  decreased BLE strength, decreased balance at 11/05/2019 0731   Progress/Outcome  goal ongoing, good progress toward goal at 11/05/2019 0731   Stairs Goal 2   Activity/Assistive Device  assistive device use, stairs, all skills, ascending stairs, descending stairs, using handrail, left, cane, straight at 11/01/2019 1106   Number of Stairs  15 at 11/01/2019 1106   [place]  supervision required, set-up required at 11/01/2019 1106   Time Frame  14 days or less at 11/01/2019 1106   Strategies/Barriers  decreased BLE strength, decreased balance at 11/05/2019 0731   Progress/Outcome  good progress toward goal, goal ongoing at 11/05/2019 0731        IRF OT Goals      Most Recent Value   Transfer Goal 1   Activity/Assistive Device  toilet, commode, 3-in-1, walker, front-wheeled at 11/01/2019 0905   [place]  other (see comments) at 11/05/2019 0715   Time Frame  short-term goal (STG), 1 week at 11/01/2019 0905   Strategies/Barriers  amb tx at CL S level at 11/05/2019 0715   Progress/Outcome  good progress toward goal, goal met, goal revised this date at 11/05/2019 0715   Transfer Goal 2   Activity/Assistive Device  toilet, commode, 3-in-1, walker, front-wheeled at 11/01/2019 0905   [place]  modified independence at 11/01/2019 0905   Time Frame  long-term goal (LTG), by discharge at 11/01/2019 0905   Strategies/Barriers  amb tx at 11/01/2019 0905   Progress/Outcome  good progress toward goal at 11/05/2019 0715   Transfer Goal 3   Activity/Assistive Device  walk-in shower, shower  chair, walker, front-wheeled at 11/01/2019 0905   [place]  other (see comments) at 11/05/2019 0715   Time Frame  short-term goal (STG), 1 week at 11/01/2019 0905   Strategies/Barriers  amb tx at CL S level at 11/05/2019 0715   Progress/Outcome  good progress toward goal, goal met, goal revised this date at 11/05/2019 0715   Transfer Goal 4   Activity/Assistive Device  walk-in shower, shower chair, walker, front-wheeled at 11/01/2019 0905   [place]  modified independence at 11/01/2019 0905   Time Frame  long-term goal (LTG), by discharge at 11/01/2019 0905   Strategies/Barriers  amb tx, step-over at 11/01/2019 0905   Progress/Outcome  good progress toward goal at 11/05/2019 0715   Bathing Goal 1   Activity/Assistive Device  bathing skills, all, shower chair, long-handled sponge at 11/01/2019 0905   [place]  supervision required, set-up required at 11/01/2019 0905   Time Frame  short-term goal (STG), 1 week at 11/01/2019 0905   Strategies/Barriers  seated at 11/01/2019 0905   Progress/Outcome  good progress toward goal at 11/05/2019 0715   Bathing Goal 2   Activity/Assistive Device  bathing skills, all, shower chair, long-handled sponge at 11/01/2019 0905   [place]  modified independence at 11/01/2019 0905   Time Frame  long-term goal (LTG), by discharge at 11/01/2019 0905   Strategies/Barriers  seated/standing PRN at 11/01/2019 0905   Progress/Outcome  good progress toward goal at 11/05/2019 0715   UB Dressing Goal 1   Activity/Assistive Device  upper body dressing at 11/01/2019 0905   [place]  supervision required at 11/05/2019 0715   Time Frame  short-term goal (STG), 1 week at 11/01/2019 0905   Strategies/Barriers  amb level IR at 11/05/2019 0715   Progress/Outcome  good progress toward goal, goal met, goal revised this date at 11/05/2019 0715   UB Dressing Goal 2   Activity/Assistive Device  upper body dressing at 11/01/2019 0905   [place]  modified independence at 11/01/2019 0905    Time Frame  long-term goal (LTG), by discharge at 11/01/2019 0905   Progress/Outcome  good progress toward goal at 11/05/2019 0715   LB Dressing Goal 1   Activity/Assistive Device  lower body dressing, reacher, sock aid, long handled shoe horn at 11/01/2019 0905   [place]  minimum assist (75% or more patient effort) at 11/05/2019 0715   Time Frame  short-term goal (STG), 1 week at 11/01/2019 0905   Strategies/Barriers  with LRAD at 11/01/2019 0905   Progress/Outcome  good progress toward goal, goal met, goal revised this date at 11/05/2019 0715   LB Dressing Goal 2   Activity/Assistive Device  lower body dressing at 11/01/2019 0905   [place]  modified independence at 11/01/2019 0905   Time Frame  long-term goal (LTG), by discharge at 11/01/2019 0905   Strategies/Barriers  with LRAD at 11/01/2019 0905   Progress/Outcome  good progress toward goal at 11/05/2019 0715   Grooming Goal 1   Activity/Assistive Device  grooming skills, all at 11/01/2019 0905   [place]  supervision required at 11/01/2019 0905   Time Frame  short-term goal (STG), 1 week at 11/01/2019 0905   Strategies/Barriers  standing at sink at 11/01/2019 0905   Progress/Outcome  good progress toward goal at 11/05/2019 0715   Grooming Goal 2   Activity/Assistive Device  grooming skills, all at 11/01/2019 0905   [place]  modified independence at 11/01/2019 0905   Time Frame  long-term goal (LTG), by discharge at 11/01/2019 0905   Strategies/Barriers  seated/standing at 11/01/2019 0905   Progress/Outcome  good progress toward goal at 11/05/2019 0715   Toileting Goal 1   Activity/Assistive Device  toileting skills, all, commode chair at 11/01/2019 0905   [place]  supervision required at 11/01/2019 0905   Time Frame  short-term goal (STG), 1 week at 11/01/2019 0905   Progress/Outcome  good progress toward goal at 11/05/2019 0715   Toileting Goal 2   Activity/Assistive Device  toileting skills, all, commode chair at 11/01/2019 0905    [place]  modified independence at 11/01/2019 0905   Time Frame  long-term goal (LTG), by discharge at 11/01/2019 0905   Progress/Outcome  good progress toward goal at 11/05/2019 0715          Risk for Complications  Constipation: Moderate  Dehydration/Malnutrition: Moderate  DVT: Moderate  Falls: Moderate      The patient's medical prognosis is good to achieve the stated goals below.    Expected Level of Function  Expected Functional Improvement: mobility;motor dysfunction;safety;self-care  Self-Care: Independent  [place] Control: Independent  Transfers: Independent  Locomotion: Independent  Communication: Independent  Social Cognition: Independent       Discharge Planning:  Anticipated Discharge Disposition: home with outpatient services  Type of Outpatient Services: physical therapy    Equipment/Device Needs at Discharge  Equipment Currently Used at Home: none  Equipment Needed After Discharge: walker, front-wheeled, commode, 3-in-1, shower chair, cane, straight  Temporary Housing Plan  Discharge Transition, Temporary Housing Plan: none needed    Anticipated Discharge Date: 11/12/2019    Needs Identified:         Team Members Present     Rehab Attending Present:  Vernon Rob MD    Care Coordinator Present:  Cyndi Gallardo LSW    Nurse Present:  Diamante Bush RN    OT Present:  Keya Hargrove OT    PT Present:  Alice Yee, PT    Other Team Member Present:  Suzanne Campos PT        Next Team Conference Date: 11/12/19

## 2019-11-05 NOTE — PLAN OF CARE
Problem: Rehabilitation (IRF) Plan of Care  Goal: Plan of Care Review  Flowsheets (Taken 11/5/2019 5113)  Plan of Care Reviewed With: patient  IRF Plan of Care Review: progress ongoing, continue  Outcome Summary: Cm met with patient. Reviewed ELOS 11/12/19 with recomendaiton of home with outpatient. Patient agreeable to outpatient services and would like [facility]. CM to -tup evaluation prior to discharge. CM reviewed that transportation is not guranteed from [facility] and patient should discuss transportation with family and friends for appointments. CM called Christianne,spouse, and left . awaiting return call to set-up family training prior to discharge.

## 2019-11-05 NOTE — PLAN OF CARE
Problem: Rehabilitation (IRF) Plan of Care  Goal: Plan of Care Review  Outcome: Progressing  Flowsheets (Taken 11/5/2019 0930)  Plan of Care Reviewed With: patient  IRF Plan of Care Review: progress ongoing, continue  Outcome Summary: Improving with ambulation distance this session.

## 2019-11-05 NOTE — PLAN OF CARE
Problem: Ambulation and Stair Mobility Impairment (Functional Mobility Impairment)  Goal: Functional Mobility Impairment Goal: Ambulation and Stair Mobility [place] Increased  Outcome: Progressing     Problem: Bed Mobility Impairment (Functional Mobility Impairment)  Goal: Functional Mobility Impairment Goal: Bed Mobility [place] Increased  Outcome: Progressing     Problem: Functional Transfer Ability Impairment (Functional Mobility Impairment)  Goal: Functional Mobility Impairment Goal: Functional Transfer [place] Increased  Outcome: Progressing     Problem: Wheelchair Skills Development (Functional Mobility Impairment)  Goal: Functional Mobility Impairment Goal: Wheelchair Use [place] Increased  Outcome: Unable to meet, plan of care revised

## 2019-11-05 NOTE — PROGRESS NOTES
"Subjective    Patient seen and examined on rounds.  Chart reviewed.  Events overnight noted.  History reviewed briefly with patient.     CC:  Deficits in mobility, transfers, self-care status post bilateral total knee replacements     HPI:  Mr. Yoon is a 68-year-old right-handed white male with chronic conditions significant for degenerative arthritis, hypertension, prostate cancer status post prostatectomy, dyslipidemia who had progressively worsening bilateral knee pain which failed conservative management and subsequently the patient underwent elective bilateral total knee replacements on 10/29/19 at [facility] by Dr. Ted Zheng.  Postoperatively, he is allowed weightbearing as tolerated on both lower extremities.  He is on Aspirin for deep vein thrombus prophylaxis and also on pneumatic compression boots. He has put on Oxycodone for pain control.  Postoperative course was significant for anemia, but he did not need transfusion. On 10/30/19, hemoglobin was 11.2, WBC count 7.65, BUN 13, creatinine 0.9.  He has been needing assistance for mobility, transfers, self-care postoperatively. He is transferred to [place] [facility] on 10/31/19 for further rehabilitation care.       SUBJ: Tolerating therapies per endurance.  He reports pain medications are helping.      ROS: Denies chest pain or shortness of breath. Other ROS negative. Past, family, social history is unchanged.    Physical Exam      Blood pressure 138/64, pulse 89, temperature 37.2 °C (99 °F), temperature source Oral, resp. rate 16, height 1.702 m (5' 7\"), weight 102 kg (225 lb 1.4 oz), SpO2 97 %.  Body mass index is 35.25 kg/m².    General Appearance: Not in acute distress  Head/Ear/Nose/Throat: Normocephalic; Atraumatic.   Eye: EOMI; PERRL.   Neck: No JVD; No Bruits.   Respiratory: Decreased breath sounds at bases.   Cardiovascular: RRR; Normal S1, S2.   Gastrointestinal: Soft; NT; +BS.   Extremities: Bilateral lower extremity edema noted. "  Healing incisions noted on anterior aspect of both knees.  Aquacel dressing in place on both incisions.    Musculoskeletal: Functional active range of motion in both upper extremities.  Some limitation of active range of motion in both hips and knees, limited by pain.    Neurological: AAO ×3. Speech is fluent. Cranial nerve examination does not reveal any gross facial asymmetry. Strength testing about 4+/5 strength in both upper extremities.  Bilateral hip flexion is less than 3/5.  Bilateral quadriceps are less than 3/5.  Bilateral ankle dorsi and plantar flexion are 4/5.  He is grossly able to localize touch and position sense in great toes.  Deep tendon reflexes are hypoactive and symmetric bilaterally.  Plantars are flexor.  Coordination is functional upper extremities.  Both knee jerks were not tested.  Neurologically unchanged  Behavior/Emotional: Appropriate; Cooperative.   Skin: No obvious rashes noted.  Bilateral knee incisions healing.  Aquacel dressing in place.      Current Facility-Administered Medications:   •  acetaminophen (TYLENOL) tablet 650 mg, 650 mg, oral, q4h PRN, Vernon Rob MD, 650 mg at 11/04/19 1834  •  alum-mag hydroxide-simeth (MAALOX) 200-200-20 mg/5 mL suspension 30 mL, 30 mL, oral, q4h PRN, Vernon Rob MD  •  aspirin enteric coated tablet 81 mg, 81 mg, oral, BID, Vernon Rob MD, 81 mg at 11/04/19 0901  •  atorvastatin (LIPITOR) tablet 10 mg, 10 mg, oral, Daily (6p), Vernon Rob MD, 10 mg at 11/04/19 1757  •  bisacodyl (DULCOLAX) 10 mg suppository 10 mg, 10 mg, rectal, Daily PRN, Vernon Rob MD  •  docusate sodium (COLACE) capsule 100 mg, 100 mg, oral, BID, Vernon Rob MD, 100 mg at 11/04/19 0901  •  magnesium hydroxide (M.O.M.) 400 mg/5 mL suspension 30 mL, 30 mL, oral, Daily PRN, Vernon Rob MD, 30 mL at 11/03/19 1732  •  multivitamin tablet 1 tablet, 1 tablet, oral, Daily, Vernon Rob MD, 1 tablet at 11/04/19 0901  •   ondansetron ODT (ZOFRAN-ODT) disintegrating tablet 4 mg, 4 mg, oral, q6h PRN, Vernon Rob MD  •  oxyCODONE (ROXICODONE) immediate release tablet 5-10 mg, 5-10 mg, oral, q4h PRN, Vernon Rob MD, 10 mg at 11/04/19 1833  •  polyethylene glycol (MIRALAX) 17 gram packet 17 g, 17 g, oral, Daily, Shalini Torres MD, 17 g at 11/04/19 1421  •  senna (SENOKOT) tablet 2 tablet, 2 tablet, oral, Daily with lunch, Vernon Rob MD, 2 tablet at 11/04/19 1224  •  valsartan (DIOVAN) tablet 80 mg, 80 mg, oral, Daily, Vernon Rob MD, 80 mg at 11/04/19 0901       Labs / Radiology    Lab Results   Component Value Date    WBC 9.41 11/01/2019    HGB 10.7 (L) 11/01/2019    HCT 30.5 (L) 11/01/2019    MCV 93.3 11/01/2019     11/01/2019     Lab Results   Component Value Date    GLUCOSE 90 11/01/2019    CALCIUM 8.3 (L) 11/01/2019     11/01/2019    K 4.0 11/01/2019    CO2 25 11/01/2019     11/01/2019    BUN 14 11/01/2019    CREATININE 1.0 11/01/2019         Assessment and Plan    ASSESSMENT PLAN:  1. 68-year-old right-handed white male with chronic conditions significant for degenerative arthritis, hypertension, prostate cancer status post prostatectomy, dyslipidemia who had progressively worsening bilateral knee pain which failed conservative management and subsequently the patient underwent elective bilateral total knee replacements on 10/29/19 at [facility] by Dr. Ted Zheng.  Postoperatively, he is allowed weightbearing as tolerated on both lower extremities.  He is on Aspirin for deep vein thrombus prophylaxis and also on pneumatic compression boots. He has put on Oxycodone for pain control.  Postoperative course was significant for anemia, but he did not need transfusion. On 10/30/19, hemoglobin was 11.2, WBC count 7.65, BUN 13, creatinine 0.9.  He has been needing assistance for mobility, transfers, self-care postoperatively. He is transferred to [place] rehabilitation on  10/31/19 for further rehabilitation care.       2. DVT prophylaxis - on Aspirin.  On SCDs.    Platelets 224 on 11/1/2019.  More ambulatory now.      3.  Orthopedics - status post bilateral total knee replacements.  Continue PT, OT.  Monitor healing of incisions.     4. GI - On Colace, Senokot.  On Dulcolax.  On Zofran ODT for nausea.  On when necessary MOM, Maalox.     5.  - voiding.  Denies dysuria.  Monitor post void residuals.     6.  Hypertension - on Diovan.     7. Pain - on when necessary Oxycodone.  On Tylenol PRN.  Ice to knees when necessary.  Pain medications are helping.  He does not want increased pain medications at this time.     8.  Hematology - monitor hemoglobin, platelets.  Anemia - on MVI.  Hemoglobin 10.7 on 11/1/2019.     9.  Dyslipidemia - on Lipitor.     10. Rehabilitation medicine - Continue comprehensive rehabilitation care. Continue PT, OT.  We will follow falls precautions, cardiac precautions, monitor pulse oximetry in therapy.     11. Reviewed labs today.  BUN 14, creatinine 1.0 on 11/1/2019.        Vernon Rob MD  11/4/2019

## 2019-11-05 NOTE — PROGRESS NOTES
Patient: [place]inc[place] Yoon  Location: [facility] [address]  MRN: [id number]  Today's date: 11/5/2019    Hospital Course  Chris is a 68 y.o. male admitted on 10/31/2019 with Status post bilateral knee replacements [Z96.653]. Principal problem is S/P TKR (total knee replacement), bilateral.    Chris has a past medical history of Essential (primary) hypertension, Hyperlipidemia, unspecified, Impaired fasting glucose, Osteoarthritis, and Prostate cancer (CMS/Formerly [facility]) (2011).     Pt is a 68 year old male admitted to [facility] on 10/31/19 with h/o bilateral knee pain x 5-6 years. It has progressively worsened. Pt is now s/p bilateral TKA's on 10/29/19 and no complications post op.           Prior Living Environment      Most Recent Value   Lives With  spouse   Living Arrangements  house   Home Accessibility  stairs to enter home (Group), stairs within home (Group)   Living Environment Comment  2 SH, 3 GENE + 1 GENE without HR,  1st floor MD, FF to bed/bath   Number of Stairs, Main Entrance  4   Surface of Stairs, Main Entrance  concrete   Stair Railings, Main Entrance  none   Location, Patient Bedroom  second floor, must negotiate stairs to access   Location, Bathroom  first (main) floor, second floor, must negotiate stairs to access   Bathroom Access Comment  MD 1st floor, stnd height toilet,  full bath on 2nd c walk-in shower with a low threshold entry and built-in seat, no grab bars,  stnd height toilet   Number of Stairs, Within Home, Primary  other (see comments) [15]   Surface of Stairs, Within Home, Primary  carpeting, hardwood   Stair Railings, Within Home, Primary  railings on both sides of stairs, railing on left side (ascending)   Stairs Comment, Within Home, Primary  B rails for 12 steps & 3          Prior Level of Function      Most Recent Value   Dominant Hand  right   Ambulation  independent   Transferring  independent   Toileting  independent   Bathing  independent   Dressing  independent   Eating   independent   Communication  understands/communicates without difficulty   Prior Level of Function Comment  No DME   Equipment Currently Used at Home  none          IRF OT Evaluation and Treatment - 11/05/19 0738        Time Calculation    Start Time  0730     Stop Time  0830     Time Calculation (min)  60 min        Session Details    Document Type  daily treatment/progress note     Mode of Treatment  occupational therapy;individual therapy        General Information    Patient Profile Reviewed?  yes     General Observations of Patient  Pt agreeable for ADL shower        Toilet Transfer    Transfer Technique  stand pivot/stand step     [place], Toilet Transfer  minimum assist (75% patient effort);close supervision     Verbal Cues  safety     Assistive Device  raised toilet seat;grab bars/safety frame     Comment  via amb approach c RW        Shower Transfer    Transfer Technique  stand pivot/stand step     [place], Shower Transfer  minimum assist (75% patient effort)    within barrier free shower stall    Verbal Cues  hand placement;safety;technique     Assistive Device  grab bars/tub rail;tub bench;walker, front-wheeled        Safety Issues, Functional Mobility    Comment, Safety Issues/Impairments (Mobility)  OT: MIN A short HHD c RW        Lower Extremity (Therapeutic Exercise)    Exercise Position/Type (LE Therapeutic Exercise)  seated;AAROM (active assistive range of motion)     General Exercise (LE Therapeutic Exercise)  bilateral     Range of Motion Exercises (LE Therapeutic Exercise)  knee flexion/extension     Reps and Sets (LE Therapeutic Exercise)  x10     Comment (LE Therapeutic Exercise)  x10 knee flexion/extension towel slides        Bathing    Self-Performance  chest;left arm;right arm;abdomen;front perineal area;buttocks;left upper leg;right upper leg;left lower leg, including foot;right lower leg, including foot     Position  unsupported sitting     Setup Assistance  obtain supplies      Adaptive Equipment  grab bar/tub rail;hand-held shower spray hose;long-handled sponge;tub bench     Comment  seated on tub bench; lateral lean to bathe/dry buttock, utilized LHS to bathe bilateral feet. Able to dry feet via bending forward        Upper Body Dressing    Tasks  don;doff;pull over garment     Self-Performance  threads left arm, shirt;threads right arm, shirt;pulls shirt over head/around back;pulls shirt down/adjusts     [place] Assistance  obtains clothes     [place]  set up     Position  unsupported sitting     Comment  seated on tub bench; able to don/doff pull over garment seated on tub bench. Per pt, plans to get dressed in bathroom at home        Lower Body Dressing    Tasks  doff;don;pants/bottoms;underwear;socks     Self-Performance  threads left leg, underpants;threads right leg, underpants;pulls underpants up or down;threads left leg, pants/shorts;threads right leg, pants/shorts;pulls pants/shorts up or down;dons/doffs left sock;dons/doffs right sock     [place] Assistance  obtains clothes;adaptive equipment setup     [place]  close supervision     Position  unsupported sitting     Adaptive Equipment  reacher;sock aid     Comment  seated on tub bench; able to thread BLE through underwear/shorts with CL S for balance to stand to hike at RW level. Able to don B socks c sock aid without vcs. Seated in w/c, able to don B shoes c LHSH and reacher and via bending forward to tie shoes.        Grooming    Self-Performance  brushes/adma hair;oral care (brushing teeth, cleaning dentures)     [place]  close supervision     Position  supported standing     Comment  standing at sink c RW to complete grooming tasks        Toileting    [place]  adjust/manage clothing     Position  unsupported sitting     Adaptive Equipment  reacher     Comment  declined use of commode; CL S for balance to pull pants/underwear down. Prior to wet shower, pt utilized reacher to doff socks/pants/underwear  fully.        Self-Feeding    [place]  independent     Comment  pt finished breakfast prior to ADL session        Daily Progress Summary (OT)    Daily Outcome Statement (OT)  Progressed to standing at sink to complete grooming tasks and donning B shoes. Rest breaks throughout due to B/L knee pain. Cont c POC.                              IRF OT Goals      Most Recent Value   Transfer Goal 1   Activity/Assistive Device  toilet, commode, 3-in-1, walker, front-wheeled at 11/01/2019 0905   [place]  other (see comments) at 11/05/2019 0715   Time Frame  short-term goal (STG), 1 week at 11/01/2019 0905   Strategies/Barriers  amb tx at CL S level at 11/05/2019 0715   Progress/Outcome  good progress toward goal, goal met, goal revised this date at 11/05/2019 0715   Transfer Goal 2   Activity/Assistive Device  toilet, commode, 3-in-1, walker, front-wheeled at 11/01/2019 0905   [place]  modified independence at 11/01/2019 0905   Time Frame  long-term goal (LTG), by discharge at 11/01/2019 0905   Strategies/Barriers  amb tx at 11/01/2019 0905   Progress/Outcome  good progress toward goal at 11/05/2019 0715   Transfer Goal 3   Activity/Assistive Device  walk-in shower, shower chair, walker, front-wheeled at 11/01/2019 0905   [place]  other (see comments) at 11/05/2019 0715   Time Frame  short-term goal (STG), 1 week at 11/01/2019 0905   Strategies/Barriers  amb tx at CL S level at 11/05/2019 0715   Progress/Outcome  good progress toward goal, goal met, goal revised this date at 11/05/2019 0715   Transfer Goal 4   Activity/Assistive Device  walk-in shower, shower chair, walker, front-wheeled at 11/01/2019 0905   [place]  modified independence at 11/01/2019 0905   Time Frame  long-term goal (LTG), by discharge at 11/01/2019 0905   Strategies/Barriers  amb tx, step-over at 11/01/2019 0905   Progress/Outcome  good progress toward goal at 11/05/2019 0715   Bathing Goal 1   Activity/Assistive Device  bathing  skills, all, shower chair, long-handled sponge at 11/01/2019 0905   [place]  supervision required, set-up required at 11/01/2019 0905   Time Frame  short-term goal (STG), 1 week at 11/01/2019 0905   Strategies/Barriers  seated at 11/01/2019 0905   Progress/Outcome  good progress toward goal at 11/05/2019 0715   Bathing Goal 2   Activity/Assistive Device  bathing skills, all, shower chair, long-handled sponge at 11/01/2019 0905   [place]  modified independence at 11/01/2019 0905   Time Frame  long-term goal (LTG), by discharge at 11/01/2019 0905   Strategies/Barriers  seated/standing PRN at 11/01/2019 0905   Progress/Outcome  good progress toward goal at 11/05/2019 0715   UB Dressing Goal 1   Activity/Assistive Device  upper body dressing at 11/01/2019 0905   [place]  supervision required at 11/05/2019 0715   Time Frame  short-term goal (STG), 1 week at 11/01/2019 0905   Strategies/Barriers  amb level IR at 11/05/2019 0715   Progress/Outcome  good progress toward goal, goal met, goal revised this date at 11/05/2019 0715   UB Dressing Goal 2   Activity/Assistive Device  upper body dressing at 11/01/2019 0905   [place]  modified independence at 11/01/2019 0905   Time Frame  long-term goal (LTG), by discharge at 11/01/2019 0905   Progress/Outcome  good progress toward goal at 11/05/2019 0715   LB Dressing Goal 1   Activity/Assistive Device  lower body dressing, reacher, sock aid, long handled shoe horn at 11/01/2019 0905   [place]  minimum assist (75% or more patient effort) at 11/05/2019 0715   Time Frame  short-term goal (STG), 1 week at 11/01/2019 0905   Strategies/Barriers  with LRAD at 11/01/2019 0905   Progress/Outcome  good progress toward goal, goal met, goal revised this date at 11/05/2019 0715   LB Dressing Goal 2   Activity/Assistive Device  lower body dressing at 11/01/2019 0905   [place]  modified independence at 11/01/2019 0905   Time Frame  long-term goal (LTG), by discharge  at 11/01/2019 0905   Strategies/Barriers  with LRAD at 11/01/2019 0905   Progress/Outcome  good progress toward goal at 11/05/2019 0715   Grooming Goal 1   Activity/Assistive Device  grooming skills, all at 11/01/2019 0905   [place]  supervision required at 11/01/2019 0905   Time Frame  short-term goal (STG), 1 week at 11/01/2019 0905   Strategies/Barriers  standing at sink at 11/01/2019 0905   Progress/Outcome  good progress toward goal at 11/05/2019 0715   Grooming Goal 2   Activity/Assistive Device  grooming skills, all at 11/01/2019 0905   [place]  modified independence at 11/01/2019 0905   Time Frame  long-term goal (LTG), by discharge at 11/01/2019 0905   Strategies/Barriers  seated/standing at 11/01/2019 0905   Progress/Outcome  good progress toward goal at 11/05/2019 0715   Toileting Goal 1   Activity/Assistive Device  toileting skills, all, commode chair at 11/01/2019 0905   [place]  supervision required at 11/01/2019 0905   Time Frame  short-term goal (STG), 1 week at 11/01/2019 0905   Progress/Outcome  good progress toward goal at 11/05/2019 0715   Toileting Goal 2   Activity/Assistive Device  toileting skills, all, commode chair at 11/01/2019 0905   [place]  modified independence at 11/01/2019 0905   Time Frame  long-term goal (LTG), by discharge at 11/01/2019 0905   Progress/Outcome  good progress toward goal at 11/05/2019 0715

## 2019-11-05 NOTE — PLAN OF CARE
Problem: Rehabilitation (IRF) Plan of Care  Goal: Plan of Care Review  Outcome: Progressing  Flowsheets (Taken 11/5/2019 1124)  Plan of Care Reviewed With: patient  IRF Plan of Care Review: progress ongoing, continue  Outcome Summary: Pt has increased pain. Being managed with prn Oxycodone q4 hours. Dressings removed to B/L knees. Incisions are well approximated, pink, no drainage. Pt tolerated well.

## 2019-11-05 NOTE — PROGRESS NOTES
Patient: Chapincito Yoon  Location: [facility] [address]  MRN: [id number]  Today's date: 11/5/2019    Hospital Course  Chris is a 68 y.o. male admitted on 10/31/2019 with Status post bilateral knee replacements [Z96.653]. Principal problem is S/P TKR (total knee replacement), bilateral.    Chris has a past medical history of Essential (primary) hypertension, Hyperlipidemia, unspecified, Impaired fasting glucose, Osteoarthritis, and Prostate cancer (CMS/Formerly [facility]) (2011).     Pt is a 68 year old male admitted to [facility] on 10/31/19 with h/o bilateral knee pain x 5-6 years. It has progressively worsened. Pt is now s/p bilateral TKA's on 10/29/19 and no complications post op.      Therapy Pain/Vitals     Row Name  11/05/19 1301       Pain/Comfort/Sleep    Pain Charting Type   Pain Assessment    Preferred Pain Scale   --    Pain Body Location - Side   Bilateral    Pain Body Location   knee    Pain Rating (0-10): Rest   7    Pain Rating (0-10): Activity   7    Pain Management Interventions   premedicated for activity       Vital Signs    Pulse   (!) 107    BP   (!) 145/65    BP Location   Right upper arm    BP Method   Automatic    Patient Position   Sitting       Patient Observation    Observations   asymptomatic    Row Name 11/05/19 1355          Pain/Comfort/Sleep    Pain Charting Type  Pain Reassessment     Preferred Pain Scale  number (Numeric Rating Pain Scale)     Pain Body Location - Side  Bilateral     Pain Body Location  knee     Pain Rating (0-10): Rest  7     Pain Rating (0-10): Activity  7     Pain Management Interventions  position adjusted        Vital Signs    Pulse  91     BP  (!) 154/68     BP Location  Right upper arm     BP Method  Automatic     Patient Position  Sitting        Patient Observation    Observations  asymptomatic           Prior Living Environment      Most Recent Value   Lives With  spouse   Living Arrangements  house   Home Accessibility  stairs to enter home (Group), stairs  within home (Group)   Living Environment Comment  2 SH, 3 GENE + 1 GENE without HR,  1st floor MO, FF to bed/bath   Number of Stairs, Main Entrance  4   Surface of Stairs, Main Entrance  concrete   Stair Railings, Main Entrance  none   Location, Patient Bedroom  second floor, must negotiate stairs to access   Location, Bathroom  first (main) floor, second floor, must negotiate stairs to access   Bathroom Access Comment  MO 1st floor, stnd height toilet,  full bath on 2nd c walk-in shower with a low threshold entry and built-in seat, no grab bars,  stnd height toilet   Number of Stairs, Within Home, Primary  other (see comments) [15]   Surface of Stairs, Within Home, Primary  carpeting, hardwood   Stair Railings, Within Home, Primary  railings on both sides of stairs, railing on left side (ascending)   Stairs Comment, Within Home, Primary  B rails for 12 steps & 3          Prior Level of Function      Most Recent Value   Dominant Hand  right   Ambulation  independent   Transferring  independent   Toileting  independent   Bathing  independent   Dressing  independent   Eating  independent   Communication  understands/communicates without difficulty   Prior Level of Function Comment  No DME   Equipment Currently Used at Home  none          IRF PT Evaluation and Treatment - 11/05/19 1308        Time Calculation    Start Time  1258     Stop Time  1358     Time Calculation (min)  60 min        Session Details    Document Type  daily treatment/progress note     Mode of Treatment  physical therapy;concurrent therapy        General Information    Patient Profile Reviewed?  yes     Existing Precautions/Restrictions  aspiration;cardiac;fall        Weight-Bearing Status    Left LE Weight-Bearing Status  weight-bearing as tolerated (WBAT)     Right LE Weight-Bearing Status  weight-bearing as tolerated (WBAT)        Range of Motion (ROM)    Knee, Left (ROM)  PROM: 3-90 degrees     Knee, Right (ROM)  PROM: 2-90 degrees        Bed  Mobility    [place], Supine to Sit  close supervision     [place], Sit to Supine  close supervision     Comment (Bed Mobility)  simulated on mat for balance/safety        Transfers    Transfers  stand pivot/stand step transfer        Sit to Stand Transfer    [place], Sit to Stand Transfer  close supervision     Assistive Device  walker, front-wheeled     Comment  for balance/safety        Stand to Sit Transfer    [place], Stand to Sit Transfer  close supervision     Assistive Device  walker, front-wheeled     Comment  for balance/safety        Stand Pivot/Stand Step Transfer    [place], Stand Pivot/Stand Step Transfer  close supervision     Assistive Device  walker, front-wheeled     Comment  for balance/safety        Gait Training    [place], Gait  close supervision     Assistive Device  walker, front-wheeled     Distance in Feet  150 feet     Gait Pattern Utilized  step-through     Deviations/Abnormal Patterns (Gait)  antalgic;bilateral deviations;base of support, wide;willian decreased;gait speed decreased     Bilateral Gait Deviations  heel strike decreased     Comment  150' x 1 with RW with Cl S for balance/safety        Lower Extremity (Therapeutic Exercise)    Exercise Position/Type (LE Therapeutic Exercise)  supine;AROM (active range of motion);AAROM (active assistive range of motion)     General Exercise (LE Therapeutic Exercise)  bilateral;LAQ (long arc quad);quad sets;gluteal sets;heel slides     Range of Motion Exercises (LE Therapeutic Exercise)  ankle dorsiflexion/plantarflexion;hip abduction/adduction     Reps and Sets (LE Therapeutic Exercise)  3 sets of 10     Comment (LE Therapeutic Exercise)  increased time to complete        Daily Progress Summary (PT)    Daily Outcome Statement (PT)  Pt demonstrates increased stiffness into flexion today. Continues to improve with LE motor control with min verbal cues during activity. Progress functional activities as tolerated.            Pain Assessment/Intervention  Pain Charting Type: Pain Reassessment             Education provided this session. See the Patient Education summary report for full details.    IRF PT Goals      Most Recent Value   Bed Mobility Goal 1   Activity/Assistive Device  bed mobility activities, all at 11/05/2019 0731   [place]  modified independence at 11/05/2019 0731   Time Frame  5 - 7 days at 11/05/2019 0731   Strategies/Barriers  pain, decreased BLE strength at 11/05/2019 0731   Progress/Outcome  goal met, goal revised this date, good progress toward goal at 11/05/2019 0731   Bed Mobility Goal 2   Activity/Assistive Device  rolling to right, rolling to left, supine to sit, sit to supine at 11/01/2019 1106   [place]  modified independence at 11/01/2019 1106   Time Frame  14 days or less at 11/01/2019 1106   Strategies/Barriers  pain and decreased BLE strength at 11/05/2019 0731   Progress/Outcome  good progress toward goal, goal ongoing at 11/05/2019 0731   Transfer Goal 1   Activity/Assistive Device  sit-to-stand/stand-to-sit, bed-to-chair/chair-to-bed, car transfer, walker, front-wheeled at 11/05/2019 0731   [place]  modified independence at 11/05/2019 0731   Time Frame  5 - 7 days at 11/05/2019 0731   Strategies/Barriers  decreased BLE strength, decreased balance at 11/05/2019 0731   Progress/Outcome  goal met, goal revised this date, good progress toward goal at 11/05/2019 0731   Transfer Goal 2   Activity/Assistive Device  sit-to-stand/stand-to-sit, bed-to-chair/chair-to-bed at 11/01/2019 1106   [place]  modified independence at 11/01/2019 1106   Time Frame  14 days or less at 11/01/2019 1106   Strategies/Barriers  decreased BLE strength, decreased balance & decreased ROM at 11/05/2019 0731   Progress/Outcome  goal ongoing, good progress toward goal, continuing progress toward goal at 11/05/2019 0731   Gait/Walking Locomotion Goal 1   Activity/Assistive Device  gait (walking locomotion),  assistive device use at 11/05/2019 0731   Distance  200 feet at 11/05/2019 0731   [place]  modified independence at 11/05/2019 0731   Time Frame  5 - 7 days, short-term goal (STG) at 11/05/2019 0731   Strategies/Barriers  decreased BLE strength, decreased balance at 11/05/2019 0731   Progress/Outcome  goal met, goal revised this date, good progress toward goal at 11/05/2019 0731   Gait/Walking Locomotion Goal 2   Activity/Assistive Device  gait (walking locomotion), assistive device use, improve balance and speed, increase endurance/gait distance, walker, front-wheeled at 11/01/2019 1106   Distance  200 feet at 11/01/2019 1106   [place]  modified independence at 11/01/2019 1106   Time Frame  14 days or less at 11/01/2019 1106   Strategies/Barriers  decreased BLE strength, decreased balance at 11/05/2019 0731   Progress/Outcome  good progress toward goal, goal ongoing at 11/05/2019 0731   Stairs Goal 1   Activity/Assistive Device  stairs, all skills, assistive device use, ascending stairs, descending stairs, using handrail, left, cane, straight at 11/05/2019 0731   Number of Stairs  15 at 11/05/2019 0731   [place]  supervision required, set-up required at 11/05/2019 0731   Time Frame  5 - 7 days at 11/05/2019 0731   Strategies/Barriers  decreased BLE strength, decreased balance at 11/05/2019 0731   Progress/Outcome  goal ongoing, good progress toward goal at 11/05/2019 0731   Stairs Goal 2   Activity/Assistive Device  assistive device use, stairs, all skills, ascending stairs, descending stairs, using handrail, left, cane, straight at 11/01/2019 1106   Number of Stairs  15 at 11/01/2019 1106   [place]  supervision required, set-up required at 11/01/2019 1106   Time Frame  14 days or less at 11/01/2019 1106   Strategies/Barriers  decreased BLE strength, decreased balance at 11/05/2019 0731   Progress/Outcome  good progress toward goal, goal ongoing at 11/05/2019 0731

## 2019-11-05 NOTE — PROGRESS NOTES
Internal Medicine Progress Note      Patient seen and examined  Attestation Notes: Face to face encounter completed      HPI  Mr Car is a 69 yo M with hypertension, dyslipidemia, prostate CA, osteoarthritis who was evaluated as an outpatient for chronic progressive severe bilateral knee pain not alleviated by conservative measures; he was recommended bilateral total knee replacement  On 10/29, he was admitted to [facility], where he underwent bilateral total knee replacement secondary to osteoarthritis by Dr. Zheng  He utilized twice daily aspirin 81 mg for DVT prophylaxis  He continued on Diovan for blood pressure management  He was admitted to [facility] on 10/31 for acute inpatient rehabilitation    SUBJECTIVE  Bright affect today, reporting good control of knee pain  Seeing improvement in lower extremity swelling  Still with mild constipation, having small bowel movements, passing gas; has not had impact on appetite.  No nausea or abdominal pain  Reviewed lab results, hemoglobin is stable      Review of Systems as above, otherwise without change      Medical History reviewed:  FH, SH without change since admission  Social History     Tobacco Use   • Smoking status: Former Smoker     Last attempt to quit:      Years since quittin.8   • Smokeless tobacco: Never Used   • Tobacco comment: quit 40 years ago   Substance Use Topics   • Alcohol use: Yes     Alcohol/week: 3.0 standard drinks     Types: 3 Cans of beer per week     Frequency: 2-3 times a week     Drinks per session: 3 or 4     Binge frequency: Less than monthly   • Drug use: Never       PMH, PSH without change since admission      Current medications and allergies reviewed:  Allergies: Patient has no known allergies.    CURRENT MEDICATIONS:      • aspirin  81 mg oral BID   • atorvastatin  10 mg oral Daily (6p)   • docusate sodium  100 mg oral BID   • multivitamin  1 tablet oral Daily   • polyethylene glycol  17 g oral Daily    • senna  2 tablet oral Daily with lunch   • valsartan  80 mg oral Daily           Objective     Vitals:    11/04/19 1945 11/05/19 0726 11/05/19 0901 11/05/19 0959   BP: (!) 163/76 (!) 154/69 (!) 148/65 137/76   BP Location:  Right upper arm Right upper arm Right upper arm   Patient Position:  Sitting Sitting Sitting   Pulse: 92 75 97 (!) 102   Resp: 20 20     Temp: 37.1 °C (98.8 °F) 36.9 °C (98.5 °F)     TempSrc: Oral Oral     SpO2: 98% 94%     Weight:       Height:           Physical Exam   Constitutional: He is oriented to person, place, and time. He appears well-developed and well-nourished. No distress.   HENT:   Head: Normocephalic and atraumatic.   Mouth/Throat: Oropharynx is clear and moist.   Eyes: Pupils are equal, round, and reactive to light. Conjunctivae and EOM are normal. No scleral icterus.   Neck: Normal range of motion. Neck supple.   Cardiovascular: Normal rate, regular rhythm, normal heart sounds and intact distal pulses.   Pulmonary/Chest: Effort normal and breath sounds normal. No respiratory distress. He has no wheezes. He has no rales.   Abdominal: Soft. Bowel sounds are normal. He exhibits no distension. There is no tenderness.   Musculoskeletal: He exhibits edema (Bilateral lower extremity edema) and deformity (s/p bilateral TKR).   Neurological: He is alert and oriented to person, place, and time. No cranial nerve deficit.   Skin: Skin is warm and dry. He is not diaphoretic. No erythema.   Bilateral knee incisions dressed, scant drainage apparent on upper portions of dressings   Psychiatric: He has a normal mood and affect.   Nursing note and vitals reviewed.       Labs   I have reviewed the patient's pertinent labs.  Significant abnormals are Anemia, hypoalbuminemia.  Lab results reviewed, discussed with patient:    Lab Results   Component Value Date    WBC 8.78 11/05/2019    HGB 10.6 (L) 11/05/2019    HCT 31.3 (L) 11/05/2019     (H) 11/05/2019     11/05/2019    K 4.6  11/05/2019     11/05/2019    CREATININE 0.8 11/05/2019    BUN 17 11/05/2019    CO2 27 11/05/2019    ALT 12 (L) 11/01/2019    AST 17 11/01/2019    INR 1.0 10/16/2019    HGBA1C 5.6 10/16/2019       Imaging    Bilateral lower extremity venous Doppler ultrasound 10/31:  No evidence of deep vein thrombus in either lower extremity  Fluid in right popliteal space likely represents a ruptured Baker's cyst but is not well evaluated on this vascular study        Assessment/Plan       Patient Active Problem List    Diagnosis Date Noted   • Anemia due to blood loss 10/31/2019   • Leukemoid reaction 10/31/2019   • S/P TKR (total knee replacement), bilateral 10/31/2019   • Status post bilateral knee replacements 10/31/2019   • Osteoarthrosis 10/29/2019   • Osteoarthritis of both knees 10/16/2019   • Counseling on health promotion and disease prevention 10/16/2019   • Hyperlipidemia, unspecified    • Essential (primary) hypertension        Mr Yoon is a 69 yo M with hypertension, dyslipidemia, prostate CA, osteoarthritis who is admitted to [facility] on 10/31 from [facility] for acute inpatient rehabilitation s/p bilateral TKR by Dr. Zheng on 10/29     Ortho:  S/p B TKR-continue rehab  Continue current pain management regimen  Local wound care     Cardiovascular:  Essential hypertension-blood pressure 138-163/64-76 on Diovan  Maintain hold parameters and cardiac precautions     Pulmonary:  Encourage use of spirometry for atelectasis  No evidence of respiratory distress     Endocrine:  Dyslipidemia-continue statin therapy, low-fat diet  LFT 11/1 unremarkable     Renal:  BUN/Cr  17/0.8, GFR> 60  Monitor renal function, avoid nephrotoxins     GI:  Constipation-continue bowel program     :  Hx prostate CA  Monitor PVR, CIC PRN  Urinalysis 10/31 clear, leukocyte esterase negative, nitrite negative; culture not indicated at present     Heme:  Postoperative anemia-preop hemoglobin 14.2    Normochromic,  normocytic  Hemoglobin stable, 10.6     F/E/N:  Regular/thin diet  Electrolytes stable, maintaining hydration  With  Derm:  Local wound care     Prophylaxis:  -BID 81mg ASA, SCD for DVT prophylaxis; Doppler ultrasound 11/1 without evidence for DVT  -Spirometry for atelectasis  -Maintain fall, cardiac precautions     Disposition  -Expected discharge date 11/12  .  Shalini Torres MD  11/5/2019  11:26 AM

## 2019-11-06 ENCOUNTER — APPOINTMENT (INPATIENT)
Dept: PHYSICAL THERAPY | Facility: REHABILITATION | Age: 69
DRG: 560 | End: 2019-11-06
Payer: MEDICARE

## 2019-11-06 ENCOUNTER — TRANSCRIBE ORDERS (OUTPATIENT)
Dept: SCHEDULING | Facility: REHABILITATION | Age: 69
End: 2019-11-06

## 2019-11-06 ENCOUNTER — APPOINTMENT (INPATIENT)
Dept: OCCUPATIONAL THERAPY | Facility: REHABILITATION | Age: 69
DRG: 560 | End: 2019-11-06
Payer: MEDICARE

## 2019-11-06 DIAGNOSIS — Z96.653 PRESENCE OF ARTIFICIAL KNEE JOINT, BILATERAL: Primary | ICD-10-CM

## 2019-11-06 PROCEDURE — 97530 THERAPEUTIC ACTIVITIES: CPT | Mod: GP

## 2019-11-06 PROCEDURE — 97116 GAIT TRAINING THERAPY: CPT | Mod: GP

## 2019-11-06 PROCEDURE — 63700000 HC SELF-ADMINISTRABLE DRUG: Performed by: PHYSICAL MEDICINE & REHABILITATION

## 2019-11-06 PROCEDURE — 97110 THERAPEUTIC EXERCISES: CPT | Mod: GP

## 2019-11-06 PROCEDURE — 97110 THERAPEUTIC EXERCISES: CPT | Mod: GO

## 2019-11-06 PROCEDURE — 12800000 HC ROOM AND CARE SEMIPRIVATE REHAB

## 2019-11-06 PROCEDURE — 97530 THERAPEUTIC ACTIVITIES: CPT | Mod: GO

## 2019-11-06 RX ORDER — ACETAMINOPHEN 325 MG/1
650 TABLET ORAL
Status: DISCONTINUED | OUTPATIENT
Start: 2019-11-07 | End: 2019-11-12 | Stop reason: HOSPADM

## 2019-11-06 RX ORDER — LIDOCAINE 560 MG/1
2 PATCH PERCUTANEOUS; TOPICAL; TRANSDERMAL DAILY
Status: DISCONTINUED | OUTPATIENT
Start: 2019-11-07 | End: 2019-11-07

## 2019-11-06 RX ADMIN — DOCUSATE SODIUM 100 MG: 100 CAPSULE, LIQUID FILLED ORAL at 21:17

## 2019-11-06 RX ADMIN — ASPIRIN 81 MG: 81 TABLET, COATED ORAL at 21:17

## 2019-11-06 RX ADMIN — VALSARTAN 80 MG: 80 TABLET, FILM COATED ORAL at 07:50

## 2019-11-06 RX ADMIN — DOCUSATE SODIUM 100 MG: 100 CAPSULE, LIQUID FILLED ORAL at 07:50

## 2019-11-06 RX ADMIN — SENNOSIDES 2 TABLET: 8.6 TABLET, FILM COATED ORAL at 12:18

## 2019-11-06 RX ADMIN — OXYCODONE HYDROCHLORIDE 10 MG: 5 TABLET ORAL at 16:36

## 2019-11-06 RX ADMIN — OXYCODONE HYDROCHLORIDE 10 MG: 5 TABLET ORAL at 12:18

## 2019-11-06 RX ADMIN — OXYCODONE HYDROCHLORIDE 10 MG: 5 TABLET ORAL at 23:38

## 2019-11-06 RX ADMIN — MULTIPLE VITAMINS W/ MINERALS TAB 1 TABLET: TAB at 07:54

## 2019-11-06 RX ADMIN — OXYCODONE HYDROCHLORIDE 10 MG: 5 TABLET ORAL at 00:28

## 2019-11-06 RX ADMIN — ATORVASTATIN CALCIUM 10 MG: 10 TABLET, FILM COATED ORAL at 18:55

## 2019-11-06 RX ADMIN — ACETAMINOPHEN 650 MG: 325 TABLET, FILM COATED ORAL at 07:51

## 2019-11-06 RX ADMIN — ACETAMINOPHEN 650 MG: 325 TABLET, FILM COATED ORAL at 00:29

## 2019-11-06 RX ADMIN — OXYCODONE HYDROCHLORIDE 10 MG: 5 TABLET ORAL at 07:50

## 2019-11-06 RX ADMIN — ASPIRIN 81 MG: 81 TABLET, COATED ORAL at 07:50

## 2019-11-06 NOTE — PLAN OF CARE
Problem: Rehabilitation (IRF) Plan of Care  Goal: Plan of Care Review  Flowsheets (Taken 11/6/2019 1501)  Plan of Care Reviewed With: patient  IRF Plan of Care Review: progress ongoing, continue  Outcome Summary: Patient ambulated 200' (1x) and 150' (1x) with RW and CL-S

## 2019-11-06 NOTE — PLAN OF CARE
Problem: Rehabilitation (IRF) Plan of Care  Goal: Plan of Care Review  Outcome: Progressing  Flowsheets (Taken 11/6/2019 1986)  Plan of Care Reviewed With: patient  IRF Plan of Care Review: progress ongoing, continue  Outcome Summary: Able to complete 15 steps per home setup.

## 2019-11-06 NOTE — PLAN OF CARE
Problem: Rehabilitation (IRF) Plan of Care  Goal: Plan of Care Review  Outcome: Progressing  Flowsheets (Taken 11/6/2019 4544)  Plan of Care Reviewed With: patient  IRF Plan of Care Review: progress ongoing, continue  Outcome Summary: Focus of session placed on simulated apartment transfers and UE strength/endurance. Pt reports already owns commode and shower chair, planning to utilize upon d/c. Pt completes functional transfers via amb approach using RW.

## 2019-11-06 NOTE — PROGRESS NOTES
CM talked to Saumya at [facility] for updated script for Mr. Yoon for physical therapy. Provided fax [phone].

## 2019-11-06 NOTE — PROGRESS NOTES
Patient: Chapincito Yoon  Location: [facility] [address]  MRN: [id number]  Today's date: 11/6/2019    Hospital Course  Chris is a 68 y.o. male admitted on 10/31/2019 with Status post bilateral knee replacements [Z96.653]. Principal problem is S/P TKR (total knee replacement), bilateral.    Chris has a past medical history of Essential (primary) hypertension, Hyperlipidemia, unspecified, Impaired fasting glucose, Osteoarthritis, and Prostate cancer (CMS/[facility]) (2011).     Pt is a 68 year old male admitted to [facility] on 10/31/19 with h/o bilateral knee pain x 5-6 years. It has progressively worsened. Pt is now s/p bilateral TKA's on 10/29/19 and no complications post op.      Therapy Pain/Vitals              Row Name 11/06/19 1314 11/06/19 1331       Pain/Comfort/Sleep    Pain Charting Type  Pain Assessment  Post Activity    Preferred Pain Scale  number (Numeric Rating Pain Scale)  number (Numeric Rating Pain Scale)    Pain Body Location - Side  Bilateral  Bilateral    Pain Body Location - Orientation  incisional  incisional    Pain Body Location  knee  knee    Pain Rating (0-10): Rest  7  8    Pain Rating (0-10): Activity  7  8    Pain Management Interventions  premedicated for activity;relaxation techniques promoted;position adjusted  premedicated for activity;relaxation techniques promoted;position adjusted       Vital Signs    Pulse  95  --    Heart Rate Source  Monitor  --    BP  (!) 141/66  --    BP Location  Left upper arm  --    BP Method  Automatic  --    Patient Position  Sitting  --    Row Name 11/06/19 1356          Pain Charting Type  Pain Reassessment    Preferred Pain Scale  number (Numeric Rating Pain Scale)    Pain Body Location - Side  Bilateral    Pain Body Location - Orientation  incisional    Pain Body Location  knee    Pain Rating (0-10): Rest  7    Pain Rating (0-10): Activity  7    Pain Management Interventions  premedicated for activity;relaxation techniques promoted;position adjusted           Pulse  --    Heart Rate Source  --    BP  --    BP Location  --    BP Method  --    Patient Position  --       Prior Living Environment      Most Recent Value   Lives With  spouse   Living Arrangements  house   Home Accessibility  stairs to enter home (Group), stairs within home (Group)   Living Environment Comment  2 SH, 3 GENE + 1 GENE without HR,  1st floor GA, FF to bed/bath   Number of Stairs, Main Entrance  4   Surface of Stairs, Main Entrance  concrete   Stair Railings, Main Entrance  none   Location, Patient Bedroom  second floor, must negotiate stairs to access   Location, Bathroom  first (main) floor, second floor, must negotiate stairs to access   Bathroom Access Comment  [place] 1st floor, stnd height toilet,  full bath on 2nd c walk-in shower with a low threshold entry and built-in seat, no grab bars,  stnd height toilet   Number of Stairs, Within Home, Primary  other (see comments) [15]   Surface of Stairs, Within Home, Primary  carpeting, hardwood   Stair Railings, Within Home, Primary  railings on both sides of stairs, railing on left side (ascending)   Stairs Comment, Within Home, Primary  B rails for 12 steps & 3          Prior Level of Function      Most Recent Value   Dominant Hand  right   Ambulation  independent   Transferring  independent   Toileting  independent   Bathing  independent   Dressing  independent   Eating  independent   Communication  understands/communicates without difficulty   Prior Level of Function Comment  No DME   Equipment Currently Used at Home  none          IRF OT Evaluation and Treatment - 11/06/19 1301        Time Calculation    Start Time  1300     Stop Time  1400     Time Calculation (min)  60 min        Session Details    Document Type  daily treatment/progress note     Mode of Treatment  occupational therapy;individual therapy        General Information    Patient Profile Reviewed?  yes     General Observations of Patient  Pt approached seated in [facility] agreeable  to OT session     Existing Precautions/Restrictions  aspiration;fall;cardiac        Coping Strategies    Counseling (Coping Strategies)  active listening utilized;goal setting facilitated;personal strengths utilized;positive reinforcement provided;problem solving facilitated;relaxation techniques promoted        Transfers    Transfers  toilet transfer;shower transfer        Sit to Stand Transfer    [place], Sit to Stand Transfer  close supervision     Verbal Cues  safety;technique     Assistive Device  walker, front-wheeled     Comment  from shower chair, commode, sofa chair, and WC.        Stand to Sit Transfer    [place], Stand to Sit Transfer  close supervision     Verbal Cues  safety;technique     Assistive Device  walker, front-wheeled     Comment  to shower chair, commode, sofa chair, and WC.        Stand Pivot/Stand Step Transfer    [place], Stand Pivot/Stand Step Transfer  close supervision     Verbal Cues  proper use of assistive device;safety     Assistive Device  walker, front-wheeled     Comment  amb approach using RW        Toilet Transfer    Transfer Technique  stand pivot/stand step     [place], Toilet Transfer  minimum assist (75% patient effort)     Verbal Cues  safety;technique     Assistive Device  commode, 3-in-1     Comment  via amb approach using RW in simulated apartment         Shower Transfer    Transfer Technique  stand pivot/stand step     [place], Shower Transfer  minimum assist (75% patient effort)     Verbal Cues  safety;technique;proper use of assistive device;hand placement     Assistive Device  grab bars/tub rail;shower chair;walker, front-wheeled     Comment  via amb approach to shower stall c lip in simulated apartment using step-over front entry approach using B GBs for support        Safety Issues, Functional Mobility    Comment, Safety Issues/Impairments (Mobility)  OT: HHD using RW over level tile and low pile carpeting using RW c min A for  balance/safety        Upper Extremity (Therapeutic Exercise)    Exercise Position/Type (UE Therapeutic Exercise)  seated;resistive exercises     General Exercise (Upper Extremity Therapeutic Exercise)  bilateral;bicep curl;other (see comments);tricep extension     Weight/Resistance (Upper Extremity Therapeutic Exercise)  4 pounds     Reps and Sets (Upper Extremity Therapeutic Exercise)  3 x 10     Comment (UE Therapeutic Exercise)  seated in [facility] completes bicep curls, front raises, and OH press to address UB strength and overall endurance. Completes OH tricep extensions bimanually        Lower Extremity (Therapeutic Exercise)    Exercise Position/Type (LE Therapeutic Exercise)  seated     Range of Motion Exercises (LE Therapeutic Exercise)  bilateral;hip flexion/extension;knee flexion/extension;ankle dorsiflexion/plantarflexion     Reps and Sets (LE Therapeutic Exercise)  2 x 10     Comment (LE Therapeutic Exercise)  completes active warm-up seated in [facility]        Daily Progress Summary (OT)    Symptoms Noted During/After Treatment  fatigue;increased pain     Progress Toward Functional Goals (OT)  progressing toward functional goals as expected     Daily Outcome Statement (OT)  Focus of session placed on simulated apartment transfers and UE strength/endurance. Pt reports already owns commode and shower chair, planning to utilize upon d/c. Pt completes functional transfers via amb approach using RW.     Barriers to Overall Progress (OT)  c/o pain and fatigue; impaired strength, ROM, balance, endurance, and activity tolerance     Recommendations  cont IP OT to address above mentioned barriers to maximize I in ADL/IADLs and functional mobility           Pain Assessment/Intervention  Pain Charting Type: Pain Reassessment                  IRF OT Goals      Most Recent Value   Transfer Goal 1   Activity/Assistive Device  toilet, commode, 3-in-1, walker, front-wheeled at 11/01/2019 0905   [place]  other (see comments)  at 11/05/2019 0715   Time Frame  short-term goal (STG), 1 week at 11/01/2019 0905   Strategies/Barriers  amb tx at CL S level at 11/05/2019 0715   Progress/Outcome  good progress toward goal, goal met, goal revised this date at 11/05/2019 0715   Transfer Goal 2   Activity/Assistive Device  toilet, commode, 3-in-1, walker, front-wheeled at 11/01/2019 0905   [place]  modified independence at 11/01/2019 0905   Time Frame  long-term goal (LTG), by discharge at 11/01/2019 0905   Strategies/Barriers  amb tx at 11/01/2019 0905   Progress/Outcome  good progress toward goal at 11/05/2019 0715   Transfer Goal 3   Activity/Assistive Device  walk-in shower, shower chair, walker, front-wheeled at 11/01/2019 0905   [place]  other (see comments) at 11/05/2019 0715   Time Frame  short-term goal (STG), 1 week at 11/01/2019 0905   Strategies/Barriers  amb tx at CL S level at 11/05/2019 0715   Progress/Outcome  good progress toward goal, goal met, goal revised this date at 11/05/2019 0715   Transfer Goal 4   Activity/Assistive Device  walk-in shower, shower chair, walker, front-wheeled at 11/01/2019 0905   [place]  modified independence at 11/01/2019 0905   Time Frame  long-term goal (LTG), by discharge at 11/01/2019 0905   Strategies/Barriers  amb tx, step-over at 11/01/2019 0905   Progress/Outcome  good progress toward goal at 11/05/2019 0715   Bathing Goal 1   Activity/Assistive Device  bathing skills, all, shower chair, long-handled sponge at 11/01/2019 0905   [place]  supervision required, set-up required at 11/01/2019 0905   Time Frame  short-term goal (STG), 1 week at 11/01/2019 0905   Strategies/Barriers  seated at 11/01/2019 0905   Progress/Outcome  good progress toward goal at 11/05/2019 0715   Bathing Goal 2   Activity/Assistive Device  bathing skills, all, shower chair, long-handled sponge at 11/01/2019 0905   [place]  modified independence at 11/01/2019 0905   Time Frame  long-term goal (LTG), by  discharge at 11/01/2019 0905   Strategies/Barriers  seated/standing PRN at 11/01/2019 0905   Progress/Outcome  good progress toward goal at 11/05/2019 0715   UB Dressing Goal 1   Activity/Assistive Device  upper body dressing at 11/01/2019 0905   [place]  supervision required at 11/05/2019 0715   Time Frame  short-term goal (STG), 1 week at 11/01/2019 0905   Strategies/Barriers  amb level IR at 11/05/2019 0715   Progress/Outcome  good progress toward goal, goal met, goal revised this date at 11/05/2019 0715   UB Dressing Goal 2   Activity/Assistive Device  upper body dressing at 11/01/2019 0905   [place]  modified independence at 11/01/2019 0905   Time Frame  long-term goal (LTG), by discharge at 11/01/2019 0905   Progress/Outcome  good progress toward goal at 11/05/2019 0715   LB Dressing Goal 1   Activity/Assistive Device  lower body dressing, reacher, sock aid, long handled shoe horn at 11/01/2019 0905   [place]  minimum assist (75% or more patient effort) at 11/05/2019 0715   Time Frame  short-term goal (STG), 1 week at 11/01/2019 0905   Strategies/Barriers  with LRAD at 11/01/2019 0905   Progress/Outcome  good progress toward goal, goal met, goal revised this date at 11/05/2019 0715   LB Dressing Goal 2   Activity/Assistive Device  lower body dressing at 11/01/2019 0905   [place]  modified independence at 11/01/2019 0905   Time Frame  long-term goal (LTG), by discharge at 11/01/2019 0905   Strategies/Barriers  with LRAD at 11/01/2019 0905   Progress/Outcome  good progress toward goal at 11/05/2019 0715   Grooming Goal 1   Activity/Assistive Device  grooming skills, all at 11/01/2019 0905   [place]  supervision required at 11/01/2019 0905   Time Frame  short-term goal (STG), 1 week at 11/01/2019 0905   Strategies/Barriers  standing at sink at 11/01/2019 0905   Progress/Outcome  good progress toward goal at 11/05/2019 0715   Grooming Goal 2   Activity/Assistive Device  grooming skills,  all at 11/01/2019 0905   [place]  modified independence at 11/01/2019 0905   Time Frame  long-term goal (LTG), by discharge at 11/01/2019 0905   Strategies/Barriers  seated/standing at 11/01/2019 0905   Progress/Outcome  good progress toward goal at 11/05/2019 0715   Toileting Goal 1   Activity/Assistive Device  toileting skills, all, commode chair at 11/01/2019 0905   [place]  supervision required at 11/01/2019 0905   Time Frame  short-term goal (STG), 1 week at 11/01/2019 0905   Progress/Outcome  good progress toward goal at 11/05/2019 0715   Toileting Goal 2   Activity/Assistive Device  toileting skills, all, commode chair at 11/01/2019 0905   [place]  modified independence at 11/01/2019 0905   Time Frame  long-term goal (LTG), by discharge at 11/01/2019 0905   Progress/Outcome  good progress toward goal at 11/05/2019 0715

## 2019-11-06 NOTE — PROGRESS NOTES
Patient: Chapincito Yoon  Location: [facility] [address]  MRN: [id number]  Today's date: 11/6/2019    Hospital Course  Chris is a 68 y.o. male admitted on 10/31/2019 with Status post bilateral knee replacements [Z96.653]. Principal problem is S/P TKR (total knee replacement), bilateral.    Chris has a past medical history of Essential (primary) hypertension, Hyperlipidemia, unspecified, Impaired fasting glucose, Osteoarthritis, and Prostate cancer (CMS/[facility]) (2011).     Pt is a 68 year old male admitted to [facility] on 10/31/19 with h/o bilateral knee pain x 5-6 years. It has progressively worsened. Pt is now s/p bilateral TKA's on 10/29/19 and no complications post op.      Therapy Pain/Vitals     Row Name 11/06/19 0801 11/06/19 0853       Pain/Comfort/Sleep    Pain Charting Type  Pain Assessment  Pain Reassessment    Preferred Pain Scale  number (Numeric Rating Pain Scale)  number (Numeric Rating Pain Scale)    Pain Body Location - Side  Bilateral  Bilateral    Pain Body Location  knee  knee    Pain Rating (0-10): Rest  8  7    Pain Rating (0-10): Activity  8  7    Pain Management Interventions  premedicated for activity  position adjusted       Vital Signs    Pulse  (!) 103  (!) 101    BP  (!) 158/63  (!) 145/74    BP Location  Left upper arm  Left upper arm    BP Method  Automatic  Automatic    Patient Position  Sitting  Sitting       Patient Observation    Observations  asymptomatic  asymptomatic    Row Name 11/06/19 1314 11/06/19 1331       Pain/Comfort/Sleep    Pain Charting Type  Pain Assessment  Post Activity    Preferred Pain Scale  number (Numeric Rating Pain Scale)  number (Numeric Rating Pain Scale)    Pain Body Location - Side  Bilateral  Bilateral    Pain Body Location - Orientation  incisional  incisional    Pain Body Location  knee  knee    Pain Rating (0-10): Rest  7  8    Pain Rating (0-10): Activity  7  8    Pain Management Interventions  premedicated for activity;relaxation techniques  promoted;position adjusted  premedicated for activity;relaxation techniques promoted;position adjusted       Vital Signs    Pulse  95  --    Heart Rate Source  Monitor  --    BP  (!) 141/66  --    BP Location  Left upper arm  --    BP Method  Automatic  --    Patient Position  Sitting  --    Row Name 11/06/19 1356 11/06/19 1408       Pain/Comfort/Sleep    Pain Charting Type  Pain Reassessment  Pain Assessment    Preferred Pain Scale  number (Numeric Rating Pain Scale)  --    Pain Body Location - Side  Bilateral  Bilateral    Pain Body Location - Orientation  incisional  incisional    Pain Body Location  knee  knee    Pain Rating (0-10): Rest  7  7    Pain Rating (0-10): Activity  7  --    Pain Management Interventions  premedicated for activity;relaxation techniques promoted;position adjusted  premedicated for activity       Vital Signs    Pulse  --  98    Heart Rate Source  --  Monitor    BP  --  (!) 141/67    BP Location  --  Left upper arm    BP Method  --  Automatic    Patient Position  --  Sitting    Row Name 11/06/19 1459          Pain/Comfort/Sleep    Pain Charting Type  Pain Assessment     Pain Body Location - Side  Bilateral     Pain Body Location - Orientation  incisional     Pain Body Location  knee     Pain Rating (0-10): Rest  7     Pain Management Interventions  premedicated for activity        Vital Signs    Pulse  100     Heart Rate Source  Monitor     BP  (!) 170/74     BP Location  Left upper arm     BP Method  Automatic     Patient Position  Sitting           Prior Living Environment      Most Recent Value   Lives With  spouse   Living Arrangements  house   Home Accessibility  stairs to enter home (Group), stairs within home (Group)   Living Environment Comment  2 SH, 3 GENE + 1 GENE without HR,  1st floor OH, FF to bed/bath   Number of Stairs, Main Entrance  4   Surface of Stairs, Main Entrance  concrete   Stair Railings, Main Entrance  none   Location, Patient Bedroom  second floor, must negotiate  stairs to access   Location, Bathroom  first (main) floor, second floor, must negotiate stairs to access   Bathroom Access Comment  [place] 1st floor, stnd height toilet,  full bath on 2nd c walk-in shower with a low threshold entry and built-in seat, no grab bars,  stnd height toilet   Number of Stairs, Within Home, Primary  other (see comments) [15]   Surface of Stairs, Within Home, Primary  carpeting, hardwood   Stair Railings, Within Home, Primary  railings on both sides of stairs, railing on left side (ascending)   Stairs Comment, Within Home, Primary  B rails for 12 steps & 3          Prior Level of Function      Most Recent Value   Dominant Hand  right   Ambulation  independent   Transferring  independent   Toileting  independent   Bathing  independent   Dressing  independent   Eating  independent   Communication  understands/communicates without difficulty   Prior Level of Function Comment  No DME   Equipment Currently Used at Home  none          IRF PT Evaluation and Treatment - 11/06/19 1408        Time Calculation    Start Time  1400     Stop Time  1500     Time Calculation (min)  60 min        Session Details    Document Type  daily treatment/progress note     Mode of Treatment  individual therapy;physical therapy        General Information    Existing Precautions/Restrictions  aspiration;fall;cardiac        Weight-Bearing Status    Left LE Weight-Bearing Status  weight-bearing as tolerated (WBAT)     Right LE Weight-Bearing Status  weight-bearing as tolerated (WBAT)        Range of Motion (ROM)    Knee, Left (ROM)  1-90     Knee, Right (ROM)  2-91        Bed Mobility    [place], Supine to Sit  close supervision     [place], Sit to Supine  close supervision     Comment (Bed Mobility)  On therapy mat        Transfers    Transfers  stand pivot/stand step transfer        Sit to Stand Transfer    [place], Sit to Stand Transfer  close supervision     Verbal Cues  safety;technique;hand placement      Assistive Device  walker, front-wheeled     Comment  from wc to RW        Stand to Sit Transfer    [place], Stand to Sit Transfer  close supervision     Assistive Device  walker, front-wheeled     Comment  to wc        Stand Pivot/Stand Step Transfer    [place], Stand Pivot/Stand Step Transfer  close supervision     Assistive Device  walker, front-wheeled     Comment  via ambulatory approach         Gait Training    [place], Gait  close supervision     Assistive Device  walker, front-wheeled     Distance in Feet  200 feet     Gait Pattern Utilized  step-through     Deviations/Abnormal Patterns (Gait)  antalgic;bilateral deviations;stride length decreased;base of support, wide     Bilateral Gait Deviations  heel strike decreased     Comment  200' (1x) and 150' (1x) with Cl-S for safety and balance        Lower Extremity (Therapeutic Exercise)    Exercise Position/Type (LE Therapeutic Exercise)  supine     Reps and Sets (LE Therapeutic Exercise)  3x10 reps     Comment (LE Therapeutic Exercise)  Supine: ankle pumps, SAQ over bolster, quad sets, glute sets, abduction/adduction wtih powder board, B bridges over bolster, and heel slides with strap and powder board        Daily Progress Summary (PT)    Daily Outcome Statement (PT)  Patient tolerated supine therex session well. Patient requires up to Cl-S for safety considerations with transfers and ambulation. Patient reports pain with all therex, but is motivated to improve ROM. Cont POC.            Pain Assessment/Intervention  Pain Charting Type: Pain Assessment                  IRF PT Goals      Most Recent Value   Bed Mobility Goal 1   Activity/Assistive Device  bed mobility activities, all at 11/05/2019 0731   [place]  modified independence at 11/05/2019 0731   Time Frame  5 - 7 days at 11/05/2019 0731   Strategies/Barriers  pain, decreased BLE strength at 11/05/2019 0731   Progress/Outcome  goal met, goal revised this date, good progress toward  goal at 11/05/2019 0731   Bed Mobility Goal 2   Activity/Assistive Device  rolling to right, rolling to left, supine to sit, sit to supine at 11/01/2019 1106   [place]  modified independence at 11/01/2019 1106   Time Frame  14 days or less at 11/01/2019 1106   Strategies/Barriers  pain and decreased BLE strength at 11/05/2019 0731   Progress/Outcome  good progress toward goal, goal ongoing at 11/05/2019 0731   Transfer Goal 1   Activity/Assistive Device  sit-to-stand/stand-to-sit, bed-to-chair/chair-to-bed, car transfer, walker, front-wheeled at 11/05/2019 0731   [place]  modified independence at 11/05/2019 0731   Time Frame  5 - 7 days at 11/05/2019 0731   Strategies/Barriers  decreased BLE strength, decreased balance at 11/05/2019 0731   Progress/Outcome  goal met, goal revised this date, good progress toward goal at 11/05/2019 0731   Transfer Goal 2   Activity/Assistive Device  sit-to-stand/stand-to-sit, bed-to-chair/chair-to-bed at 11/01/2019 1106   [place]  modified independence at 11/01/2019 1106   Time Frame  14 days or less at 11/01/2019 1106   Strategies/Barriers  decreased BLE strength, decreased balance & decreased ROM at 11/05/2019 0731   Progress/Outcome  goal ongoing, good progress toward goal, continuing progress toward goal at 11/05/2019 0731   Gait/Walking Locomotion Goal 1   Activity/Assistive Device  gait (walking locomotion), assistive device use at 11/05/2019 0731   Distance  200 feet at 11/05/2019 0731   [place]  modified independence at 11/05/2019 0731   Time Frame  5 - 7 days, short-term goal (STG) at 11/05/2019 0731   Strategies/Barriers  decreased BLE strength, decreased balance at 11/05/2019 0731   Progress/Outcome  goal met, goal revised this date, good progress toward goal at 11/05/2019 0731   Gait/Walking Locomotion Goal 2   Activity/Assistive Device  gait (walking locomotion), assistive device use, improve balance and speed, increase endurance/gait distance, walker,  front-wheeled at 11/01/2019 1106   Distance  200 feet at 11/01/2019 1106   [place]  modified independence at 11/01/2019 1106   Time Frame  14 days or less at 11/01/2019 1106   Strategies/Barriers  decreased BLE strength, decreased balance at 11/05/2019 0731   Progress/Outcome  good progress toward goal, goal ongoing at 11/05/2019 0731   Stairs Goal 1   Activity/Assistive Device  stairs, all skills, assistive device use, ascending stairs, descending stairs, using handrail, left, cane, straight at 11/05/2019 0731   Number of Stairs  15 at 11/05/2019 0731   [place]  supervision required, set-up required at 11/05/2019 0731   Time Frame  5 - 7 days at 11/05/2019 0731   Strategies/Barriers  decreased BLE strength, decreased balance at 11/05/2019 0731   Progress/Outcome  goal ongoing, good progress toward goal at 11/05/2019 0731   Stairs Goal 2   Activity/Assistive Device  assistive device use, stairs, all skills, ascending stairs, descending stairs, using handrail, left, cane, straight at 11/01/2019 1106   Number of Stairs  15 at 11/01/2019 1106   [place]  supervision required, set-up required at 11/01/2019 1106   Time Frame  14 days or less at 11/01/2019 1106   Strategies/Barriers  decreased BLE strength, decreased balance at 11/05/2019 0731   Progress/Outcome  good progress toward goal, goal ongoing at 11/05/2019 0731

## 2019-11-06 NOTE — PLAN OF CARE
Problem: Rehabilitation (IRF) Plan of Care  Goal: Plan of Care Review  Outcome: Progressing  Flowsheets (Taken 11/6/2019 0304)  Plan of Care Reviewed With: patient  IRF Plan of Care Review: progress ongoing, continue  Outcome Summary: No signs of distress noted. Pt currently sleeping in bed with call bell within reach.

## 2019-11-06 NOTE — PLAN OF CARE
Problem: Rehabilitation (IRF) Plan of Care  Goal: Plan of Care Review  Outcome: Progressing  Flowsheets (Taken 11/6/2019 1127)  Plan of Care Reviewed With: patient  IRF Plan of Care Review: progress ongoing, continue  Outcome Summary: patient remains free from falls, pain managed using multimodal approach, using call bell appropriately

## 2019-11-06 NOTE — PROGRESS NOTES
Patient: Chapincito Yoon  Location: [facility] [address]  MRN: [id number]  Today's date: 11/6/2019    Hospital Course  Chris is a 68 y.o. male admitted on 10/31/2019 with Status post bilateral knee replacements [Z96.653]. Principal problem is S/P TKR (total knee replacement), bilateral.    Chirs has a past medical history of Essential (primary) hypertension, Hyperlipidemia, unspecified, Impaired fasting glucose, Osteoarthritis, and Prostate cancer (CMS/[facility]) (2011).     Pt is a 68 year old male admitted to [facility] on 10/31/19 with h/o bilateral knee pain x 5-6 years. It has progressively worsened. Pt is now s/p bilateral TKA's on 10/29/19 and no complications post op.      Therapy Pain/Vitals     Row Name 11/06/19 0801 11/06/19 0853       Pain/Comfort/Sleep    Pain Charting Type  Pain Assessment  Pain Reassessment    Preferred Pain Scale  number (Numeric Rating Pain Scale)  number (Numeric Rating Pain Scale)    Pain Body Location - Side  Bilateral  Bilateral    Pain Body Location  knee  knee    Pain Rating (0-10): Rest  8  7    Pain Rating (0-10): Activity  8  7    Pain Management Interventions  premedicated for activity  position adjusted       Vital Signs    Pulse  (!) 103  (!) 101    BP  (!) 158/63  (!) 145/74    BP Location  Left upper arm  Left upper arm    BP Method  Automatic  Automatic    Patient Position  Sitting  Sitting       Patient Observation    Observations  asymptomatic  asymptomatic          Prior Living Environment      Most Recent Value   Lives With  spouse   Living Arrangements  house   Home Accessibility  stairs to enter home (Group), stairs within home (Group)   Living Environment Comment  2 SH, 3 GENE + 1 GENE without HR,  1st floor [place], FF to bed/bath   Number of Stairs, Main Entrance  4   Surface of Stairs, Main Entrance  concrete   Stair Railings, Main Entrance  none   Location, Patient Bedroom  second floor, must negotiate stairs to access   Location, Bathroom  first (main) floor,  second floor, must negotiate stairs to access   Bathroom Access Comment  PA 1st floor, stnd height toilet,  full bath on 2nd c walk-in shower with a low threshold entry and built-in seat, no grab bars,  stnd height toilet   Number of Stairs, Within Home, Primary  other (see comments) [15]   Surface of Stairs, Within Home, Primary  carpeting, hardwood   Stair Railings, Within Home, Primary  railings on both sides of stairs, railing on left side (ascending)   Stairs Comment, Within Home, Primary  B rails for 12 steps & 3          Prior Level of Function      Most Recent Value   Dominant Hand  right   Ambulation  independent   Transferring  independent   Toileting  independent   Bathing  independent   Dressing  independent   Eating  independent   Communication  understands/communicates without difficulty   Prior Level of Function Comment  No DME   Equipment Currently Used at Home  none          IRF PT Evaluation and Treatment - 11/06/19 0805        Time Calculation    Start Time  0759     Stop Time  0859     Time Calculation (min)  60 min        Session Details    Document Type  daily treatment/progress note     Mode of Treatment  physical therapy;individual therapy        General Information    Patient Profile Reviewed?  yes     Existing Precautions/Restrictions  aspiration;cardiac;fall        Weight-Bearing Status    Left LE Weight-Bearing Status  weight-bearing as tolerated (WBAT)     Right LE Weight-Bearing Status  weight-bearing as tolerated (WBAT)        Transfers    Transfers  stand pivot/stand step transfer        Sit to Stand Transfer    [place], Sit to Stand Transfer  close supervision     Verbal Cues  safety;hand placement     Assistive Device  walker, front-wheeled     Comment  for balance/safety with verbal cues for RW management & hand placement        Stand to Sit Transfer    [place], Stand to Sit Transfer  close supervision     Assistive Device  walker, front-wheeled     Comment  for  "balance/safety        Stand Pivot/Stand Step Transfer    [place], Stand Pivot/Stand Step Transfer  close supervision     Assistive Device  walker, front-wheeled     Comment  for balance/safety        Gait Training    [place], Gait  close supervision     Assistive Device  walker, front-wheeled     Distance in Feet  200 feet     Gait Pattern Utilized  step-through     Deviations/Abnormal Patterns (Gait)  antalgic;bilateral deviations;base of support, wide;gait speed decreased;willian decreased;stride length decreased     Bilateral Gait Deviations  heel strike decreased     Comment  200 feet x 2 with RW with Cl s for balance/safety        Curb Negotiation (Mobility)    [place]  minimum assist (75% or more patient effort)     Comment  min A for 6\" curb with RW with assist for steadying        Sloped Surface Gait Skills (Mobility)    [place]  close supervision     Comment  with RW for balance/safety & verbal cues for RW management        Stairs Training    [place], Stairs  minimum assist (75% patient effort)     Assistive Device  railing     Handrail Location  both sides;left side (ascending);right side (descending)     Number of Stairs  15     Stair Height  7 inches     Ascending Stairs Technique  step-to-step     Descending Stairs Technique  step-to-step     Comment  for balance with ascending & descending with side stepping on last 3 steps with BUE support to simulate  home setup        Lower Extremity (Therapeutic Exercise)    Exercise Position/Type (LE Therapeutic Exercise)  seated;AROM (active range of motion)     General Exercise (LE Therapeutic Exercise)  bilateral;LAQ (long arc quad);marching while seated     Range of Motion Exercises (LE Therapeutic Exercise)  ankle dorsiflexion/plantarflexion     Reps and Sets (LE Therapeutic Exercise)  3 sets of 10        Daily Progress Summary (PT)    Daily Outcome Statement (PT)  Pt continues to improve with gait mechanics with encouragement & able " to progress to full flight on stairwell. Recommending continuing knee ROM activities to increase safety with stair negotiation with pt hesitant to perform full knee flexion to lower.     Recommendations  PM mat program           Pain Assessment/Intervention  Pain Charting Type: Pain Reassessment             Education provided this session. See the Patient Education summary report for full details.    IRF PT Goals      Most Recent Value   Bed Mobility Goal 1   Activity/Assistive Device  bed mobility activities, all at 11/05/2019 0731   [place]  modified independence at 11/05/2019 0731   Time Frame  5 - 7 days at 11/05/2019 0731   Strategies/Barriers  pain, decreased BLE strength at 11/05/2019 0731   Progress/Outcome  goal met, goal revised this date, good progress toward goal at 11/05/2019 0731   Bed Mobility Goal 2   Activity/Assistive Device  rolling to right, rolling to left, supine to sit, sit to supine at 11/01/2019 1106   [place]  modified independence at 11/01/2019 1106   Time Frame  14 days or less at 11/01/2019 1106   Strategies/Barriers  pain and decreased BLE strength at 11/05/2019 0731   Progress/Outcome  good progress toward goal, goal ongoing at 11/05/2019 0731   Transfer Goal 1   Activity/Assistive Device  sit-to-stand/stand-to-sit, bed-to-chair/chair-to-bed, car transfer, walker, front-wheeled at 11/05/2019 0731   [place]  modified independence at 11/05/2019 0731   Time Frame  5 - 7 days at 11/05/2019 0731   Strategies/Barriers  decreased BLE strength, decreased balance at 11/05/2019 0731   Progress/Outcome  goal met, goal revised this date, good progress toward goal at 11/05/2019 0731   Transfer Goal 2   Activity/Assistive Device  sit-to-stand/stand-to-sit, bed-to-chair/chair-to-bed at 11/01/2019 1106   [place]  modified independence at 11/01/2019 1106   Time Frame  14 days or less at 11/01/2019 1106   Strategies/Barriers  decreased BLE strength, decreased balance & decreased ROM  at 11/05/2019 0731   Progress/Outcome  goal ongoing, good progress toward goal, continuing progress toward goal at 11/05/2019 0731   Gait/Walking Locomotion Goal 1   Activity/Assistive Device  gait (walking locomotion), assistive device use at 11/05/2019 0731   Distance  200 feet at 11/05/2019 0731   [place]  modified independence at 11/05/2019 0731   Time Frame  5 - 7 days, short-term goal (STG) at 11/05/2019 0731   Strategies/Barriers  decreased BLE strength, decreased balance at 11/05/2019 0731   Progress/Outcome  goal met, goal revised this date, good progress toward goal at 11/05/2019 0731   Gait/Walking Locomotion Goal 2   Activity/Assistive Device  gait (walking locomotion), assistive device use, improve balance and speed, increase endurance/gait distance, walker, front-wheeled at 11/01/2019 1106   Distance  200 feet at 11/01/2019 1106   [place]  modified independence at 11/01/2019 1106   Time Frame  14 days or less at 11/01/2019 1106   Strategies/Barriers  decreased BLE strength, decreased balance at 11/05/2019 0731   Progress/Outcome  good progress toward goal, goal ongoing at 11/05/2019 0731   Stairs Goal 1   Activity/Assistive Device  stairs, all skills, assistive device use, ascending stairs, descending stairs, using handrail, left, cane, straight at 11/05/2019 0731   Number of Stairs  15 at 11/05/2019 0731   [place]  supervision required, set-up required at 11/05/2019 0731   Time Frame  5 - 7 days at 11/05/2019 0731   Strategies/Barriers  decreased BLE strength, decreased balance at 11/05/2019 0731   Progress/Outcome  goal ongoing, good progress toward goal at 11/05/2019 0731   Stairs Goal 2   Activity/Assistive Device  assistive device use, stairs, all skills, ascending stairs, descending stairs, using handrail, left, cane, straight at 11/01/2019 1106   Number of Stairs  15 at 11/01/2019 1106   [place]  supervision required, set-up required at 11/01/2019 1106   Time Frame  14 days  or less at 11/01/2019 1106   Strategies/Barriers  decreased BLE strength, decreased balance at 11/05/2019 0731   Progress/Outcome  good progress toward goal, goal ongoing at 11/05/2019 0719

## 2019-11-06 NOTE — PLAN OF CARE
Problem: Rehabilitation (IRF) Plan of Care  Goal: Plan of Care Review  Outcome: Progressing  Flowsheets (Taken 11/6/2019 1647)  Plan of Care Reviewed With: patient; spouse  IRF Plan of Care Review: progress ongoing, continue  Outcome Summary: Family education est for 11/8/19. SCript sent for outpatient appointments at [facility]. Wife and patient aware that transportation is nto a gurantee from [facility] and family/friends will need to assist.

## 2019-11-06 NOTE — PROGRESS NOTES
Internal Medicine Progress Note      Patient seen and examined  Attestation Notes: Face to face encounter completed      HPI  Mr Car is a 69 yo M with hypertension, dyslipidemia, prostate CA, osteoarthritis who was evaluated as an outpatient for chronic progressive severe bilateral knee pain not alleviated by conservative measures; he was recommended bilateral total knee replacement  On 10/29, he was admitted to [facility], where he underwent bilateral total knee replacement secondary to osteoarthritis by Dr. Zheng  He utilized twice daily aspirin 81 mg for DVT prophylaxis  He continued on Diovan for blood pressure management  He was admitted to [facility] on 10/31 for acute inpatient rehabilitation    SUBJECTIVE  Feeling well this am, denies new complaints  Dressings removed from incisions yesterday- no drainage, erythema  Able to move bowels this am  No fever, orthostasis, CP, dyspnea      Review of Systems as above, otherwise without change      Medical History reviewed:  FH, SH without change since admission  Social History     Tobacco Use   • Smoking status: Former Smoker     Last attempt to quit: 1979     Years since quittin.8   • Smokeless tobacco: Never Used   • Tobacco comment: quit 40 years ago   Substance Use Topics   • Alcohol use: Yes     Alcohol/week: 3.0 standard drinks     Types: 3 Cans of beer per week     Frequency: 2-3 times a week     Drinks per session: 3 or 4     Binge frequency: Less than monthly   • Drug use: Never       PMH, PSH without change since admission      Current medications and allergies reviewed:  Allergies: Patient has no known allergies.    CURRENT MEDICATIONS:      • aspirin  81 mg oral BID   • atorvastatin  10 mg oral Daily (6p)   • docusate sodium  100 mg oral BID   • multivitamin  1 tablet oral Daily   • polyethylene glycol  17 g oral Daily   • senna  2 tablet oral Daily with lunch   • valsartan  80 mg oral Daily           Objective     Vitals:     11/05/19 1900 11/06/19 0742 11/06/19 0801 11/06/19 0853   BP: (!) 158/71 (!) 158/70 (!) 158/63 (!) 145/74   BP Location: Right upper arm Left upper arm Left upper arm Left upper arm   Patient Position: Sitting Sitting Sitting Sitting   Pulse: 91 81 (!) 103 (!) 101   Resp: 18 20     Temp: 37.1 °C (98.7 °F) 36.9 °C (98.5 °F)     TempSrc: Oral Oral     SpO2: 96% 97%     Weight:       Height:           Physical Exam   Constitutional: He is oriented to person, place, and time. He appears well-developed and well-nourished. No distress.   HENT:   Head: Normocephalic and atraumatic.   Mouth/Throat: Oropharynx is clear and moist.   Eyes: Pupils are equal, round, and reactive to light. Conjunctivae and EOM are normal. No scleral icterus.   Neck: Normal range of motion. Neck supple.   Cardiovascular: Normal rate, regular rhythm, normal heart sounds and intact distal pulses.   Pulmonary/Chest: Effort normal and breath sounds normal. No respiratory distress. He has no wheezes. He has no rales.   Abdominal: Soft. Bowel sounds are normal. He exhibits no distension. There is no tenderness.   Musculoskeletal: He exhibits edema (Bilateral lower extremity edema) and deformity (s/p bilateral TKR).   Neurological: He is alert and oriented to person, place, and time. No cranial nerve deficit.   Skin: Skin is warm and dry. He is not diaphoretic. No erythema.   Bilateral knee incisions c/d/i with dermabond   Psychiatric: He has a normal mood and affect.   Nursing note and vitals reviewed.       Labs   I have reviewed the patient's pertinent labs.  Significant abnormals are Anemia, hypoalbuminemia.  Lab results reviewed, discussed with patient:    Lab Results   Component Value Date    WBC 8.78 11/05/2019    HGB 10.6 (L) 11/05/2019    HCT 31.3 (L) 11/05/2019     (H) 11/05/2019     11/05/2019    K 4.6 11/05/2019     11/05/2019    CREATININE 0.8 11/05/2019    BUN 17 11/05/2019    CO2 27 11/05/2019    ALT 12 (L) 11/01/2019     AST 17 11/01/2019    INR 1.0 10/16/2019    HGBA1C 5.6 10/16/2019       Imaging    Bilateral lower extremity venous Doppler ultrasound 10/31:  No evidence of deep vein thrombus in either lower extremity  Fluid in right popliteal space likely represents a ruptured Baker's cyst but is not well evaluated on this vascular study        Assessment/Plan       Patient Active Problem List    Diagnosis Date Noted   • Anemia due to blood loss 10/31/2019   • Leukemoid reaction 10/31/2019   • S/P TKR (total knee replacement), bilateral 10/31/2019   • Status post bilateral knee replacements 10/31/2019   • Osteoarthrosis 10/29/2019   • Osteoarthritis of both knees 10/16/2019   • Counseling on health promotion and disease prevention 10/16/2019   • Hyperlipidemia, unspecified    • Essential (primary) hypertension        Mr Yoon is a 69 yo M with hypertension, dyslipidemia, prostate CA, osteoarthritis who is [facility] 10/31 from [facility] for acute inpatient rehabilitation s/p bilateral TKR by Dr. Zheng on 10/29     Ortho:  S/p B TKR-continue to assess in rehab  Continue current pain management regimen  Local wound care     Cardiovascular:  Essential hypertension-blood pressure 137-169/63-77 on Diovan  Maintain hold parameters and cardiac precautions     Pulmonary:  Encourage use of spirometry for atelectasis  No evidence of respiratory distress     Endocrine:  Dyslipidemia-continue statin therapy, low-fat diet  LFT 11/1 unremarkable     Renal:  BUN/Cr  17/0.8, GFR> 60  Monitor renal function, avoid nephrotoxins     GI:  Constipation-continue bowel program     :  Hx prostate CA  Monitor PVR, CIC PRN  Urinalysis 10/31 clear, leukocyte esterase negative, nitrite negative; culture not indicated at present     Heme:  Postoperative anemia-preop hemoglobin 14.2    Normochromic, normocytic  Hemoglobin stable, 10.6     F/E/N:  Regular/thin diet  Electrolytes stable, maintaining  hydration  With  Derm:  Local wound care     Prophylaxis:  -BID 81mg ASA, SCD for DVT prophylaxis; Doppler ultrasound 11/1 without evidence for DVT  -Spirometry for atelectasis  -Maintain fall, cardiac precautions     Disposition  -Expected discharge date 11/12  .  Shalini Torres MD  11/6/2019  10:40 AM

## 2019-11-06 NOTE — PROGRESS NOTES
"Subjective    Patient seen and examined on rounds.  Chart reviewed.  Events overnight noted.  History reviewed briefly with patient.     CC:  Deficits in mobility, transfers, self-care status post bilateral total knee replacements     HPI:  Mr. Yoon is a 68-year-old right-handed white male with chronic conditions significant for degenerative arthritis, hypertension, prostate cancer status post prostatectomy, dyslipidemia who had progressively worsening bilateral knee pain which failed conservative management and subsequently the patient underwent elective bilateral total knee replacements on 10/29/19 at [facility] by Dr. Ted Zheng.  Postoperatively, he is allowed weightbearing as tolerated on both lower extremities.  He is on Aspirin for deep vein thrombus prophylaxis and also on pneumatic compression boots. He has put on Oxycodone for pain control.  Postoperative course was significant for anemia, but he did not need transfusion. On 10/30/19, hemoglobin was 11.2, WBC count 7.65, BUN 13, creatinine 0.9.  He has been needing assistance for mobility, transfers, self-care postoperatively. He is transferred to [facility] on 10/31/19 for further rehabilitation care.       SUBJ: Discussed his progress in therapies with therapist in team meeting today.  Therapist indicated patient has been complaining of a lot of pain in his knees.  Discussed with patient and with his wife in the evening.  Again he does not want to go on OxyContin.  Will order Lidoderm patches.  Suggested using ice to the knees.  On scheduled Tylenol. He is on PRN oxycodone.     ROS: Denies chest pain or shortness of breath. Other ROS negative. Past, family, social history is unchanged.    Physical Exam      Blood pressure (!) 158/71, pulse 91, temperature 37.1 °C (98.7 °F), temperature source Oral, resp. rate 18, height 1.702 m (5' 7\"), weight 102 kg (225 lb 1.4 oz), SpO2 96 %.  Body mass index is 35.25 kg/m².    General " Appearance: Not in acute distress  Head/Ear/Nose/Throat: Normocephalic; Atraumatic.   Eye: EOMI; PERRL.   Neck: No JVD; No Bruits.   Respiratory: Decreased breath sounds at bases.   Cardiovascular: RRR; Normal S1, S2.   Gastrointestinal: Soft; NT; +BS.   Extremities: Bilateral lower extremity edema noted.  Healing incisions noted on anterior aspect of both knees.  Incisions open to air.  No drainage today.  Musculoskeletal: Functional active range of motion in both upper extremities.  Some limitation of active range of motion in both hips and knees, limited by pain.  Right knee PROM 0 to 93 degrees.  Left knee PROM 4 to 90 degrees.  Neurological: AAO ×3. Speech is fluent. Cranial nerve examination does not reveal any gross facial asymmetry. Strength testing about 4+/5 strength in both upper extremities.  Bilateral hip flexion is less than 3/5.  Bilateral quadriceps are less than 3/5.  Bilateral ankle dorsi and plantar flexion are 4/5.  He is grossly able to localize touch and position sense in great toes.  Deep tendon reflexes are hypoactive and symmetric bilaterally.  Plantars are flexor.  Coordination is functional upper extremities.  Both knee jerks were not tested.  Neurologically stable.  Behavior/Emotional: Appropriate; Cooperative.   Skin: No obvious rashes noted.  Bilateral knee incisions healing.  Incisions open to air.  No drainage today.      Current Facility-Administered Medications:   •  acetaminophen (TYLENOL) tablet 650 mg, 650 mg, oral, q4h PRN, Vernon Rob MD, 650 mg at 11/05/19 1137  •  alum-mag hydroxide-simeth (MAALOX) 200-200-20 mg/5 mL suspension 30 mL, 30 mL, oral, q4h PRN, Vernon Rob MD  •  aspirin enteric coated tablet 81 mg, 81 mg, oral, BID, Vernon Rob MD, 81 mg at 11/05/19 2007  •  atorvastatin (LIPITOR) tablet 10 mg, 10 mg, oral, Daily (6p), Vernon Rob MD, 10 mg at 11/05/19 1854  •  bisacodyl (DULCOLAX) 10 mg suppository 10 mg, 10 mg, rectal, Daily PRN,  Vernon Rob MD  •  docusate sodium (COLACE) capsule 100 mg, 100 mg, oral, BID, Vernon Rob MD, 100 mg at 11/05/19 2007  •  magnesium hydroxide (M.O.M.) 400 mg/5 mL suspension 30 mL, 30 mL, oral, Daily PRN, Vernon Rob MD, 30 mL at 11/03/19 1732  •  multivitamin tablet 1 tablet, 1 tablet, oral, Daily, Vernon oRb MD, 1 tablet at 11/05/19 0734  •  ondansetron ODT (ZOFRAN-ODT) disintegrating tablet 4 mg, 4 mg, oral, q6h PRN, Vernon Rob MD  •  oxyCODONE (ROXICODONE) immediate release tablet 5-10 mg, 5-10 mg, oral, q4h PRN, Vernon Rob MD, 10 mg at 11/05/19 2006  •  polyethylene glycol (MIRALAX) 17 gram packet 17 g, 17 g, oral, Daily, Shalini Torres MD, 17 g at 11/05/19 0735  •  senna (SENOKOT) tablet 2 tablet, 2 tablet, oral, Daily with lunch, Vernon Rob MD, 2 tablet at 11/05/19 1135  •  valsartan (DIOVAN) tablet 80 mg, 80 mg, oral, Daily, Vernon Rob MD, 80 mg at 11/05/19 0734       Labs / Radiology    Lab Results   Component Value Date    WBC 8.78 11/05/2019    HGB 10.6 (L) 11/05/2019    HCT 31.3 (L) 11/05/2019    MCV 95.4 11/05/2019     (H) 11/05/2019     Lab Results   Component Value Date    GLUCOSE 113 (H) 11/05/2019    CALCIUM 8.7 (L) 11/05/2019     11/05/2019    K 4.6 11/05/2019    CO2 27 11/05/2019     11/05/2019    BUN 17 11/05/2019    CREATININE 0.8 11/05/2019         Assessment and Plan    ASSESSMENT PLAN:  1. 68-year-old right-handed white male with chronic conditions significant for degenerative arthritis, hypertension, prostate cancer status post prostatectomy, dyslipidemia who had progressively worsening bilateral knee pain which failed conservative management and subsequently the patient underwent elective bilateral total knee replacements on 10/29/19 at [facility] by Dr. Ted Zheng.  Postoperatively, he is allowed weightbearing as tolerated on both lower extremities.  He is on Aspirin for deep vein thrombus  prophylaxis and also on pneumatic compression boots. He has put on Oxycodone for pain control.  Postoperative course was significant for anemia, but he did not need transfusion. On 10/30/19, hemoglobin was 11.2, WBC count 7.65, BUN 13, creatinine 0.9.  He has been needing assistance for mobility, transfers, self-care postoperatively. He is transferred to [facility] on 10/31/19 for further rehabilitation care.       2. DVT prophylaxis - on Aspirin.  On SCDs.    Platelets 224 on 11/1/2019.  More ambulatory now.  Platelets 377 on 11/5/2019.     3.  Orthopedics - status post bilateral total knee replacements.  Continue PT, OT.  Monitor healing of incisions.     4. GI - On Colace, Senokot.  On Dulcolax.  On Zofran ODT for nausea.  On when necessary MOM, Maalox.     5.  - voiding.  Denies dysuria.  Monitor post void residuals.     6.  Hypertension - on Diovan.     7. Pain - on when necessary Oxycodone.  On Tylenol PRN.  Ice to knees when necessary.  Pain medications are helping.  He does not want increased pain medications at this time.Therapist indicated patient has been complaining of a lot of pain in his knees.  Discussed with patient and with his wife in the evening.  Again he does not want to go on OxyContin.  Will order Lidoderm patches.  Suggested using ice to the knees.  On scheduled Tylenol. He is on PRN oxycodone.     8.  Hematology - monitor hemoglobin, platelets.  Anemia - on MVI.  Hemoglobin 10.7 on 11/1/2019.  Hemoglobin 10.6 on 11/5/9.     9.  Dyslipidemia - on Lipitor.     10. Rehabilitation medicine -Discussed in PCC. See PCC documentation.  Reviewed patients progress in therapies with therapists in team meeting today. Continue comprehensive rehabilitation care. Continue PT, OT.  We will follow falls precautions, cardiac precautions, monitor pulse oximetry in therapy.Therapist indicated patient has been complaining of a lot of pain in his knees.  Discussed with patient and with his wife in  the evening.  Again he does not want to go on OxyContin.  Will order Lidoderm patches.  Suggested using ice to the knees.  On scheduled Tylenol. He is on PRN oxycodone.     11. Reviewed labs today.  BUN 14, creatinine 1.0 on 11/1/2019.  BUN 17, creatinine 0.8 on 11/5/2019.        Vernon Rob MD  11/5/2019

## 2019-11-07 ENCOUNTER — APPOINTMENT (INPATIENT)
Dept: OCCUPATIONAL THERAPY | Facility: REHABILITATION | Age: 69
DRG: 560 | End: 2019-11-07
Payer: MEDICARE

## 2019-11-07 ENCOUNTER — APPOINTMENT (INPATIENT)
Dept: PHYSICAL THERAPY | Facility: REHABILITATION | Age: 69
DRG: 560 | End: 2019-11-07
Payer: MEDICARE

## 2019-11-07 PROCEDURE — 97530 THERAPEUTIC ACTIVITIES: CPT | Mod: GO

## 2019-11-07 PROCEDURE — 12800000 HC ROOM AND CARE SEMIPRIVATE REHAB

## 2019-11-07 PROCEDURE — 97116 GAIT TRAINING THERAPY: CPT | Mod: GP

## 2019-11-07 PROCEDURE — 97110 THERAPEUTIC EXERCISES: CPT | Mod: GP

## 2019-11-07 PROCEDURE — 97530 THERAPEUTIC ACTIVITIES: CPT | Mod: GP

## 2019-11-07 PROCEDURE — 63700000 HC SELF-ADMINISTRABLE DRUG: Performed by: PHYSICAL MEDICINE & REHABILITATION

## 2019-11-07 PROCEDURE — 97110 THERAPEUTIC EXERCISES: CPT | Mod: GO

## 2019-11-07 RX ADMIN — DOCUSATE SODIUM 100 MG: 100 CAPSULE, LIQUID FILLED ORAL at 08:34

## 2019-11-07 RX ADMIN — LIDOCAINE 2 PATCH: 246 PATCH TOPICAL at 08:36

## 2019-11-07 RX ADMIN — MULTIPLE VITAMINS W/ MINERALS TAB 1 TABLET: TAB at 08:34

## 2019-11-07 RX ADMIN — ASPIRIN 81 MG: 81 TABLET, COATED ORAL at 21:37

## 2019-11-07 RX ADMIN — ACETAMINOPHEN 650 MG: 325 TABLET, FILM COATED ORAL at 12:48

## 2019-11-07 RX ADMIN — ACETAMINOPHEN 650 MG: 325 TABLET, FILM COATED ORAL at 21:38

## 2019-11-07 RX ADMIN — ACETAMINOPHEN 650 MG: 325 TABLET, FILM COATED ORAL at 08:34

## 2019-11-07 RX ADMIN — OXYCODONE HYDROCHLORIDE 10 MG: 5 TABLET ORAL at 18:37

## 2019-11-07 RX ADMIN — OXYCODONE HYDROCHLORIDE 10 MG: 5 TABLET ORAL at 12:49

## 2019-11-07 RX ADMIN — ATORVASTATIN CALCIUM 10 MG: 10 TABLET, FILM COATED ORAL at 17:16

## 2019-11-07 RX ADMIN — OXYCODONE HYDROCHLORIDE 10 MG: 5 TABLET ORAL at 08:35

## 2019-11-07 RX ADMIN — ASPIRIN 81 MG: 81 TABLET, COATED ORAL at 08:34

## 2019-11-07 RX ADMIN — ACETAMINOPHEN 650 MG: 325 TABLET, FILM COATED ORAL at 17:15

## 2019-11-07 RX ADMIN — VALSARTAN 80 MG: 80 TABLET, FILM COATED ORAL at 08:34

## 2019-11-07 NOTE — PROGRESS NOTES
Patient: Chapincito Yoon  Location: [facility]  MRN: [id number]  Today's date: 11/7/2019    Hospital Course  Chris is a 68 y.o. male admitted on 10/31/2019 with Status post bilateral knee replacements [Z96.653]. Principal problem is S/P TKR (total knee replacement), bilateral.    Chris has a past medical history of Essential (primary) hypertension, Hyperlipidemia, unspecified, Impaired fasting glucose, Osteoarthritis, and Prostate cancer (CMS/[facility]) (2011).     Pt is a 68 year old male admitted to [facility] on 10/31/19 with h/o bilateral knee pain x 5-6 years. It has progressively worsened. Pt is now s/p bilateral TKA's on 10/29/19 and no complications post op.      Therapy Pain/Vitals     Row Name  11/07/19 1301       Pain/Comfort/Sleep    Pain Charting Type   Pain Assessment    Preferred Pain Scale   number (Numeric Rating Pain Scale)    Pain Body Location - Side   Bilateral    Pain Body Location - Orientation   incisional    Pain Body Location   knee    Pain Rating (0-10): Rest   8    Pain Rating (0-10): Activity   8    Pain Management Interventions   premedicated for activity       Vital Signs    Pulse   94    SpO2   97 %    BP   (!) 148/64    BP Location   Right upper arm    BP Method   Automatic    Patient Position   Sitting       Patient Observation    Observations   asymptomatic    Row Name 11/07/19 1341 11/07/19 1353       Pain/Comfort/Sleep    Pain Charting Type  Pain Reassessment  --    Preferred Pain Scale  number (Numeric Rating Pain Scale)  --    Pain Body Location - Side  Bilateral  --    Pain Body Location - Orientation  incisional  --    Pain Body Location  knee  --    Pain Rating (0-10): Activity  7  --    Pain Management Interventions  prescribed exercises encouraged  --       Vital Signs    Pulse  --  97    BP  --  (!) 150/63    BP Location  --  Right upper arm    BP Method  --  Automatic    Patient Position  --  Sitting       Patient Observation    Observations  --  asymptmoatic           Prior Living Environment      Most Recent Value   Lives With  spouse   Living Arrangements  house   Home Accessibility  stairs to enter home (Group), stairs within home (Group)   Living Environment Comment  2 SH, 3 GENE + 1 GENE without HR,  1st floor AR, FF to bed/bath   Number of Stairs, Main Entrance  4   Surface of Stairs, Main Entrance  concrete   Stair Railings, Main Entrance  none   Location, Patient Bedroom  second floor, must negotiate stairs to access   Location, Bathroom  first (main) floor, second floor, must negotiate stairs to access   Bathroom Access Comment  AR 1st floor, stnd height toilet,  full bath on 2nd c walk-in shower with a low threshold entry and built-in seat, no grab bars,  stnd height toilet   Number of Stairs, Within Home, Primary  other (see comments) [15]   Surface of Stairs, Within Home, Primary  carpeting, hardwood   Stair Railings, Within Home, Primary  railings on both sides of stairs, railing on left side (ascending)   Stairs Comment, Within Home, Primary  B rails for 12 steps & 3          Prior Level of Function      Most Recent Value   Dominant Hand  right   Ambulation  independent   Transferring  independent   Toileting  independent   Bathing  independent   Dressing  independent   Eating  independent   Communication  understands/communicates without difficulty   Prior Level of Function Comment  No DME   Equipment Currently Used at Home  none          IRF PT Evaluation and Treatment - 11/07/19 1315        Time Calculation    Start Time  1259     Stop Time  1359     Time Calculation (min)  60 min        Session Details    Document Type  daily treatment/progress note     Mode of Treatment  physical therapy;individual therapy        General Information    Patient Profile Reviewed?  yes     Existing Precautions/Restrictions  aspiration;cardiac;fall        Weight-Bearing Status    Left LE Weight-Bearing Status  weight-bearing as tolerated (WBAT)     Right LE Weight-Bearing  "Status  weight-bearing as tolerated (WBAT)        Range of Motion (ROM)    Knee, Left (ROM)  1-92 degrees     Knee, Right (ROM)  2-93 degrees        Bed Mobility    [place], Supine to Sit  supervision     [place]ange, Sit to Supine  supervision     Comment (Bed Mobility)  on therapy mat with wedge for HOB elevated        Transfers    Transfers  stand pivot/stand step transfer        Sit to Stand Transfer    [place], Sit to Stand Transfer  supervision     Assistive Device  walker, front-wheeled     Comment  for balance/safety        Stand to Sit Transfer    [place], Stand to Sit Transfer  supervision     Assistive Device  walker, front-wheeled     Comment  for balance/safety        Stand Pivot/Stand Step Transfer    [place], Stand Pivot/Stand Step Transfer  supervision     Assistive Device  walker, front-wheeled     Comment  for balance/safety        Gait Training    [place], Gait  close supervision     Assistive Device  walker, front-wheeled     Distance in Feet  200 feet     Gait Pattern Utilized  step-through     Deviations/Abnormal Patterns (Gait)  antalgic;base of support, wide     Bilateral Gait Deviations  heel strike decreased     Comment  S for 25 feet with RW to mat; Cl S/S 200' x 2 with RW for balance/safety        Curb Negotiation (Mobility)    [place]  minimum assist (75% or more patient effort)     Comment  up & down 2 - 6\" steps with RW with min A for balance ascending forward & descending retro        Lower Extremity (Therapeutic Exercise)    Exercise Position/Type (LE Therapeutic Exercise)  supine;AROM (active range of motion);AAROM (active assistive range of motion)     General Exercise (LE Therapeutic Exercise)  bilateral;SAQ (short arc quad);gluteal sets;heel slides     Range of Motion Exercises (LE Therapeutic Exercise)  bilateral;hip abduction/adduction;ankle dorsiflexion/plantarflexion     Reps and Sets (LE Therapeutic Exercise)  3 sets of 10     Comment (LE " Therapeutic Exercise)  improved time to complete        Daily Progress Summary (PT)    Daily Outcome Statement (PT)  Pt continues to show improvement in pain tolerance & ROM tolerance with decreased time to complete supine mat exercises. Pt encouraged by current progress & reviewed d/c goals, goals of family training tomorrow & d/c recommendations for HEP to be included in d/c paperwork.           Pain Assessment/Intervention  Pain Charting Type: Pain Reassessment             Education provided this session. See the Patient Education summary report for full details.    IRF PT Goals      Most Recent Value   Bed Mobility Goal 1   Activity/Assistive Device  bed mobility activities, all at 11/05/2019 0731   [place]  modified independence at 11/05/2019 0731   Time Frame  5 - 7 days at 11/05/2019 0731   Strategies/Barriers  pain, decreased BLE strength at 11/05/2019 0731   Progress/Outcome  goal met, goal revised this date, good progress toward goal at 11/05/2019 0731   Bed Mobility Goal 2   Activity/Assistive Device  rolling to right, rolling to left, supine to sit, sit to supine at 11/01/2019 1106   [place]  modified independence at 11/01/2019 1106   Time Frame  14 days or less at 11/01/2019 1106   Strategies/Barriers  pain and decreased BLE strength at 11/05/2019 0731   Progress/Outcome  good progress toward goal, goal ongoing at 11/05/2019 0731   Transfer Goal 1   Activity/Assistive Device  sit-to-stand/stand-to-sit, bed-to-chair/chair-to-bed, car transfer, walker, front-wheeled at 11/05/2019 0731   [place]  modified independence at 11/05/2019 0731   Time Frame  5 - 7 days at 11/05/2019 0731   Strategies/Barriers  decreased BLE strength, decreased balance at 11/05/2019 0731   Progress/Outcome  goal met, goal revised this date, good progress toward goal at 11/05/2019 0731   Transfer Goal 2   Activity/Assistive Device  sit-to-stand/stand-to-sit, bed-to-chair/chair-to-bed at 11/01/2019 1106   [place]   modified independence at 11/01/2019 1106   Time Frame  14 days or less at 11/01/2019 1106   Strategies/Barriers  decreased BLE strength, decreased balance & decreased ROM at 11/05/2019 0731   Progress/Outcome  goal ongoing, good progress toward goal, continuing progress toward goal at 11/05/2019 0731   Gait/Walking Locomotion Goal 1   Activity/Assistive Device  gait (walking locomotion), assistive device use at 11/05/2019 0731   Distance  200 feet at 11/05/2019 0731   [place]  modified independence at 11/05/2019 0731   Time Frame  5 - 7 days, short-term goal (STG) at 11/05/2019 0731   Strategies/Barriers  decreased BLE strength, decreased balance at 11/05/2019 0731   Progress/Outcome  goal met, goal revised this date, good progress toward goal at 11/05/2019 0731   Gait/Walking Locomotion Goal 2   Activity/Assistive Device  gait (walking locomotion), assistive device use, improve balance and speed, increase endurance/gait distance, walker, front-wheeled at 11/01/2019 1106   Distance  200 feet at 11/01/2019 1106   [place]  modified independence at 11/01/2019 1106   Time Frame  14 days or less at 11/01/2019 1106   Strategies/Barriers  decreased BLE strength, decreased balance at 11/05/2019 0731   Progress/Outcome  good progress toward goal, goal ongoing at 11/05/2019 0731   Stairs Goal 1   Activity/Assistive Device  stairs, all skills, assistive device use, ascending stairs, descending stairs, using handrail, left, cane, straight at 11/05/2019 0731   Number of Stairs  15 at 11/05/2019 0731   [place]  supervision required, set-up required at 11/05/2019 0731   Time Frame  5 - 7 days at 11/05/2019 0731   Strategies/Barriers  decreased BLE strength, decreased balance at 11/05/2019 0731   Progress/Outcome  goal ongoing, good progress toward goal at 11/05/2019 0731   Stairs Goal 2   Activity/Assistive Device  assistive device use, stairs, all skills, ascending stairs, descending stairs, using handrail, left,  cane, straight at 11/01/2019 1106   Number of Stairs  15 at 11/01/2019 1106   [place]  supervision required, set-up required at 11/01/2019 1106   Time Frame  14 days or less at 11/01/2019 1106   Strategies/Barriers  decreased BLE strength, decreased balance at 11/05/2019 0731   Progress/Outcome  good progress toward goal, goal ongoing at 11/05/2019 0731

## 2019-11-07 NOTE — PLAN OF CARE
Problem: Rehabilitation (IRF) Plan of Care  Goal: Plan of Care Review  Outcome: Progressing  Flowsheets (Taken 11/6/2019 1910)  Plan of Care Reviewed With: patient  IRF Plan of Care Review: progress ongoing, continue  Outcome Summary: Managing pain with prn oxycodone. This evening he reports his level is at an 8 despite using the oxycodone. Using ice and positionn as alternatives to medication for pain management.

## 2019-11-07 NOTE — PROGRESS NOTES
Patient: Chapincito Yoon  Location: [facility] [address]  MRN: [id number]  Today's date: 11/7/2019    Hospital Course  Chris is a 68 y.o. male admitted on 10/31/2019 with Status post bilateral knee replacements [Z96.653]. Principal problem is S/P TKR (total knee replacement), bilateral.    Chris has a past medical history of Essential (primary) hypertension, Hyperlipidemia, unspecified, Impaired fasting glucose, Osteoarthritis, and Prostate cancer (CMS/[facility]) (2011).     Pt is a 68 year old male admitted to [facility] on 10/31/19 with h/o bilateral knee pain x 5-6 years. It has progressively worsened. Pt is now s/p bilateral TKA's on 10/29/19 and no complications post op.      Therapy Pain/Vitals     Row Name 11/07/19 1112       Pain/Comfort/Sleep    Pain Charting Type  Pain Assessment    Preferred Pain Scale  number (Numeric Rating Pain Scale)    Pain Body Location - Side  Bilateral    Pain Body Location - Orientation  incisional    Pain Body Location  knee    Pain Rating (0-10): Rest  8    Pain Rating (0-10): Activity  8    Pain Management Interventions  premedicated for activity;relaxation techniques promoted;position adjusted       Vital Signs    Pulse  89    Heart Rate Source  Monitor    BP  (!) 148/64    BP Location  Right upper arm    BP Method  Automatic    Patient Position  Sitting    Row Name 11/07/19 1158          Pain Charting Type  Pain Assessment    Preferred Pain Scale  number (Numeric Rating Pain Scale)    Pain Body Location - Side  Bilateral    Pain Body Location - Orientation  incisional    Pain Body Location  knee    Pain Rating (0-10): Rest  8    Pain Rating (0-10): Activity  8    Pain Management Interventions  premedicated for activity;relaxation techniques promoted;position adjusted          Pulse  --    SpO2  --    BP  --    BP Location  --    BP Method  --    Patient Position  --          Observations  --       Prior Living Environment      Most Recent Value   Lives With  spouse    Living Arrangements  house   Home Accessibility  stairs to enter home (Group), stairs within home (Group)   Living Environment Comment  2 SH, 3 GENE + 1 GENE without HR,  1st floor [place], FF to bed/bath   Number of Stairs, Main Entrance  4   Surface of Stairs, Main Entrance  concrete   Stair Railings, Main Entrance  none   Location, Patient Bedroom  second floor, must negotiate stairs to access   Location, Bathroom  first (main) floor, second floor, must negotiate stairs to access   Bathroom Access Comment  [place] 1st floor, stnd height toilet,  full bath on 2nd c walk-in shower with a low threshold entry and built-in seat, no grab bars,  stnd height toilet   Number of Stairs, Within Home, Primary  other (see comments) [15]   Surface of Stairs, Within Home, Primary  carpeting, hardwood   Stair Railings, Within Home, Primary  railings on both sides of stairs, railing on left side (ascending)   Stairs Comment, Within Home, Primary  B rails for 12 steps & 3          Prior Level of Function      Most Recent Value   Dominant Hand  right   Ambulation  independent   Transferring  independent   Toileting  independent   Bathing  independent   Dressing  independent   Eating  independent   Communication  understands/communicates without difficulty   Prior Level of Function Comment  No DME   Equipment Currently Used at Home  none          IRF OT Evaluation and Treatment - 11/07/19 1101        Time Calculation    Start Time  1100     Stop Time  1200     Time Calculation (min)  60 min        Session Details    Document Type  daily treatment/progress note     Mode of Treatment  occupational therapy;individual therapy        General Information    Patient Profile Reviewed?  yes     General Observations of Patient  Pt approached seated in MWC     Existing Precautions/Restrictions  aspiration;cardiac;fall        Sit to Stand Transfer    [place], Sit to Stand Transfer  close supervision     Verbal Cues  safety;technique     Assistive  Device  walker, 4-wheeled     Comment  from  and standard kitchen chair c arm rests        Stand to Sit Transfer    [place], Stand to Sit Transfer  close supervision     Verbal Cues  safety;technique     Assistive Device  walker, front-wheeled     Comment  to WC and standard kitchen chair c arm rests        Stand Pivot/Stand Step Transfer    [place], Stand Pivot/Stand Step Transfer  close supervision     Verbal Cues  safety;technique     Assistive Device  walker, front-wheeled     Comment  amb approach to/from WC and standard kitchen chair c arm rests        Safety Issues, Functional Mobility    Comment, Safety Issues/Impairments (Mobility)  OT: HHD t/o simulated apartment over level tile and low pile carpeting        Upper Extremity (Therapeutic Exercise)    Exercise Position/Type (UE Therapeutic Exercise)  seated;resistive exercises     General Exercise (Upper Extremity Therapeutic Exercise)  bilateral;bicep curl;tricep extension;other (see comments)     Weight/Resistance (Upper Extremity Therapeutic Exercise)  4 pounds     Reps and Sets (Upper Extremity Therapeutic Exercise)  3 x 10     Comment (UE Therapeutic Exercise)  seated in [facility] completes bicep curls, front raises, and OH press to address UB strength and overall endurance. Completes OH tricep extensions bimanually        Lower Extremity (Therapeutic Exercise)    Exercise Position/Type (LE Therapeutic Exercise)  seated;AROM (active range of motion)     Range of Motion Exercises (LE Therapeutic Exercise)  bilateral;hip flexion/extension;knee flexion/extension;ankle dorsiflexion/plantarflexion     Reps and Sets (LE Therapeutic Exercise)  2 x 10     Comment (LE Therapeutic Exercise)  completes active warm-up prior to amb        Meal Preparation (IADLs)    Meal Preparation  obtaining items/supplies        Daily Progress Summary (OT)    Symptoms Noted During/After Treatment  fatigue;increased pain     Progress Toward Functional Goals (OT)  progressing  toward functional goals as expected     Daily Outcome Statement (OT)  Focus of session placed on functional amb specifically in kitchen for meal prep. Educated pt on sliding items on counter, using RW bag, and using a travel thermos/ziploc containers. Pt verbalizes understanding.           Pain Assessment/Intervention  Pain Charting Type: Pain Assessment             Education provided this session. See the Patient Education summary report for full details.    IRF OT Goals      Most Recent Value   Transfer Goal 1   Activity/Assistive Device  toilet, commode, 3-in-1, walker, front-wheeled at 11/01/2019 0905   [place]  other (see comments) at 11/05/2019 0715   Time Frame  short-term goal (STG), 1 week at 11/01/2019 0905   Strategies/Barriers  amb tx at CL S level at 11/05/2019 0715   Progress/Outcome  good progress toward goal, goal met, goal revised this date at 11/05/2019 0715   Transfer Goal 2   Activity/Assistive Device  toilet, commode, 3-in-1, walker, front-wheeled at 11/01/2019 0905   [place]  modified independence at 11/01/2019 0905   Time Frame  long-term goal (LTG), by discharge at 11/01/2019 0905   Strategies/Barriers  amb tx at 11/01/2019 0905   Progress/Outcome  good progress toward goal at 11/05/2019 0715   Transfer Goal 3   Activity/Assistive Device  walk-in shower, shower chair, walker, front-wheeled at 11/01/2019 0905   [place]  other (see comments) at 11/05/2019 0715   Time Frame  short-term goal (STG), 1 week at 11/01/2019 0905   Strategies/Barriers  amb tx at CL S level at 11/05/2019 0715   Progress/Outcome  good progress toward goal, goal met, goal revised this date at 11/05/2019 0715   Transfer Goal 4   Activity/Assistive Device  walk-in shower, shower chair, walker, front-wheeled at 11/01/2019 0905   [place]  modified independence at 11/01/2019 0905   Time Frame  long-term goal (LTG), by discharge at 11/01/2019 0905   Strategies/Barriers  amb tx, step-over at 11/01/2019 0905    Progress/Outcome  good progress toward goal at 11/05/2019 0715   Bathing Goal 1   Activity/Assistive Device  bathing skills, all, shower chair, long-handled sponge at 11/01/2019 0905   [place]  supervision required, set-up required at 11/01/2019 0905   Time Frame  short-term goal (STG), 1 week at 11/01/2019 0905   Strategies/Barriers  seated at 11/01/2019 0905   Progress/Outcome  good progress toward goal at 11/05/2019 0715   Bathing Goal 2   Activity/Assistive Device  bathing skills, all, shower chair, long-handled sponge at 11/01/2019 0905   [place]  modified independence at 11/01/2019 0905   Time Frame  long-term goal (LTG), by discharge at 11/01/2019 0905   Strategies/Barriers  seated/standing PRN at 11/01/2019 0905   Progress/Outcome  good progress toward goal at 11/05/2019 0715   UB Dressing Goal 1   Activity/Assistive Device  upper body dressing at 11/01/2019 0905   [place]  supervision required at 11/05/2019 0715   Time Frame  short-term goal (STG), 1 week at 11/01/2019 0905   Strategies/Barriers  amb level IR at 11/05/2019 0715   Progress/Outcome  good progress toward goal, goal met, goal revised this date at 11/05/2019 0715   UB Dressing Goal 2   Activity/Assistive Device  upper body dressing at 11/01/2019 0905   [place]  modified independence at 11/01/2019 0905   Time Frame  long-term goal (LTG), by discharge at 11/01/2019 0905   Progress/Outcome  good progress toward goal at 11/05/2019 0715   LB Dressing Goal 1   Activity/Assistive Device  lower body dressing, reacher, sock aid, long handled shoe horn at 11/01/2019 0905   [place]  minimum assist (75% or more patient effort) at 11/05/2019 0715   Time Frame  short-term goal (STG), 1 week at 11/01/2019 0905   Strategies/Barriers  with LRAD at 11/01/2019 0905   Progress/Outcome  good progress toward goal, goal met, goal revised this date at 11/05/2019 0715   LB Dressing Goal 2   Activity/Assistive Device  lower body dressing at  11/01/2019 0905   [place]  modified independence at 11/01/2019 0905   Time Frame  long-term goal (LTG), by discharge at 11/01/2019 0905   Strategies/Barriers  with LRAD at 11/01/2019 0905   Progress/Outcome  good progress toward goal at 11/05/2019 0715   Grooming Goal 1   Activity/Assistive Device  grooming skills, all at 11/01/2019 0905   [place]  supervision required at 11/01/2019 0905   Time Frame  short-term goal (STG), 1 week at 11/01/2019 0905   Strategies/Barriers  standing at sink at 11/01/2019 0905   Progress/Outcome  good progress toward goal at 11/05/2019 0715   Grooming Goal 2   Activity/Assistive Device  grooming skills, all at 11/01/2019 0905   [place]  modified independence at 11/01/2019 0905   Time Frame  long-term goal (LTG), by discharge at 11/01/2019 0905   Strategies/Barriers  seated/standing at 11/01/2019 0905   Progress/Outcome  good progress toward goal at 11/05/2019 0715   Toileting Goal 1   Activity/Assistive Device  toileting skills, all, commode chair at 11/01/2019 0905   [place]  supervision required at 11/01/2019 0905   Time Frame  short-term goal (STG), 1 week at 11/01/2019 0905   Progress/Outcome  good progress toward goal at 11/05/2019 0715   Toileting Goal 2   Activity/Assistive Device  toileting skills, all, commode chair at 11/01/2019 0905   [place]  modified independence at 11/01/2019 0905   Time Frame  long-term goal (LTG), by discharge at 11/01/2019 0905   Progress/Outcome  good progress toward goal at 11/05/2019 0715

## 2019-11-07 NOTE — PLAN OF CARE
Problem: Rehabilitation (IRF) Plan of Care  Goal: Plan of Care Review  Flowsheets (Taken 11/7/2019 7769)  Plan of Care Reviewed With: patient  IRF Plan of Care Review: progress ongoing, continue  Outcome Summary: Improved to Cl S/Min A for negotiation of 15 stairs.

## 2019-11-07 NOTE — PLAN OF CARE
Problem: Rehabilitation (IRF) Plan of Care  Goal: Plan of Care Review  Outcome: Progressing  Flowsheets (Taken 11/7/2019 1323)  Plan of Care Reviewed With: patient  IRF Plan of Care Review: progress ongoing, continue  Outcome Summary: Continuing to functionally progress and improving with range of motion with BLE knees.

## 2019-11-07 NOTE — PROGRESS NOTES
Patient: Chapincito Yoon  Location: [facility] [address]  MRN: [id number]  Today's date: 11/7/2019    Hospital Course  Chris is a 68 y.o. male admitted on 10/31/2019 with Status post bilateral knee replacements [Z96.653]. Principal problem is S/P TKR (total knee replacement), bilateral.    Chris has a past medical history of Essential (primary) hypertension, Hyperlipidemia, unspecified, Impaired fasting glucose, Osteoarthritis, and Prostate cancer (CMS/[facility]) (2011).     Pt is a 68 year old male admitted to [facility] on 10/31/19 with h/o bilateral knee pain x 5-6 years. It has progressively worsened. Pt is now s/p bilateral TKA's on 10/29/19 and no complications post op.         11/07/19 0906   Pain/Comfort/Sleep   Preferred Pain Scale number (Numeric Rating Pain Scale)   Pain Body Location - Side Bilateral   Pain Body Location knee   Pain Rating (0-10): Rest 7   Pain Rating (0-10): Activity 7   Pain Management Interventions premedicated for activity   Vital Signs   Heart Rate 90   BP (!) 147/70   BP Location Right upper arm   BP Method Automatic   Patient Position Sitting         Prior Living Environment      Most Recent Value   Lives With  spouse   Living Arrangements  house   Home Accessibility  stairs to enter home (Group), stairs within home (Group)   Living Environment Comment  2 SH, 3 GENE + 1 GENE without HR,  1st floor [place], FF to bed/bath   Number of Stairs, Main Entrance  4   Surface of Stairs, Main Entrance  concrete   Stair Railings, Main Entrance  none   Location, Patient Bedroom  second floor, must negotiate stairs to access   Location, Bathroom  first (main) floor, second floor, must negotiate stairs to access   Bathroom Access Comment  [place] 1st floor, stnd height toilet,  full bath on 2nd c walk-in shower with a low threshold entry and built-in seat, no grab bars,  stnd height toilet   Number of Stairs, Within Home, Primary  other (see comments) [15]   Surface of Stairs, Within Home, Primary   carpeting, hardwood   Stair Railings, Within Home, Primary  railings on both sides of stairs, railing on left side (ascending)   Stairs Comment, Within Home, Primary  B rails for 12 steps & 3          Prior Level of Function      Most Recent Value   Dominant Hand  right   Ambulation  independent   Transferring  independent   Toileting  independent   Bathing  independent   Dressing  independent   Eating  independent   Communication  understands/communicates without difficulty   Prior Level of Function Comment  No DME   Equipment Currently Used at Home  none          IRF PT Evaluation and Treatment - 11/07/19 0906        Time Calculation    Start Time  0900     Stop Time  1000     Time Calculation (min)  60 min        Session Details    Document Type  daily treatment/progress note     Mode of Treatment  physical therapy;individual therapy        Weight-Bearing Status    Left LE Weight-Bearing Status  weight-bearing as tolerated (WBAT)     Right LE Weight-Bearing Status  weight-bearing as tolerated (WBAT)        Sit to Stand Transfer    [place], Sit to Stand Transfer  close supervision;verbal cues     Verbal Cues  hand placement;safety     Assistive Device  walker, front-wheeled        Stand to Sit Transfer    [place], Stand to Sit Transfer  close supervision;verbal cues     Verbal Cues  hand placement;safety     Assistive Device  walker, front-wheeled        Stand Pivot/Stand Step Transfer    [place], Stand Pivot/Stand Step Transfer  close supervision;verbal cues     Verbal Cues  proper use of assistive device;safety     Assistive Device  walker, front-wheeled     Comment  via ambulatory approach        Gait Training    [place], Gait  close supervision;verbal cues     Assistive Device  walker, front-wheeled     Distance in Feet  200 feet    x3    Deviations/Abnormal Patterns (Gait)  antalgic;base of support, wide;gait speed decreased;stride length decreased     Bilateral Gait Deviations  heel  "strike decreased     Comment  VC for upright posture and safety        Curb Negotiation (Mobility)    [place]  minimum assist (75% or more patient effort);verbal cues     Comment  up/down 6\" curb with RW and Min A, VC for safety and sequencing        Sloped Surface Gait Skills (Mobility)    [place]  close supervision;verbal cues     Comment  up/down 5' ramp with RW, VC for safety        Stairs Training    [place], Stairs  close supervision;minimum assist (75% patient effort);verbal cues     Assistive Device  railing     Handrail Location  both sides;left side (ascending);right side (descending)     Number of Stairs  15     Stair Height  6 inches     Ascending Stairs Technique  step-to-step    RLE leading    Descending Stairs Technique  step-to-step    LLE leading    Comment  12 stairs with BHR, 3 steps with BUE on unilateral handrail to simulate home set-up, Cl S/Min A with VC for sequencing and safety throughout        Lower Extremity (Therapeutic Exercise)    Comment (LE Therapeutic Exercise)  Seated hip flexion, LAQ, and ankle pumps x30 each per LE        Daily Progress Summary (PT)    Daily Outcome Statement (PT)  Patient progressing to Cl S/Min A for stairs this session. Improving tolerance for gait as session progressed. Continue POC to maximize functional gains.              IRF PT Goals      Most Recent Value   Bed Mobility Goal 1   Activity/Assistive Device  bed mobility activities, all at 11/05/2019 0731   [place]  modified independence at 11/05/2019 0731   Time Frame  5 - 7 days at 11/05/2019 0731   Strategies/Barriers  pain, decreased BLE strength at 11/05/2019 0731   Progress/Outcome  goal met, goal revised this date, good progress toward goal at 11/05/2019 0731   Bed Mobility Goal 2   Activity/Assistive Device  rolling to right, rolling to left, supine to sit, sit to supine at 11/01/2019 1106   [place]  modified independence at 11/01/2019 1106   Time Frame  14 days or less at " 11/01/2019 1106   Strategies/Barriers  pain and decreased BLE strength at 11/05/2019 0731   Progress/Outcome  good progress toward goal, goal ongoing at 11/05/2019 0731   Transfer Goal 1   Activity/Assistive Device  sit-to-stand/stand-to-sit, bed-to-chair/chair-to-bed, car transfer, walker, front-wheeled at 11/05/2019 0731   [place]  modified independence at 11/05/2019 0731   Time Frame  5 - 7 days at 11/05/2019 0731   Strategies/Barriers  decreased BLE strength, decreased balance at 11/05/2019 0731   Progress/Outcome  goal met, goal revised this date, good progress toward goal at 11/05/2019 0731   Transfer Goal 2   Activity/Assistive Device  sit-to-stand/stand-to-sit, bed-to-chair/chair-to-bed at 11/01/2019 1106   [place]  modified independence at 11/01/2019 1106   Time Frame  14 days or less at 11/01/2019 1106   Strategies/Barriers  decreased BLE strength, decreased balance & decreased ROM at 11/05/2019 0731   Progress/Outcome  goal ongoing, good progress toward goal, continuing progress toward goal at 11/05/2019 0731   Gait/Walking Locomotion Goal 1   Activity/Assistive Device  gait (walking locomotion), assistive device use at 11/05/2019 0731   Distance  200 feet at 11/05/2019 0731   [place]  modified independence at 11/05/2019 0731   Time Frame  5 - 7 days, short-term goal (STG) at 11/05/2019 0731   Strategies/Barriers  decreased BLE strength, decreased balance at 11/05/2019 0731   Progress/Outcome  goal met, goal revised this date, good progress toward goal at 11/05/2019 0731   Gait/Walking Locomotion Goal 2   Activity/Assistive Device  gait (walking locomotion), assistive device use, improve balance and speed, increase endurance/gait distance, walker, front-wheeled at 11/01/2019 1106   Distance  200 feet at 11/01/2019 1106   [place]  modified independence at 11/01/2019 1106   Time Frame  14 days or less at 11/01/2019 1106   Strategies/Barriers  decreased BLE strength, decreased balance at  11/05/2019 0731   Progress/Outcome  good progress toward goal, goal ongoing at 11/05/2019 0731   Stairs Goal 1   Activity/Assistive Device  stairs, all skills, assistive device use, ascending stairs, descending stairs, using handrail, left, cane, straight at 11/05/2019 0731   Number of Stairs  15 at 11/05/2019 0731   [place]  supervision required, set-up required at 11/05/2019 0731   Time Frame  5 - 7 days at 11/05/2019 0731   Strategies/Barriers  decreased BLE strength, decreased balance at 11/05/2019 0731   Progress/Outcome  goal ongoing, good progress toward goal at 11/05/2019 0731   Stairs Goal 2   Activity/Assistive Device  assistive device use, stairs, all skills, ascending stairs, descending stairs, using handrail, left, cane, straight at 11/01/2019 1106   Number of Stairs  15 at 11/01/2019 1106   [place]  supervision required, set-up required at 11/01/2019 1106   Time Frame  14 days or less at 11/01/2019 1106   Strategies/Barriers  decreased BLE strength, decreased balance at 11/05/2019 0731   Progress/Outcome  good progress toward goal, goal ongoing at 11/05/2019 0731

## 2019-11-07 NOTE — OP NOTE
[facility]     OPERATIVE REPORT     PATIENT NAME:  LIZA JOHNSON  MR#:    [id number]  VISIT #:   [id number]  :    1950  ADMIT DATE:   10/29/2019  PROCEDURE DATE: 10/29/2019  ROOM NUMBER:    OPERATION:    RIGHT TOTAL KNEE ARTHROPLASTY   PREOPERATIVE DIAGNOSIS: Primary osteoarthritis right knee  POSTOPERATIVE DIAGNOSIS: Primary osteoarthritis right knee   ANESTHESIA: Spinal   SURGEON: Ted Zheng M.D.   ASSISTANT:  JENNIFER Douglass     EBL:   Specimens: If required by hospital rules, resected tissue was sent for routine analysis.     PROCEDURE:  After the induction of anesthesia, the patient was routinely prepped and draped for total knee arthroplasty.  Dr. Ted Zheng performed and/or supervised the key portions of the surgical procedure including bone preparation, insertion of the provisional and final components and assessment of range of motion, joint stability, and leg length.  The right knee joint was exposed and severe joint deterioration was noted. The soft tissues about the knee were prepared to facilitate knee arthroplasty.  The proximal tibia was shaped to accept a 11T x 12 tibial component.  The distal femur was shaped to accept a 9F femoral component   After evaluation of the patella, no resurfacing was performed.  All cuts were checked using cutting and alignment guides.  The knee was felt to be well aligned and stable throughout the range of motion after insertion of the trial and final knee components. The bony surfaces were cleaned prior to final component implantation with pulsatile lavage. Cemented components, if utilized,  were inserted using pressurized vacuum-mixed cement.  Uncemented components, if utilized, were press fitted and rigid stability was achieved. A sterile dressing as applied.      [employer] components were utilized for this procedure.    I performed and/or supervised the key portions of the surgical procedure including an  evaluation of the bone cuts, reshaping of the bony elements, insertion of the provisional and final components, and assessment of range of motion and stability.    A surgical assistant was required during this procedure. This assistant facilitated the safe and competent performance of the surgical operation. The assistant functions included retraction, vascular coagulation, removal of resected bone elements, suction, wound irrigation, and wound closure. The presence of this assistant during the procedure was essential.

## 2019-11-07 NOTE — PROGRESS NOTES
"Subjective    Patient seen and examined on rounds.  Chart reviewed.  Events overnight noted.  History reviewed briefly with patient.     CC:  Deficits in mobility, transfers, self-care status post bilateral total knee replacements     HPI:  Mr. Yoon is a 68-year-old right-handed white male with chronic conditions significant for degenerative arthritis, hypertension, prostate cancer status post prostatectomy, dyslipidemia who had progressively worsening bilateral knee pain which failed conservative management and subsequently the patient underwent elective bilateral total knee replacements on 10/29/19 at [facility] by Dr. Ted Zheng.  Postoperatively, he is allowed weightbearing as tolerated on both lower extremities.  He is on Aspirin for deep vein thrombus prophylaxis and also on pneumatic compression boots. He has put on Oxycodone for pain control.  Postoperative course was significant for anemia, but he did not need transfusion. On 10/30/19, hemoglobin was 11.2, WBC count 7.65, BUN 13, creatinine 0.9.  He has been needing assistance for mobility, transfers, self-care postoperatively. He is transferred to [facility] on 10/31/19 for further rehabilitation care.       SUBJ: Discussed recommendations of PCC with patient.  He has soreness in his knees.  Tylenol and Lidoderm patches were added.  On oxycodone as needed.  He does not want to go on long-acting pain meds.       ROS: Denies chest pain or shortness of breath. Other ROS negative. Past, family, social history is unchanged.    Physical Exam      Blood pressure (!) 141/65, pulse 87, temperature 36.4 °C (97.5 °F), temperature source Oral, resp. rate 18, height 1.702 m (5' 7\"), weight 102 kg (225 lb 1.4 oz), SpO2 96 %.  Body mass index is 35.25 kg/m².    General Appearance: Not in acute distress  Head/Ear/Nose/Throat: Normocephalic; Atraumatic.   Eye: EOMI; PERRL.   Neck: No JVD; No Bruits.   Respiratory: Decreased breath sounds at bases. "   Cardiovascular: RRR; Normal S1, S2.   Gastrointestinal: Soft; NT; +BS.   Extremities: Bilateral lower extremity edema noted.  Healing incisions noted on anterior aspect of both knees.  Incisions open to air.  No drainage today.  Musculoskeletal: Functional active range of motion in both upper extremities.  Some limitation of active range of motion in both hips and knees, limited by pain.  Right knee PROM 0 to 93 degrees.  Left knee PROM 4 to 90 degrees.  Neurological: AAO ×3. Speech is fluent. Cranial nerve examination does not reveal any gross facial asymmetry. Strength testing about 4+/5 strength in both upper extremities.  Bilateral hip flexion is less than 3/5.  Bilateral quadriceps are less than 3/5.  Bilateral ankle dorsi and plantar flexion are 4/5.  He is grossly able to localize touch and position sense in great toes.  Deep tendon reflexes are hypoactive and symmetric bilaterally.  Plantars are flexor.  Coordination is functional upper extremities.  Both knee jerks were not tested.  Neurologically unchanged  Behavior/Emotional: Appropriate; Cooperative.   Skin: No obvious rashes noted.  Bilateral knee incisions healing.  Incisions open to air.  No drainage today.      Current Facility-Administered Medications:   •  acetaminophen (TYLENOL) tablet 650 mg, 650 mg, oral, q4h PRN, Vernon Rob MD, 650 mg at 11/06/19 0751  •  [START ON 11/7/2019] acetaminophen (TYLENOL) tablet 650 mg, 650 mg, oral, With meals & nightly, Vernon Rob MD  •  alum-mag hydroxide-simeth (MAALOX) 200-200-20 mg/5 mL suspension 30 mL, 30 mL, oral, q4h PRN, Vernon Rob MD  •  aspirin enteric coated tablet 81 mg, 81 mg, oral, BID, Vernon Rob MD, 81 mg at 11/06/19 2117  •  atorvastatin (LIPITOR) tablet 10 mg, 10 mg, oral, Daily (6p), Vernon Rob MD, 10 mg at 11/06/19 1855  •  bisacodyl (DULCOLAX) 10 mg suppository 10 mg, 10 mg, rectal, Daily PRN, Vernon Rob MD  •  docusate sodium (COLACE)  capsule 100 mg, 100 mg, oral, BID, Vernon Rob MD, 100 mg at 11/06/19 2117  •  [START ON 11/7/2019] lidocaine (ASPERCREME) 4 % topical patch 2 patch, 2 patch, Topical, Daily, Vernon Rob MD  •  magnesium hydroxide (M.O.M.) 400 mg/5 mL suspension 30 mL, 30 mL, oral, Daily PRN, Vernon Rbo MD, 30 mL at 11/03/19 1732  •  multivitamin tablet 1 tablet, 1 tablet, oral, Daily, Vernon Rob MD, 1 tablet at 11/06/19 0754  •  ondansetron ODT (ZOFRAN-ODT) disintegrating tablet 4 mg, 4 mg, oral, q6h PRN, Vernon Rob MD  •  oxyCODONE (ROXICODONE) immediate release tablet 5-10 mg, 5-10 mg, oral, q4h PRN, Vernon Rob MD, 10 mg at 11/06/19 1636  •  polyethylene glycol (MIRALAX) 17 gram packet 17 g, 17 g, oral, Daily, Shalini Torres MD, 17 g at 11/05/19 0735  •  senna (SENOKOT) tablet 2 tablet, 2 tablet, oral, Daily with lunch, Vernon Rob MD, 2 tablet at 11/06/19 1218  •  valsartan (DIOVAN) tablet 80 mg, 80 mg, oral, Daily, Vernon Rob MD, 80 mg at 11/06/19 0750       Labs / Radiology    Lab Results   Component Value Date    WBC 8.78 11/05/2019    HGB 10.6 (L) 11/05/2019    HCT 31.3 (L) 11/05/2019    MCV 95.4 11/05/2019     (H) 11/05/2019     Lab Results   Component Value Date    GLUCOSE 113 (H) 11/05/2019    CALCIUM 8.7 (L) 11/05/2019     11/05/2019    K 4.6 11/05/2019    CO2 27 11/05/2019     11/05/2019    BUN 17 11/05/2019    CREATININE 0.8 11/05/2019         Assessment and Plan    ASSESSMENT PLAN:  1. 68-year-old right-handed white male with chronic conditions significant for degenerative arthritis, hypertension, prostate cancer status post prostatectomy, dyslipidemia who had progressively worsening bilateral knee pain which failed conservative management and subsequently the patient underwent elective bilateral total knee replacements on 10/29/19 at [facility] by Dr. Ted Zheng.  Postoperatively, he is allowed weightbearing as tolerated  on both lower extremities.  He is on Aspirin for deep vein thrombus prophylaxis and also on pneumatic compression boots. He has put on Oxycodone for pain control.  Postoperative course was significant for anemia, but he did not need transfusion. On 10/30/19, hemoglobin was 11.2, WBC count 7.65, BUN 13, creatinine 0.9.  He has been needing assistance for mobility, transfers, self-care postoperatively. He is transferred to [facility] on 10/31/19 for further rehabilitation care.       2. DVT prophylaxis - on Aspirin.  On SCDs.    Platelets 224 on 11/1/2019.  More ambulatory now.  Platelets 377 on 11/5/2019.     3.  Orthopedics - status post bilateral total knee replacements.  Continue PT, OT.  Monitor healing of incisions.     4. GI - On Colace, Senokot.  On Dulcolax.  On Zofran ODT for nausea.  On when necessary MOM, Maalox.     5.  - voiding.  Denies dysuria.  Monitor post void residuals.     6.  Hypertension - on Diovan.     7. Pain - on when necessary Oxycodone.  On Tylenol PRN.  Ice to knees when necessary.  Pain medications are helping.  He does not want increased pain medications at this time.Therapist indicated patient has been complaining of a lot of pain in his knees.  Discussed with patient and with his wife in the evening.  Again he does not want to go on OxyContin.  Will order Lidoderm patches.  Suggested using ice to the knees.  On scheduled Tylenol. He is on PRN oxycodone. He has soreness in his knees.  Tylenol and Lidoderm patches were added.  On oxycodone as needed.  He does not want to go on long-acting pain meds.       8.  Hematology - monitor hemoglobin, platelets.  Anemia - on MVI.  Hemoglobin 10.7 on 11/1/2019.  Hemoglobin 10.6 on 11/5/9.     9.  Dyslipidemia - on Lipitor.     10. Rehabilitation medicine -Discussed recommendations of PCC with patient.  Continue comprehensive rehabilitation care. Continue PT, OT.  We will follow falls precautions, cardiac precautions, monitor pulse  oximetry in therapy.Therapist indicated patient has been complaining of a lot of pain in his knees.  Discussed with patient and with his wife in the evening.  Again he does not want to go on OxyContin.  Will order Lidoderm patches.  Suggested using ice to the knees.  On scheduled Tylenol. He is on PRN oxycodone. He has soreness in his knees.  Tylenol and Lidoderm patches were added.  On oxycodone as needed.  He does not want to go on long-acting pain meds.      11. Reviewed labs today.  BUN 14, creatinine 1.0 on 11/1/2019.  BUN 17, creatinine 0.8 on 11/5/2019.        Vernon Rob MD  11/6/2019

## 2019-11-07 NOTE — PROGRESS NOTES
Internal Medicine Progress Note      Patient seen and examined  Attestation Notes: Face to face encounter completed      HPI  Mr Car is a 69 yo M with hypertension, dyslipidemia, prostate CA, osteoarthritis who was evaluated as an outpatient for chronic progressive severe bilateral knee pain not alleviated by conservative measures; he was recommended bilateral total knee replacement  On 10/29, he was admitted to [facility], where he underwent bilateral total knee replacement secondary to osteoarthritis by Dr. Zheng  He utilized twice daily aspirin 81 mg for DVT prophylaxis  He continued on Diovan for blood pressure management  He was admitted to [facility] on 10/31 for acute inpatient rehabilitation    SUBJECTIVE  Bright affect today, though did report some increasing knee pain yesterday  Seeing significant improvement in lower extremity edema  Incisions remain clean, dry, intact, without evidence of drainage  No further reported constipation, moving bowels well  Denies fever, headache, orthostasis, chest pain, dyspnea      Review of Systems as above, otherwise without change      Medical History reviewed:  FH, SH without change since admission  Social History     Tobacco Use   • Smoking status: Former Smoker     Last attempt to quit: 1979     Years since quittin.8   • Smokeless tobacco: Never Used   • Tobacco comment: quit 40 years ago   Substance Use Topics   • Alcohol use: Yes     Alcohol/week: 3.0 standard drinks     Types: 3 Cans of beer per week     Frequency: 2-3 times a week     Drinks per session: 3 or 4     Binge frequency: Less than monthly   • Drug use: Never       PMH, PSH without change since admission      Current medications and allergies reviewed:  Allergies: Patient has no known allergies.    CURRENT MEDICATIONS:      • acetaminophen  650 mg oral With meals & nightly   • aspirin  81 mg oral BID   • atorvastatin  10 mg oral Daily (6p)   • docusate sodium  100 mg oral BID    • lidocaine  2 patch Topical Daily   • multivitamin  1 tablet oral Daily   • polyethylene glycol  17 g oral Daily   • senna  2 tablet oral Daily with lunch   • valsartan  80 mg oral Daily           Objective     Vitals:    11/06/19 1957 11/06/19 2338 11/07/19 0709 11/07/19 0906   BP: (!) 141/65  (!) 156/70 (!) 147/70   BP Location: Left upper arm  Left upper arm Right upper arm   Patient Position: Lying  Lying Sitting   Pulse: 87  78 90   Resp: 18  20    Temp: 36.4 °C (97.5 °F)  36.6 °C (97.8 °F)    TempSrc: Oral  Oral    SpO2: 96% 94% 93%    Weight:       Height:           Physical Exam   Constitutional: He is oriented to person, place, and time. He appears well-developed and well-nourished. No distress.   HENT:   Head: Normocephalic and atraumatic.   Mouth/Throat: Oropharynx is clear and moist.   Eyes: Pupils are equal, round, and reactive to light. Conjunctivae and EOM are normal. No scleral icterus.   Neck: Normal range of motion. Neck supple.   Cardiovascular: Normal rate, regular rhythm, normal heart sounds and intact distal pulses.   Pulmonary/Chest: Effort normal and breath sounds normal. No respiratory distress. He has no wheezes. He has no rales.   Abdominal: Soft. Bowel sounds are normal. He exhibits no distension. There is no tenderness.   Musculoskeletal: He exhibits edema (Bilateral lower extremity edema) and deformity (s/p bilateral TKR).   Neurological: He is alert and oriented to person, place, and time. No cranial nerve deficit.   Skin: Skin is warm and dry. He is not diaphoretic. No erythema.   Bilateral knee incisions c/d/i with dermabond   Psychiatric: He has a normal mood and affect.   Nursing note and vitals reviewed.       Labs   I have reviewed the patient's pertinent labs.  Significant abnormals are Anemia, hypoalbuminemia.  Lab results reviewed, discussed with patient:    Lab Results   Component Value Date    WBC 8.78 11/05/2019    HGB 10.6 (L) 11/05/2019    HCT 31.3 (L) 11/05/2019      (H) 11/05/2019     11/05/2019    K 4.6 11/05/2019     11/05/2019    CREATININE 0.8 11/05/2019    BUN 17 11/05/2019    CO2 27 11/05/2019    ALT 12 (L) 11/01/2019    AST 17 11/01/2019    INR 1.0 10/16/2019    HGBA1C 5.6 10/16/2019       Imaging    Bilateral lower extremity venous Doppler ultrasound 10/31:  No evidence of deep vein thrombus in either lower extremity  Fluid in right popliteal space likely represents a ruptured Baker's cyst but is not well evaluated on this vascular study        Assessment/Plan       Patient Active Problem List    Diagnosis Date Noted   • Anemia due to blood loss 10/31/2019   • Leukemoid reaction 10/31/2019   • S/P TKR (total knee replacement), bilateral 10/31/2019   • Status post bilateral knee replacements 10/31/2019   • Osteoarthrosis 10/29/2019   • Osteoarthritis of both knees 10/16/2019   • Counseling on health promotion and disease prevention 10/16/2019   • Hyperlipidemia, unspecified    • Essential (primary) hypertension        Mr Yoon is a 69 yo M with hypertension, dyslipidemia, prostate CA, osteoarthritis who is admitted to [facility] on 10/31 from [facility] for acute inpatient rehabilitation s/p bilateral TKR by Dr. Zheng on 10/29     Ortho:  S/p B TKR-continue rehab  Continue current pain management regimen  Local wound care     Cardiovascular:  Essential hypertension-blood pressure 129-170/58-74 on Diovan  Patient reports discrepancy in blood pressure values on Dynabac versus manual monitoring, as well as discrepancy in arms-will request manual only monitoring  Maintain hold parameters and cardiac precautions     Pulmonary:  Encourage use of spirometry for atelectasis  No evidence of respiratory distress     Endocrine:  Dyslipidemia-continue statin therapy, low-fat diet  LFT 11/1 unremarkable     Renal:  BUN/Cr  17/0.8, GFR> 60  Monitor renal function, avoid nephrotoxins     GI:  Constipation-continue bowel program     :  Hx  prostate CA  Monitor PVR, CIC PRN  Urinalysis 10/31 clear, leukocyte esterase negative, nitrite negative; culture not indicated at present     Heme:  Postoperative anemia-preop hemoglobin 14.2    Normochromic, normocytic  Hemoglobin stable, 10.6     F/E/N:  Regular/thin diet  Electrolytes stable, maintaining hydration  With  Derm:  Local wound care     Prophylaxis:  -BID 81mg ASA, SCD for DVT prophylaxis; Doppler ultrasound 11/1 without evidence for DVT  -Spirometry for atelectasis  -Maintain fall, cardiac precautions     Disposition  -Expected discharge date 11/12  .  Shalini Torres MD  11/7/2019  10:11 AM

## 2019-11-07 NOTE — PLAN OF CARE
Problem: Rehabilitation (IRF) Plan of Care  Goal: Plan of Care Review  Outcome: Progressing  Flowsheets (Taken 11/7/2019 1230)  Plan of Care Reviewed With: patient  IRF Plan of Care Review: progress ongoing, continue  Outcome Summary: patient continues to progress functionally, pain managed using a multimodal approach

## 2019-11-07 NOTE — PLAN OF CARE
Problem: Rehabilitation (IRF) Plan of Care  Goal: Plan of Care Review  Outcome: Progressing  Flowsheets (Taken 11/7/2019 0557)  Plan of Care Reviewed With: patient  IRF Plan of Care Review: progress ongoing, continue  Outcome Summary: Slept well. Pain managed prn.

## 2019-11-07 NOTE — DISCHARGE INSTRUCTIONS
[facility] - Physical Therapy      Total Knee Replacement Exercise Protocol     Your therapist is working with you to help maximize your strength and function during your rehab stay. The following exercises were designed to strengthen the muscles in your legs. They are extremely important to restore full movement in your legs. You should schedule time each day to complete this exercise program.  Please perform them slowly on a firm surface. Relax between exercises as necessary and do not hold your breath.  Perform exercises 1 to 2x/day.                                                       Long Arc Quads  Sit on the edge of a firm surface with your feet flat on the floor.  If necessary you can support yourself with your hands. Do not lean back while straightening your leg as much as possible by lifting your foot off the floor.  Slowly lower your leg and try to bend your knee as much as possible.  Hold each position for a count of 3.     Repeat for 3 sets of 10 repetitions.    (Repeat this exercise on the other leg.)                          Ankle Pumps  This exercise can be done in any position. When lying in bed, keep your knees straight and pump your ankle up and down.    Repeat 3 sets of 10 times.                 Quad Sets  Keep your knees straight.  Tighten the front of your thighs and push the back of your knees down into the surface you are on.  Hold for a count of 3 and relax.    Repeat for 3 sets of 10 repetitions                         Gluteal Sets  Lie flat with your knees straight.  Squeeze your buttocks together.  Hold for a count of 3 and relax.     Repeat for 3 sets of 10 repetitions                       Heel Slide  Lie flat on your back and keep your knees pointed towards the ceiling. Slide your heel towards your buttocks by bending your knee and hip.  Try to bend your knee as much as possible. Hold for 5-10 seconds. Return to starting position.    Repeat for 3 sets of 10    (Repeat this  exercise on the other leg.)                    Short Arc Quad  Lie on your back with a rolled up towel placed under your knees so your knee is bent about 30°. Straighten your knee, raising your foot off the surface.  Hold for a count of 3.  Bend knee and return foot to surface.     Repeat for 3 sets of 10.    (Repeat this exercise on the other leg. )  Note: You can tape two small coffee or juice cans together end to end and wrapping the cans with a towel.        Supine Hip Abduction/Adduction    Lie flat with your knees straight.  Slide your leg out to the side and back while keeping your feet pointed directly toward the ceiling throughout the exercise.  Place your heel on a small towel on a cookie sheet or other smooth surface to assist you in sliding your leg in and out.     Repeat for 3 sets of 10 repetitions.    Repeat this exercise on the other leg.     SAFETY WITH YOUR WALKER      1.  ALWAYS KEEP YOUR WALKER IN FRONT OF YOU.  Do not step away from the walker.  It is there to give you the extra support you need right now.    2. REACH BACK WITH YOUR HANDS BEFORE YOU SIT DOWN.  Do not plop!  Back up to the surface you are about to sit down on until both of your legs touch it.  Bring your walker around with you.  Reach back with both hands, bend forward, and use your arms to lower yourself down.  This lets you know the chair is behind you, and helps to protect your joints.    3. WHEN YOU STAND UP, PUSH FROM THE SURFACE YOU ARE SITTING ON.    4. FACE YOUR WORK SURFACE SQUARELY AND DO NOT OVERREACH.  This helps you to keep your balance, and also protects your back.  Move you and your walker close to the items you need.    5. FIND SUPPORT ON A SOLID SURFACE  Hold on to your counter or a solid piece of furniture when you reach for  items in cabinets, drawers, or on the floor.  Your walker does not offer enough support to hold on to when you are reaching.    6. DO NOT CARRY ANYTHING IN YOUR HANDS!    -------Your hands  are busy supporting you and your walker-----  • Slide objects along counters in your kitchen.  • Hang clothing/linen over the front bar of your walker.  • Use a walker bag, or tie a plastic grocery bag to the front of the walker.  • Wear clothing with pockets, or use a backpack.  • Position yourself in the center of two surfaces (such as the kitchen counter and table); turn so one hip faces each surface.  Pick the item up with one hand, holding on to the center of the walker with the other.  Switch the item to the opposite hand, and place on the other surface.    7. DO NOT RUSH  Give yourself plenty of time to complete a task, and allow yourself rest breaks when you need them.  Take the extra few minutes to do things safely to avoid falling.          [facility] - Physical Therapy  HOME SAFE HOME - Safety Tips  Bathroom  Watch out for water on the floor  Remove throw rugs  Check water temperature  Use nonskid strips on the bathtub  Install grab rails in the tub and at the toilet  Place a nightlight in the bathroom  If family member is wheelchair mobile, make sure the toilet, sink and tub/shower are accessible  If equipment is recommended, ensure training is completed for installation and use    Living Room  Keep toys off the floor  Discourage pets from getting underfoot  Area rugs should be tacked down at corners and edges  Do not run extension cords across rooms  Minimize furnishings and clutter  Use the remote control for television    Outside  Make sure all landings and stairs are adequately lit  Check for raised/uneven areas on the walkways and sidewalks  Make sure all handrails are secure  Tan top & bottom stairs in bright colors  Designate alternate exits from the house as fire escapes    Kitchen  Use oven mitts to reach into the oven  Label cabinets for memory impaired  Use plastic containers rather than glass  Use front burners and turn pot handles inward  Post most-used items on low shelves  Watch for  spills on the floor  Make sure items used on a daily basis are within reach. Conversely, ensure that harmful items (sharp knives, toxic cleaning supplies etc.) are out of reach or place in one designated area.  If family member is wheelchair mobile, make sure all necessary appliances are within reach and/or wheelchair accessible. (refrigerator, microwave, washer/dryer etc.)  Purchase “STOP, HOT” decals to place on stove/oven when in operation to prevent burns.      Bedroom  At night, clear and light the path to the bathroom  Never sleep on heating pads  Place the telephone on a tabletop near the bed    General  Wear sturdy shoes with low heels and rubber soles  Keep a list of emergency phone numbers near each phone  Make sure you have a properly installed and working smoke detector on each floor    Resources  Subscribe to a 24-hour system that automatically alerts programmed phone numbers   Obtain a “Handicapped Parking” placard  Notify your fire department that there is a handicapped/disabled person living in your home  Contact your  for suggestions

## 2019-11-07 NOTE — PLAN OF CARE
Problem: Rehabilitation (IRF) Plan of Care  Goal: Plan of Care Review  Outcome: Progressing  Flowsheets (Taken 11/7/2019 1606)  Plan of Care Reviewed With: patient  IRF Plan of Care Review: progress ongoing, continue  Outcome Summary: Focus of session placed on functional amb specifically in kitchen for meal prep. Educated pt on sliding items on counter, using RW bag, and using a travel thermos/ziploc containers. Pt verbalizes understanding.

## 2019-11-07 NOTE — OP NOTE
[facility]     OPERATIVE REPORT     PATIENT NAME:  LIZA JOHNSON  MR#:    [id number]  VISIT #:   [id number]  :    1950  ADMIT DATE:   10/29/2019  PROCEDURE DATE: 10/29/2019  ROOM NUMBER:    OPERATION:    LEFT TOTAL KNEE ARTHROPLASTY   PREOPERATIVE DIAGNOSIS: Primary osteoarthritis left  knee  POSTOPERATIVE DIAGNOSIS: Primary osteoarthritis left  knee   ANESTHESIA: Spinal   SURGEON: Ted Zheng M.D.   ASSISTANT:  JENNIFER Douglass     EBL:   Specimens: If required by hospital rules, resected tissue was sent for routine analysis.     PROCEDURE:  After the induction of anesthesia, the patient was routinely prepped and draped for total knee arthroplasty.  Dr. Ted Zheng performed and/or supervised the key portions of the surgical procedure including bone preparation, insertion of the provisional and final components and assessment of range of motion, joint stability, and leg length.  The left knee joint was exposed and severe joint deterioration was noted. The soft tissues about the knee were prepared to facilitate knee arthroplasty.  The proximal tibia was shaped to accept a 11T x 10 tibial component.  The distal femur was shaped to accept a 9F femoral component  After evaluation of the patella, no resurfacing was performed.  All cuts were checked using cutting and alignment guides.  The knee was felt to be well aligned and stable throughout the range of motion after insertion of the trial and final knee components. The bony surfaces were cleaned prior to final component implantation with pulsatile lavage. Cemented components, if utilized,  were inserted using pressurized vacuum-mixed cement.  Uncemented components, if utilized, were press fitted and rigid stability was achieved. A sterile dressing as applied.      [employer] components were utilized for this procedure.    I performed and/or supervised the key portions of the surgical procedure including an  evaluation of the bone cuts, reshaping of the bony elements, insertion of the provisional and final components, and assessment of range of motion and stability.    A surgical assistant was required during this procedure. This assistant facilitated the safe and competent performance of the surgical operation. The assistant functions included retraction, vascular coagulation, removal of resected bone elements, suction, wound irrigation, and wound closure. The presence of this assistant during the procedure was essential.

## 2019-11-08 ENCOUNTER — APPOINTMENT (INPATIENT)
Dept: OCCUPATIONAL THERAPY | Facility: REHABILITATION | Age: 69
DRG: 560 | End: 2019-11-08
Payer: MEDICARE

## 2019-11-08 ENCOUNTER — APPOINTMENT (INPATIENT)
Dept: PHYSICAL THERAPY | Facility: REHABILITATION | Age: 69
DRG: 560 | End: 2019-11-08
Payer: MEDICARE

## 2019-11-08 PROCEDURE — 97110 THERAPEUTIC EXERCISES: CPT | Mod: GO

## 2019-11-08 PROCEDURE — 97535 SELF CARE MNGMENT TRAINING: CPT | Mod: GO

## 2019-11-08 PROCEDURE — 97530 THERAPEUTIC ACTIVITIES: CPT | Mod: GP

## 2019-11-08 PROCEDURE — 63700000 HC SELF-ADMINISTRABLE DRUG: Performed by: PHYSICAL MEDICINE & REHABILITATION

## 2019-11-08 PROCEDURE — 97530 THERAPEUTIC ACTIVITIES: CPT | Mod: GO

## 2019-11-08 PROCEDURE — 97110 THERAPEUTIC EXERCISES: CPT | Mod: GP

## 2019-11-08 PROCEDURE — 12800000 HC ROOM AND CARE SEMIPRIVATE REHAB

## 2019-11-08 PROCEDURE — 97116 GAIT TRAINING THERAPY: CPT | Mod: GP

## 2019-11-08 PROCEDURE — 90832 PSYTX W PT 30 MINUTES: CPT | Performed by: CLINICAL NEUROPSYCHOLOGIST

## 2019-11-08 RX ADMIN — ATORVASTATIN CALCIUM 10 MG: 10 TABLET, FILM COATED ORAL at 17:20

## 2019-11-08 RX ADMIN — DOCUSATE SODIUM 100 MG: 100 CAPSULE, LIQUID FILLED ORAL at 21:04

## 2019-11-08 RX ADMIN — MULTIPLE VITAMINS W/ MINERALS TAB 1 TABLET: TAB at 07:52

## 2019-11-08 RX ADMIN — VALSARTAN 80 MG: 80 TABLET, FILM COATED ORAL at 07:52

## 2019-11-08 RX ADMIN — DOCUSATE SODIUM 100 MG: 100 CAPSULE, LIQUID FILLED ORAL at 07:52

## 2019-11-08 RX ADMIN — ACETAMINOPHEN 650 MG: 325 TABLET, FILM COATED ORAL at 21:03

## 2019-11-08 RX ADMIN — ASPIRIN 81 MG: 81 TABLET, COATED ORAL at 07:52

## 2019-11-08 RX ADMIN — OXYCODONE HYDROCHLORIDE 10 MG: 5 TABLET ORAL at 01:22

## 2019-11-08 RX ADMIN — ASPIRIN 81 MG: 81 TABLET, COATED ORAL at 21:04

## 2019-11-08 RX ADMIN — ACETAMINOPHEN 650 MG: 325 TABLET, FILM COATED ORAL at 17:20

## 2019-11-08 RX ADMIN — OXYCODONE HYDROCHLORIDE 10 MG: 5 TABLET ORAL at 10:16

## 2019-11-08 RX ADMIN — OXYCODONE HYDROCHLORIDE 10 MG: 5 TABLET ORAL at 19:40

## 2019-11-08 RX ADMIN — ACETAMINOPHEN 650 MG: 325 TABLET, FILM COATED ORAL at 07:52

## 2019-11-08 RX ADMIN — ACETAMINOPHEN 650 MG: 325 TABLET, FILM COATED ORAL at 12:15

## 2019-11-08 ASSESSMENT — COGNITIVE AND FUNCTIONAL STATUS - GENERAL
MOOD: EUTHYMIC (NORMAL)
REMOTE MEMORY: WNL
THOUGHT_CONTENT: APPROPRIATE
SLEEP_WAKE_CYCLE: INCREASED
INSIGHT: INTACT
EYE_CONTACT: WNL
APPETITE: INCREASED
DELUSIONS: NONE OR AGE APPROPRIATE
EST. PREMORBID INTELLIGENCE: AVERAGE
CONCENTRATION: WNL
SPEECH: REGULAR
LIBIDO: NON-CONTRIBUTORY
THOUGHT_PROCESS: WNL
RECENT MEMORY: WNL
APPEARANCE: WELL GROOMED
PSYCHOMOTOR FUNCTIONING: WNL
ORIENTATION: FULLY ORIENTED
PERCEPTUAL FUNCTION: NORMAL
ATTENTION: WNL
IMPULSE CONTROL: INTACT
AFFECT: FULL RANGE
AROUSAL LEVEL: ALERT

## 2019-11-08 NOTE — PROGRESS NOTES
Internal Medicine Progress Note      Patient seen and examined  Attestation Notes: Face to face encounter completed      HPI  Mr Car is a 67 yo M with hypertension, dyslipidemia, prostate CA, osteoarthritis who was evaluated as an outpatient for chronic progressive severe bilateral knee pain not alleviated by conservative measures; he was recommended bilateral total knee replacement  On 10/29, he was admitted to [facility], where he underwent bilateral total knee replacement secondary to osteoarthritis by Dr. Zheng  He utilized twice daily aspirin 81 mg for DVT prophylaxis  He continued on Diovan for blood pressure management  [facility]    SUBJECTIVE  Feeling well, reporting good control of knee pain  LE edema continues to gradually improve  Moving bowels, denies GI distress  No fever, HA, orthostasis, cough, CP, palpitations, dyspnea  Reviewed BP trend      Review of Systems as above, otherwise without change      Medical History reviewed:  FH, SH without change since admission  Social History     Tobacco Use   • Smoking status: Former Smoker     Last attempt to quit: 1979     Years since quittin.8   • Smokeless tobacco: Never Used   • Tobacco comment: quit 40 years ago   Substance Use Topics   • Alcohol use: Yes     Alcohol/week: 3.0 standard drinks     Types: 3 Cans of beer per week     Frequency: 2-3 times a week     Drinks per session: 3 or 4     Binge frequency: Less than monthly   • Drug use: Never       PMH, PSH without change since admission      Current medications and allergies reviewed:  Allergies: Patient has no known allergies.    CURRENT MEDICATIONS:      • acetaminophen  650 mg oral With meals & nightly   • aspirin  81 mg oral BID   • atorvastatin  10 mg oral Daily (6p)   • docusate sodium  100 mg oral BID   • multivitamin  1 tablet oral Daily   • polyethylene glycol  17 g oral Daily   • senna  2 tablet oral  Daily with lunch   • valsartan  80 mg oral Daily           Objective     Vitals:    11/07/19 1353 11/07/19 1600 11/07/19 2000 11/08/19 0724   BP: (!) 150/63 (!) 154/68 138/63 135/63   BP Location: Right upper arm Left upper arm Left upper arm Left upper arm   Patient Position: Sitting Lying Sitting Sitting   Pulse: 97 84 86 80   Resp:  18 18 20   Temp:  36.7 °C (98 °F) 36.9 °C (98.5 °F) 37.1 °C (98.7 °F)   TempSrc:  Oral Oral Oral   SpO2:  97% 96% 94%   Weight:       Height:           Physical Exam   Constitutional: He is oriented to person, place, and time. He appears well-developed and well-nourished. No distress.   HENT:   Head: Normocephalic and atraumatic.   Mouth/Throat: Oropharynx is clear and moist.   Eyes: Pupils are equal, round, and reactive to light. Conjunctivae and EOM are normal. No scleral icterus.   Neck: Normal range of motion. Neck supple.   Cardiovascular: Normal rate, regular rhythm, normal heart sounds and intact distal pulses.   Pulmonary/Chest: Effort normal and breath sounds normal. No respiratory distress. He has no wheezes. He has no rales.   Abdominal: Soft. Bowel sounds are normal. He exhibits no distension. There is no tenderness.   Musculoskeletal: He exhibits edema (Bilateral lower extremity edema) and deformity (s/p bilateral TKR).   Neurological: He is alert and oriented to person, place, and time. No cranial nerve deficit.   Skin: Skin is warm and dry. He is not diaphoretic. No erythema.   Bilateral knee incisions c/d/i with dermabond   Psychiatric: He has a normal mood and affect.   Nursing note and vitals reviewed.       Labs   I have reviewed the patient's pertinent labs.  Significant abnormals are Anemia, hypoalbuminemia.  Lab results reviewed, discussed with patient:    Lab Results   Component Value Date    WBC 8.78 11/05/2019    HGB 10.6 (L) 11/05/2019    HCT 31.3 (L) 11/05/2019     (H) 11/05/2019     11/05/2019    K 4.6 11/05/2019     11/05/2019     CREATININE 0.8 11/05/2019    BUN 17 11/05/2019    CO2 27 11/05/2019    ALT 12 (L) 11/01/2019    AST 17 11/01/2019    INR 1.0 10/16/2019    HGBA1C 5.6 10/16/2019       Imaging    Bilateral lower extremity venous Doppler ultrasound 10/31:  No evidence of deep vein thrombus in either lower extremity  Fluid in right popliteal space likely represents a ruptured Baker's cyst but is not well evaluated on this vascular study        Assessment/Plan       Patient Active Problem List    Diagnosis Date Noted   • Anemia due to blood loss 10/31/2019   • Leukemoid reaction 10/31/2019   • S/P TKR (total knee replacement), bilateral 10/31/2019   • Status post bilateral knee replacements 10/31/2019   • Osteoarthrosis 10/29/2019   • Osteoarthritis of both knees 10/16/2019   • Counseling on health promotion and disease prevention 10/16/2019   • Hyperlipidemia, unspecified    • Essential (primary) hypertension        Mr Yoon is a 69 yo M with hypertension, dyslipidemia, prostate CA, osteoarthritis who is admitted to [facility] hospital on 10/31 from [facility] for acute inpatient rehabilitation s/p bilateral TKR by Dr. Zheng on 10/29     Ortho:  S/p B TKR-continue to follow in rehab  Continue current pain management regimen  Local wound care     Cardiovascular:  Essential hypertension-blood pressure 135-156/63-70 on Diovan  Manual only monitoring  Maintain hold parameters and cardiac precautions     Pulmonary:  Encourage use of spirometry for atelectasis  No evidence of respiratory distress     Endocrine:  Dyslipidemia-continue statin therapy, low-fat diet  LFT 11/1 unremarkable     Renal:  BUN/Cr  17/0.8, GFR> 60  Monitor renal function, avoid nephrotoxins     GI:  Constipation-continue bowel program     :  Hx prostate CA  Monitor PVR, CIC PRN  Urinalysis 10/31 clear, leukocyte esterase negative, nitrite negative; culture not indicated at present     Heme:  Postoperative anemia-preop hemoglobin 14.2    Normochromic,  normocytic  Hemoglobin stable, 10.6  Repeat CBC 11/11     F/E/N:  Regular/thin diet  Electrolytes stable, maintaining hydration  Reassess labs 11/11    Derm:  Local wound care     Prophylaxis:  -BID 81mg ASA, SCD for DVT prophylaxis; Doppler ultrasound 11/1 without evidence for DVT  -Spirometry for atelectasis  -Maintain fall, cardiac precautions     Disposition  -Expected discharge date 11/12  .  Shalini Torres MD  11/8/2019  10:22 AM

## 2019-11-08 NOTE — PLAN OF CARE
Problem: Rehabilitation (IRF) Plan of Care  Goal: Plan of Care Review  Outcome: Progressing  Flowsheets (Taken 11/7/2019 0303)  Plan of Care Reviewed With: patient; spouse; sibling  IRF Plan of Care Review: progress ongoing, continue  Outcome Summary: POC reviewed with pt. Medicated for pain with PRN oxy as needed, pt reports acceptable pain control. Denies further complaints at this time.

## 2019-11-08 NOTE — PROGRESS NOTES
Patient: Chapincito Yoon  Location: [facility] [address]  MRN: [id number]  Today's date: 11/8/2019    Hospital Course  Chris is a 68 y.o. male admitted on 10/31/2019 with Status post bilateral knee replacements [Z96.653]. Principal problem is S/P TKR (total knee replacement), bilateral.    Chris has a past medical history of Essential (primary) hypertension, Hyperlipidemia, unspecified, Impaired fasting glucose, Osteoarthritis, and Prostate cancer (CMS/[facility]) (2011).     Pt is a 68 year old male admitted to [facility] on 10/31/19 with h/o bilateral knee pain x 5-6 years. It has progressively worsened. Pt is now s/p bilateral TKA's on 10/29/19 and no complications post op.      Therapy Pain/Vitals - 11/08/19 1400        Pain/Comfort/Sleep    Pain Charting Type  Pain Assessment     Preferred Pain Scale  number (Numeric Rating Pain Scale)     Pain Body Location - Side  Bilateral     Pain Body Location - Orientation  incisional     Pain Body Location  knee     Pain Rating (0-10): Rest  6     Pain Rating (0-10): Activity  7     Pain Management Interventions  premedicated for activity;relaxation techniques promoted;position adjusted        Vital Signs    Pulse  94     Heart Rate Source  Monitor     BP  140/67     BP Location  Left upper arm     BP Method  Automatic     Patient Position  Sitting           Prior Living Environment      Most Recent Value   Lives With  spouse   Living Arrangements  house   Home Accessibility  stairs to enter home (Group), stairs within home (Group)   Living Environment Comment  2 SH, 3 GENE + 1 GENE without HR,  1st floor IA, FF to bed/bath   Number of Stairs, Main Entrance  4   Surface of Stairs, Main Entrance  concrete   Stair Railings, Main Entrance  none   Location, Patient Bedroom  second floor, must negotiate stairs to access   Location, Bathroom  first (main) floor, second floor, must negotiate stairs to access   Bathroom Access Comment  IA 1st floor, stnd height toilet,  full bath  on 2nd c walk-in shower with a low threshold entry and built-in seat, no grab bars,  stnd height toilet   Number of Stairs, Within Home, Primary  other (see comments) [15]   Surface of Stairs, Within Home, Primary  carpeting, hardwood   Stair Railings, Within Home, Primary  railings on both sides of stairs, railing on left side (ascending)   Stairs Comment, Within Home, Primary  B rails for 12 steps & 3          Prior Level of Function      Most Recent Value   Dominant Hand  right   Ambulation  independent   Transferring  independent   Toileting  independent   Bathing  independent   Dressing  independent   Eating  independent   Communication  understands/communicates without difficulty   Prior Level of Function Comment  No DME   Equipment Currently Used at Home  none          IRF PT Evaluation and Treatment - 11/08/19 1310        Time Calculation    Start Time  1300     Stop Time  1400     Time Calculation (min)  60 min        Session Details    Document Type  daily treatment/progress note     Mode of Treatment  physical therapy;individual therapy        General Information    Patient Profile Reviewed?  yes     Patient/Family/Caregiver Comments/Observations  Pt's wife Christianne present for training        Weight-Bearing Status    Left LE Weight-Bearing Status  weight-bearing as tolerated (WBAT)     Right LE Weight-Bearing Status  weight-bearing as tolerated (WBAT)        Sit to Stand Transfer    [place], Sit to Stand Transfer  supervision     Verbal Cues  technique;safety    cueing for spouse    Assistive Device  walker, front-wheeled     Comment  wife participating in performance        Stand to Sit Transfer    [place], Stand to Sit Transfer  supervision;verbal cues     Verbal Cues  technique;safety;preparatory posture    cueing for spouse    Assistive Device  walker, front-wheeled     Comment  wife participating in performance        Stand Pivot/Stand Step Transfer    [place], Stand Pivot/Stand Step  "Transfer  supervision;verbal cues     Verbal Cues  technique;preparatory posture;proper use of assistive device;safety    cueing for spouse    Assistive Device  walker, front-wheeled     Comment  wife participating in performance        Car Transfer    Transfer Technique  stand pivot/stand step    \"sit-spin' method; mock car main gym    [place], Car Transfer  close supervision;verbal cues     Verbal Cues  technique;hand placement     Assistive Device  walker, front-wheeled        Gait Training    [place], Gait  supervision     Assistive Device  walker, front-wheeled     Distance in Feet  200 feet    x2;     Gait Pattern Utilized  step-through     Comment  wife participating in performance        Stairs Training    [place], Stairs  close supervision;supervision;verbal cues     Assistive Device  railing     Handrail Location  both sides;left side (ascending);right side (descending)    B rails: 12 steps; B UE on L rail up x 3 steps per home    Number of Stairs  15     Stair Height  6 inches     Ascending Stairs Technique  step-to-step    R LE lead    Descending Stairs Technique  step-to-step    L LE lead    Comment  wife observing         Daily Progress Summary (PT)    Symptoms Noted During/After Treatment  increased pain     Progress Toward Functional Goals (PT)  progressing toward functional goals as expected     Daily Outcome Statement (PT)  Pt's wife attending session and presents w/ anxiousness about his requirements for assistance at home. PT and pt educate her on his progression w/ functuional mobility towards Mod I goals which means he will not require hands on, manual levels of assist for balance and mobility. Currently he is Supervision and as she walked along with him during functional tasks, she began to relax and report improving comfort w/ his skill sets. She initially asked about gait belt, but current recommendations are he will not require d/t anticipated Mod I status. PT did have pt " "sign [facility] equipment form for RW needed at home. Cont w/ POC and progress as primo.     Barriers to Overall Progress (PT)  pain, strength/ROM deficits B LE\"s     Recommendations  Cont w/ POC and progress mobility and knee ROM/strength as primo.           Pain Assessment/Intervention  Pain Charting Type: Pain Assessment             Education provided this session. See the Patient Education summary report for full details.    IRF PT Goals      Most Recent Value   Bed Mobility Goal 1   Activity/Assistive Device  bed mobility activities, all at 11/05/2019 0731   [place]  modified independence at 11/05/2019 0731   Time Frame  5 - 7 days at 11/05/2019 0731   Strategies/Barriers  pain, decreased BLE strength at 11/05/2019 0731   Progress/Outcome  goal met, goal revised this date, good progress toward goal at 11/05/2019 0731   Bed Mobility Goal 2   Activity/Assistive Device  rolling to right, rolling to left, supine to sit, sit to supine at 11/01/2019 1106   [place]  modified independence at 11/01/2019 1106   Time Frame  14 days or less at 11/01/2019 1106   Strategies/Barriers  pain and decreased BLE strength at 11/05/2019 0731   Progress/Outcome  good progress toward goal, goal ongoing at 11/05/2019 0731   Transfer Goal 1   Activity/Assistive Device  sit-to-stand/stand-to-sit, bed-to-chair/chair-to-bed, car transfer, walker, front-wheeled at 11/05/2019 0731   [place]  modified independence at 11/05/2019 0731   Time Frame  5 - 7 days at 11/05/2019 0731   Strategies/Barriers  decreased BLE strength, decreased balance at 11/05/2019 0731   Progress/Outcome  goal met, goal revised this date, good progress toward goal at 11/05/2019 0731   Transfer Goal 2   Activity/Assistive Device  sit-to-stand/stand-to-sit, bed-to-chair/chair-to-bed at 11/01/2019 1106   [place]  modified independence at 11/01/2019 1106   Time Frame  14 days or less at 11/01/2019 1106   Strategies/Barriers  decreased BLE strength, " decreased balance & decreased ROM at 11/05/2019 0731   Progress/Outcome  goal ongoing, good progress toward goal, continuing progress toward goal at 11/05/2019 0731   Gait/Walking Locomotion Goal 1   Activity/Assistive Device  gait (walking locomotion), assistive device use at 11/05/2019 0731   Distance  200 feet at 11/05/2019 0731   [place]  modified independence at 11/05/2019 0731   Time Frame  5 - 7 days, short-term goal (STG) at 11/05/2019 0731   Strategies/Barriers  decreased BLE strength, decreased balance at 11/05/2019 0731   Progress/Outcome  goal met, goal revised this date, good progress toward goal at 11/05/2019 0731   Gait/Walking Locomotion Goal 2   Activity/Assistive Device  gait (walking locomotion), assistive device use, improve balance and speed, increase endurance/gait distance, walker, front-wheeled at 11/01/2019 1106   Distance  200 feet at 11/01/2019 1106   [place]  modified independence at 11/01/2019 1106   Time Frame  14 days or less at 11/01/2019 1106   Strategies/Barriers  decreased BLE strength, decreased balance at 11/05/2019 0731   Progress/Outcome  good progress toward goal, goal ongoing at 11/05/2019 0731   Stairs Goal 1   Activity/Assistive Device  stairs, all skills, assistive device use, ascending stairs, descending stairs, using handrail, left, cane, straight at 11/05/2019 0731   Number of Stairs  15 at 11/05/2019 0731   [place]  supervision required, set-up required at 11/05/2019 0731   Time Frame  5 - 7 days at 11/05/2019 0731   Strategies/Barriers  decreased BLE strength, decreased balance at 11/05/2019 0731   Progress/Outcome  goal ongoing, good progress toward goal at 11/05/2019 0731   Stairs Goal 2   Activity/Assistive Device  assistive device use, stairs, all skills, ascending stairs, descending stairs, using handrail, left, cane, straight at 11/01/2019 1106   Number of Stairs  15 at 11/01/2019 1106   [place]  supervision required, set-up required at  11/01/2019 1106   Time Frame  14 days or less at 11/01/2019 1106   Strategies/Barriers  decreased BLE strength, decreased balance at 11/05/2019 0731   Progress/Outcome  good progress toward goal, goal ongoing at 11/05/2019 0731

## 2019-11-08 NOTE — PLAN OF CARE
Problem: Rehabilitation (IRF) Plan of Care  Goal: Plan of Care Review  Outcome: Progressing  Flowsheets (Taken 11/8/2019 0646)  Plan of Care Reviewed With: patient  IRF Plan of Care Review: progress ongoing, continue  Outcome Summary: Slept well, pain managed prn.

## 2019-11-08 NOTE — PLAN OF CARE
Problem: Rehabilitation (IRF) Plan of Care  Goal: Plan of Care Review  Outcome: Progressing  Flowsheets (Taken 11/8/2019 4434)  Plan of Care Reviewed With: patient; spouse  IRF Plan of Care Review: progress ongoing, continue  Outcome Summary: Pt's wife attending session and presents w/ anxiousness about his requirements for assistance at home. PT and pt educate her on his progression w/ functuional mobility towards Mod I goals which means he will not require hands on, manual levels of assist for balance and mobility. Currently he is Supervision and as she walked along with him during functional tasks, she began to relax and report improving comfort w/ his skill sets. She initially asked about gait belt, but current recommendations are he will not require d/t anticipated Mod I status. PT did have pt sign [facility] equipment form for RW needed at home. Cont w/ POC and progress as primo.

## 2019-11-08 NOTE — PROGRESS NOTES
Patient: Chapincito Yoon  Location: [facility] [address]  MRN: [id number]  Today's date: 11/8/2019    Hospital Course  Chris is a 68 y.o. male admitted on 10/31/2019 with Status post bilateral knee replacements [Z96.653]. Principal problem is S/P TKR (total knee replacement), bilateral.    Chris has a past medical history of Essential (primary) hypertension, Hyperlipidemia, unspecified, Impaired fasting glucose, Osteoarthritis, and Prostate cancer (CMS/[facility]) (2011).     Pt is a 68 year old male admitted to [facility] on 10/31/19 with h/o bilateral knee pain x 5-6 years. It has progressively worsened. Pt is now s/p bilateral TKA's on 10/29/19 and no complications post op.           Prior Living Environment      Most Recent Value   Lives With  spouse   Living Arrangements  house   Home Accessibility  stairs to enter home (Group), stairs within home (Group)   Living Environment Comment  2 SH, 3 GENE + 1 GENE without HR,  1st floor SD, FF to bed/bath   Number of Stairs, Main Entrance  4   Surface of Stairs, Main Entrance  concrete   Stair Railings, Main Entrance  none   Location, Patient Bedroom  second floor, must negotiate stairs to access   Location, Bathroom  first (main) floor, second floor, must negotiate stairs to access   Bathroom Access Comment  SD 1st floor, stnd height toilet,  full bath on 2nd c walk-in shower with a low threshold entry and built-in seat, no grab bars,  stnd height toilet   Number of Stairs, Within Home, Primary  other (see comments) [15]   Surface of Stairs, Within Home, Primary  carpeting, hardwood   Stair Railings, Within Home, Primary  railings on both sides of stairs, railing on left side (ascending)   Stairs Comment, Within Home, Primary  B rails for 12 steps & 3          Prior Level of Function      Most Recent Value   Dominant Hand  right   Ambulation  independent   Transferring  independent   Toileting  independent   Bathing  independent   Dressing  independent   Eating   independent   Communication  understands/communicates without difficulty   Prior Level of Function Comment  No DME   Equipment Currently Used at Home  none          IRF OT Evaluation and Treatment - 11/08/19 0735        Time Calculation    Start Time  0725     Stop Time  0755     Time Calculation (min)  30 min        Session Details    Document Type  daily treatment/progress note     Mode of Treatment  occupational therapy;individual therapy        General Information    Patient Profile Reviewed?  yes     General Observations of Patient  Pt supine in bed prepared for ADL session.     Existing Precautions/Restrictions  aspiration;cardiac;fall        Bed Mobility    [place], Supine to Sit  supervision     [place], Sit to Supine  supervision     Assistive Device (Bed Mobility)  head of bed elevated;bed rails     Comment (Bed Mobility)  completes on hospital bed        Transfers    Transfers  stand pivot/stand step transfer;shower transfer     Comment  using RW        Sit to Stand Transfer    [place], Sit to Stand Transfer  supervision     Verbal Cues  safety;technique     Assistive Device  walker, front-wheeled     Comment  from EOB        Stand to Sit Transfer    [place], Stand to Sit Transfer  supervision     Verbal Cues  safety;technique     Assistive Device  walker, front-wheeled        Stand Pivot/Stand Step Transfer    [place], Stand Pivot/Stand Step Transfer  supervision     Verbal Cues  safety;technique     Assistive Device  walker, front-wheeled     Comment  amb level to/from EOB        Shower Transfer    Transfer Technique  stand pivot/stand step     [place], Shower Transfer  supervision     Verbal Cues  safety;technique     Assistive Device  grab bars/tub rail;tub bench;walker, front-wheeled     Comment  via amb approach short HHD using RW to tub bench in barrier free shower using GB for support        Safety Issues, Functional Mobility    Comment, Safety Issues/Impairments  (Mobility)  OT: HHD to/from hospital room bathroom using RW c CL S for safety        Balance    Balance Interventions  occupation based/functional task     Comment, Balance  seated on tub bench and in supported stance completes bathing tasks c CL S for safety 2* wet environment        Bathing    Self-Performance  chest;left arm;right arm;abdomen;front perineal area;buttocks;left upper leg;right upper leg;left lower leg, including foot;right lower leg, including foot     [place]  close supervision     Position  unsupported sitting;supported standing     Setup Assistance  obtain supplies     Adaptive Equipment  grab bar/tub rail;hand-held shower spray hose;long-handled sponge;tub bench     Comment  seated on tub bench completes full wet shower c CL S for overall safety        Upper Body Dressing    Tasks  don;doff;pull over garment     Self-Performance  don;doff;threads left arm, shirt;threads right arm, shirt;pulls shirt over head/around back;pulls shirt down/adjusts     [place] Assistance  obtains clothes     [place]  set up     Position  unsupported sitting     Comment  seated on tub bench doffs/dons OH shirt post set-up        Lower Body Dressing    Tasks  don;doff;pants/bottoms;socks;underwear     Self-Performance  don;doff;threads left leg, underpants;threads right leg, underpants;pulls underpants up or down;threads left leg, pants/shorts;threads right leg, pants/shorts;pulls pants/shorts up or down;dons/doffs left sock;dons/doffs right sock     [place] Assistance  obtains clothes;adaptive equipment setup     [place]  supervision     Position  unsupported sitting;supported standing     Adaptive Equipment  reacher     Comment  seated on tub bench doffs non-skid socks using reacher, threads/unthreads underwear and pants using reacher. completes CM in stance. Primary OT assists c donning non-skid socks post shower for amb 2* time constraints.        Grooming    Self-Performance  washes, rinses and  dries face;washes, rinses and dries hands;oral care (brushing teeth, cleaning dentures);shaves face     [place]  supervision     Position  supported standing;unsupported standing     Comment  in stance at sink using counter/RW for intermittent UE support completes grooming tasks c S for safety        Daily Progress Summary (OT)    Symptoms Noted During/After Treatment  fatigue;increased pain     Progress Toward Functional Goals (OT)  progressing toward functional goals as expected     Daily Outcome Statement (OT)  Focus of session placed on morning ADL routine. Pt completes c overall CL S-S for safety.      Barriers to Overall Progress (OT)  c/o pain and fatigue; impaired strength, ROM, balance, endurance, and activity tolerance     Recommendations  cont IP OT to address above mentioned barriers to maximize I in ADL/IADLs and functional mobility                              IRF OT Goals      Most Recent Value   Transfer Goal 1   Activity/Assistive Device  toilet, commode, 3-in-1, walker, front-wheeled at 11/01/2019 0905   [place]  other (see comments) at 11/05/2019 0715   Time Frame  short-term goal (STG), 1 week at 11/01/2019 0905   Strategies/Barriers  amb tx at CL S level at 11/05/2019 0715   Progress/Outcome  good progress toward goal, goal met, goal revised this date at 11/05/2019 0715   Transfer Goal 2   Activity/Assistive Device  toilet, commode, 3-in-1, walker, front-wheeled at 11/01/2019 0905   [place]  modified independence at 11/01/2019 0905   Time Frame  long-term goal (LTG), by discharge at 11/01/2019 0905   Strategies/Barriers  amb tx at 11/01/2019 0905   Progress/Outcome  good progress toward goal at 11/05/2019 0715   Transfer Goal 3   Activity/Assistive Device  walk-in shower, shower chair, walker, front-wheeled at 11/01/2019 0905   [place]  other (see comments) at 11/05/2019 0715   Time Frame  short-term goal (STG), 1 week at 11/01/2019 0905   Strategies/Barriers  amb tx at CL S  level at 11/05/2019 0715   Progress/Outcome  good progress toward goal, goal met, goal revised this date at 11/05/2019 0715   Transfer Goal 4   Activity/Assistive Device  walk-in shower, shower chair, walker, front-wheeled at 11/01/2019 0905   [place]  modified independence at 11/01/2019 0905   Time Frame  long-term goal (LTG), by discharge at 11/01/2019 0905   Strategies/Barriers  amb tx, step-over at 11/01/2019 0905   Progress/Outcome  good progress toward goal at 11/05/2019 0715   Bathing Goal 1   Activity/Assistive Device  bathing skills, all, shower chair, long-handled sponge at 11/01/2019 0905   [place]  supervision required, set-up required at 11/01/2019 0905   Time Frame  short-term goal (STG), 1 week at 11/01/2019 0905   Strategies/Barriers  seated at 11/01/2019 0905   Progress/Outcome  good progress toward goal at 11/05/2019 0715   Bathing Goal 2   Activity/Assistive Device  bathing skills, all, shower chair, long-handled sponge at 11/01/2019 0905   [place]  modified independence at 11/01/2019 0905   Time Frame  long-term goal (LTG), by discharge at 11/01/2019 0905   Strategies/Barriers  seated/standing PRN at 11/01/2019 0905   Progress/Outcome  good progress toward goal at 11/05/2019 0715   UB Dressing Goal 1   Activity/Assistive Device  upper body dressing at 11/01/2019 0905   [place]  supervision required at 11/05/2019 0715   Time Frame  short-term goal (STG), 1 week at 11/01/2019 0905   Strategies/Barriers  amb level IR at 11/05/2019 0715   Progress/Outcome  good progress toward goal, goal met, goal revised this date at 11/05/2019 0715   UB Dressing Goal 2   Activity/Assistive Device  upper body dressing at 11/01/2019 0905   [place]  modified independence at 11/01/2019 0905   Time Frame  long-term goal (LTG), by discharge at 11/01/2019 0905   Progress/Outcome  good progress toward goal at 11/05/2019 0715   LB Dressing Goal 1   Activity/Assistive Device  lower body dressing,  reacher, sock aid, long handled shoe horn at 11/01/2019 0905   [place]  minimum assist (75% or more patient effort) at 11/05/2019 0715   Time Frame  short-term goal (STG), 1 week at 11/01/2019 0905   Strategies/Barriers  with LRAD at 11/01/2019 0905   Progress/Outcome  good progress toward goal, goal met, goal revised this date at 11/05/2019 0715   LB Dressing Goal 2   Activity/Assistive Device  lower body dressing at 11/01/2019 0905   [place]  modified independence at 11/01/2019 0905   Time Frame  long-term goal (LTG), by discharge at 11/01/2019 0905   Strategies/Barriers  with LRAD at 11/01/2019 0905   Progress/Outcome  good progress toward goal at 11/05/2019 0715   Grooming Goal 1   Activity/Assistive Device  grooming skills, all at 11/01/2019 0905   [place]  supervision required at 11/01/2019 0905   Time Frame  short-term goal (STG), 1 week at 11/01/2019 0905   Strategies/Barriers  standing at sink at 11/01/2019 0905   Progress/Outcome  good progress toward goal at 11/05/2019 0715   Grooming Goal 2   Activity/Assistive Device  grooming skills, all at 11/01/2019 0905   [place]  modified independence at 11/01/2019 0905   Time Frame  long-term goal (LTG), by discharge at 11/01/2019 0905   Strategies/Barriers  seated/standing at 11/01/2019 0905   Progress/Outcome  good progress toward goal at 11/05/2019 0715   Toileting Goal 1   Activity/Assistive Device  toileting skills, all, commode chair at 11/01/2019 0905   [place]  supervision required at 11/01/2019 0905   Time Frame  short-term goal (STG), 1 week at 11/01/2019 0905   Progress/Outcome  good progress toward goal at 11/05/2019 0715   Toileting Goal 2   Activity/Assistive Device  toileting skills, all, commode chair at 11/01/2019 0905   [place]  modified independence at 11/01/2019 0905   Time Frame  long-term goal (LTG), by discharge at 11/01/2019 0905   Progress/Outcome  good progress toward goal at 11/05/2019 0715

## 2019-11-08 NOTE — PLAN OF CARE
Problem: Rehabilitation (IRF) Plan of Care  Goal: Plan of Care Review  Outcome: Progressing  Flowsheets (Taken 11/8/2019 7692)  Plan of Care Reviewed With: patient  IRF Plan of Care Review: progress ongoing, continue  Outcome Summary: Focus of session placed on UB strength and endurance. Cryotherapy to B knees 2* increased c/o pain post previous PT session.

## 2019-11-08 NOTE — PROGRESS NOTES
Patient: Chapincito Yoon  Location: [facility] [address]  MRN: [id number]  Today's date: 11/8/2019    Hospital Course  Chris is a 68 y.o. male admitted on 10/31/2019 with Status post bilateral knee replacements [Z96.653]. Principal problem is S/P TKR (total knee replacement), bilateral.    Chris has a past medical history of Essential (primary) hypertension, Hyperlipidemia, unspecified, Impaired fasting glucose, Osteoarthritis, and Prostate cancer (CMS/[facility]) (2011).     Pt is a 68 year old male admitted to [facility] on 10/31/19 with h/o bilateral knee pain x 5-6 years. It has progressively worsened. Pt is now s/p bilateral TKA's on 10/29/19 and no complications post op.      Therapy Pain/Vitals     Row Name 11/08/19 1400       Pain/Comfort/Sleep    Pain Charting Type  Pain Assessment    Preferred Pain Scale  number (Numeric Rating Pain Scale)    Pain Body Location - Side  Bilateral    Pain Body Location - Orientation  incisional    Pain Body Location  knee    Pain Rating (0-10): Rest  6    Pain Rating (0-10): Activity  7    Pain Management Interventions  premedicated for activity;relaxation techniques promoted;position adjusted       Vital Signs    Pulse  94    Heart Rate Source  Monitor    BP  140/67    BP Location  Left upper arm    BP Method  Automatic    Patient Position  Sitting          Prior Living Environment      Most Recent Value   Lives With  spouse   Living Arrangements  house   Home Accessibility  stairs to enter home (Group), stairs within home (Group)   Living Environment Comment  2 SH, 3 GENE + 1 GENE without HR,  1st floor OH, FF to bed/bath   Number of Stairs, Main Entrance  4   Surface of Stairs, Main Entrance  concrete   Stair Railings, Main Entrance  none   Location, Patient Bedroom  second floor, must negotiate stairs to access   Location, Bathroom  first (main) floor, second floor, must negotiate stairs to access   Bathroom Access Comment  OH 1st floor, stnd height toilet,  full bath on  2nd c walk-in shower with a low threshold entry and built-in seat, no grab bars,  stnd height toilet   Number of Stairs, Within Home, Primary  other (see comments) [15]   Surface of Stairs, Within Home, Primary  carpeting, hardwood   Stair Railings, Within Home, Primary  railings on both sides of stairs, railing on left side (ascending)   Stairs Comment, Within Home, Primary  B rails for 12 steps & 3          Prior Level of Function      Most Recent Value   Dominant Hand  right   Ambulation  independent   Transferring  independent   Toileting  independent   Bathing  independent   Dressing  independent   Eating  independent   Communication  understands/communicates without difficulty   Prior Level of Function Comment  No DME   Equipment Currently Used at Home  none          IRF OT Evaluation and Treatment - 11/08/19 1401        Time Calculation    Start Time  1400     Stop Time  1430     Time Calculation (min)  30 min        Session Details    Document Type  daily treatment/progress note     Mode of Treatment  occupational therapy;individual therapy        General Information    Patient Profile Reviewed?  yes     General Observations of Patient  Pt received from PT family education session. Pt and wife verbalize feeling confident in ADL/functional mobility tasks. Wife left and pt agreeable to session focused on UE strength/endurance.     Existing Precautions/Restrictions  aspiration;cardiac;fall;weight bearing        Caregiver Training    Comment, Caregiver Training Plan  discussed c pt recommendations for S during bathing/shower transfers and pt verbalizes understanding. pt already owns recommended DME.        Upper Extremity (Therapeutic Exercise)    Exercise Position/Type (UE Therapeutic Exercise)  seated;resistive exercises     General Exercise (Upper Extremity Therapeutic Exercise)  bilateral;bicep curl;tricep extension;other (see comments)     Weight/Resistance (Upper Extremity Therapeutic Exercise)  4 pounds      Reps and Sets (Upper Extremity Therapeutic Exercise)  3 x 10     Comment (UE Therapeutic Exercise)  seated in MWC completes bicep curls, front raises, and OH press to address UB strength and overall endurance. Completes OH tricep extensions and shoulder ER to progress performance of sit > stand transfers        Cryotherapy    Location 1  lower extremity treatment area     Location 2  lower extremity treatment area     Indications  pain     Method of Application (Cryotherapy)  cryostatic     Cryogenic Agent (Cryotherapy)  gel pack     Duration (Cryotherapy)  15 minutes     Response to Treatment  pain decreased     Comment  gel pack to B knees seated in WC during UB TE's. reports decreased pain post application        Daily Progress Summary (OT)    Symptoms Noted During/After Treatment  fatigue;increased pain     Progress Toward Functional Goals (OT)  progressing toward functional goals as expected     Daily Outcome Statement (OT)  Focus of session placed on UB strength and endurance. Cryotherapy to B knees 2* increased c/o pain post previous PT session.     Barriers to Overall Progress (OT)  c/o pain and fatigue; impaired strength, ROM, balance, endurance, and activity tolerance     Recommendations  cont IP OT to address above mentioned barriers to maximize I in ADL/IADLs and functional mobility           Pain Assessment/Intervention  Pain Charting Type: Pain Assessment                  IRF OT Goals      Most Recent Value   Transfer Goal 1   Activity/Assistive Device  toilet, commode, 3-in-1, walker, front-wheeled at 11/01/2019 0905   [place]  other (see comments) at 11/05/2019 0715   Time Frame  short-term goal (STG), 1 week at 11/01/2019 0905   Strategies/Barriers  amb tx at CL S level at 11/05/2019 0715   Progress/Outcome  good progress toward goal, goal met, goal revised this date at 11/05/2019 0715   Transfer Goal 2   Activity/Assistive Device  toilet, commode, 3-in-1, walker, front-wheeled at 11/01/2019  0905   [place]  modified independence at 11/01/2019 0905   Time Frame  long-term goal (LTG), by discharge at 11/01/2019 0905   Strategies/Barriers  amb tx at 11/01/2019 0905   Progress/Outcome  good progress toward goal at 11/05/2019 0715   Transfer Goal 3   Activity/Assistive Device  walk-in shower, shower chair, walker, front-wheeled at 11/01/2019 0905   [place]  other (see comments) at 11/05/2019 0715   Time Frame  short-term goal (STG), 1 week at 11/01/2019 0905   Strategies/Barriers  amb tx at CL S level at 11/05/2019 0715   Progress/Outcome  good progress toward goal, goal met, goal revised this date at 11/05/2019 0715   Transfer Goal 4   Activity/Assistive Device  walk-in shower, shower chair, walker, front-wheeled at 11/01/2019 0905   [place]  modified independence at 11/01/2019 0905   Time Frame  long-term goal (LTG), by discharge at 11/01/2019 0905   Strategies/Barriers  amb tx, step-over at 11/01/2019 0905   Progress/Outcome  good progress toward goal at 11/05/2019 0715   Bathing Goal 1   Activity/Assistive Device  bathing skills, all, shower chair, long-handled sponge at 11/01/2019 0905   [place]  supervision required, set-up required at 11/01/2019 0905   Time Frame  short-term goal (STG), 1 week at 11/01/2019 0905   Strategies/Barriers  seated at 11/01/2019 0905   Progress/Outcome  good progress toward goal at 11/05/2019 0715   Bathing Goal 2   Activity/Assistive Device  bathing skills, all, shower chair, long-handled sponge at 11/01/2019 0905   [place]  modified independence at 11/01/2019 0905   Time Frame  long-term goal (LTG), by discharge at 11/01/2019 0905   Strategies/Barriers  seated/standing PRN at 11/01/2019 0905   Progress/Outcome  good progress toward goal at 11/05/2019 0715   UB Dressing Goal 1   Activity/Assistive Device  upper body dressing at 11/01/2019 0905   [place]  supervision required at 11/05/2019 0715   Time Frame  short-term goal (STG), 1 week at  11/01/2019 0905   Strategies/Barriers  amb level IR at 11/05/2019 0715   Progress/Outcome  good progress toward goal, goal met, goal revised this date at 11/05/2019 0715   UB Dressing Goal 2   Activity/Assistive Device  upper body dressing at 11/01/2019 0905   [place]  modified independence at 11/01/2019 0905   Time Frame  long-term goal (LTG), by discharge at 11/01/2019 0905   Progress/Outcome  good progress toward goal at 11/05/2019 0715   LB Dressing Goal 1   Activity/Assistive Device  lower body dressing, reacher, sock aid, long handled shoe horn at 11/01/2019 0905   [place]  minimum assist (75% or more patient effort) at 11/05/2019 0715   Time Frame  short-term goal (STG), 1 week at 11/01/2019 0905   Strategies/Barriers  with LRAD at 11/01/2019 0905   Progress/Outcome  good progress toward goal, goal met, goal revised this date at 11/05/2019 0715   LB Dressing Goal 2   Activity/Assistive Device  lower body dressing at 11/01/2019 0905   [place]  modified independence at 11/01/2019 0905   Time Frame  long-term goal (LTG), by discharge at 11/01/2019 0905   Strategies/Barriers  with LRAD at 11/01/2019 0905   Progress/Outcome  good progress toward goal at 11/05/2019 0715   Grooming Goal 1   Activity/Assistive Device  grooming skills, all at 11/01/2019 0905   [place]  supervision required at 11/01/2019 0905   Time Frame  short-term goal (STG), 1 week at 11/01/2019 0905   Strategies/Barriers  standing at sink at 11/01/2019 0905   Progress/Outcome  good progress toward goal at 11/05/2019 0715   Grooming Goal 2   Activity/Assistive Device  grooming skills, all at 11/01/2019 0905   [place]  modified independence at 11/01/2019 0905   Time Frame  long-term goal (LTG), by discharge at 11/01/2019 0905   Strategies/Barriers  seated/standing at 11/01/2019 0905   Progress/Outcome  good progress toward goal at 11/05/2019 0715   Toileting Goal 1   Activity/Assistive Device  toileting skills, all, commode  chair at 11/01/2019 0905   [place]  supervision required at 11/01/2019 0905   Time Frame  short-term goal (STG), 1 week at 11/01/2019 0905   Progress/Outcome  good progress toward goal at 11/05/2019 0715   Toileting Goal 2   Activity/Assistive Device  toileting skills, all, commode chair at 11/01/2019 0905   [place]  modified independence at 11/01/2019 0905   Time Frame  long-term goal (LTG), by discharge at 11/01/2019 0905   Progress/Outcome  good progress toward goal at 11/05/2019 0715

## 2019-11-08 NOTE — PROGRESS NOTES
Inpatient Psychology Progress Note    Duration: 20 minutes  Chapincito Yoon : 1950, a 68 y.o. male, for follow up visit.  Reason:  He has been experiencing Adjustment to Disability.    HPI: Mr. Chapincito Yoon  is a 68-year-old right-handed white male with chronic conditions significant for degenerative arthritis, hypertension, prostate cancer status post prostatectomy, dyslipidemia who had progressively worsening bilateral knee pain which failed conservative management and subsequently the patient underwent elective bilateral total knee replacements on 10/29/19 at [facility] by Dr. Ted Zheng.  Postoperatively, he is allowed weightbearing as tolerated on both lower extremities.  He is on Aspirin for deep vein thrombus prophylaxis and also on pneumatic compression boots. He has put on Oxycodone for pain control.  Postoperative course was significant for anemia, but he did not need transfusion. On 10/30/19, hemoglobin was 11.2, WBC count 7.65, BUN 13, creatinine 0.9.  He has been needing assistance for mobility, transfers, self-care postoperatively. I have reviewed the preadmission screening and concur with that information and there are no significant changes from patient’s preadmission screening. He is transferred to [facility] on 10/31/19 for further rehabilitation care.     Current Evaluation:     Mental Status Evaluation:  Arousal Level: Alert  Appearance: Well Groomed  Speech: Regular  Psychomotor Functioning: WNL  Eye Contact: WNL  Est. Premorbid Intelligence: Average  Orientation: Fully oriented  Attention: WNL  Concentration: WNL  Recent Memory: WNL  Remote Memory: WNL  Thought Content: Appropriate  Thought Process: WNL  Insight: Intact  Perceptual Function: Normal  Delusions: None or age appropriate  Sleeping: Increased  Appetite: Increased  Libido: Non-Contributory  Affect: Full Range  Mood: Euthymic (normal)    Comments:  Appetite and sleep decreased following his surgery due to  pain, but have recently improved, although they have not reached his pre-surgical level.     Additional Assessments done this visit:   Orientation      Interventions  Acceptance & Adjustment  Safety Management  Self Monitoring Training    Recommendations:   Individual Therapy   30 minutes weekly    Goals     • Increase adjustment to rehabilitation facility.           Visit Diagnosis:     1. Adjustment disorder with anxiety        Diagnostic Impression:   Weekly Progress Summary: progressing toward goals as expected  Weekly Outcome Statement: Pt is pleasant and cooperative. He reported improved mood related to experiencing progress in therapies. He was encouraged to continue motoring progress. He identified looking forward to an upcoming trip and indicated that his trip helps maintain his mood and motivation. He acknowledged the need for outpatient therapies following discharge and identified the importance of safety awareness following discharge. He was fully oriented to self, date, day of the week, and location and was accurately able to describe recent and remote historical events. He denied changes in cognition. He reported improved energy, sleep, and appetite. He reported appreciating psychological services. Psychology will follow while he is an inpatient.     Psychological condition is generally improving       Gus Muñoz PSY.D @ 11:12 AM

## 2019-11-08 NOTE — PROGRESS NOTES
"Subjective    Patient seen and examined on rounds.  Chart reviewed.  Events overnight noted.  History reviewed briefly with patient.     CC:  Deficits in mobility, transfers, self-care status post bilateral total knee replacements     HPI:  Mr. Yoon is a 68-year-old right-handed white male with chronic conditions significant for degenerative arthritis, hypertension, prostate cancer status post prostatectomy, dyslipidemia who had progressively worsening bilateral knee pain which failed conservative management and subsequently the patient underwent elective bilateral total knee replacements on 10/29/19 at [facility] by Dr. Ted Zheng.  Postoperatively, he is allowed weightbearing as tolerated on both lower extremities.  He is on Aspirin for deep vein thrombus prophylaxis and also on pneumatic compression boots. He has put on Oxycodone for pain control.  Postoperative course was significant for anemia, but he did not need transfusion. On 10/30/19, hemoglobin was 11.2, WBC count 7.65, BUN 13, creatinine 0.9.  He has been needing assistance for mobility, transfers, self-care postoperatively. He is transferred to [facility] on 10/31/19 for further rehabilitation care.       SUBJ: Discussed with patient and with his wife with him.  Tolerating therapies per endurance.  Pain medications are helping.  Lidoderm patches did not help much so they will be discontinued.  On scheduled Tylenol also.  Ice to knees as needed.       ROS: Denies chest pain or shortness of breath. Other ROS negative. Past, family, social history is unchanged.    Physical Exam      Blood pressure 138/63, pulse 86, temperature 36.9 °C (98.5 °F), temperature source Oral, resp. rate 18, height 1.702 m (5' 7\"), weight 102 kg (225 lb 1.4 oz), SpO2 96 %.  Body mass index is 35.25 kg/m².    General Appearance: Not in acute distress  Head/Ear/Nose/Throat: Normocephalic; Atraumatic.   Eye: EOMI; PERRL.   Neck: No JVD; No Bruits. "   Respiratory: Decreased breath sounds at bases.   Cardiovascular: RRR; Normal S1, S2.   Gastrointestinal: Soft; NT; +BS.   Extremities: Bilateral lower extremity edema noted.  Healing incisions noted on anterior aspect of both knees.  Incisions open to air.  No drainage today.  Musculoskeletal: Functional active range of motion in both upper extremities.  Some limitation of active range of motion in both hips and knees, limited by pain.  Right knee PROM 0 to 93 degrees.  Left knee PROM 4 to 90 degrees.  Neurological: AAO ×3. Speech is fluent. Cranial nerve examination does not reveal any gross facial asymmetry. Strength testing about 4+/5 strength in both upper extremities.  Bilateral hip flexion is less than 3/5.  Bilateral quadriceps are less than 3/5.  Bilateral ankle dorsi and plantar flexion are 4/5.  He is grossly able to localize touch and position sense in great toes.  Deep tendon reflexes are hypoactive and symmetric bilaterally.  Plantars are flexor.  Coordination is functional upper extremities.  Both knee jerks were not tested.  Neurologically stable  Behavior/Emotional: Appropriate; Cooperative.   Skin: No obvious rashes noted.  Bilateral knee incisions healing.  Incisions open to air.  No drainage today.      Current Facility-Administered Medications:   •  acetaminophen (TYLENOL) tablet 650 mg, 650 mg, oral, q4h PRN, Vernon Rob MD, 650 mg at 11/06/19 0751  •  acetaminophen (TYLENOL) tablet 650 mg, 650 mg, oral, With meals & nightly, Vernon Rob MD, 650 mg at 11/07/19 1715  •  alum-mag hydroxide-simeth (MAALOX) 200-200-20 mg/5 mL suspension 30 mL, 30 mL, oral, q4h PRN, Vernon Rob MD  •  aspirin enteric coated tablet 81 mg, 81 mg, oral, BID, Vernon Rob MD, 81 mg at 11/07/19 0834  •  atorvastatin (LIPITOR) tablet 10 mg, 10 mg, oral, Daily (6p), Vernon Rob MD, 10 mg at 11/07/19 1716  •  bisacodyl (DULCOLAX) 10 mg suppository 10 mg, 10 mg, rectal, Daily PRN,  Vernon Rob MD  •  docusate sodium (COLACE) capsule 100 mg, 100 mg, oral, BID, Vernon Rob MD, 100 mg at 11/07/19 0834  •  lidocaine (ASPERCREME) 4 % topical patch 2 patch, 2 patch, Topical, Daily, Vernon Rob MD, 2 patch at 11/07/19 0836  •  magnesium hydroxide (M.O.M.) 400 mg/5 mL suspension 30 mL, 30 mL, oral, Daily PRN, Vernon Rob MD, 30 mL at 11/03/19 1732  •  multivitamin tablet 1 tablet, 1 tablet, oral, Daily, Vernon Rob MD, 1 tablet at 11/07/19 0834  •  ondansetron ODT (ZOFRAN-ODT) disintegrating tablet 4 mg, 4 mg, oral, q6h PRN, Vernon Rob MD  •  oxyCODONE (ROXICODONE) immediate release tablet 5-10 mg, 5-10 mg, oral, q4h PRN, Vernon Rob MD, 10 mg at 11/07/19 1837  •  polyethylene glycol (MIRALAX) 17 gram packet 17 g, 17 g, oral, Daily, Shalini Torres MD, 17 g at 11/05/19 0735  •  senna (SENOKOT) tablet 2 tablet, 2 tablet, oral, Daily with lunch, Vernon Rob MD, 2 tablet at 11/06/19 1218  •  valsartan (DIOVAN) tablet 80 mg, 80 mg, oral, Daily, Vernon Rob MD, 80 mg at 11/07/19 0834       Labs / Radiology    Lab Results   Component Value Date    WBC 8.78 11/05/2019    HGB 10.6 (L) 11/05/2019    HCT 31.3 (L) 11/05/2019    MCV 95.4 11/05/2019     (H) 11/05/2019     Lab Results   Component Value Date    GLUCOSE 113 (H) 11/05/2019    CALCIUM 8.7 (L) 11/05/2019     11/05/2019    K 4.6 11/05/2019    CO2 27 11/05/2019     11/05/2019    BUN 17 11/05/2019    CREATININE 0.8 11/05/2019         Assessment and Plan    ASSESSMENT PLAN:  1. 68-year-old right-handed white male with chronic conditions significant for degenerative arthritis, hypertension, prostate cancer status post prostatectomy, dyslipidemia who had progressively worsening bilateral knee pain which failed conservative management and subsequently the patient underwent elective bilateral total knee replacements on 10/29/19 at [facility] by Dr. Ted Zheng.   Postoperatively, he is allowed weightbearing as tolerated on both lower extremities.  He is on Aspirin for deep vein thrombus prophylaxis and also on pneumatic compression boots. He has put on Oxycodone for pain control.  Postoperative course was significant for anemia, but he did not need transfusion. On 10/30/19, hemoglobin was 11.2, WBC count 7.65, BUN 13, creatinine 0.9.  He has been needing assistance for mobility, transfers, self-care postoperatively. He is transferred to [facility] on 10/31/19 for further rehabilitation care.       2. DVT prophylaxis - on Aspirin.  On SCDs.    Platelets 224 on 11/1/2019.  More ambulatory now.  Platelets 377 on 11/5/2019.     3.  Orthopedics - status post bilateral total knee replacements.  Continue PT, OT.  Monitor healing of incisions.     4. GI - On Colace, Senokot.  On Dulcolax.  On Zofran ODT for nausea.  On when necessary MOM, Maalox.     5.  - voiding.  Denies dysuria.  Monitor post void residuals.     6.  Hypertension - on Diovan.     7. Pain - on when necessary Oxycodone.  On Tylenol PRN.  Ice to knees when necessary.  Pain medications are helping.  He does not want increased pain medications at this time.Therapist indicated patient has been complaining of a lot of pain in his knees.  Discussed with patient and with his wife in the evening.  Again he does not want to go on OxyContin.  Will order Lidoderm patches.  Suggested using ice to the knees.  On scheduled Tylenol. He is on PRN oxycodone. He has soreness in his knees.  Tylenol and Lidoderm patches were added.  On oxycodone as needed.  He does not want to go on long-acting pain meds. Discussed with patient and with his wife with him.  Lidoderm patches did not help much so they will be discontinued.  On scheduled Tylenol also.  Ice to knees as needed.        8.  Hematology - monitor hemoglobin, platelets.  Anemia - on MVI.  Hemoglobin 10.7 on 11/1/2019.  Hemoglobin 10.6 on 11/5/9.     9.   Dyslipidemia - on Lipitor.     10. Rehabilitation medicine -Discussed recommendations of PCC with patient.  Continue comprehensive rehabilitation care. Continue PT, OT.  We will follow falls precautions, cardiac precautions, monitor pulse oximetry in therapy.Therapist indicated patient has been complaining of a lot of pain in his knees.  Discussed with patient and with his wife in the evening.  Again he does not want to go on OxyContin.  Will order Lidoderm patches.  Suggested using ice to the knees.  On scheduled Tylenol. He is on PRN oxycodone. He has soreness in his knees.  Tylenol and Lidoderm patches were added.  On oxycodone as needed.  He does not want to go on long-acting pain meds.  Discussed with patient and with his wife with him.  Lidoderm patches did not help much so they will be discontinued.  On scheduled Tylenol also.  Ice to knees as needed.      11. Reviewed labs today.  BUN 14, creatinine 1.0 on 11/1/2019.  BUN 17, creatinine 0.8 on 11/5/2019.        Vernon Rob MD  11/7/2019

## 2019-11-08 NOTE — PROGRESS NOTES
Patient: Chapincito Yoon  Location: [facility] [address]  MRN: [id number]  Today's date: 11/8/2019    Hospital Course  Chris is a 68 y.o. male admitted on 10/31/2019 with Status post bilateral knee replacements [Z96.653]. Principal problem is S/P TKR (total knee replacement), bilateral.    Chris has a past medical history of Essential (primary) hypertension, Hyperlipidemia, unspecified, Impaired fasting glucose, Osteoarthritis, and Prostate cancer (CMS/[facility]) (2011).     Pt is a 68 year old male admitted to [facility] on 10/31/19 with h/o bilateral knee pain x 5-6 years. It has progressively worsened. Pt is now s/p bilateral TKA's on 10/29/19 and no complications post op.      Therapy Pain/Vitals     Row Name 11/08/19 1103 11/08/19 1203       Pain/Comfort/Sleep    Pain Charting Type  Pain Assessment  Post Activity    Preferred Pain Scale  number (Numeric Rating Pain Scale)  number (Numeric Rating Pain Scale)    Pain Body Location - Side  Bilateral  Bilateral    Pain Body Location  knee  knee    Pain Rating (0-10): Rest  3  6    Pain Rating (0-10): Activity  4  7    Pain Management Interventions  position adjusted  prescribed exercises encouraged       Vital Signs    Pulse  --  97    BP  --  (!) 145/63    BP Location  --  Left upper arm    BP Method  --  Automatic    Patient Position  --  Sitting       Patient Observation    Observations  asymptomatic  asymptomatic          Prior Living Environment      Most Recent Value   Lives With  spouse   Living Arrangements  house   Home Accessibility  stairs to enter home (Group), stairs within home (Group)   Living Environment Comment  2 SH, 3 GENE + 1 GENE without HR,  1st floor IN, FF to bed/bath   Number of Stairs, Main Entrance  4   Surface of Stairs, Main Entrance  concrete   Stair Railings, Main Entrance  none   Location, Patient Bedroom  second floor, must negotiate stairs to access   Location, Bathroom  first (main) floor, second floor, must negotiate stairs to  access   Bathroom Access Comment  DE 1st floor, stnd height toilet,  full bath on 2nd c walk-in shower with a low threshold entry and built-in seat, no grab bars,  stnd height toilet   Number of Stairs, Within Home, Primary  other (see comments) [15]   Surface of Stairs, Within Home, Primary  carpeting, hardwood   Stair Railings, Within Home, Primary  railings on both sides of stairs, railing on left side (ascending)   Stairs Comment, Within Home, Primary  B rails for 12 steps & 3          Prior Level of Function      Most Recent Value   Dominant Hand  right   Ambulation  independent   Transferring  independent   Toileting  independent   Bathing  independent   Dressing  independent   Eating  independent   Communication  understands/communicates without difficulty   Prior Level of Function Comment  No DME   Equipment Currently Used at Home  none          IRF PT Evaluation and Treatment - 11/08/19 1112        Time Calculation    Start Time  1103     Stop Time  1203     Time Calculation (min)  60 min        Session Details    Document Type  daily treatment/progress note     Mode of Treatment  physical therapy;individual therapy        General Information    Patient Profile Reviewed?  yes     Existing Precautions/Restrictions  aspiration;cardiac;fall;weight bearing        Weight-Bearing Status    Left LE Weight-Bearing Status  weight-bearing as tolerated (WBAT)     Right LE Weight-Bearing Status  weight-bearing as tolerated (WBAT)        Range of Motion (ROM)    Knee, Left (ROM)  1-96 degrees     Knee, Right (ROM)  2-90 degrees        Bed Mobility    [place], Supine to Sit  supervision     [place], Sit to Supine  supervision     Comment (Bed Mobility)  completes on mat        Sit to Stand Transfer    [place], Sit to Stand Transfer  supervision     Assistive Device  walker, front-wheeled     Comment  for balance/safety        Stand to Sit Transfer    [place], Stand to Sit Transfer  supervision      Assistive Device  walker, front-wheeled     Comment  for balance/safety        Stand Pivot/Stand Step Transfer    [place], Stand Pivot/Stand Step Transfer  supervision     Assistive Device  walker, front-wheeled     Comment  for balance/safety        Gait Training    [place], Gait  supervision     Assistive Device  walker, front-wheeled     Distance in Feet  200 feet     Gait Pattern Utilized  step-through     Deviations/Abnormal Patterns (Gait)  antalgic;base of support, wide;willian decreased;gait speed decreased     Bilateral Gait Deviations  heel strike decreased     Advanced Gait Activity  rough/uneven surfaces     Comment  S for 200 feet with RW for balance/safety        Rough/Uneven Surface Gait Skills (Mobility)    [place]  close supervision     Comment  with RW for balance/safety for 150' on/off carpet & tile surfaces        Lower Extremity (Therapeutic Exercise)    Exercise Position/Type (LE Therapeutic Exercise)  seated;AROM (active range of motion);supine;AAROM (active assistive range of motion)     General Exercise (LE Therapeutic Exercise)  bilateral;SAQ (short arc quad);quad sets;gluteal sets;heel slides;marching while seated     Range of Motion Exercises (LE Therapeutic Exercise)  bilateral;hip abduction/adduction;ankle dorsiflexion/plantarflexion     Reps and Sets (LE Therapeutic Exercise)  3 sets of 10     Comment (LE Therapeutic Exercise)  seated AP; Supine all other exercises; heel slides AAROM        Daily Progress Summary (PT)    Daily Outcome Statement (PT)  Performed supine TE this AM due to family training session scheduled in PM. Pt's ROM continues to be improved into flexion & speed with which pt is completing exercises continues to improve. Perform and review all functionals with pt's wife in PM session.           Pain Assessment/Intervention  Pain Charting Type: Post Activity             Education provided this session. See the Patient Education summary report for full  details.    IRF PT Goals      Most Recent Value   Bed Mobility Goal 1   Activity/Assistive Device  bed mobility activities, all at 11/05/2019 0731   [place]  modified independence at 11/05/2019 0731   Time Frame  5 - 7 days at 11/05/2019 0731   Strategies/Barriers  pain, decreased BLE strength at 11/05/2019 0731   Progress/Outcome  goal met, goal revised this date, good progress toward goal at 11/05/2019 0731   Bed Mobility Goal 2   Activity/Assistive Device  rolling to right, rolling to left, supine to sit, sit to supine at 11/01/2019 1106   [place]  modified independence at 11/01/2019 1106   Time Frame  14 days or less at 11/01/2019 1106   Strategies/Barriers  pain and decreased BLE strength at 11/05/2019 0731   Progress/Outcome  good progress toward goal, goal ongoing at 11/05/2019 0731   Transfer Goal 1   Activity/Assistive Device  sit-to-stand/stand-to-sit, bed-to-chair/chair-to-bed, car transfer, walker, front-wheeled at 11/05/2019 0731   [place]  modified independence at 11/05/2019 0731   Time Frame  5 - 7 days at 11/05/2019 0731   Strategies/Barriers  decreased BLE strength, decreased balance at 11/05/2019 0731   Progress/Outcome  goal met, goal revised this date, good progress toward goal at 11/05/2019 0731   Transfer Goal 2   Activity/Assistive Device  sit-to-stand/stand-to-sit, bed-to-chair/chair-to-bed at 11/01/2019 1106   [place]  modified independence at 11/01/2019 1106   Time Frame  14 days or less at 11/01/2019 1106   Strategies/Barriers  decreased BLE strength, decreased balance & decreased ROM at 11/05/2019 0731   Progress/Outcome  goal ongoing, good progress toward goal, continuing progress toward goal at 11/05/2019 0731   Gait/Walking Locomotion Goal 1   Activity/Assistive Device  gait (walking locomotion), assistive device use at 11/05/2019 0731   Distance  200 feet at 11/05/2019 0731   [place]  modified independence at 11/05/2019 0731   Time Frame  5 - 7 days,  short-term goal (STG) at 11/05/2019 0731   Strategies/Barriers  decreased BLE strength, decreased balance at 11/05/2019 0731   Progress/Outcome  goal met, goal revised this date, good progress toward goal at 11/05/2019 0731   Gait/Walking Locomotion Goal 2   Activity/Assistive Device  gait (walking locomotion), assistive device use, improve balance and speed, increase endurance/gait distance, walker, front-wheeled at 11/01/2019 1106   Distance  200 feet at 11/01/2019 1106   [place]  modified independence at 11/01/2019 1106   Time Frame  14 days or less at 11/01/2019 1106   Strategies/Barriers  decreased BLE strength, decreased balance at 11/05/2019 0731   Progress/Outcome  good progress toward goal, goal ongoing at 11/05/2019 0731   Stairs Goal 1   Activity/Assistive Device  stairs, all skills, assistive device use, ascending stairs, descending stairs, using handrail, left, cane, straight at 11/05/2019 0731   Number of Stairs  15 at 11/05/2019 0731   [place]  supervision required, set-up required at 11/05/2019 0731   Time Frame  5 - 7 days at 11/05/2019 0731   Strategies/Barriers  decreased BLE strength, decreased balance at 11/05/2019 0731   Progress/Outcome  goal ongoing, good progress toward goal at 11/05/2019 0731   Stairs Goal 2   Activity/Assistive Device  assistive device use, stairs, all skills, ascending stairs, descending stairs, using handrail, left, cane, straight at 11/01/2019 1106   Number of Stairs  15 at 11/01/2019 1106   [place]  supervision required, set-up required at 11/01/2019 1106   Time Frame  14 days or less at 11/01/2019 1106   Strategies/Barriers  decreased BLE strength, decreased balance at 11/05/2019 0731   Progress/Outcome  good progress toward goal, goal ongoing at 11/05/2019 0731

## 2019-11-08 NOTE — PLAN OF CARE
Problem: Rehabilitation (IRF) Plan of Care  Goal: Plan of Care Review  Outcome: Progressing  Flowsheets (Taken 11/8/2019 1126)  Plan of Care Reviewed With: patient  IRF Plan of Care Review: progress ongoing, continue  Outcome Summary: Improved with knee flexion ROM today.

## 2019-11-08 NOTE — PLAN OF CARE
Problem: Rehabilitation (IRF) Plan of Care  Goal: Plan of Care Review  Outcome: Progressing  Flowsheets (Taken 11/8/2019 1153)  Plan of Care Reviewed With: patient  IRF Plan of Care Review: progress ongoing, continue  Outcome Summary: Pain well managed, participating in all therapies. Supervision with weelchair.

## 2019-11-09 ENCOUNTER — APPOINTMENT (INPATIENT)
Dept: PHYSICAL THERAPY | Facility: REHABILITATION | Age: 69
DRG: 560 | End: 2019-11-09
Payer: MEDICARE

## 2019-11-09 PROCEDURE — 97530 THERAPEUTIC ACTIVITIES: CPT | Mod: GP

## 2019-11-09 PROCEDURE — 63700000 HC SELF-ADMINISTRABLE DRUG: Performed by: PHYSICAL MEDICINE & REHABILITATION

## 2019-11-09 PROCEDURE — 12800000 HC ROOM AND CARE SEMIPRIVATE REHAB

## 2019-11-09 PROCEDURE — 97116 GAIT TRAINING THERAPY: CPT | Mod: GP

## 2019-11-09 PROCEDURE — 97110 THERAPEUTIC EXERCISES: CPT | Mod: GP

## 2019-11-09 RX ADMIN — ACETAMINOPHEN 650 MG: 325 TABLET, FILM COATED ORAL at 08:53

## 2019-11-09 RX ADMIN — ASPIRIN 81 MG: 81 TABLET, COATED ORAL at 20:18

## 2019-11-09 RX ADMIN — ACETAMINOPHEN 650 MG: 325 TABLET, FILM COATED ORAL at 12:26

## 2019-11-09 RX ADMIN — VALSARTAN 80 MG: 80 TABLET, FILM COATED ORAL at 08:51

## 2019-11-09 RX ADMIN — ACETAMINOPHEN 650 MG: 325 TABLET, FILM COATED ORAL at 18:01

## 2019-11-09 RX ADMIN — OXYCODONE HYDROCHLORIDE 5 MG: 5 TABLET ORAL at 12:27

## 2019-11-09 RX ADMIN — ATORVASTATIN CALCIUM 10 MG: 10 TABLET, FILM COATED ORAL at 18:01

## 2019-11-09 RX ADMIN — OXYCODONE HYDROCHLORIDE 10 MG: 5 TABLET ORAL at 22:55

## 2019-11-09 RX ADMIN — ASPIRIN 81 MG: 81 TABLET, COATED ORAL at 08:52

## 2019-11-09 RX ADMIN — MULTIPLE VITAMINS W/ MINERALS TAB 1 TABLET: TAB at 08:52

## 2019-11-09 RX ADMIN — OXYCODONE HYDROCHLORIDE 5 MG: 5 TABLET ORAL at 04:54

## 2019-11-09 RX ADMIN — ACETAMINOPHEN 650 MG: 325 TABLET, FILM COATED ORAL at 20:18

## 2019-11-09 NOTE — PLAN OF CARE
Problem: Rehabilitation (IRF) Plan of Care  Goal: Plan of Care Review  Outcome: Progressing  Flowsheets (Taken 11/8/2019 2203)  IRF Plan of Care Review: progress ongoing, continue  Outcome Summary: making progress with transfers

## 2019-11-09 NOTE — PROGRESS NOTES
Internal Medicine Progress Note      Patient seen and examined  Attestation Notes: Face to face encounter completed      HPI  Mr Car is a 69 yo M with hypertension, dyslipidemia, prostate CA, osteoarthritis who was evaluated as an outpatient for chronic progressive severe bilateral knee pain not alleviated by conservative measures; he was recommended bilateral total knee replacement  On 10/29, he was admitted to [facility], where he underwent bilateral total knee replacement secondary to osteoarthritis by Dr. Zheng  He utilized twice daily aspirin 81 mg for DVT prophylaxis  He continued on Diovan for blood pressure management  He was admitted to [facility] on 10/31 for acute inpatient rehabilitation    SUBJECTIVE  Bright affect, feeling well today  Reports good control of knee pain  Good appetite without reported GI distress  Lower extremity edema continues to gradually improve each day  No fever, headache, cough, chest pain, dyspnea    Review of Systems as above, otherwise without change      Medical History reviewed:  FH, SH without change since admission  Social History     Tobacco Use   • Smoking status: Former Smoker     Last attempt to quit: 1979     Years since quittin.8   • Smokeless tobacco: Never Used   • Tobacco comment: quit 40 years ago   Substance Use Topics   • Alcohol use: Yes     Alcohol/week: 3.0 standard drinks     Types: 3 Cans of beer per week     Frequency: 2-3 times a week     Drinks per session: 3 or 4     Binge frequency: Less than monthly   • Drug use: Never       PMH, PSH without change since admission      Current medications and allergies reviewed:  Allergies: Patient has no known allergies.    CURRENT MEDICATIONS:      • acetaminophen  650 mg oral With meals & nightly   • aspirin  81 mg oral BID   • atorvastatin  10 mg oral Daily (6p)   • docusate sodium  100 mg oral BID   • multivitamin  1 tablet oral Daily   • polyethylene glycol  17 g oral Daily   • senna   2 tablet oral Daily with lunch   • valsartan  80 mg oral Daily           Objective     Vitals:    11/08/19 1400 11/08/19 1620 11/08/19 1900 11/09/19 0700   BP: 140/67 (!) 154/70 (!) 144/67 140/68   BP Location: Left upper arm Left upper arm Left upper arm    Patient Position: Sitting Sitting Sitting Sitting   Pulse: 94 88 86 76   Resp:  18 16 18   Temp:  36.4 °C (97.6 °F) (!) 36 °C (96.8 °F) 36.8 °C (98.2 °F)   TempSrc:  Oral Oral Oral   SpO2:  97% 100% 96%   Weight:       Height:           Physical Exam   Constitutional: He is oriented to person, place, and time. He appears well-developed and well-nourished. No distress.   HENT:   Head: Normocephalic and atraumatic.   Mouth/Throat: Oropharynx is clear and moist.   Eyes: Pupils are equal, round, and reactive to light. Conjunctivae and EOM are normal. No scleral icterus.   Neck: Normal range of motion. Neck supple.   Cardiovascular: Normal rate, regular rhythm, normal heart sounds and intact distal pulses.   Pulmonary/Chest: Effort normal and breath sounds normal. No respiratory distress. He has no wheezes. He has no rales.   Abdominal: Soft. Bowel sounds are normal. He exhibits no distension. There is no tenderness.   Musculoskeletal: He exhibits edema (Bilateral lower extremity edema) and deformity (s/p bilateral TKR).   Neurological: He is alert and oriented to person, place, and time. No cranial nerve deficit.   Skin: Skin is warm and dry. He is not diaphoretic. No erythema.   Bilateral knee incisions c/d/i with dermabond   Psychiatric: He has a normal mood and affect.   Nursing note and vitals reviewed.       Labs   I have reviewed the patient's pertinent labs.  Significant abnormals are Anemia, hypoalbuminemia.  Lab results reviewed, discussed with patient:    Lab Results   Component Value Date    WBC 8.78 11/05/2019    HGB 10.6 (L) 11/05/2019    HCT 31.3 (L) 11/05/2019     (H) 11/05/2019     11/05/2019    K 4.6 11/05/2019     11/05/2019     CREATININE 0.8 11/05/2019    BUN 17 11/05/2019    CO2 27 11/05/2019    ALT 12 (L) 11/01/2019    AST 17 11/01/2019    INR 1.0 10/16/2019    HGBA1C 5.6 10/16/2019       Imaging    Bilateral lower extremity venous Doppler ultrasound 10/31:  No evidence of deep vein thrombus in either lower extremity  Fluid in right popliteal space likely represents a ruptured Baker's cyst but is not well evaluated on this vascular study        Assessment/Plan       Patient Active Problem List    Diagnosis Date Noted   • Anemia due to blood loss 10/31/2019   • Leukemoid reaction 10/31/2019   • S/P TKR (total knee replacement), bilateral 10/31/2019   • Status post bilateral knee replacements 10/31/2019   • Osteoarthrosis 10/29/2019   • Osteoarthritis of both knees 10/16/2019   • Counseling on health promotion and disease prevention 10/16/2019   • Hyperlipidemia, unspecified    • Essential (primary) hypertension        Mr Yoon is a 69 yo M with hypertension, dyslipidemia, prostate CA, osteoarthritis who is admitted to [facility] on 10/31 from [facility] for acute inpatient rehabilitation s/p bilateral TKR by Dr. Zheng on 10/29     Ortho:  S/p B TKR-continue rehab  Continue current pain management regimen  Local wound care     Cardiovascular:  Essential hypertension-blood pressure 135-154/63-70 on Diovan  Manual only monitoring  Maintain hold parameters and cardiac precautions     Pulmonary:  Encourage use of spirometry for atelectasis  No evidence of respiratory distress     Endocrine:  Dyslipidemia-continue statin therapy, low-fat diet  LFT 11/1 unremarkable     Renal:  BUN/Cr  17/0.8, GFR> 60  Monitor renal function, avoid nephrotoxins     GI:  Constipation-continue bowel program     :  Hx prostate CA  Monitor PVR, CIC PRN  Urinalysis 10/31 clear, leukocyte esterase negative, nitrite negative; culture not indicated at present     Heme:  Postoperative anemia-preop hemoglobin 14.2    Normochromic,  normocytic  Hemoglobin stable, 10.6  Repeat CBC 11/11     F/E/N:  Regular/thin diet  Electrolytes stable, maintaining hydration  Reassess labs 11/11    Derm:  Local wound care     Prophylaxis:  -BID 81mg ASA, SCD for DVT prophylaxis; Doppler ultrasound 11/1 without evidence for DVT  -Spirometry for atelectasis  -Maintain fall, cardiac precautions     Disposition  -Expected discharge date 11/12  .  Shalini Torres MD  11/9/2019  12:53 PM

## 2019-11-09 NOTE — PROGRESS NOTES
Patient: Chapincito Yoon  Location: [facility] [address]  MRN: [id number]  Today's date: 11/9/2019    Hospital Course  Chris is a 68 y.o. male admitted on 10/31/2019 with Status post bilateral knee replacements [Z96.653]. Principal problem is S/P TKR (total knee replacement), bilateral.    Chris has a past medical history of Essential (primary) hypertension, Hyperlipidemia, unspecified, Impaired fasting glucose, Osteoarthritis, and Prostate cancer (CMS/[facility]) (2011).     Pt is a 68 year old male admitted to [facility] on 10/31/19 with h/o bilateral knee pain x 5-6 years. It has progressively worsened. Pt is now s/p bilateral TKA's on 10/29/19 and no complications post op.      Therapy Pain/Vitals     Row Name 11/09/19 1310 11/09/19 1356       Pain/Comfort/Sleep    Pain Charting Type  Pain Assessment  --    Preferred Pain Scale  number (Numeric Rating Pain Scale)  --    Pain Body Location - Side  Bilateral  --    Pain Body Location  knee  --    Pain Rating (0-10): Rest  5  --    Pain Rating (0-10): Activity  5  --    Pain Management Interventions  cold applied  cold applied       Vital Signs    Pulse  70  70    SpO2  99 %  99 %    BP  134/66  --    BP Location  Left upper arm  --    BP Method  Manual  --    Patient Position  Sitting  --          Prior Living Environment      Most Recent Value   Lives With  spouse   Living Arrangements  house   Home Accessibility  stairs to enter home (Group), stairs within home (Group)   Living Environment Comment  2 SH, 3 GENE + 1 GENE without HR,  1st floor MD, FF to bed/bath   Number of Stairs, Main Entrance  4   Surface of Stairs, Main Entrance  concrete   Stair Railings, Main Entrance  none   Location, Patient Bedroom  second floor, must negotiate stairs to access   Location, Bathroom  first (main) floor, second floor, must negotiate stairs to access   Bathroom Access Comment  MD 1st floor, stnd height toilet,  full bath on 2nd c walk-in shower with a low threshold entry and  built-in seat, no grab bars,  stnd height toilet   Number of Stairs, Within Home, Primary  other (see comments) [15]   Surface of Stairs, Within Home, Primary  carpeting, hardwood   Stair Railings, Within Home, Primary  railings on both sides of stairs, railing on left side (ascending)   Stairs Comment, Within Home, Primary  B rails for 12 steps & 3          Prior Level of Function      Most Recent Value   Dominant Hand  right   Ambulation  independent   Transferring  independent   Toileting  independent   Bathing  independent   Dressing  independent   Eating  independent   Communication  understands/communicates without difficulty   Prior Level of Function Comment  No DME   Equipment Currently Used at Home  none          IRF PT Evaluation and Treatment - 11/09/19 1305        Time Calculation    Start Time  1300     Stop Time  1400     Time Calculation (min)  60 min        Session Details    Document Type  daily treatment/progress note     Mode of Treatment  physical therapy;individual therapy        General Information    Patient Profile Reviewed?  yes     General Observations of Patient  received in [facility], report B knee pain at 5/10, premedicated for therapy     Existing Precautions/Restrictions  aspiration;cardiac;fall;weight bearing        Weight-Bearing Status    Left LE Weight-Bearing Status  weight-bearing as tolerated (WBAT)     Right LE Weight-Bearing Status  weight-bearing as tolerated (WBAT)        Range of Motion (ROM)    Knee, Left (ROM)  0-96     Knee, Right (ROM)  4-94        Bed Mobility    [place], Supine to Sit  supervision     Verbal Cues (Supine to Sit)  technique     [place], Sit to Supine  supervision     Verbal Cues (Sit to Supine)  technique     Comment (Bed Mobility)  performed on mat table to wedge and pillow        Bed to Chair Transfer    [place], Bed to Chair  supervision     Assistive Device  walker, front-wheeled        Chair to Bed Transfer    [place], Chair to Bed   "supervision     Assistive Device  walker, front-wheeled        Sit to Stand Transfer    [place], Sit to Stand Transfer  supervision     Assistive Device  walker, front-wheeled        Stand to Sit Transfer    [place], Stand to Sit Transfer  supervision     Assistive Device  walker, front-wheeled        Stand Pivot/Stand Step Transfer    [place], Stand Pivot/Stand Step Transfer  supervision     Verbal Cues  hand placement     Assistive Device  walker, front-wheeled        Gait Training    [place], Gait  supervision     Assistive Device  walker, front-wheeled     Distance in Feet  200 feet     Gait Pattern Utilized  step-through     Deviations/Abnormal Patterns (Gait)  antalgic;gait speed decreased     Bilateral Gait Deviations  heel strike decreased     Maintains Weight-Bearing Status  able to maintain     Comment  200ft, 400ft, 50ft x2        Curb Negotiation (Mobility)    [place]  close supervision;supervision     Comment  up/down 6\" curb with RW        Rough/Uneven Surface Gait Skills (Mobility)    [place]  close supervision     Comment  up/down with RW, assist for safety and balance         Stairs Training    [place], Stairs  close supervision;supervision     Assistive Device  railing     Handrail Location  both sides;left side (ascending);left side (descending)     Number of Stairs  12     Stair Height  6 inches     Ascending Stairs Technique  step-to-step     Descending Stairs Technique  step-to-step     Maintains Weight-Bearing Status (Stairs)  able to maintain     Comment  perform 4 steps with L rail and SPC for education        Lower Extremity (Therapeutic Exercise)    Exercise Position/Type (LE Therapeutic Exercise)  seated;supine;AROM (active range of motion);AAROM (active assistive range of motion)     General Exercise (LE Therapeutic Exercise)  bilateral;gluteal sets;quad sets;SAQ (short arc quad);heel slides     Reps and Sets (LE Therapeutic Exercise)  2/10     Comment " (LE Therapeutic Exercise)  Stair Stretching with B handrails- hamstring/knee ext 1' x2, Knee flex/gastroc 30s x2        Cryotherapy    Indications  pain     Method of Application (Cryotherapy)  cryostatic     Cryogenic Agent (Cryotherapy)  gel pack     Duration (Cryotherapy)  20 minutes     Comment  B gel back to knees intermittently        Daily Progress Summary (PT)    Daily Outcome Statement (PT)  Pt progressing safety, balance and independence to long term goals for functional mobility. Pt with improved B knee ROM, educated on stretching and daily ROM therex for HEP. Pt reports decreased pain s/p cold pack. Continue POC.           Pain Assessment/Intervention  Pain Charting Type: Pain Assessment                  IRF PT Goals      Most Recent Value   Bed Mobility Goal 1   Activity/Assistive Device  bed mobility activities, all at 11/05/2019 0731   [place]  modified independence at 11/05/2019 0731   Time Frame  5 - 7 days at 11/05/2019 0731   Strategies/Barriers  pain, decreased BLE strength at 11/05/2019 0731   Progress/Outcome  goal met, goal revised this date, good progress toward goal at 11/05/2019 0731   Bed Mobility Goal 2   Activity/Assistive Device  rolling to right, rolling to left, supine to sit, sit to supine at 11/01/2019 1106   [place]  modified independence at 11/01/2019 1106   Time Frame  14 days or less at 11/01/2019 1106   Strategies/Barriers  pain and decreased BLE strength at 11/05/2019 0731   Progress/Outcome  good progress toward goal, goal ongoing at 11/05/2019 0731   Transfer Goal 1   Activity/Assistive Device  sit-to-stand/stand-to-sit, bed-to-chair/chair-to-bed, car transfer, walker, front-wheeled at 11/05/2019 0731   [place]  modified independence at 11/05/2019 0731   Time Frame  5 - 7 days at 11/05/2019 0731   Strategies/Barriers  decreased BLE strength, decreased balance at 11/05/2019 0731   Progress/Outcome  goal met, goal revised this date, good progress toward goal at  11/05/2019 0731   Transfer Goal 2   Activity/Assistive Device  sit-to-stand/stand-to-sit, bed-to-chair/chair-to-bed at 11/01/2019 1106   [place]  modified independence at 11/01/2019 1106   Time Frame  14 days or less at 11/01/2019 1106   Strategies/Barriers  decreased BLE strength, decreased balance & decreased ROM at 11/05/2019 0731   Progress/Outcome  goal ongoing, good progress toward goal, continuing progress toward goal at 11/05/2019 0731   Gait/Walking Locomotion Goal 1   Activity/Assistive Device  gait (walking locomotion), assistive device use at 11/05/2019 0731   Distance  200 feet at 11/05/2019 0731   [place]  modified independence at 11/05/2019 0731   Time Frame  5 - 7 days, short-term goal (STG) at 11/05/2019 0731   Strategies/Barriers  decreased BLE strength, decreased balance at 11/05/2019 0731   Progress/Outcome  goal met, goal revised this date, good progress toward goal at 11/05/2019 0731   Gait/Walking Locomotion Goal 2   Activity/Assistive Device  gait (walking locomotion), assistive device use, improve balance and speed, increase endurance/gait distance, walker, front-wheeled at 11/01/2019 1106   Distance  200 feet at 11/01/2019 1106   [place]  modified independence at 11/01/2019 1106   Time Frame  14 days or less at 11/01/2019 1106   Strategies/Barriers  decreased BLE strength, decreased balance at 11/05/2019 0731   Progress/Outcome  good progress toward goal, goal ongoing at 11/05/2019 0731   Stairs Goal 1   Activity/Assistive Device  stairs, all skills, assistive device use, ascending stairs, descending stairs, using handrail, left, cane, straight at 11/05/2019 0731   Number of Stairs  15 at 11/05/2019 0731   [place]  supervision required, set-up required at 11/05/2019 0731   Time Frame  5 - 7 days at 11/05/2019 0731   Strategies/Barriers  decreased BLE strength, decreased balance at 11/05/2019 0731   Progress/Outcome  goal ongoing, good progress toward goal at 11/05/2019  0731   Stairs Goal 2   Activity/Assistive Device  assistive device use, stairs, all skills, ascending stairs, descending stairs, using handrail, left, cane, straight at 11/01/2019 1106   Number of Stairs  15 at 11/01/2019 1106   [place]  supervision required, set-up required at 11/01/2019 1106   Time Frame  14 days or less at 11/01/2019 1106   Strategies/Barriers  decreased BLE strength, decreased balance at 11/05/2019 0731   Progress/Outcome  good progress toward goal, goal ongoing at 11/05/2019 0731

## 2019-11-09 NOTE — PROGRESS NOTES
"Subjective    Patient seen and examined on rounds.  Chart reviewed.  Events overnight noted.  History reviewed briefly with patient.     CC:  Deficits in mobility, transfers, self-care status post bilateral total knee replacements     HPI:  Mr. Yoon is a 68-year-old right-handed white male with chronic conditions significant for degenerative arthritis, hypertension, prostate cancer status post prostatectomy, dyslipidemia who had progressively worsening bilateral knee pain which failed conservative management and subsequently the patient underwent elective bilateral total knee replacements on 10/29/19 at [facility] by Dr. Ted Zheng.  Postoperatively, he is allowed weightbearing as tolerated on both lower extremities.  He is on Aspirin for deep vein thrombus prophylaxis and also on pneumatic compression boots. He has put on Oxycodone for pain control.  Postoperative course was significant for anemia, but he did not need transfusion. On 10/30/19, hemoglobin was 11.2, WBC count 7.65, BUN 13, creatinine 0.9.  He has been needing assistance for mobility, transfers, self-care postoperatively. He is transferred to [facility] on 10/31/19 for further rehabilitation care.       SUBJ: Tolerating therapies per endurance.  Inspected incisions which are stable.  Discussed with patient and with his wife with him.       ROS: Denies chest pain or shortness of breath. Other ROS negative. Past, family, social history is unchanged.    Physical Exam      Blood pressure (!) 144/67, pulse 86, temperature (!) 36 °C (96.8 °F), temperature source Oral, resp. rate 16, height 1.702 m (5' 7\"), weight 102 kg (225 lb 1.4 oz), SpO2 100 %.  Body mass index is 35.25 kg/m².    General Appearance: Not in acute distress  Head/Ear/Nose/Throat: Normocephalic; Atraumatic.   Eye: EOMI; PERRL.   Neck: No JVD; No Bruits.   Respiratory: Decreased breath sounds at bases.   Cardiovascular: RRR; Normal S1, S2.   Gastrointestinal: Soft; NT; " +BS.   Extremities: Bilateral lower extremity edema noted.  Healing incisions noted on anterior aspect of both knees.  Incisions open to air.  No drainage today.  Musculoskeletal: Functional active range of motion in both upper extremities.  Some limitation of active range of motion in both hips and knees, limited by pain.  Right knee PROM 0 to 93 degrees.  Left knee PROM 4 to 90 degrees.  Neurological: AAO ×3. Speech is fluent. Cranial nerve examination does not reveal any gross facial asymmetry. Strength testing about 4+/5 strength in both upper extremities.  Bilateral hip flexion is less than 3/5.  Bilateral quadriceps are less than 3/5.  Bilateral ankle dorsi and plantar flexion are 4/5.  He is grossly able to localize touch and position sense in great toes.  Deep tendon reflexes are hypoactive and symmetric bilaterally.  Plantars are flexor.  Coordination is functional upper extremities.  Both knee jerks were not tested.  Neurologically unchanged  Behavior/Emotional: Appropriate; Cooperative.   Skin: No obvious rashes noted.  Bilateral knee incisions healing.  Incisions open to air.  No drainage today.      Current Facility-Administered Medications:   •  acetaminophen (TYLENOL) tablet 650 mg, 650 mg, oral, q4h PRN, Vernon Rob MD, 650 mg at 11/06/19 0751  •  acetaminophen (TYLENOL) tablet 650 mg, 650 mg, oral, With meals & nightly, Vernon Rob MD, 650 mg at 11/08/19 2103  •  alum-mag hydroxide-simeth (MAALOX) 200-200-20 mg/5 mL suspension 30 mL, 30 mL, oral, q4h PRN, Vernon Rob MD  •  aspirin enteric coated tablet 81 mg, 81 mg, oral, BID, Vernon Rob MD, 81 mg at 11/08/19 2104  •  atorvastatin (LIPITOR) tablet 10 mg, 10 mg, oral, Daily (6p), Vernon Rob MD, 10 mg at 11/08/19 1720  •  bisacodyl (DULCOLAX) 10 mg suppository 10 mg, 10 mg, rectal, Daily PRN, Vernon Rob MD  •  docusate sodium (COLACE) capsule 100 mg, 100 mg, oral, BID, Vernon Rob MD, 100 mg  at 11/08/19 2104  •  magnesium hydroxide (M.O.M.) 400 mg/5 mL suspension 30 mL, 30 mL, oral, Daily PRN, Vernon Rob MD, 30 mL at 11/03/19 1732  •  multivitamin tablet 1 tablet, 1 tablet, oral, Daily, Vernon Rob MD, 1 tablet at 11/08/19 0752  •  ondansetron ODT (ZOFRAN-ODT) disintegrating tablet 4 mg, 4 mg, oral, q6h PRN, Vernon Rob MD  •  oxyCODONE (ROXICODONE) immediate release tablet 5-10 mg, 5-10 mg, oral, q4h PRN, Vernon Rob MD, 10 mg at 11/08/19 1940  •  polyethylene glycol (MIRALAX) 17 gram packet 17 g, 17 g, oral, Daily, Shalini Torres MD, 17 g at 11/05/19 0735  •  senna (SENOKOT) tablet 2 tablet, 2 tablet, oral, Daily with lunch, Vernon Rob MD, 2 tablet at 11/06/19 1218  •  valsartan (DIOVAN) tablet 80 mg, 80 mg, oral, Daily, Vernon Rob MD, 80 mg at 11/08/19 0752       Labs / Radiology    Lab Results   Component Value Date    WBC 8.78 11/05/2019    HGB 10.6 (L) 11/05/2019    HCT 31.3 (L) 11/05/2019    MCV 95.4 11/05/2019     (H) 11/05/2019     Lab Results   Component Value Date    GLUCOSE 113 (H) 11/05/2019    CALCIUM 8.7 (L) 11/05/2019     11/05/2019    K 4.6 11/05/2019    CO2 27 11/05/2019     11/05/2019    BUN 17 11/05/2019    CREATININE 0.8 11/05/2019         Assessment and Plan    ASSESSMENT PLAN:  1. 68-year-old right-handed white male with chronic conditions significant for degenerative arthritis, hypertension, prostate cancer status post prostatectomy, dyslipidemia who had progressively worsening bilateral knee pain which failed conservative management and subsequently the patient underwent elective bilateral total knee replacements on 10/29/19 at [facility] by Dr. Ted Zheng.  Postoperatively, he is allowed weightbearing as tolerated on both lower extremities.  He is on Aspirin for deep vein thrombus prophylaxis and also on pneumatic compression boots. He has put on Oxycodone for pain control.  Postoperative course was  significant for anemia, but he did not need transfusion. On 10/30/19, hemoglobin was 11.2, WBC count 7.65, BUN 13, creatinine 0.9.  He has been needing assistance for mobility, transfers, self-care postoperatively. He is transferred to [facility] on 10/31/19 for further rehabilitation care.       2. DVT prophylaxis - on Aspirin.  On SCDs.    Platelets 224 on 11/1/2019.  More ambulatory now.  Platelets 377 on 11/5/2019.     3.  Orthopedics - status post bilateral total knee replacements.  Continue PT, OT.  Monitor healing of incisions.     4. GI - On Colace, Senokot.  On Dulcolax.  On Zofran ODT for nausea.  On when necessary MOM, Maalox.     5.  - voiding.  Denies dysuria.  Monitor post void residuals.     6.  Hypertension - on Diovan.     7. Pain - on when necessary Oxycodone.  On Tylenol PRN.  Ice to knees when necessary.  Pain medications are helping.  He does not want increased pain medications at this time.Therapist indicated patient has been complaining of a lot of pain in his knees.  Discussed with patient and with his wife in the evening.  Again he does not want to go on OxyContin.  Will order Lidoderm patches.  Suggested using ice to the knees.  On scheduled Tylenol. He is on PRN oxycodone. He has soreness in his knees.  Tylenol and Lidoderm patches were added.  On oxycodone as needed.  He does not want to go on long-acting pain meds. Discussed with patient and with his wife with him.  Lidoderm patches did not help much so they will be discontinued.  On scheduled Tylenol also.  Ice to knees as needed.        8.  Hematology - monitor hemoglobin, platelets.  Anemia - on MVI.  Hemoglobin 10.7 on 11/1/2019.  Hemoglobin 10.6 on 11/5/9.     9.  Dyslipidemia - on Lipitor.     10. Rehabilitation medicine - Continue comprehensive rehabilitation care. Continue PT, OT.  We will follow falls precautions, cardiac precautions, monitor pulse oximetry in therapy.Therapist indicated patient has been  complaining of a lot of pain in his knees.  Discussed with patient and with his wife in the evening.  Again he does not want to go on OxyContin.  Will order Lidoderm patches.  Suggested using ice to the knees.  On scheduled Tylenol. He is on PRN oxycodone. He has soreness in his knees.  Tylenol and Lidoderm patches were added.  On oxycodone as needed.  He does not want to go on long-acting pain meds.   Lidoderm patches did not help much so they will be discontinued.  On scheduled Tylenol also.  Ice to knees as needed.  Inspected incisions which are stable.  Discussed with patient and with his wife.    11. Reviewed labs today.  BUN 14, creatinine 1.0 on 11/1/2019.  BUN 17, creatinine 0.8 on 11/5/2019.        Vernon Rob MD  11/8/2019

## 2019-11-09 NOTE — PLAN OF CARE
Problem: Rehabilitation (IRF) Plan of Care  Goal: Plan of Care Review  Flowsheets (Taken 11/9/2019 6986)  Plan of Care Reviewed With: patient  IRF Plan of Care Review: progress ongoing, continue  Outcome Summary: improved safety and balance for functional mobility

## 2019-11-09 NOTE — PLAN OF CARE
Problem: Rehabilitation (IRF) Plan of Care  Goal: Plan of Care Review  Outcome: Progressing  Flowsheets (Taken 11/9/2019 0500)  Plan of Care Reviewed With: patient  IRF Plan of Care Review: progress ongoing, continue  Outcome Summary: Slept well. Pain managed prn.

## 2019-11-10 ENCOUNTER — APPOINTMENT (INPATIENT)
Dept: OCCUPATIONAL THERAPY | Facility: REHABILITATION | Age: 69
DRG: 560 | End: 2019-11-10
Payer: MEDICARE

## 2019-11-10 ENCOUNTER — APPOINTMENT (INPATIENT)
Dept: PHYSICAL THERAPY | Facility: REHABILITATION | Age: 69
DRG: 560 | End: 2019-11-10
Payer: MEDICARE

## 2019-11-10 PROCEDURE — 97110 THERAPEUTIC EXERCISES: CPT | Mod: GO

## 2019-11-10 PROCEDURE — 97116 GAIT TRAINING THERAPY: CPT | Mod: GP

## 2019-11-10 PROCEDURE — 97530 THERAPEUTIC ACTIVITIES: CPT | Mod: GP

## 2019-11-10 PROCEDURE — 97110 THERAPEUTIC EXERCISES: CPT | Mod: GP

## 2019-11-10 PROCEDURE — 63700000 HC SELF-ADMINISTRABLE DRUG: Performed by: PHYSICAL MEDICINE & REHABILITATION

## 2019-11-10 PROCEDURE — 12800000 HC ROOM AND CARE SEMIPRIVATE REHAB

## 2019-11-10 RX ADMIN — OXYCODONE HYDROCHLORIDE 10 MG: 5 TABLET ORAL at 20:33

## 2019-11-10 RX ADMIN — ACETAMINOPHEN 650 MG: 325 TABLET, FILM COATED ORAL at 20:32

## 2019-11-10 RX ADMIN — ACETAMINOPHEN 650 MG: 325 TABLET, FILM COATED ORAL at 11:51

## 2019-11-10 RX ADMIN — ACETAMINOPHEN 650 MG: 325 TABLET, FILM COATED ORAL at 08:11

## 2019-11-10 RX ADMIN — ASPIRIN 81 MG: 81 TABLET, COATED ORAL at 08:10

## 2019-11-10 RX ADMIN — OXYCODONE HYDROCHLORIDE 10 MG: 5 TABLET ORAL at 11:51

## 2019-11-10 RX ADMIN — ACETAMINOPHEN 650 MG: 325 TABLET, FILM COATED ORAL at 17:59

## 2019-11-10 RX ADMIN — ASPIRIN 81 MG: 81 TABLET, COATED ORAL at 20:32

## 2019-11-10 RX ADMIN — OXYCODONE HYDROCHLORIDE 10 MG: 5 TABLET ORAL at 04:16

## 2019-11-10 RX ADMIN — VALSARTAN 80 MG: 80 TABLET, FILM COATED ORAL at 08:11

## 2019-11-10 RX ADMIN — MULTIPLE VITAMINS W/ MINERALS TAB 1 TABLET: TAB at 08:11

## 2019-11-10 RX ADMIN — ATORVASTATIN CALCIUM 10 MG: 10 TABLET, FILM COATED ORAL at 18:00

## 2019-11-10 NOTE — PROGRESS NOTES
Patient: Chapincito Yoon  Location: [facility]  MRN: [id number]  Today's date: 11/10/2019    Hospital Course  Chris is a 68 y.o. male admitted on 10/31/2019 with Status post bilateral knee replacements [Z96.653]. Principal problem is S/P TKR (total knee replacement), bilateral.    Chris has a past medical history of Essential (primary) hypertension, Hyperlipidemia, unspecified, Impaired fasting glucose, Osteoarthritis, and Prostate cancer (CMS/[facility]) (2011).     Pt is a 68 year old male admitted to [facility] on 10/31/19 with h/o bilateral knee pain x 5-6 years. It has progressively worsened. Pt is now s/p bilateral TKA's on 10/29/19 and no complications post op.      Therapy Pain/Vitals     Row Name 11/10/19 1004 11/10/19 1100       Pain/Comfort/Sleep    Pain Charting Type  Pain Assessment  Pain Reassessment    Preferred Pain Scale  number (Numeric Rating Pain Scale)  number (Numeric Rating Pain Scale)    Pain Body Location - Side  Bilateral  Bilateral    Pain Body Location - Orientation  incisional  incisional    Pain Body Location  knee  knee    Pain Rating (0-10): Rest  4  5    Pain Management Interventions  premedicated for activity  premedicated for activity;cold applied       Vital Signs    Pulse  94  94    Heart Rate Source  Monitor  Monitor    BP  130/66  133/60    BP Location  Left upper arm  Left upper arm    BP Method  Automatic  Automatic    Patient Position  Sitting  Sitting    Row Name 11/10/19 1328 11/10/19 1427       Pain/Comfort/Sleep    Pain Charting Type  Pain Assessment  Post Activity    Preferred Pain Scale  number (Numeric Rating Pain Scale)  number (Numeric Rating Pain Scale)    Comfort/Acceptable Pain Level  5  7    Pain Body Location - Side  Bilateral  Bilateral    Pain Body Location - Orientation  generalized  generalized    Pain Body Location  knee  knee    Pain Management Interventions  premedicated for activity  --       Vital Signs    Pulse  88  96    Heart Rate Source   Right Radial  Right Radial    BP  (!) 146/60  (!) 160/60    BP Location  Right upper arm  Right upper arm    BP Method  Manual  Manual    Patient Position  Sitting  Sitting       Patient Observation    Observations  --  At end of session.          Prior Living Environment      Most Recent Value   Lives With  spouse   Living Arrangements  house   Home Accessibility  stairs to enter home (Group), stairs within home (Group)   Living Environment Comment  2 SH, 3 GNEE + 1 GENE without HR,  1st floor ME, FF to bed/bath   Number of Stairs, Main Entrance  4   Surface of Stairs, Main Entrance  concrete   Stair Railings, Main Entrance  none   Location, Patient Bedroom  second floor, must negotiate stairs to access   Location, Bathroom  first (main) floor, second floor, must negotiate stairs to access   Bathroom Access Comment  ME 1st floor, stnd height toilet,  full bath on 2nd c walk-in shower with a low threshold entry and built-in seat, no grab bars,  stnd height toilet   Number of Stairs, Within Home, Primary  other (see comments) [15]   Surface of Stairs, Within Home, Primary  carpeting, hardwood   Stair Railings, Within Home, Primary  railings on both sides of stairs, railing on left side (ascending)   Stairs Comment, Within Home, Primary  B rails for 12 steps & 3          Prior Level of Function      Most Recent Value   Dominant Hand  right   Ambulation  independent   Transferring  independent   Toileting  independent   Bathing  independent   Dressing  independent   Eating  independent   Communication  understands/communicates without difficulty   Prior Level of Function Comment  No DME   Equipment Currently Used at Home  none          IRF PT Evaluation and Treatment - 11/10/19 1327        Time Calculation    Start Time  1327     Stop Time  1427     Time Calculation (min)  60 min        Session Details    Document Type  daily treatment/progress note     Mode of Treatment  physical therapy;individual therapy         "General Information    Patient Profile Reviewed?  yes     General Observations of Patient  Pleasant and cooperation     Existing Precautions/Restrictions  aspiration;cardiac;fall;weight bearing        Weight-Bearing Status    Left LE Weight-Bearing Status  weight-bearing as tolerated (WBAT)     Right LE Weight-Bearing Status  weight-bearing as tolerated (WBAT)        Range of Motion (ROM)    Knee, Left (ROM)  0-95     Knee, Right (ROM)  0-99        Gait Training    [place], Gait  supervision     Assistive Device  walker, front-wheeled     Distance in Feet  200 feet     Gait Pattern Utilized  step-through     Deviations/Abnormal Patterns (Gait)  antalgic;gait speed decreased;stride length decreased     Bilateral Gait Deviations  heel strike decreased     Maintains Weight-Bearing Status  able to maintain     Advanced Gait Activity  curb negotiation;sloped surfaces        Curb Negotiation (Mobility)    [place]  close supervision     Comment  Up 2-4\" curbs steps, down 8\" step with close s. Pt declined to negotiate up 8\" curb.        Sloped Surface Gait Skills (Mobility)    [place]  close supervision     Comment  up/down 5\" ramp with RW        Stairs Training    [place], Stairs  close supervision     Assistive Device  railing     Handrail Location  both sides     Number of Stairs  12     Stair Height  6 inches     Ascending Stairs Technique  step-to-step     Descending Stairs Technique  step-to-step     Maintains Weight-Bearing Status (Stairs)  able to maintain     Comment  Pt performed ascending another 5 steps with step over step technique. DEclined to attempt descending with this pattern.        Lower Extremity (Therapeutic Exercise)    Exercise Position/Type (LE Therapeutic Exercise)  supine;AROM (active range of motion);active stretching;concentric isotonic contraction     General Exercise (LE Therapeutic Exercise)  bilateral;SAQ (short arc quad);heel slides;other (see comments)    knee to chest, " bridging over bolster    Range of Motion Exercises (LE Therapeutic Exercise)  knee flexion/extension     Reps and Sets (LE Therapeutic Exercise)  20        Daily Progress Summary (PT)    Symptoms Noted During/After Treatment  increased pain     Progress Toward Functional Goals (PT)  progressing toward functional goals as expected     Daily Outcome Statement (PT)  Pt participated well with challenges to curb height, stair technique, and supine ther ex. At times resistant to new challenge, but with education, understood importance of progression. B knee ROM remains excellent.      Barriers to Overall Progress (PT)  pain     Recommendations  Continue POC           Pain Assessment/Intervention  Pain Charting Type: Post Activity             Education provided this session. See the Patient Education summary report for full details.    IRF PT Goals      Most Recent Value   Bed Mobility Goal 1   Activity/Assistive Device  bed mobility activities, all at 11/05/2019 0731   [place]  modified independence at 11/05/2019 0731   Time Frame  5 - 7 days at 11/05/2019 0731   Strategies/Barriers  pain, decreased BLE strength at 11/05/2019 0731   Progress/Outcome  goal met, goal revised this date, good progress toward goal at 11/05/2019 0731   Bed Mobility Goal 2   Activity/Assistive Device  rolling to right, rolling to left, supine to sit, sit to supine at 11/01/2019 1106   [place]  modified independence at 11/01/2019 1106   Time Frame  14 days or less at 11/01/2019 1106   Strategies/Barriers  pain and decreased BLE strength at 11/05/2019 0731   Progress/Outcome  good progress toward goal, goal ongoing at 11/05/2019 0731   Transfer Goal 1   Activity/Assistive Device  sit-to-stand/stand-to-sit, bed-to-chair/chair-to-bed, car transfer, walker, front-wheeled at 11/05/2019 0731   [place]  modified independence at 11/05/2019 0731   Time Frame  5 - 7 days at 11/05/2019 0731   Strategies/Barriers  decreased BLE strength,  decreased balance at 11/05/2019 0731   Progress/Outcome  goal met, goal revised this date, good progress toward goal at 11/05/2019 0731   Transfer Goal 2   Activity/Assistive Device  sit-to-stand/stand-to-sit, bed-to-chair/chair-to-bed at 11/01/2019 1106   [place]  modified independence at 11/01/2019 1106   Time Frame  14 days or less at 11/01/2019 1106   Strategies/Barriers  decreased BLE strength, decreased balance & decreased ROM at 11/05/2019 0731   Progress/Outcome  goal ongoing, good progress toward goal, continuing progress toward goal at 11/05/2019 0731   Gait/Walking Locomotion Goal 1   Activity/Assistive Device  gait (walking locomotion), assistive device use at 11/05/2019 0731   Distance  200 feet at 11/05/2019 0731   [place]  modified independence at 11/05/2019 0731   Time Frame  5 - 7 days, short-term goal (STG) at 11/05/2019 0731   Strategies/Barriers  decreased BLE strength, decreased balance at 11/05/2019 0731   Progress/Outcome  goal met, goal revised this date, good progress toward goal at 11/05/2019 0731   Gait/Walking Locomotion Goal 2   Activity/Assistive Device  gait (walking locomotion), assistive device use, improve balance and speed, increase endurance/gait distance, walker, front-wheeled at 11/01/2019 1106   Distance  200 feet at 11/01/2019 1106   [place]  modified independence at 11/01/2019 1106   Time Frame  14 days or less at 11/01/2019 1106   Strategies/Barriers  decreased BLE strength, decreased balance at 11/05/2019 0731   Progress/Outcome  good progress toward goal, goal ongoing at 11/05/2019 0731   Stairs Goal 1   Activity/Assistive Device  stairs, all skills, assistive device use, ascending stairs, descending stairs, using handrail, left, cane, straight at 11/05/2019 0731   Number of Stairs  15 at 11/05/2019 0731   [place]  supervision required, set-up required at 11/05/2019 0731   Time Frame  5 - 7 days at 11/05/2019 0731   Strategies/Barriers  decreased BLE  strength, decreased balance at 11/05/2019 0731   Progress/Outcome  goal ongoing, good progress toward goal at 11/05/2019 0731   Stairs Goal 2   Activity/Assistive Device  assistive device use, stairs, all skills, ascending stairs, descending stairs, using handrail, left, cane, straight at 11/01/2019 1106   Number of Stairs  15 at 11/01/2019 1106   [place]  supervision required, set-up required at 11/01/2019 1106   Time Frame  14 days or less at 11/01/2019 1106   Strategies/Barriers  decreased BLE strength, decreased balance at 11/05/2019 0731   Progress/Outcome  good progress toward goal, goal ongoing at 11/05/2019 0731

## 2019-11-10 NOTE — PLAN OF CARE
Problem: Rehabilitation (IRF) Plan of Care  Goal: Plan of Care Review  Outcome: Progressing  Flowsheets (Taken 11/9/2019 0260)  Plan of Care Reviewed With: patient  IRF Plan of Care Review: progress ongoing, continue  Outcome Summary: Pt. AA&Ox3, pleasant and cooperative, continent of bowel and bladder, transfer with rolling walker, B/L knee pain controlled with oxycodone and ice.

## 2019-11-10 NOTE — PLAN OF CARE
Problem: Rehabilitation (IRF) Plan of Care  Goal: Plan of Care Review  Outcome: Progressing  Flowsheets (Taken 11/10/2019 9580)  Plan of Care Reviewed With: patient  IRF Plan of Care Review: progress ongoing, continue  Outcome Summary: pt able to make needs known, reports pain appropriately,cont b/b refusing BM medications at this time, using call bell appropriatley, incision  healthy, no drainage.      none

## 2019-11-10 NOTE — PLAN OF CARE
Problem: Rehabilitation (IRF) Plan of Care  Goal: Plan of Care Review  Outcome: Progressing  Flowsheets (Taken 11/10/2019 1233)  Plan of Care Reviewed With: patient  IRF Plan of Care Review: progress ongoing, continue  Outcome Summary: Pt tolerated session well, progressing in fxl mobility and endurance for standing, ambulating, and UE strengthening.

## 2019-11-10 NOTE — PLAN OF CARE
Problem: Rehabilitation (IRF) Plan of Care  Goal: Plan of Care Review  Flowsheets (Taken 11/10/2019 5543)  Plan of Care Reviewed With: patient  IRF Plan of Care Review: progress ongoing, continue  Outcome Summary: Pt participated well with challenges to curb height and stair technique.  B knee ROM remains excellent.

## 2019-11-10 NOTE — PROGRESS NOTES
"Subjective    Patient seen and examined on rounds.  Chart reviewed.  Events overnight noted.  History reviewed briefly with patient.     CC:  Deficits in mobility, transfers, self-care status post bilateral total knee replacements     HPI:  Mr. Yoon is a 68-year-old right-handed white male with chronic conditions significant for degenerative arthritis, hypertension, prostate cancer status post prostatectomy, dyslipidemia who had progressively worsening bilateral knee pain which failed conservative management and subsequently the patient underwent elective bilateral total knee replacements on 10/29/19 at [facility] by Dr. Ted Zheng.  Postoperatively, he is allowed weightbearing as tolerated on both lower extremities.  He is on Aspirin for deep vein thrombus prophylaxis and also on pneumatic compression boots. He has put on Oxycodone for pain control.  Postoperative course was significant for anemia, but he did not need transfusion. On 10/30/19, hemoglobin was 11.2, WBC count 7.65, BUN 13, creatinine 0.9.  He has been needing assistance for mobility, transfers, self-care postoperatively. He is transferred to [facility] on 10/31/19 for further rehabilitation care.       SUBJ: Feels better overall.  Inspected knee incisions which are healing.  Progressing in therapy.      ROS: Denies chest pain or shortness of breath. Other ROS negative. Past, family, social history is unchanged.    Physical Exam      Blood pressure (!) 159/72, pulse 84, temperature 36.9 °C (98.4 °F), temperature source Oral, resp. rate 18, height 1.702 m (5' 7\"), weight 102 kg (225 lb 1.4 oz), SpO2 98 %.  Body mass index is 35.25 kg/m².    General Appearance: Not in acute distress  Head/Ear/Nose/Throat: Normocephalic; Atraumatic.   Eye: EOMI; PERRL.   Neck: No JVD; No Bruits.   Respiratory: Decreased breath sounds at bases.   Cardiovascular: RRR; Normal S1, S2.   Gastrointestinal: Soft; NT; +BS.   Extremities: Bilateral lower " extremity edema noted.  Healing incisions noted on anterior aspect of both knees.  Incisions open to air.  No drainage today.  No erythema around incisions.  Musculoskeletal: Functional active range of motion in both upper extremities.  Some limitation of active range of motion in both hips and knees, limited by pain.  Right knee PROM 0 to 93 degrees.  Left knee PROM 4 to 90 degrees.  Neurological: AAO ×3. Speech is fluent. Cranial nerve examination does not reveal any gross facial asymmetry. Strength testing about 4+/5 strength in both upper extremities.  Bilateral hip flexion is less than 3/5.  Bilateral quadriceps are less than 3/5.  Bilateral ankle dorsi and plantar flexion are 4/5.  He is grossly able to localize touch and position sense in great toes.  Deep tendon reflexes are hypoactive and symmetric bilaterally.  Plantars are flexor.  Coordination is functional upper extremities.  Both knee jerks were not tested.  Neurologically stable.  Behavior/Emotional: Appropriate; Cooperative.   Skin: No obvious rashes noted.  Bilateral knee incisions healing.  Incisions open to air.  No drainage today.      Current Facility-Administered Medications:   •  acetaminophen (TYLENOL) tablet 650 mg, 650 mg, oral, q4h PRN, Vernon Rob MD, 650 mg at 11/06/19 0751  •  acetaminophen (TYLENOL) tablet 650 mg, 650 mg, oral, With meals & nightly, Vernon Rob MD, 650 mg at 11/09/19 2018  •  alum-mag hydroxide-simeth (MAALOX) 200-200-20 mg/5 mL suspension 30 mL, 30 mL, oral, q4h PRN, Vernon Rob MD  •  aspirin enteric coated tablet 81 mg, 81 mg, oral, BID, Vernon Rob MD, 81 mg at 11/09/19 2018  •  atorvastatin (LIPITOR) tablet 10 mg, 10 mg, oral, Daily (6p), Vernon Rob MD, 10 mg at 11/09/19 1801  •  bisacodyl (DULCOLAX) 10 mg suppository 10 mg, 10 mg, rectal, Daily PRN, Vernon Rob MD  •  docusate sodium (COLACE) capsule 100 mg, 100 mg, oral, BID, Vernon Rob MD, 100 mg at  11/08/19 2104  •  magnesium hydroxide (M.O.M.) 400 mg/5 mL suspension 30 mL, 30 mL, oral, Daily PRN, Vernon Rob MD, 30 mL at 11/03/19 1732  •  multivitamin tablet 1 tablet, 1 tablet, oral, Daily, Vernon Rob MD, 1 tablet at 11/09/19 0852  •  ondansetron ODT (ZOFRAN-ODT) disintegrating tablet 4 mg, 4 mg, oral, q6h PRN, Vernon Rob MD  •  oxyCODONE (ROXICODONE) immediate release tablet 5-10 mg, 5-10 mg, oral, q4h PRN, Vernon Rob MD, 10 mg at 11/09/19 2255  •  polyethylene glycol (MIRALAX) 17 gram packet 17 g, 17 g, oral, Daily, Sahlini Torres MD, 17 g at 11/05/19 0735  •  senna (SENOKOT) tablet 2 tablet, 2 tablet, oral, Daily with lunch, Vernon Rob MD, 2 tablet at 11/06/19 1218  •  valsartan (DIOVAN) tablet 80 mg, 80 mg, oral, Daily, Vernon Rob MD, 80 mg at 11/09/19 0851       Labs / Radiology    Lab Results   Component Value Date    WBC 8.78 11/05/2019    HGB 10.6 (L) 11/05/2019    HCT 31.3 (L) 11/05/2019    MCV 95.4 11/05/2019     (H) 11/05/2019     Lab Results   Component Value Date    GLUCOSE 113 (H) 11/05/2019    CALCIUM 8.7 (L) 11/05/2019     11/05/2019    K 4.6 11/05/2019    CO2 27 11/05/2019     11/05/2019    BUN 17 11/05/2019    CREATININE 0.8 11/05/2019         Assessment and Plan    ASSESSMENT PLAN:  1. 68-year-old right-handed white male with chronic conditions significant for degenerative arthritis, hypertension, prostate cancer status post prostatectomy, dyslipidemia who had progressively worsening bilateral knee pain which failed conservative management and subsequently the patient underwent elective bilateral total knee replacements on 10/29/19 at [facility] by Dr. Ted Zheng.  Postoperatively, he is allowed weightbearing as tolerated on both lower extremities.  He is on Aspirin for deep vein thrombus prophylaxis and also on pneumatic compression boots. He has put on Oxycodone for pain control.  Postoperative course was  significant for anemia, but he did not need transfusion. On 10/30/19, hemoglobin was 11.2, WBC count 7.65, BUN 13, creatinine 0.9.  He has been needing assistance for mobility, transfers, self-care postoperatively. He is transferred to [facility] on 10/31/19 for further rehabilitation care.     2. DVT prophylaxis - on Aspirin.  On SCDs.    Platelets 224 on 11/1/2019.  More ambulatory now.  Platelets 377 on 11/5/2019.     3.  Orthopedics - status post bilateral total knee replacements.  Continue PT, OT.  Monitor healing of incisions.     4. GI - On Colace, Senokot.  On Dulcolax.  On Zofran ODT for nausea.  On when necessary MOM, Maalox.     5.  - voiding.  Denies dysuria.  Monitor post void residuals.     6.  Hypertension - on Diovan.     7. Pain - on when necessary Oxycodone.  On Tylenol PRN.  Ice to knees when necessary.  Pain medications are helping.  He does not want increased pain medications at this time.Therapist indicated patient has been complaining of a lot of pain in his knees.  Discussed with patient and with his wife in the evening.  Again he does not want to go on OxyContin.  Will order Lidoderm patches.  Suggested using ice to the knees.  On scheduled Tylenol. He is on PRN oxycodone. He has soreness in his knees.  Tylenol and Lidoderm patches were added.  On oxycodone as needed.  He does not want to go on long-acting pain meds. Discussed with patient and with his wife with him.  Lidoderm patches did not help much so they will be discontinued.  On scheduled Tylenol also.  Ice to knees as needed.        8.  Hematology - monitor hemoglobin, platelets.  Anemia - on MVI.  Hemoglobin 10.7 on 11/1/2019.  Hemoglobin 10.6 on 11/5/9.     9.  Dyslipidemia - on Lipitor.     10. Rehabilitation medicine - Continue comprehensive rehabilitation care. Continue PT, OT.  We will follow falls precautions, cardiac precautions, monitor pulse oximetry in therapy.Therapist indicated patient has been  complaining of a lot of pain in his knees.  Discussed with patient and with his wife in the evening.  Again he does not want to go on OxyContin.  Will order Lidoderm patches.  Suggested using ice to the knees.  On scheduled Tylenol. He is on PRN oxycodone. He has soreness in his knees.  Tylenol and Lidoderm patches were added.  On oxycodone as needed.  He does not want to go on long-acting pain meds.   Lidoderm patches did not help much so they will be discontinued.  On scheduled Tylenol also.  Ice to knees as needed.  Inspected incisions which are stable.  Discussed with patient and with his wife. Inspected knee incisions which are healing.  Progressing in therapy.     11. Reviewed labs today.  BUN 14, creatinine 1.0 on 11/1/2019.  BUN 17, creatinine 0.8 on 11/5/2019.        Vernon Rob MD  11/9/2019

## 2019-11-10 NOTE — PROGRESS NOTES
Internal Medicine Progress Note      Patient seen and examined  Attestation Notes: Face to face encounter completed      HPI  Mr Car is a 69 yo M with hypertension, dyslipidemia, prostate CA, osteoarthritis who was evaluated as an outpatient for chronic progressive severe bilateral knee pain not alleviated by conservative measures; he was recommended bilateral total knee replacement  On 10/29, he was admitted to [facility] [place], where he underwent bilateral total knee replacement secondary to osteoarthritis by Dr. Zheng  He utilized twice daily aspirin 81 mg for DVT prophylaxis  He continued on Diovan for blood pressure management  He was admitted to [facility] on 10/31 for acute inpatient rehabilitation    SUBJECTIVE  Feeling well, pleasant and conversant, no acute distress  Reports good management of knee pain  Moving bowels, denies GI distress  Lower extremity edema essentially resolved  Incisions remain intact, without evidence of drainage-patient does note right knee appears slightly more swollen than left  Reviewed blood pressure trend,  Discussed obtaining follow-up labs in a.m.    Review of Systems as above, otherwise without change      Medical History reviewed:  FH, SH without change since admission  Social History     Tobacco Use   • Smoking status: Former Smoker     Last attempt to quit: 1979     Years since quittin.8   • Smokeless tobacco: Never Used   • Tobacco comment: quit 40 years ago   Substance Use Topics   • Alcohol use: Yes     Alcohol/week: 3.0 standard drinks     Types: 3 Cans of beer per week     Frequency: 2-3 times a week     Drinks per session: 3 or 4     Binge frequency: Less than monthly   • Drug use: Never       PMH, PSH without change since admission      Current medications and allergies reviewed:  Allergies: Patient has no known allergies.    CURRENT MEDICATIONS:      • acetaminophen  650 mg oral With meals & nightly   • aspirin  81 mg oral BID   • atorvastatin   10 mg oral Daily (6p)   • docusate sodium  100 mg oral BID   • multivitamin  1 tablet oral Daily   • polyethylene glycol  17 g oral Daily   • senna  2 tablet oral Daily with lunch   • valsartan  80 mg oral Daily           Objective     Vitals:    11/09/19 2013 11/10/19 0709 11/10/19 1004 11/10/19 1100   BP: (!) 159/72 (!) 157/66 130/66 133/60   BP Location:  Left upper arm Left upper arm Left upper arm   Patient Position:  Lying Sitting Sitting   Pulse: 84 75 94 94   Resp: 18 18     Temp: 36.9 °C (98.4 °F) 36.8 °C (98.2 °F)     TempSrc: Oral Oral     SpO2: 98% 96%     Weight:       Height:           Physical Exam   Constitutional: He is oriented to person, place, and time. He appears well-developed and well-nourished. No distress.   HENT:   Head: Normocephalic and atraumatic.   Mouth/Throat: Oropharynx is clear and moist.   Eyes: Pupils are equal, round, and reactive to light. Conjunctivae and EOM are normal. No scleral icterus.   Neck: Normal range of motion. Neck supple.   Cardiovascular: Normal rate, regular rhythm, normal heart sounds and intact distal pulses.   Pulmonary/Chest: Effort normal and breath sounds normal. No respiratory distress. He has no wheezes. He has no rales.   Abdominal: Soft. Bowel sounds are normal. He exhibits no distension. There is no tenderness.   Musculoskeletal: He exhibits edema (Bilateral lower extremity edema) and deformity (s/p bilateral TKR).   Neurological: He is alert and oriented to person, place, and time. No cranial nerve deficit.   Skin: Skin is warm and dry. He is not diaphoretic. No erythema.   Bilateral knee incisions c/d/i with dermabond   Psychiatric: He has a normal mood and affect.   Nursing note and vitals reviewed.       Labs   I have reviewed the patient's pertinent labs.  Significant abnormals are Anemia, hypoalbuminemia.  Lab results reviewed, discussed with patient:    Lab Results   Component Value Date    WBC 8.78 11/05/2019    HGB 10.6 (L) 11/05/2019    HCT  31.3 (L) 11/05/2019     (H) 11/05/2019     11/05/2019    K 4.6 11/05/2019     11/05/2019    CREATININE 0.8 11/05/2019    BUN 17 11/05/2019    CO2 27 11/05/2019    ALT 12 (L) 11/01/2019    AST 17 11/01/2019    INR 1.0 10/16/2019    HGBA1C 5.6 10/16/2019       Imaging    Bilateral lower extremity venous Doppler ultrasound 10/31:  No evidence of deep vein thrombus in either lower extremity  Fluid in right popliteal space likely represents a ruptured Baker's cyst but is not well evaluated on this vascular study        Assessment/Plan       Patient Active Problem List    Diagnosis Date Noted   • Anemia due to blood loss 10/31/2019   • Leukemoid reaction 10/31/2019   • S/P TKR (total knee replacement), bilateral 10/31/2019   • Status post bilateral knee replacements 10/31/2019   • Osteoarthrosis 10/29/2019   • Osteoarthritis of both knees 10/16/2019   • Counseling on health promotion and disease prevention 10/16/2019   • Hyperlipidemia, unspecified    • Essential (primary) hypertension        Mr Yoon is a 67 yo M with hypertension, dyslipidemia, prostate CA, osteoarthritis who is admitted to [facility] on 10/31 from [facility] for acute inpatient rehabilitation s/p bilateral TKR by Dr. Zheng on 10/29     Ortho:  S/p B TKR-continue to follow progress with rehab  Continue current pain management regimen  Local wound care     Cardiovascular:  Essential hypertension-blood pressure 134-159/66-72 on Diovan  Manual only monitoring  Maintain hold parameters and cardiac precautions     Pulmonary:  Encourage use of spirometry for atelectasis  No evidence of respiratory distress     Endocrine:  Dyslipidemia-continue statin therapy, low-fat diet  LFT 11/1 unremarkable     Renal:  BUN/Cr  17/0.8, GFR> 60  Monitor renal function, avoid nephrotoxins  Repeat BMP in a.m.     GI:  Constipation-continue bowel program     :  Hx prostate CA  Monitor PVR, CIC PRN  Urinalysis 10/31 clear, leukocyte  esterase negative, nitrite negative; culture not indicated at present     Heme:  Postoperative anemia-preop hemoglobin 14.2    Normochromic, normocytic  Hemoglobin stable, 10.6  Repeat CBC 11/11     F/E/N:  Regular/thin diet  Electrolytes stable, maintaining hydration  Reassess labs 11/11    Derm:  Local wound care     Prophylaxis:  -BID 81mg ASA, SCD for DVT prophylaxis; Doppler ultrasound 11/1 without evidence for DVT  -Spirometry for atelectasis  -Maintain fall, cardiac precautions     Disposition  -Expected discharge date 11/12  .  Shalini Torres MD  11/10/2019  1:00 PM

## 2019-11-10 NOTE — PROGRESS NOTES
Patient: Chapincito Yoon  Location: [facility] [address]  MRN: [id number]  Today's date: 11/10/2019    Hospital Course  Chris is a 68 y.o. male admitted on 10/31/2019 with Status post bilateral knee replacements [Z96.653]. Principal problem is S/P TKR (total knee replacement), bilateral.    Chris has a past medical history of Essential (primary) hypertension, Hyperlipidemia, unspecified, Impaired fasting glucose, Osteoarthritis, and Prostate cancer (CMS/[facility]) (2011).     Pt is a 68 year old male admitted to [facility] on 10/31/19 with h/o bilateral knee pain x 5-6 years. It has progressively worsened. Pt is now s/p bilateral TKA's on 10/29/19 and no complications post op.      Therapy Pain/Vitals     Row Name 11/10/19 1004 11/10/19 1100       Pain/Comfort/Sleep    Pain Charting Type  Pain Assessment  Pain Reassessment    Preferred Pain Scale  number (Numeric Rating Pain Scale)  number (Numeric Rating Pain Scale)    Pain Body Location - Side  Bilateral  Bilateral    Pain Body Location - Orientation  incisional  incisional    Pain Body Location  knee  knee    Pain Rating (0-10): Rest  4  5    Pain Management Interventions  premedicated for activity  premedicated for activity;cold applied       Vital Signs    Pulse  94  94    Heart Rate Source  Monitor  Monitor    BP  130/66  133/60    BP Location  Left upper arm  Left upper arm    BP Method  Automatic  Automatic    Patient Position  Sitting  Sitting          Prior Living Environment      Most Recent Value   Lives With  spouse   Living Arrangements  house   Home Accessibility  stairs to enter home (Group), stairs within home (Group)   Living Environment Comment  2 SH, 3 GENE + 1 GENE without HR,  1st floor MT, FF to bed/bath   Number of Stairs, Main Entrance  4   Surface of Stairs, Main Entrance  concrete   Stair Railings, Main Entrance  none   Location, Patient Bedroom  second floor, must negotiate stairs to access   Location, Bathroom  first (main) floor, second  floor, must negotiate stairs to access   Bathroom Access Comment  [place] 1st floor, stnd height toilet,  full bath on 2nd c walk-in shower with a low threshold entry and built-in seat, no grab bars,  stnd height toilet   Number of Stairs, Within Home, Primary  other (see comments) [15]   Surface of Stairs, Within Home, Primary  carpeting, hardwood   Stair Railings, Within Home, Primary  railings on both sides of stairs, railing on left side (ascending)   Stairs Comment, Within Home, Primary  B rails for 12 steps & 3          Prior Level of Function      Most Recent Value   Dominant Hand  right   Ambulation  independent   Transferring  independent   Toileting  independent   Bathing  independent   Dressing  independent   Eating  independent   Communication  understands/communicates without difficulty   Prior Level of Function Comment  No DME   Equipment Currently Used at Home  none          IRF OT Evaluation and Treatment - 11/10/19 1001        Time Calculation    Start Time  1000     Stop Time  1100     Time Calculation (min)  60 min        Session Details    Document Type  daily treatment/progress note     Mode of Treatment  occupational therapy;individual therapy        Sit to Stand Transfer    [place], Sit to Stand Transfer  supervision     Assistive Device  walker, front-wheeled     Comment  from w/c x15 reps        Stand to Sit Transfer    [place], Stand to Sit Transfer  supervision     Assistive Device  walker, front-wheeled     Comment  to w/c x15 reps        Stand Pivot/Stand Step Transfer    [place], Stand Pivot/Stand Step Transfer  supervision     Assistive Device  walker, front-wheeled     Comment  amb approach to w/c        Safety Issues, Functional Mobility    Comment, Safety Issues/Impairments (Mobility)  OT: ClS/S c RW amb 250' around therapy gym; 500' (2 laps) on second trial at S level        Balance    Balance Interventions  occupation based/functional task     Comment, Balance  Stands at  table c intermittent UE support to engage in card game, completing STS transitions in conjunction c winning/losing hands. Total standing time approx. 10 mins        Upper Extremity (Therapeutic Exercise)    Exercise Position/Type (UE Therapeutic Exercise)  seated;static isometric contraction;concentric isotonic contraction     General Exercise (Upper Extremity Therapeutic Exercise)  bilateral;bicep curl;tricep extension     Range of Motion Exercises (Upper Extremity Therapeutic Exercise)  bilateral;shoulder flexion/extension;elbow flexion/extension;shoulder abduction/adduction;shoulder horizontal abduction/adduction;shoulder external/internal rotation     Weight/Resistance (Upper Extremity Therapeutic Exercise)  4 pounds    each UE    Reps and Sets (Upper Extremity Therapeutic Exercise)  2 x 15 reps     Comment (UE Therapeutic Exercise)  Bicep curls, overhead tricep extension, chest press, shoulder press, shld abd, shld hor abd/adduction, shld IR/ER        Modality Interventions Comprehensive    Modality Interventions  cryotherapy        Cryotherapy    Location 1  lower extremity treatment area    R knee    Location 2  lower extremity treatment area    L knee    Indications  pain     Method of Application (Cryotherapy)  cryostatic     Cryogenic Agent (Cryotherapy)  gel pack     Duration (Cryotherapy)  10 minutes     Response to Treatment  pain decreased     Comment  B gel pack to knees c 3 towel ply barrier        Daily Progress Summary (OT)    Daily Outcome Statement (OT)  Pt tolerated session well, progressing in fxl mobility and endurance for standing, ambulating, and UE strengthening.           Pain Assessment/Intervention  Pain Charting Type: Pain Reassessment                  IRF OT Goals      Most Recent Value   Transfer Goal 1   Activity/Assistive Device  toilet, commode, 3-in-1, walker, front-wheeled at 11/01/2019 0905   [place]  other (see comments) at 11/05/2019 0715   Time Frame  short-term goal  (STG), 1 week at 11/01/2019 0905   Strategies/Barriers  amb tx at CL S level at 11/05/2019 0715   Progress/Outcome  good progress toward goal, goal met, goal revised this date at 11/05/2019 0715   Transfer Goal 2   Activity/Assistive Device  toilet, commode, 3-in-1, walker, front-wheeled at 11/01/2019 0905   [place]  modified independence at 11/01/2019 0905   Time Frame  long-term goal (LTG), by discharge at 11/01/2019 0905   Strategies/Barriers  amb tx at 11/01/2019 0905   Progress/Outcome  good progress toward goal at 11/05/2019 0715   Transfer Goal 3   Activity/Assistive Device  walk-in shower, shower chair, walker, front-wheeled at 11/01/2019 0905   [place]  other (see comments) at 11/05/2019 0715   Time Frame  short-term goal (STG), 1 week at 11/01/2019 0905   Strategies/Barriers  amb tx at CL S level at 11/05/2019 0715   Progress/Outcome  good progress toward goal, goal met, goal revised this date at 11/05/2019 0715   Transfer Goal 4   Activity/Assistive Device  walk-in shower, shower chair, walker, front-wheeled at 11/01/2019 0905   [place]  modified independence at 11/01/2019 0905   Time Frame  long-term goal (LTG), by discharge at 11/01/2019 0905   Strategies/Barriers  amb tx, step-over at 11/01/2019 0905   Progress/Outcome  good progress toward goal at 11/05/2019 0715   Bathing Goal 1   Activity/Assistive Device  bathing skills, all, shower chair, long-handled sponge at 11/01/2019 0905   [place]  supervision required, set-up required at 11/01/2019 0905   Time Frame  short-term goal (STG), 1 week at 11/01/2019 0905   Strategies/Barriers  seated at 11/01/2019 0905   Progress/Outcome  good progress toward goal at 11/05/2019 0715   Bathing Goal 2   Activity/Assistive Device  bathing skills, all, shower chair, long-handled sponge at 11/01/2019 0905   [place]  modified independence at 11/01/2019 0905   Time Frame  long-term goal (LTG), by discharge at 11/01/2019 0905    Strategies/Barriers  seated/standing PRN at 11/01/2019 0905   Progress/Outcome  good progress toward goal at 11/05/2019 0715   UB Dressing Goal 1   Activity/Assistive Device  upper body dressing at 11/01/2019 0905   [place]  supervision required at 11/05/2019 0715   Time Frame  short-term goal (STG), 1 week at 11/01/2019 0905   Strategies/Barriers  amb level IR at 11/05/2019 0715   Progress/Outcome  good progress toward goal, goal met, goal revised this date at 11/05/2019 0715   UB Dressing Goal 2   Activity/Assistive Device  upper body dressing at 11/01/2019 0905   [place]  modified independence at 11/01/2019 0905   Time Frame  long-term goal (LTG), by discharge at 11/01/2019 0905   Progress/Outcome  good progress toward goal at 11/05/2019 0715   LB Dressing Goal 1   Activity/Assistive Device  lower body dressing, reacher, sock aid, long handled shoe horn at 11/01/2019 0905   [place]  minimum assist (75% or more patient effort) at 11/05/2019 0715   Time Frame  short-term goal (STG), 1 week at 11/01/2019 0905   Strategies/Barriers  with LRAD at 11/01/2019 0905   Progress/Outcome  good progress toward goal, goal met, goal revised this date at 11/05/2019 0715   LB Dressing Goal 2   Activity/Assistive Device  lower body dressing at 11/01/2019 0905   [place]  modified independence at 11/01/2019 0905   Time Frame  long-term goal (LTG), by discharge at 11/01/2019 0905   Strategies/Barriers  with LRAD at 11/01/2019 0905   Progress/Outcome  good progress toward goal at 11/05/2019 0715   Grooming Goal 1   Activity/Assistive Device  grooming skills, all at 11/01/2019 0905   [place]  supervision required at 11/01/2019 0905   Time Frame  short-term goal (STG), 1 week at 11/01/2019 0905   Strategies/Barriers  standing at sink at 11/01/2019 0905   Progress/Outcome  good progress toward goal at 11/05/2019 0715   Grooming Goal 2   Activity/Assistive Device  grooming skills, all at 11/01/2019 0905    [place]  modified independence at 11/01/2019 0905   Time Frame  long-term goal (LTG), by discharge at 11/01/2019 0905   Strategies/Barriers  seated/standing at 11/01/2019 0905   Progress/Outcome  good progress toward goal at 11/05/2019 0715   Toileting Goal 1   Activity/Assistive Device  toileting skills, all, commode chair at 11/01/2019 0905   [place]  supervision required at 11/01/2019 0905   Time Frame  short-term goal (STG), 1 week at 11/01/2019 0905   Progress/Outcome  good progress toward goal at 11/05/2019 0715   Toileting Goal 2   Activity/Assistive Device  toileting skills, all, commode chair at 11/01/2019 0905   [place]  modified independence at 11/01/2019 0905   Time Frame  long-term goal (LTG), by discharge at 11/01/2019 0905   Progress/Outcome  good progress toward goal at 11/05/2019 0715

## 2019-11-11 ENCOUNTER — APPOINTMENT (INPATIENT)
Dept: PHYSICAL THERAPY | Facility: REHABILITATION | Age: 69
DRG: 560 | End: 2019-11-11
Payer: MEDICARE

## 2019-11-11 ENCOUNTER — APPOINTMENT (INPATIENT)
Dept: OCCUPATIONAL THERAPY | Facility: REHABILITATION | Age: 69
DRG: 560 | End: 2019-11-11
Payer: MEDICARE

## 2019-11-11 LAB
ANION GAP SERPL CALC-SCNC: 5 MEQ/L (ref 3–15)
BUN SERPL-MCNC: 20 MG/DL (ref 8–20)
CALCIUM SERPL-MCNC: 9.3 MG/DL (ref 8.9–10.3)
CHLORIDE SERPL-SCNC: 107 MEQ/L (ref 98–109)
CO2 SERPL-SCNC: 29 MEQ/L (ref 22–32)
CREAT SERPL-MCNC: 0.8 MG/DL
ERYTHROCYTE [DISTWIDTH] IN BLOOD BY AUTOMATED COUNT: 12.7 % (ref 11.6–14.4)
GFR SERPL CREATININE-BSD FRML MDRD: >60 ML/MIN/1.73M*2
GLUCOSE SERPL-MCNC: 106 MG/DL (ref 70–99)
HCT VFR BLDCO AUTO: 34.2 % (ref 40.1–51)
HGB BLD-MCNC: 11.4 G/DL
MAGNESIUM SERPL-MCNC: 2.1 MG/DL (ref 1.8–2.5)
MCH RBC QN AUTO: 32 PG (ref 28–33.2)
MCHC RBC AUTO-ENTMCNC: 33.3 G/DL (ref 32.2–36.5)
MCV RBC AUTO: 96.1 FL (ref 83–98)
PDW BLD AUTO: 8.9 FL (ref 9.4–12.4)
PLATELET # BLD AUTO: 534 K/UL
POTASSIUM SERPL-SCNC: 4.8 MEQ/L (ref 3.6–5.1)
RBC # BLD AUTO: 3.56 M/UL (ref 4.5–5.8)
SODIUM SERPL-SCNC: 141 MEQ/L (ref 136–144)
WBC # BLD AUTO: 7.95 K/UL

## 2019-11-11 PROCEDURE — 97530 THERAPEUTIC ACTIVITIES: CPT | Mod: GP

## 2019-11-11 PROCEDURE — 97535 SELF CARE MNGMENT TRAINING: CPT | Mod: GO

## 2019-11-11 PROCEDURE — 97110 THERAPEUTIC EXERCISES: CPT | Mod: GP

## 2019-11-11 PROCEDURE — 36415 COLL VENOUS BLD VENIPUNCTURE: CPT | Performed by: HOSPITALIST

## 2019-11-11 PROCEDURE — 85027 COMPLETE CBC AUTOMATED: CPT | Performed by: HOSPITALIST

## 2019-11-11 PROCEDURE — 80048 BASIC METABOLIC PNL TOTAL CA: CPT | Performed by: HOSPITALIST

## 2019-11-11 PROCEDURE — 97530 THERAPEUTIC ACTIVITIES: CPT | Mod: GO

## 2019-11-11 PROCEDURE — 83735 ASSAY OF MAGNESIUM: CPT | Performed by: HOSPITALIST

## 2019-11-11 PROCEDURE — 12800000 HC ROOM AND CARE SEMIPRIVATE REHAB

## 2019-11-11 PROCEDURE — 97116 GAIT TRAINING THERAPY: CPT | Mod: GP

## 2019-11-11 PROCEDURE — 63700000 HC SELF-ADMINISTRABLE DRUG: Performed by: PHYSICAL MEDICINE & REHABILITATION

## 2019-11-11 RX ORDER — ACETAMINOPHEN 325 MG/1
650 TABLET ORAL EVERY 4 HOURS PRN
COMMUNITY
Start: 2019-11-11 | End: 2019-12-11

## 2019-11-11 RX ORDER — OXYCODONE HYDROCHLORIDE 5 MG/1
5 TABLET ORAL EVERY 4 HOURS PRN
Qty: 20 TABLET | Refills: 0 | Status: SHIPPED | OUTPATIENT
Start: 2019-11-11 | End: 2019-11-16

## 2019-11-11 RX ORDER — AMOXICILLIN 250 MG
1 CAPSULE ORAL 2 TIMES DAILY PRN
COMMUNITY
Start: 2019-11-11 | End: 2023-10-25

## 2019-11-11 RX ADMIN — VALSARTAN 80 MG: 80 TABLET, FILM COATED ORAL at 08:24

## 2019-11-11 RX ADMIN — OXYCODONE HYDROCHLORIDE 10 MG: 5 TABLET ORAL at 14:38

## 2019-11-11 RX ADMIN — OXYCODONE HYDROCHLORIDE 5 MG: 5 TABLET ORAL at 08:25

## 2019-11-11 RX ADMIN — ACETAMINOPHEN 650 MG: 325 TABLET, FILM COATED ORAL at 08:25

## 2019-11-11 RX ADMIN — OXYCODONE HYDROCHLORIDE 5 MG: 5 TABLET ORAL at 20:56

## 2019-11-11 RX ADMIN — ACETAMINOPHEN 650 MG: 325 TABLET, FILM COATED ORAL at 20:56

## 2019-11-11 RX ADMIN — ASPIRIN 81 MG: 81 TABLET, COATED ORAL at 20:56

## 2019-11-11 RX ADMIN — ACETAMINOPHEN 650 MG: 325 TABLET, FILM COATED ORAL at 12:21

## 2019-11-11 RX ADMIN — ATORVASTATIN CALCIUM 10 MG: 10 TABLET, FILM COATED ORAL at 18:16

## 2019-11-11 RX ADMIN — MULTIPLE VITAMINS W/ MINERALS TAB 1 TABLET: TAB at 08:24

## 2019-11-11 RX ADMIN — ASPIRIN 81 MG: 81 TABLET, COATED ORAL at 08:24

## 2019-11-11 NOTE — PLAN OF CARE
Problem: Rehabilitation (IRF) Plan of Care  Goal: Plan of Care Review  Outcome: Progressing  Flowsheets (Taken 11/11/2019 1119)  Plan of Care Reviewed With: patient  IRF Plan of Care Review: progress ongoing, continue  Outcome Summary: patient scheduled for  performance day today, pain managed using a multimodal approach

## 2019-11-11 NOTE — PROGRESS NOTES
Patient: Chapincito Yoon  Location: [facility] [address]  MRN: [id number]  Today's date: 11/11/2019    Hospital Course  Chris is a 68 y.o. male admitted on 10/31/2019 with Status post bilateral knee replacements [Z96.653]. Principal problem is S/P TKR (total knee replacement), bilateral.    Chris has a past medical history of Essential (primary) hypertension, Hyperlipidemia, unspecified, Impaired fasting glucose, Osteoarthritis, and Prostate cancer (CMS/[facility]) (2011).     Pt is a 68 year old male admitted to [facility] on 10/31/19 with h/o bilateral knee pain x 5-6 years. It has progressively worsened. Pt is now s/p bilateral TKA's on 10/29/19 and no complications post op.      Therapy Pain/Vitals     Row Name 11/11/19 0733          Pain/Comfort/Sleep    Pain Charting Type  Pain Assessment     Preferred Pain Scale  word (verbal rating pain scale)     Pain Body Location - Side  Bilateral     Pain Body Location - Orientation  incisional     Pain Body Location  knee     Pain Rating (word): Rest  2 - mild pain     Pain Rating (word): Activity  2 - mild pain     Pain Management Interventions  relaxation techniques promoted;position adjusted;prescribed exercises encouraged        Vital Signs    Pulse  80     Heart Rate Source  Monitor     BP  132/67     BP Location  Right upper arm     BP Method  Automatic     Patient Position  Lying           Prior Living Environment      Most Recent Value   Lives With  spouse   Living Arrangements  house   Home Accessibility  stairs to enter home (Group), stairs within home (Group)   Living Environment Comment  2 SH, 3 GENE + 1 GENE without HR,  1st floor [place], FF to bed/bath   Number of Stairs, Main Entrance  4   Surface of Stairs, Main Entrance  concrete   Stair Railings, Main Entrance  none   Location, Patient Bedroom  second floor, must negotiate stairs to access   Location, Bathroom  first (main) floor, second floor, must negotiate stairs to access   Bathroom Access Comment  [place]  1st floor, stnd height toilet,  full bath on 2nd c walk-in shower with a low threshold entry and built-in seat, no grab bars,  stnd height toilet   Number of Stairs, Within Home, Primary  other (see comments) [15]   Surface of Stairs, Within Home, Primary  carpeting, hardwood   Stair Railings, Within Home, Primary  railings on both sides of stairs, railing on left side (ascending)   Stairs Comment, Within Home, Primary  B rails for 12 steps & 3          Prior Level of Function      Most Recent Value   Dominant Hand  right   Ambulation  independent   Transferring  independent   Toileting  independent   Bathing  independent   Dressing  independent   Eating  independent   Communication  understands/communicates without difficulty   Prior Level of Function Comment  No DME   Equipment Currently Used at Home  none          IRF OT Evaluation and Treatment - 11/11/19 0733        Time Calculation    Start Time  0730     Stop Time  0830     Time Calculation (min)  60 min        Session Details    Document Type  discharge evaluation     Mode of Treatment  occupational therapy;individual therapy        General Information    Patient Profile Reviewed?  yes     General Observations of Patient  Pt approached sitting up in bed prepared for session.     Existing Precautions/Restrictions  aspiration;cardiac;fall;weight bearing        Bed Mobility    [place], Supine to Sit  independent     Assistive Device (Bed Mobility)  head of bed elevated;bed rails     Comment (Bed Mobility)  completes on hospital bed         Transfers    Transfers  stand pivot/stand step transfer;toilet transfer;shower transfer     Comment  using RW        Sit to Stand Transfer    [place], Sit to Stand Transfer  independent     Assistive Device  walker, front-wheeled     Comment  from EOB and WC        Stand to Sit Transfer    [place], Stand to Sit Transfer  independent     Assistive Device  walker, front-wheeled     Comment  to WC        Stand  Pivot/Stand Step Transfer    [place], Stand Pivot/Stand Step Transfer  independent     Assistive Device  walker, front-wheeled     Comment  amb approach from EOB, to WC        Toilet Transfer    Transfer Technique  stand pivot/stand step     [place], Toilet Transfer  modified independence     Assistive Device  walker, front-wheeled     Comment  amb approach to ADA height toilet        Shower Transfer    Transfer Technique  stand pivot/stand step     [place], Shower Transfer  independent     Assistive Device  grab bars/tub rail;walker, front-wheeled;tub bench     Comment  amb approach to tub bench in barrier free shower        Safety Issues, Functional Mobility    Comment, Safety Issues/Impairments (Mobility)  OT: HHD t/o hospital room over level tile using RW         Balance    Balance Interventions  occupation based/functional task     Comment, Balance  in stance during CM, bathing, and grooming tasks        Bathing    Self-Performance  chest;left arm;right arm;abdomen;front perineal area;buttocks;left upper leg;right upper leg;left lower leg, including foot;right lower leg, including foot     [place]  modified independence     Position  unsupported sitting;supported standing     Adaptive Equipment  grab bar/tub rail;hand-held shower spray hose;long-handled sponge;tub bench     Comment  seated on tub bench and in supported stance using GB completes full wet shower at mod I level        Upper Body Dressing    Tasks  don;doff;pull over garment     Self-Performance  don;doff;obtains clothes;threads left arm, shirt;threads right arm, shirt;pulls shirt over head/around back;pulls shirt down/adjusts     [place]  independent     Position  unsupported sitting     Comment  amb level IR using RW. completes tasks seated on tub bench        Lower Body Dressing    Tasks  don;doff;pants/bottoms;underwear;socks;shoes/slippers     Self-Performance  don;doff;obtains clothes;threads left leg,  underpants;threads right leg, underpants;pulls underpants up or down;threads left leg, pants/shorts;threads right leg, pants/shorts;pulls pants/shorts up or down;dons/doffs left sock;dons/doffs right sock;dons/doffs left shoe;dons/doffs right shoe     [place]  modified independence     Position  unsupported sitting;supported standing     Adaptive Equipment  reacher;sock aid     Comment  seated on tub bench completes all tasks except regular socks/sneakers which was completed seated in WC. Utilizes AE         Grooming    Self-Performance  washes, rinses and dries face;washes, rinses and dries hands;brushes/adam hair;oral care (brushing teeth, cleaning dentures);shaves face     [place]  independent     Position  supported standing     Comment  completes all tasks in stance at sink        Toileting    [place]  independent     Position  unsupported sitting;supported standing     Comment  completes all toileting tasks seated on ADA height toilet         Self-Feeding    [place]  independent        Discharge Summary (OT)    Outcomes Achieved Upon Discharge (OT)  all goals met within established timeframes     Discharge Summary Statement (OT)  Focus of session placed on morning ADL routine. Pt grossly completes at the I-mod I level.      Transfer to Another Level of Care or Facility (OT)  patient to receive therapy via home health;patient to receive therapy via outpatient clinic           Pain Assessment/Intervention  Pain Charting Type: Pain Assessment             Education provided this session. See the Patient Education summary report for full details.    IRF OT Goals      Most Recent Value   Transfer Goal 1   Activity/Assistive Device  toilet, commode, 3-in-1, walker, front-wheeled at 11/01/2019 0905   [place]  other (see comments) at 11/05/2019 0715   Time Frame  short-term goal (STG), 1 week at 11/01/2019 0905   Strategies/Barriers  amb tx at CL S level at 11/05/2019 0715   Progress/Outcome   good progress toward goal, goal met, goal revised this date at 11/05/2019 0715   Transfer Goal 2   Activity/Assistive Device  toilet, commode, 3-in-1, walker, front-wheeled at 11/01/2019 0905   [place]  modified independence at 11/01/2019 0905   Time Frame  long-term goal (LTG), by discharge at 11/01/2019 0905   Strategies/Barriers  amb tx at 11/01/2019 0905   Progress/Outcome  good progress toward goal at 11/05/2019 0715   Transfer Goal 3   Activity/Assistive Device  walk-in shower, shower chair, walker, front-wheeled at 11/01/2019 0905   [place]  other (see comments) at 11/05/2019 0715   Time Frame  short-term goal (STG), 1 week at 11/01/2019 0905   Strategies/Barriers  amb tx at CL S level at 11/05/2019 0715   Progress/Outcome  good progress toward goal, goal met, goal revised this date at 11/05/2019 0715   Transfer Goal 4   Activity/Assistive Device  walk-in shower, shower chair, walker, front-wheeled at 11/01/2019 0905   [place]  modified independence at 11/01/2019 0905   Time Frame  long-term goal (LTG), by discharge at 11/01/2019 0905   Strategies/Barriers  amb tx, step-over at 11/01/2019 0905   Progress/Outcome  good progress toward goal at 11/05/2019 0715   Bathing Goal 1   Activity/Assistive Device  bathing skills, all, shower chair, long-handled sponge at 11/01/2019 0905   [place]  supervision required, set-up required at 11/01/2019 0905   Time Frame  short-term goal (STG), 1 week at 11/01/2019 0905   Strategies/Barriers  seated at 11/01/2019 0905   Progress/Outcome  good progress toward goal at 11/05/2019 0715   Bathing Goal 2   Activity/Assistive Device  bathing skills, all, shower chair, long-handled sponge at 11/01/2019 0905   [place]  modified independence at 11/01/2019 0905   Time Frame  long-term goal (LTG), by discharge at 11/01/2019 0905   Strategies/Barriers  seated/standing PRN at 11/01/2019 0905   Progress/Outcome  good progress toward goal at 11/05/2019 0715   UB  Dressing Goal 1   Activity/Assistive Device  upper body dressing at 11/01/2019 0905   [place]  supervision required at 11/05/2019 0715   Time Frame  short-term goal (STG), 1 week at 11/01/2019 0905   Strategies/Barriers  amb level IR at 11/05/2019 0715   Progress/Outcome  good progress toward goal, goal met, goal revised this date at 11/05/2019 0715   UB Dressing Goal 2   Activity/Assistive Device  upper body dressing at 11/01/2019 0905   [place]  modified independence at 11/01/2019 0905   Time Frame  long-term goal (LTG), by discharge at 11/01/2019 0905   Progress/Outcome  good progress toward goal at 11/05/2019 0715   LB Dressing Goal 1   Activity/Assistive Device  lower body dressing, reacher, sock aid, long handled shoe horn at 11/01/2019 0905   [place]  minimum assist (75% or more patient effort) at 11/05/2019 0715   Time Frame  short-term goal (STG), 1 week at 11/01/2019 0905   Strategies/Barriers  with LRAD at 11/01/2019 0905   Progress/Outcome  good progress toward goal, goal met, goal revised this date at 11/05/2019 0715   LB Dressing Goal 2   Activity/Assistive Device  lower body dressing at 11/01/2019 0905   [place]  modified independence at 11/01/2019 0905   Time Frame  long-term goal (LTG), by discharge at 11/01/2019 0905   Strategies/Barriers  with LRAD at 11/01/2019 0905   Progress/Outcome  good progress toward goal at 11/05/2019 0715   Grooming Goal 1   Activity/Assistive Device  grooming skills, all at 11/01/2019 0905   [place]  supervision required at 11/01/2019 0905   Time Frame  short-term goal (STG), 1 week at 11/01/2019 0905   Strategies/Barriers  standing at sink at 11/01/2019 0905   Progress/Outcome  good progress toward goal at 11/05/2019 0715   Grooming Goal 2   Activity/Assistive Device  grooming skills, all at 11/01/2019 0905   [place]  modified independence at 11/01/2019 0905   Time Frame  long-term goal (LTG), by discharge at 11/01/2019 0905    Strategies/Barriers  seated/standing at 11/01/2019 0905   Progress/Outcome  good progress toward goal at 11/05/2019 0715   Toileting Goal 1   Activity/Assistive Device  toileting skills, all, commode chair at 11/01/2019 0905   [place]  supervision required at 11/01/2019 0905   Time Frame  short-term goal (STG), 1 week at 11/01/2019 0905   Progress/Outcome  good progress toward goal at 11/05/2019 0715   Toileting Goal 2   Activity/Assistive Device  toileting skills, all, commode chair at 11/01/2019 0905   [place]  modified independence at 11/01/2019 0905   Time Frame  long-term goal (LTG), by discharge at 11/01/2019 0905   Progress/Outcome  good progress toward goal at 11/05/2019 0715

## 2019-11-11 NOTE — PROGRESS NOTES
Patient: Chapincito Yoon  Location: [facility] [address]  MRN: [id number]  Today's date: 11/11/2019    Hospital Course  Chris is a 68 y.o. male admitted on 10/31/2019 with Status post bilateral knee replacements [Z96.653]. Principal problem is S/P TKR (total knee replacement), bilateral.    Chris has a past medical history of Essential (primary) hypertension, Hyperlipidemia, unspecified, Impaired fasting glucose, Osteoarthritis, and Prostate cancer (CMS/Formerly [facility]) (2011).     Pt is a 68 year old male admitted to [facility] on 10/31/19 with h/o bilateral knee pain x 5-6 years. It has progressively worsened. Pt is now s/p bilateral TKA's on 10/29/19 and no complications post op.      Therapy Pain/Vitals     Row Name  11/11/19 1228       Pain/Comfort/Sleep    Pain Charting Type   Pain Assessment    Preferred Pain Scale   number (Numeric Rating Pain Scale)    Pain Body Location - Side   Bilateral    Pain Body Location - Orientation   generalized    Pain Body Location   knee    Pain Rating (0-10): Rest   3    Pain Rating (0-10): Activity   5    Pain Rating (word): Rest   --    Pain Rating (word): Activity   --    Pain Management Interventions   prescribed exercises encouraged       Vital Signs    Pulse   87    Heart Rate Source   --    BP   135/66    BP Location   Right upper arm    BP Method   Automatic    Patient Position   Sitting       Patient Observation    Observations   asymptomatic    Row Name 11/11/19 1426          Pain/Comfort/Sleep    Pain Charting Type  Post Activity     Preferred Pain Scale  number (Numeric Rating Pain Scale)     Pain Body Location - Side  Bilateral     Pain Body Location - Orientation  generalized     Pain Rating (0-10): Rest  4     Pain Rating (0-10): Activity  5     Pain Management Interventions  diversional activity provided        Vital Signs    Pulse  97     SpO2  98 %     BP  134/76     BP Location  Right upper arm     BP Method  Automatic     Patient Position  Sitting         Patient Observation    Observations  asymptomatic           Prior Living Environment      Most Recent Value   Lives With  spouse   Living Arrangements  house   Home Accessibility  stairs to enter home (Group), stairs within home (Group)   Living Environment Comment  2 SH, 3 GENE + 1 GENE without HR,  1st floor MT, FF to bed/bath   Number of Stairs, Main Entrance  4   Surface of Stairs, Main Entrance  concrete   Stair Railings, Main Entrance  none   Location, Patient Bedroom  second floor, must negotiate stairs to access   Location, Bathroom  first (main) floor, second floor, must negotiate stairs to access   Bathroom Access Comment  MT 1st floor, stnd height toilet,  full bath on 2nd c walk-in shower with a low threshold entry and built-in seat, no grab bars,  stnd height toilet   Number of Stairs, Within Home, Primary  other (see comments) [15]   Surface of Stairs, Within Home, Primary  carpeting, hardwood   Stair Railings, Within Home, Primary  railings on both sides of stairs, railing on left side (ascending)   Stairs Comment, Within Home, Primary  B rails for 12 steps & 3          Prior Level of Function      Most Recent Value   Dominant Hand  right   Ambulation  independent   Transferring  independent   Toileting  independent   Bathing  independent   Dressing  independent   Eating  independent   Communication  understands/communicates without difficulty   Prior Level of Function Comment  No DME   Equipment Currently Used at Home  none          IRF PT Evaluation and Treatment - 11/11/19 1240        Time Calculation    Start Time  1228     Stop Time  1428     Time Calculation (min)  120 min        Session Details    Document Type  discharge evaluation     Mode of Treatment  physical therapy;individual therapy        General Information    Patient Profile Reviewed?  yes     Patient/Family/Caregiver Comments/Observations  RW provided and height adjusted for patient     Existing Precautions/Restrictions   aspiration;cardiac;fall;weight bearing        Weight-Bearing Status    Left LE Weight-Bearing Status  weight-bearing as tolerated (WBAT)     Right LE Weight-Bearing Status  weight-bearing as tolerated (WBAT)        Sensory Assessment (Somatosensory)    Sensory Assessment (Somatosensory)  sensation intact;left-sided sensation intact;right-sided sensation intact    proprioception & light touch dermatomes       Range of Motion (ROM)    Knee, Left (ROM)  0-97 degrees     Knee, Right (ROM)  1-97 degrees        Strength (Manual Muscle Testing)    Left Lower Extremity Strength  left LE strength is WFL except     Hip, Left (Strength)  ER 3/5, IR 3-/5 (+pain), 3+/5 flexion, 2+/5 extension, 3+/5 adduction, 3+/5 abduction     Knee, Left (Strength)  3-/5 flexion & extension     Ankle, Left (Strength)  4+/5 DF, 2/5 PF, 4+/5 INV/EV     Hip, Right (Strength)  ER 3/5, IR 3-/5, 3+/5 flexion, 2+/5 extension, 3+/5 adduction, 3+/5 abduction     Knee, Right (Strength)  3-/5 flexion & extension     Ankle, Right (Strength)  4+/5 DF, 2/5 PF, 4+/5 INV/EV        Bed Mobility    [place], Roll Left  modified independence     [place], Roll Right  modified independence     [place], Supine to Sit  modified independence     [place], Sit to Supine  modified independence     Comment (Bed Mobility)  performed on ILU bed        Transfers    Transfers  stand pivot/stand step transfer;car transfer        Sit to Stand Transfer    [place], Sit to Stand Transfer  modified independence     Assistive Device  walker, front-wheeled     Comment  for safety        Stand to Sit Transfer    [place], Stand to Sit Transfer  modified independence     Assistive Device  walker, front-wheeled     Comment  for safety        Stand Pivot/Stand Step Transfer    [place], Stand Pivot/Stand Step Transfer  modified independence     Assistive Device  walker, front-wheeled     Comment  for safety        Car Transfer    Transfer Technique  stand  "pivot/stand step     [place], Car Transfer  set up     Assistive Device  walker, front-wheeled     Comment  for RW management        Gait Training    [place], Gait  modified independence     Assistive Device  walker, front-wheeled     Distance in Feet  600 feet     Gait Pattern Utilized  step-through     Deviations/Abnormal Patterns (Gait)  antalgic;gait speed decreased;willian decreased;bilateral deviations     Bilateral Gait Deviations  heel strike decreased     Advanced Gait Activity  curb negotiation;sloped surfaces;10 Meter Walk Test        Curb Negotiation (Mobility)    [place]  modified independence     Comment  6\" curb step up & down with RW        Rough/Uneven Surface Gait Skills (Mobility)    [place]  modified independence     Comment  with transitions over thresholds         Sloped Surface Gait Skills (Mobility)    [place]  modified independence     Comment  up & down 5 feet ramp with RW        10 Meter Walk Test    Trial One: Ten Meter Walk Test (Self-Selected Velocity)  16.81 seconds     Trial Two: Ten Meter Walk Test (Self-Selected Velocity)  15.19 seconds     Trial Three: Ten Meter Walk Test (Self-Selected Velocity)  14.91 seconds     Mean of 3 Trials: Ten Meter Walk Test (Self-Selected Velocity)  15.64 seconds        Stairs Training    [place], Stairs  set up     Assistive Device  railing     Handrail Location  both sides;left side (ascending);left side (descending)     Number of Stairs  15     Stair Height  7 inches     Ascending Stairs Technique  step-over-step;step-to-step     Descending Stairs Technique  step-to-step     Comment  pt perform ascending 12 steps with B rails with step over & last 3 steps with L railing with BUE support with lateral step to technique        Wheelchair Mobility/Management    [place], All Mobility  dependent (less than 25% patient effort)     Leg Rest Management  independent     Wheel Locks/Brake Management  independent        Balance "    Static Sitting Balance  WFL     Dynamic Sitting Balance  WFL     Sit to Stand Dynamic Balance  supported;WFL     Static Standing Balance  supported;WFL     Dynamic Standing Balance  supported;WFL     Comment, Balance  mod I for picking up object off the floor with reacher & RW        Motor Skills    Coordination  WFL;bilateral;lower extremity     Muscle Tone  bilateral;lower extremity(s);WNL        Postural Deviations    Head and Neck  forward head     Shoulder  left shoulder forward;right shoulder forward     Upper Back  kyphosis     Thorax  elongated     Low Back  flattened     Pelvis  posterior pelvic tilt        Lower Extremity (Therapeutic Exercise)    Exercise Position/Type (LE Therapeutic Exercise)  seated;supine;AROM (active range of motion);AAROM (active assistive range of motion)     General Exercise (LE Therapeutic Exercise)  bilateral;SAQ (short arc quad);LAQ (long arc quad);gluteal sets;heel slides;marching while seated     Range of Motion Exercises (LE Therapeutic Exercise)  bilateral;ankle dorsiflexion/plantarflexion     Reps and Sets (LE Therapeutic Exercise)  2 sets of 10 AP, LAQ, marches for warm up; 3 sets of 10 supine TE per HEP     Comment (LE Therapeutic Exercise)  HEP provided included AP, SAQ, GS, QS, hip abd, heel slides with stretch strap        Therapy Plan Review/Discharge Plan (PT)    Therapy Plan Review (PT)  evaluation/treatment results reviewed;patient;participants voiced agreement with care plan;participants included     Anticipated Equipment Needs at Discharge (PT Eval)  walker, front-wheeled     PT Recommended Discharge Disposition  home with outpatient services     Plan for Continued Service After Discharge (PT)  Outpatient PT        Discharge Summary (PT)    Outcomes Achieved/Progress Made Upon Discharge (PT)  all goals met within established timeframes     Transfer to Another Level of Care or Facility (PT)  patient will continue with therapy following discharge;patient to  "receive therapy via outpatient clinic     Discharge Summary Statement (PT)  Pt is a 68 year old male admitted to [facility] on 10/31/19 with h/o bilateral knee pain x 5-6 years. It has progressively worsened. Pt is now s/p bilateral TKA's on 10/29/19 and no complications post op. On eval, pt presented at max A for bed mobility, D level for transfer & ambulation this time, and 2 - 6\" steps with mod A for B rails. He has since progressed to mod I for bed mobility, mod I SPT with RW, amb 150 feet with mod I with RW, 15 - 7\" steps with B & L railing with setup assist, mod I for curb 6\"/ramp negotiation with RW. CGS falls below age-gender matched norms indicating falls risk but overall improvement >6 sec. His current PROM is 0-97 degrees on L and 1-97 degrees on R at his knee joint. Pt presents with decreased functional mobility & increased dependence secondary to decreased BLE strength, decreased BLE ROM, increased BLE pain, decreased standing balance with both static & dynamic and decreased activity endurance. Goals achieved at d/c. RW provided from [facility] and height adjusted for pt prior to d/c.  He would benefit from continued skilled PT with home services to progress towards PLOF. HEP provided in d/c paperwork.           Pain Assessment/Intervention  Pain Charting Type: Post Activity             Education provided this session. See the Patient Education summary report for full details.    IRF PT Goals      Most Recent Value   Bed Mobility Goal 1   Activity/Assistive Device  bed mobility activities, all at 11/05/2019 0731   [place]  modified independence at 11/05/2019 0731   Time Frame  5 - 7 days at 11/05/2019 0731   Strategies/Barriers  pain, decreased BLE strength at 11/05/2019 0731   Progress/Outcome  goal met at 11/11/2019 1240   Bed Mobility Goal 2   Activity/Assistive Device  rolling to right, rolling to left, supine to sit, sit to supine at 11/01/2019 1106   [place]  modified independence at " 11/01/2019 1106   Time Frame  14 days or less at 11/01/2019 1106   Strategies/Barriers  pain and decreased BLE strength at 11/05/2019 0731   Progress/Outcome  goal met at 11/11/2019 1240   Transfer Goal 1   Activity/Assistive Device  sit-to-stand/stand-to-sit, bed-to-chair/chair-to-bed, car transfer, walker, front-wheeled at 11/05/2019 0731   [place]  modified independence at 11/05/2019 0731   Time Frame  5 - 7 days at 11/05/2019 0731   Strategies/Barriers  decreased BLE strength, decreased balance at 11/05/2019 0731   Progress/Outcome  goal met at 11/11/2019 1240   Transfer Goal 2   Activity/Assistive Device  sit-to-stand/stand-to-sit, bed-to-chair/chair-to-bed at 11/01/2019 1106   [place]  modified independence at 11/01/2019 1106   Time Frame  14 days or less at 11/01/2019 1106   Strategies/Barriers  decreased BLE strength, decreased balance & decreased ROM at 11/05/2019 0731   Progress/Outcome  goal met at 11/11/2019 1240   Gait/Walking Locomotion Goal 1   Activity/Assistive Device  gait (walking locomotion), assistive device use at 11/05/2019 0731   Distance  200 feet at 11/05/2019 0731   [place]  modified independence at 11/05/2019 0731   Time Frame  5 - 7 days, short-term goal (STG) at 11/05/2019 0731   Strategies/Barriers  decreased BLE strength, decreased balance at 11/05/2019 0731   Progress/Outcome  goal met at 11/11/2019 1240   Gait/Walking Locomotion Goal 2   Activity/Assistive Device  gait (walking locomotion), assistive device use, improve balance and speed, increase endurance/gait distance, walker, front-wheeled at 11/01/2019 1106   Distance  200 feet at 11/01/2019 1106   [place]  modified independence at 11/01/2019 1106   Time Frame  14 days or less at 11/01/2019 1106   Strategies/Barriers  decreased BLE strength, decreased balance at 11/05/2019 0731   Progress/Outcome  goal met at 11/11/2019 1240   Stairs Goal 1   Activity/Assistive Device  stairs, all skills, assistive device  use, ascending stairs, descending stairs, using handrail, left, cane, straight at 11/05/2019 0731   Number of Stairs  15 at 11/05/2019 0731   [place]  supervision required, set-up required at 11/05/2019 0731   Time Frame  5 - 7 days at 11/05/2019 0731   Strategies/Barriers  decreased BLE strength, decreased balance at 11/05/2019 0731   Progress/Outcome  goal met at 11/11/2019 1240   Stairs Goal 2   Activity/Assistive Device  assistive device use, stairs, all skills, ascending stairs, descending stairs, using handrail, left, cane, straight at 11/01/2019 1106   Number of Stairs  15 at 11/01/2019 1106   [place]  supervision required, set-up required at 11/01/2019 1106   Time Frame  14 days or less at 11/01/2019 1106   Strategies/Barriers  decreased BLE strength, decreased balance at 11/05/2019 0731   Progress/Outcome  goal met at 11/11/2019 1240

## 2019-11-11 NOTE — PLAN OF CARE
Problem: Rehabilitation (IRF) Plan of Care  Goal: Plan of Care Review  Outcome: Progressing  Flowsheets (Taken 11/11/2019 5085)  Plan of Care Reviewed With: patient  IRF Plan of Care Review: progress ongoing, continue  Outcome Summary: Perfromance day. For discharge in the am.

## 2019-11-11 NOTE — PROGRESS NOTES
11/11/19 1000   Discharge Needs Assessment (IRF)   Anticipated Discharge Disposition home with outpatient services   Offered/Gave Vendor List yes   Patient's Choice of Community Agency(s) [facility]   Does the patient need discharge transport arranged? No   Has discharge transport been arranged? No   Discharge Transportation Vendor private vehicle   What day is the transport expected? 11/12/19   What time is the transport expected? 1000   Plan   Plan Home with wife and OP   Patient/Family in Agreement with Plan yes   Discharge Review for Designated Lay Caregiver Designated Lay Caregiver available   Final Note   Final Note home with wife and OP

## 2019-11-11 NOTE — PLAN OF CARE
Problem: Rehabilitation (IRF) Plan of Care  Goal: Plan of Care Review  Outcome: Progressing  Flowsheets (Taken 11/11/2019 1212)  Plan of Care Reviewed With: patient  IRF Plan of Care Review: discharge pending  Outcome Summary: Focus of session placed on morning ADL routine. Pt grossly completes at the I-mod I level.

## 2019-11-11 NOTE — PLAN OF CARE
Problem: Rehabilitation (IRF) Plan of Care  Goal: Plan of Care Review  Outcome: Progressing  Flowsheets (Taken 11/10/2019 2241)  Plan of Care Reviewed With: patient  IRF Plan of Care Review: progress ongoing, continue  Outcome Summary: Pt. AA&Ox3, pleasant and cooperative, continent of bowel and bladder, pain managed with oxycodone,.

## 2019-11-11 NOTE — PROGRESS NOTES
Internal Medicine Progress Note      Patient seen and examined  Attestation Notes: Face to face encounter completed      HPI  Mr Car is a 67 yo M with hypertension, dyslipidemia, prostate CA, osteoarthritis who was evaluated as an outpatient for chronic progressive severe bilateral knee pain not alleviated by conservative measures; he was recommended bilateral total knee replacement  On 10/29, he was admitted to [facility], where he underwent bilateral total knee replacement secondary to osteoarthritis by Dr. Zheng  He utilized twice daily aspirin 81 mg for DVT prophylaxis  He continued on Diovan for blood pressure management  He was admitted to [facility] on 10/31 for acute inpatient rehabilitation    SUBJECTIVE  Bright affect today, looking forward to going home tomorrow  Reporting good management of knee pain, seeing significant improvement in lower extremity swelling  Moving bowels, denies constipation or other GI distress  No fever, headache, orthostasis, angina, dyspnea  We reviewed lab results from today, hemoglobin has improved to 11.4  We reviewed current medications, with attention to changes from prior home regimen  [facility] [place] [facility] pharmacy    Review of Systems as above, otherwise without change      Medical History reviewed:  FH, SH without change since admission  Social History     Tobacco Use   • Smoking status: Former Smoker     Last attempt to quit:      Years since quittin.8   • Smokeless tobacco: Never Used   • Tobacco comment: quit 40 years ago   Substance Use Topics   • Alcohol use: Yes     Alcohol/week: 3.0 standard drinks     Types: 3 Cans of beer per week     Frequency: 2-3 times a week     Drinks per session: 3 or 4     Binge frequency: Less than monthly   • Drug use: Never       PMH, PSH without change since admission      Current medications and allergies reviewed:  Allergies: Patient has no  known allergies.    CURRENT MEDICATIONS:      • acetaminophen  650 mg oral With meals & nightly   • aspirin  81 mg oral BID   • atorvastatin  10 mg oral Daily (6p)   • multivitamin  1 tablet oral Daily   • valsartan  80 mg oral Daily           Objective     Vitals:    11/10/19 1504 11/10/19 1900 11/11/19 0713 11/11/19 0733   BP: 138/70 (!) 159/68 (!) 144/70 132/67   BP Location: Right upper arm Left upper arm Right upper arm Right upper arm   Patient Position: Sitting Lying Lying Lying   Pulse: 84 88 78 80   Resp: 18 18 20    Temp: 36.8 °C (98.3 °F) 36.7 °C (98.1 °F) 36.8 °C (98.3 °F)    TempSrc: Oral Oral Oral    SpO2: 97% 96% 96%    Weight:       Height:           Physical Exam   Constitutional: He is oriented to person, place, and time. He appears well-developed and well-nourished. No distress.   HENT:   Head: Normocephalic and atraumatic.   Mouth/Throat: Oropharynx is clear and moist.   Eyes: Pupils are equal, round, and reactive to light. Conjunctivae and EOM are normal. No scleral icterus.   Neck: Normal range of motion. Neck supple.   Cardiovascular: Normal rate, regular rhythm, normal heart sounds and intact distal pulses.   Pulmonary/Chest: Effort normal and breath sounds normal. No respiratory distress. He has no wheezes. He has no rales.   Abdominal: Soft. Bowel sounds are normal. He exhibits no distension. There is no tenderness.   Musculoskeletal: He exhibits edema (Bilateral lower extremity edema) and deformity (s/p bilateral TKR).   Neurological: He is alert and oriented to person, place, and time. No cranial nerve deficit.   Skin: Skin is warm and dry. He is not diaphoretic. No erythema.   Bilateral knee incisions c/d/i with dermabond   Psychiatric: He has a normal mood and affect.   Nursing note and vitals reviewed.       Labs   I have reviewed the patient's pertinent labs.  Significant abnormals are Anemia, hypoalbuminemia.  Lab results reviewed, discussed with patient:    Lab Results   Component  Value Date    WBC 7.95 11/11/2019    HGB 11.4 (L) 11/11/2019    HCT 34.2 (L) 11/11/2019     (H) 11/11/2019     11/11/2019    K 4.8 11/11/2019     11/11/2019    CREATININE 0.8 11/11/2019    BUN 20 11/11/2019    CO2 29 11/11/2019    ALT 12 (L) 11/01/2019    AST 17 11/01/2019    INR 1.0 10/16/2019    HGBA1C 5.6 10/16/2019       Imaging    Bilateral lower extremity venous Doppler ultrasound 10/31:  No evidence of deep vein thrombus in either lower extremity  Fluid in right popliteal space likely represents a ruptured Baker's cyst but is not well evaluated on this vascular study        Assessment/Plan       Patient Active Problem List    Diagnosis Date Noted   • Anemia due to blood loss 10/31/2019   • Leukemoid reaction 10/31/2019   • S/P TKR (total knee replacement), bilateral 10/31/2019   • Status post bilateral knee replacements 10/31/2019   • Osteoarthrosis 10/29/2019   • Osteoarthritis of both knees 10/16/2019   • Counseling on health promotion and disease prevention 10/16/2019   • Hyperlipidemia, unspecified    • Essential (primary) hypertension        Mr Yoon is a 67 yo M with hypertension, dyslipidemia, prostate CA, osteoarthritis who is admitted to [facility] on 10/31 from [facility] for acute inpatient rehabilitation s/p bilateral TKR by Dr. Zheng on 10/29     Ortho:  S/p B TKR-continue rehab, performance day  Continue current pain management regimen  Local wound care     Cardiovascular:  Essential hypertension-blood pressure 130-160/60-70 on Diovan  Manual only monitoring  Maintain hold parameters and cardiac precautions     Pulmonary:  Encourage use of spirometry for atelectasis  No evidence of respiratory distress     Endocrine:  Dyslipidemia-continue statin therapy, low-fat diet  LFT 11/1 unremarkable     Renal:  BUN/Cr  20/0.8, GFR> 60  Monitor renal function, avoid nephrotoxins     GI:  Constipation-continue bowel program     :  Hx prostate CA  Monitor PVR, CIC  PRN  Urinalysis 10/31 clear, leukocyte esterase negative, nitrite negative; culture not indicated at present     Heme:  Postoperative anemia-preop hemoglobin 14.2    Normochromic, normocytic  Hemoglobin improved to 11.4     F/E/N:  Regular/thin diet  Electrolytes stable, maintaining hydration    Derm:  Local wound care     Prophylaxis:  -BID 81mg ASA, SCD for DVT prophylaxis; Doppler ultrasound 11/1 without evidence for DVT  -Spirometry for atelectasis  -Maintain fall, cardiac precautions     Disposition  -Expected discharge date 11/12  -Current medications reviewed with patient, reconciled with home regimen  -Discharge Rx, patient discharge medication reconciliation prepared in anticipation of discharge home  -Medications E prescribed to [address] in [place]  -I have queried the state Prescription Drug Monitoring Program [PDMP] and found no suspicious PDMP activity and I will prescribe a controlled medication(s).  .  Shalini Torres MD  11/11/2019  9:47 AM

## 2019-11-11 NOTE — PLAN OF CARE
Problem: Rehabilitation (IRF) Plan of Care  Goal: Plan of Care Review  Outcome: Progressing  Flowsheets (Taken 11/11/2019 1300)  Plan of Care Reviewed With: patient  IRF Plan of Care Review: progress ongoing, continue  Outcome Summary: Performance day completed for PT. RW provided and height adjusted prior to discharge.

## 2019-11-12 VITALS
WEIGHT: 225.09 LBS | SYSTOLIC BLOOD PRESSURE: 143 MMHG | OXYGEN SATURATION: 96 % | RESPIRATION RATE: 20 BRPM | TEMPERATURE: 98.3 F | DIASTOLIC BLOOD PRESSURE: 63 MMHG | HEART RATE: 95 BPM | HEIGHT: 67 IN | BODY MASS INDEX: 35.33 KG/M2

## 2019-11-12 PROCEDURE — 63700000 HC SELF-ADMINISTRABLE DRUG: Performed by: PHYSICAL MEDICINE & REHABILITATION

## 2019-11-12 RX ADMIN — MULTIPLE VITAMINS W/ MINERALS TAB 1 TABLET: TAB at 09:17

## 2019-11-12 RX ADMIN — ASPIRIN 81 MG: 81 TABLET, COATED ORAL at 09:24

## 2019-11-12 RX ADMIN — VALSARTAN 80 MG: 80 TABLET, FILM COATED ORAL at 09:17

## 2019-11-12 RX ADMIN — OXYCODONE HYDROCHLORIDE 10 MG: 5 TABLET ORAL at 00:58

## 2019-11-12 RX ADMIN — OXYCODONE HYDROCHLORIDE 5 MG: 5 TABLET ORAL at 09:20

## 2019-11-12 RX ADMIN — ACETAMINOPHEN 650 MG: 325 TABLET, FILM COATED ORAL at 09:17

## 2019-11-12 NOTE — PROGRESS NOTES
Internal Medicine Progress Note      Patient seen and examined  Attestation Notes: Face to face encounter completed      HPI  Mr Car is a 69 yo M with hypertension, dyslipidemia, prostate CA, osteoarthritis who was evaluated as an outpatient for chronic progressive severe bilateral knee pain not alleviated by conservative measures; he was recommended bilateral total knee replacement  On 10/29, he was admitted to [facility], where he underwent bilateral total knee replacement secondary to osteoarthritis by Dr. Zheng  He utilized twice daily aspirin 81 mg for DVT prophylaxis  He continued on Diovan for blood pressure management  He was admitted to [facility] on 10/31 for acute inpatient rehabilitation    SUBJECTIVE  Feeling well today, feels ready to go home  Offers no new somatic complaints prior to discharge  Denies headache, chest pain, dyspnea, GI distress  Reports good management of knee pain  Significant improvement in lower extremity edema  Reviewed discharge medications and recommendations for follow-up    Review of Systems as above, otherwise without change      Medical History reviewed:  FH, SH without change since admission  Social History     Tobacco Use   • Smoking status: Former Smoker     Last attempt to quit: 1979     Years since quittin.8   • Smokeless tobacco: Never Used   • Tobacco comment: quit 40 years ago   Substance Use Topics   • Alcohol use: Yes     Alcohol/week: 3.0 standard drinks     Types: 3 Cans of beer per week     Frequency: 2-3 times a week     Drinks per session: 3 or 4     Binge frequency: Less than monthly   • Drug use: Never       PMH, PSH without change since admission      Current medications and allergies reviewed:  Allergies: Patient has no known allergies.    CURRENT MEDICATIONS:      • acetaminophen  650 mg oral With meals & nightly   • aspirin  81 mg oral BID   • atorvastatin  10 mg oral Daily (6p)   • multivitamin  1 tablet oral Daily   •  valsartan  80 mg oral Daily           Objective     Vitals:    11/11/19 1228 11/11/19 1426 11/11/19 1500 11/12/19 0810   BP: 135/66 134/76 (!) 141/64 (!) 143/63   BP Location: Right upper arm Right upper arm Right upper arm Right upper arm   Patient Position: Sitting Sitting Lying Sitting   Pulse: 87 97 87 95   Resp:   18 20   Temp:   36.8 °C (98.2 °F) 36.8 °C (98.3 °F)   TempSrc:   Oral Oral   SpO2:  98% 98% 96%   Weight:       Height:           Physical Exam   Constitutional: He is oriented to person, place, and time. He appears well-developed and well-nourished. No distress.   HENT:   Head: Normocephalic and atraumatic.   Mouth/Throat: Oropharynx is clear and moist.   Eyes: Pupils are equal, round, and reactive to light. Conjunctivae and EOM are normal. No scleral icterus.   Neck: Normal range of motion. Neck supple.   Cardiovascular: Normal rate, regular rhythm, normal heart sounds and intact distal pulses.   Pulmonary/Chest: Effort normal and breath sounds normal. No respiratory distress. He has no wheezes. He has no rales.   Abdominal: Soft. Bowel sounds are normal. He exhibits no distension. There is no tenderness.   Musculoskeletal: He exhibits edema (Bilateral lower extremity edema) and deformity (s/p bilateral TKR).   Neurological: He is alert and oriented to person, place, and time. No cranial nerve deficit.   Skin: Skin is warm and dry. He is not diaphoretic. No erythema.   Bilateral knee incisions c/d/i with dermabond   Psychiatric: He has a normal mood and affect.   Nursing note and vitals reviewed.       Labs   I have reviewed the patient's pertinent labs.  Significant abnormals are Anemia, hypoalbuminemia.  Lab results reviewed, discussed with patient:    Lab Results   Component Value Date    WBC 7.95 11/11/2019    HGB 11.4 (L) 11/11/2019    HCT 34.2 (L) 11/11/2019     (H) 11/11/2019     11/11/2019    K 4.8 11/11/2019     11/11/2019    CREATININE 0.8 11/11/2019    BUN 20 11/11/2019     CO2 29 11/11/2019    ALT 12 (L) 11/01/2019    AST 17 11/01/2019    INR 1.0 10/16/2019    HGBA1C 5.6 10/16/2019       Imaging    Bilateral lower extremity venous Doppler ultrasound 10/31:  No evidence of deep vein thrombus in either lower extremity  Fluid in right popliteal space likely represents a ruptured Baker's cyst but is not well evaluated on this vascular study        Assessment/Plan       Patient Active Problem List    Diagnosis Date Noted   • Anemia due to blood loss 10/31/2019   • Leukemoid reaction 10/31/2019   • S/P TKR (total knee replacement), bilateral 10/31/2019   • Status post bilateral knee replacements 10/31/2019   • Osteoarthrosis 10/29/2019   • Osteoarthritis of both knees 10/16/2019   • Counseling on health promotion and disease prevention 10/16/2019   • Hyperlipidemia, unspecified    • Essential (primary) hypertension        Mr Yoon is a 69 yo M with hypertension, dyslipidemia, prostate CA, osteoarthritis who is admitted to [facility] on 10/31 from [facility] for acute inpatient rehabilitation s/p bilateral TKR by Dr. Zheng on 10/29     Ortho:  S/p B TKR-discharge home today  Continue current pain management regimen  Local wound care     Cardiovascular:  Essential hypertension-blood pressure 132-144/63-70 on Diovan  Follow-up control with PCP     Pulmonary:  Encourage use of spirometry for atelectasis  No evidence of respiratory distress during rehab stay     Endocrine:  Dyslipidemia-continue statin therapy, low-fat diet  LFT 11/1 unremarkable     Renal:  BUN/Cr  20/0.8, GFR> 60     GI:  Constipation-OTC bowel program PRN     :  Hx prostate CA  Urinalysis 10/31 clear, leukocyte esterase negative, nitrite negative; culture not indicated      Heme:  Postoperative anemia-preop hemoglobin 14.2    Normochromic, normocytic  Hemoglobin improved to 11.4  Follow-up with PCP     F/E/N:  Regular/thin diet  Electrolytes stable, maintaining hydration    Derm:  Local wound  care     Prophylaxis:  -BID 81mg ASA, SCD for DVT prophylaxis; Doppler ultrasound 11/1 without evidence for DVT  -Spirometry for atelectasis  -Maintain fall, cardiac precautions     Disposition  Discharge home today  Reviewed discharge medications and recommendations for follow-up  .  Shalini Torres MD  11/12/2019  9:56 AM

## 2019-11-12 NOTE — PLAN OF CARE
Problem: Rehabilitation (IRF) Plan of Care  Goal: Plan of Care Review  Outcome: Progressing  Flowsheets (Taken 11/12/2019 1012)  Plan of Care Reviewed With: patient  IRF Plan of Care Review: progress ongoing, continue  Outcome Summary: patient adequate for discharge

## 2019-11-12 NOTE — PROGRESS NOTES
"Subjective    Patient seen and examined on rounds.  Chart reviewed.  Events overnight noted.  History reviewed briefly with patient.     CC:  Deficits in mobility, transfers, self-care status post bilateral total knee replacements     HPI:  Mr. Yoon is a 68-year-old right-handed white male with chronic conditions significant for degenerative arthritis, hypertension, prostate cancer status post prostatectomy, dyslipidemia who had progressively worsening bilateral knee pain which failed conservative management and subsequently the patient underwent elective bilateral total knee replacements on 10/29/19 at [facility] by Dr. Ted Zheng.  Postoperatively, he is allowed weightbearing as tolerated on both lower extremities.  He is on Aspirin for deep vein thrombus prophylaxis and also on pneumatic compression boots. He has put on Oxycodone for pain control.  Postoperative course was significant for anemia, but he did not need transfusion. On 10/30/19, hemoglobin was 11.2, WBC count 7.65, BUN 13, creatinine 0.9.  He has been needing assistance for mobility, transfers, self-care postoperatively. He is transferred to [place] [facility] on 10/31/19 for further rehabilitation care.      SUBJ: Feels fine. For discharge today. No C/O today. Appreciative of care here.     ROS: Denies chest pain or shortness of breath. Other ROS negative. Past, family, social history is unchanged.    Physical Exam      Blood pressure (!) 143/63, pulse 95, temperature 36.8 °C (98.3 °F), temperature source Oral, resp. rate 20, height 1.702 m (5' 7\"), weight 102 kg (225 lb 1.4 oz), SpO2 96 %.  Body mass index is 35.25 kg/m².    General Appearance: Not in acute distress  Head/Ear/Nose/Throat: Normocephalic; Atraumatic.   Eye: EOMI; PERRL.   Neck: No JVD; No Bruits.   Respiratory: Decreased breath sounds at bases.   Cardiovascular: RRR; Normal S1, S2.   Gastrointestinal: Soft; NT; +BS.   Extremities: Bilateral lower extremity edema noted. "  Healing incisions noted on anterior aspect of both knees.  Incisions open to air.  No drainage today.  No erythema around incisions.  Musculoskeletal: Functional active range of motion in both upper extremities.  Some limitation of active range of motion in both hips and knees, limited by pain.  Right knee PROM 0 to 93 degrees.  Left knee PROM 4 to 90 degrees.  Neurological: AAO ×3. Speech is fluent. Cranial nerve examination does not reveal any gross facial asymmetry. Strength testing about 4+/5 strength in both upper extremities.  Bilateral hip flexion is less than 3/5.  Bilateral quadriceps are less than 3/5.  Bilateral ankle dorsi and plantar flexion are 4/5.  He is grossly able to localize touch and position sense in great toes.  Deep tendon reflexes are hypoactive and symmetric bilaterally.  Plantars are flexor.  Coordination is functional upper extremities.  Both knee jerks were not tested.  Neurologically unchanged  Behavior/Emotional: Appropriate; Cooperative.   Skin: No obvious rashes noted.  Bilateral knee incisions healing.  Incisions open to air.  No drainage today.  No erythema around incisions.      Current Facility-Administered Medications:   •  acetaminophen (TYLENOL) tablet 650 mg, 650 mg, oral, q4h PRN, Vernon Rob MD, 650 mg at 11/06/19 0751  •  acetaminophen (TYLENOL) tablet 650 mg, 650 mg, oral, With meals & nightly, Venron Rob MD, 650 mg at 11/11/19 2056  •  aspirin enteric coated tablet 81 mg, 81 mg, oral, BID, Vernon Rob MD, 81 mg at 11/11/19 2056  •  atorvastatin (LIPITOR) tablet 10 mg, 10 mg, oral, Daily (6p), Vernon Rob MD, 10 mg at 11/11/19 1816  •  multivitamin tablet 1 tablet, 1 tablet, oral, Daily, Vernon Rob MD, 1 tablet at 11/11/19 0824  •  oxyCODONE (ROXICODONE) immediate release tablet 5-10 mg, 5-10 mg, oral, q4h PRN, Vernon Rob MD, 10 mg at 11/12/19 0058  •  valsartan (DIOVAN) tablet 80 mg, 80 mg, oral, Daily, Vernon Rob  MD JULIUS, 80 mg at 11/11/19 0824       Labs / Radiology    Lab Results   Component Value Date    WBC 7.95 11/11/2019    HGB 11.4 (L) 11/11/2019    HCT 34.2 (L) 11/11/2019    MCV 96.1 11/11/2019     (H) 11/11/2019     Lab Results   Component Value Date    GLUCOSE 106 (H) 11/11/2019    CALCIUM 9.3 11/11/2019     11/11/2019    K 4.8 11/11/2019    CO2 29 11/11/2019     11/11/2019    BUN 20 11/11/2019    CREATININE 0.8 11/11/2019         Assessment and Plan    ASSESSMENT PLAN:  1. 68-year-old right-handed white male with chronic conditions significant for degenerative arthritis, hypertension, prostate cancer status post prostatectomy, dyslipidemia who had progressively worsening bilateral knee pain which failed conservative management and subsequently the patient underwent elective bilateral total knee replacements on 10/29/19 at [facility] by Dr. Ted Zheng.  Postoperatively, he is allowed weightbearing as tolerated on both lower extremities.  He is on Aspirin for deep vein thrombus prophylaxis and also on pneumatic compression boots. He has put on Oxycodone for pain control.  Postoperative course was significant for anemia, but he did not need transfusion. On 10/30/19, hemoglobin was 11.2, WBC count 7.65, BUN 13, creatinine 0.9.  He has been needing assistance for mobility, transfers, self-care postoperatively. He is transferred to [place] [facility] on 10/31/19 for further rehabilitation care.     2. DVT prophylaxis - on Aspirin.  On SCDs.    Platelets 224 on 11/1/2019.  More ambulatory now.  Platelets 377 on 11/5/2019.  Platelets 534 on 11/11/2019.     3.  Orthopedics - status post bilateral total knee replacements.  Continue PT, OT.  Monitor healing of incisions.     4. GI - On Colace, Senokot.  On Dulcolax.  On Zofran ODT for nausea.  On when necessary MOM, Maalox.     5.  - voiding.  Denies dysuria.  Monitor post void residuals.     6.  Hypertension - on Diovan.     7. Pain - on  when necessary Oxycodone.  On Tylenol PRN.  Ice to knees when necessary.  Pain medications are helping.  He does not want increased pain medications at this time.Therapist indicated patient has been complaining of a lot of pain in his knees.  Discussed with patient and with his wife in the evening.  Again he does not want to go on OxyContin.  Will order Lidoderm patches.  Suggested using ice to the knees.  On scheduled Tylenol. He is on PRN oxycodone. He has soreness in his knees.  Tylenol and Lidoderm patches were added.  On oxycodone as needed.  He does not want to go on long-acting pain meds. Discussed with patient and with his wife with him.  Lidoderm patches did not help much so they will be discontinued.  On scheduled Tylenol also.  Ice to knees as needed.        8.  Hematology - monitor hemoglobin, platelets.  Anemia - on MVI.  Hemoglobin 10.7 on 11/1/2019.  Hemoglobin 10.6 on 11/5/19.  Hemoglobin 11.4 on 11/11/2019.     9.  Dyslipidemia - on Lipitor.     10. Rehabilitation medicine - Continue comprehensive rehabilitation care. Continue PT, OT.  We will follow falls precautions, cardiac precautions, monitor pulse oximetry in therapy.Therapist indicated patient has been complaining of a lot of pain in his knees.  Discussed with patient and with his wife in the evening.  Again he does not want to go on OxyContin.  Will order Lidoderm patches.  Suggested using ice to the knees.  On scheduled Tylenol. He is on PRN oxycodone. He has soreness in his knees.  Tylenol and Lidoderm patches were added.  On oxycodone as needed.  He does not want to go on long-acting pain meds.   Lidoderm patches did not help much so they will be discontinued.  On scheduled Tylenol also.  Ice to knees as needed.  Inspected incisions which are stable.  Performance day today.  Feels comfortable with discharge plans to home for today.    11. Reviewed labs today.  BUN 14, creatinine 1.0 on 11/1/2019.  BUN 17, creatinine 0.8 on 11/5/2019.  BUN  20, creatinine 0.8 on 11/11/2019.    12.  Discharge to home today. Discussed discharge plans. Discharge summary will be completed. Follow up with PCP, orthopedic surgeon and other appropriate physicians. Further therapies set up after discharge. Discharge instructions on discharge form.      Vernon Rob MD  11/12/2019

## 2019-11-12 NOTE — DISCHARGE SUMMARY
Rehab Discharge Summary      Admitting Provider: Vernon Rob MD  Discharge Provider: Vernon Rob MD  Primary Care Physician at Discharge: Sanjeev Kelley -[phone]     Admission Date: 10/31/2019     Discharge Date: 11/12/2019    Discharge Disposition  Home  DIAGNOSES AT THE TIME OF DISCHARGE:  Please see discharge form for list of medications, follow-up care and detailed functional status at discharge    REASON FOR HOSPITALIZATION:    HISTORY OF PRESENT ILLNESS:    PERTINENT PHYSICAL FINDINGS ON HOSPITALIZATION: The patient had stable vital signs.    HOSPITAL COURSE: The patient was admitted to [facility] for a comprehensive inpatient rehabilitation program to include physical therapy, occupational therapy, speech therapy, rehabilitation nursing, case management evaluation.     Dr. Byrd was consulted to follow him / her from an internal medicine standpoint.     Otherwise she tolerated therapies well and made good progress in her functional status. She had no further medical complications and was subsequently discharged to home. She will need close follow-up with her orthopedic surgeon, family physician and other physicians and should continue further therapies after discharge from [facility].    Otherwise he tolerated therapies well and made good progress in his functional status. He had no further medical complications and was subsequently discharged to home. He will need close follow-up with his orthopedic surgeon, family physician and other physicians and should continue further therapies after discharge from [facility].      Code Status at Discharge: Full Code    Discharge Medications     Medication List      START taking these medications    acetaminophen 325 mg tablet  Commonly known as:  TYLENOL  Take 2 tablets (650 mg total) by mouth every 4 (four) hours as needed for mild pain or moderate pain.  Dose:  650 mg        CHANGE how you take these  medications    oxyCODONE 5 mg immediate release tablet  Commonly known as:  ROXICODONE  Take 1 tablet (5 mg total) by mouth every 4 (four) hours as needed for severe pain for up to 5 days.  Dose:  5 mg  What changed:    · how much to take  · reasons to take this     sennosides-docusate sodium 8.6-50 mg  Commonly known as:  SENOKOT-S  Take 1 tablet by mouth 2 (two) times a day as needed for constipation.  Dose:  1 tablet  What changed:    · when to take this  · reasons to take this        CONTINUE taking these medications    aspirin 81 mg chewable tablet  Take 1 tablet (81 mg total) by mouth 2 (two) times a day. RESUME FOR 4 WEEKS AFTER SURGERY  Dose:  81 mg     atorvastatin 10 mg tablet  Commonly known as:  LIPITOR  Take 10 mg by mouth daily.  Dose:  10 mg     CENTRUM ORAL  Take 1 tablet by mouth daily.  Dose:  1 tablet     valsartan 80 mg tablet  Commonly known as:  DIOVAN  Take 80 mg by mouth daily.  Dose:  80 mg            Reason for Hospitalization    Dysfunction in ambulation, transfers and self-care status post bilateral total knee replacements.    History of Present Illness    Mr. Chapincito Yoon  is a 68-year-old right-handed white male with chronic conditions significant for degenerative arthritis, hypertension, prostate cancer status post prostatectomy, dyslipidemia who had progressively worsening bilateral knee pain which failed conservative management and subsequently the patient underwent elective bilateral total knee replacements on 10/29/19 at [facility] by Dr. Ted Zheng.  Postoperatively, he is allowed weightbearing as tolerated on both lower extremities.  He is on Aspirin for deep vein thrombus prophylaxis and also on pneumatic compression boots. He has put on Oxycodone for pain control.  Postoperative course was significant for anemia, but he did not need transfusion. On 10/30/19, hemoglobin was 11.2, WBC count 7.65, BUN 13, creatinine 0.9.  He has been needing assistance for mobility,  transfers, self-care postoperatively. I have reviewed the preadmission screening and concur with that information and there are no significant changes from patient’s preadmission screening. He is transferred to [facility] on 10/31/19 for further rehabilitation care.     Pertinent Physical Findings on Admission    The patient had stable vital signs.  General Appearance: Not in acute distress  Head/Ear/Nose/Throat: Normocephalic; Atraumatic.   Eye: EOMI; PERRL.   Neck: No JVD; No Bruits.   Respiratory: Decreased breath sounds at bases.   Cardiovascular: RRR; Normal S1, S2.   Gastrointestinal: Soft; NT; +BS.   Extremities: Bilateral lower extremity edema noted.  Healing incisions noted on anterior aspect of both knees.  Aquacel dressing in place on both incisions.    Musculoskeletal: Functional active range of motion in both upper extremities.  Some limitation of active range of motion in both hips and knees, limited by pain.    Neurological: AAO ×3. Speech is fluent. Cranial nerve examination does not reveal any gross facial asymmetry. Strength testing about 4+/5 strength in both upper extremities.  Bilateral hip flexion is less than 3/5.  Bilateral quadriceps are less than 3/5.  Bilateral ankle dorsi and plantar flexion are 4/5.  He is grossly able to localize touch and position sense in great toes.  Deep tendon reflexes are hypoactive and symmetric bilaterally.  Plantars are flexor.  Coordination is functional upper extremities.  Both knee jerks were not tested.  Behavior/Emotional: Appropriate; Cooperative.   Skin: No obvious rashes noted.  Bilateral knee incisions healing.  Aquacel dressing in place.    Hospital Course    1. The patient was admitted to [facility] for a comprehensive inpatient rehabilitation program to include physical therapy, occupational therapy, speech therapy, rehabilitation nursing, case management evaluation. Dr. Torres was consulted to follow him from an internal  medicine standpoint.     2. DVT prophylaxis - on Aspirin.  On SCDs.    Platelets 224 on 11/1/2019.  More ambulatory now.  Platelets 377 on 11/5/2019.  Platelets 534 on 11/11/2019.     3.  Orthopedics - status post bilateral total knee replacements.  Continue PT, OT.  Monitor healing of incisions.     4. GI - On Colace, Senokot.  On Dulcolax.  On Zofran ODT for nausea.  On when necessary MOM, Maalox.     5.  - voiding.  Denies dysuria.  Monitor post void residuals.     6.  Hypertension - on Diovan.     7. Pain - on when necessary Oxycodone.  On Tylenol PRN.  Ice to knees when necessary.  Pain medications are helping.  He does not want increased pain medications at this time.Therapist indicated patient has been complaining of a lot of pain in his knees.  Discussed with patient and with his wife in the evening.  Again he does not want to go on OxyContin.  Will order Lidoderm patches.  Suggested using ice to the knees.  On scheduled Tylenol. He is on PRN oxycodone. He has soreness in his knees.  Tylenol and Lidoderm patches were added.  On oxycodone as needed.  He does not want to go on long-acting pain meds. Discussed with patient and with his wife with him.  Lidoderm patches did not help much so they will be discontinued.  On scheduled Tylenol also.  Ice to knees as needed.        8.  Hematology - monitor hemoglobin, platelets.  Anemia - on MVI.  Hemoglobin 10.7 on 11/1/2019.  Hemoglobin 10.6 on 11/5/19.  Hemoglobin 11.4 on 11/11/2019.     9.  Dyslipidemia - on Lipitor.     10. Rehabilitation medicine - Continue comprehensive rehabilitation care. Continue PT, OT.  We will follow falls precautions, cardiac precautions, monitor pulse oximetry in therapy.Therapist indicated patient has been complaining of a lot of pain in his knees.  Discussed with patient and with his wife in the evening.  Again he does not want to go on OxyContin.  Will order Lidoderm patches.  Suggested using ice to the knees.  On scheduled Tylenol.  He is on PRN oxycodone. He has soreness in his knees.  Tylenol and Lidoderm patches were added.  On oxycodone as needed.  He does not want to go on long-acting pain meds.   Lidoderm patches did not help much so they will be discontinued.  On scheduled Tylenol also.  Ice to knees as needed.  Inspected incisions which are stable.  Performance day today.  Feels comfortable with discharge plans to home for today.     11. Reviewed labs today.  BUN 14, creatinine 1.0 on 11/1/2019.  BUN 17, creatinine 0.8 on 11/5/2019.  BUN 20, creatinine 0.8 on 11/11/2019.     12.  Discharge to home today, 11/12/19. Discussed discharge plans. Discharge summary will be completed. Follow up with PCP, orthopedic surgeon and other appropriate physicians. Further therapies set up after discharge. Discharge instructions on discharge form.     13. Otherwise he tolerated therapies well and made good progress in his functional status. He had no further medical complications and was subsequently discharged to home. He will need close follow-up with his orthopedic surgeon, family physician and other physicians and should continue further therapies after discharge from [place] [facility].

## 2019-11-12 NOTE — PLAN OF CARE
Problem: Rehabilitation (IRF) Plan of Care  Goal: Plan of Care Review  Outcome: Progressing   Difficulty sleeping

## 2019-11-12 NOTE — PROGRESS NOTES
"Subjective    Patient seen and examined on rounds.  Chart reviewed.  Events overnight noted.  History reviewed briefly with patient.     CC:  Deficits in mobility, transfers, self-care status post bilateral total knee replacements     HPI:  Mr. Yoon is a 68-year-old right-handed white male with chronic conditions significant for degenerative arthritis, hypertension, prostate cancer status post prostatectomy, dyslipidemia who had progressively worsening bilateral knee pain which failed conservative management and subsequently the patient underwent elective bilateral total knee replacements on 10/29/19 at [facility] by Dr. Ted Zheng.  Postoperatively, he is allowed weightbearing as tolerated on both lower extremities.  He is on Aspirin for deep vein thrombus prophylaxis and also on pneumatic compression boots. He has put on Oxycodone for pain control.  Postoperative course was significant for anemia, but he did not need transfusion. On 10/30/19, hemoglobin was 11.2, WBC count 7.65, BUN 13, creatinine 0.9.  He has been needing assistance for mobility, transfers, self-care postoperatively. He is transferred to [facility] on 10/31/19 for further rehabilitation care.    SUBJ: Progressing in therapy.  Performance day today.  Feels comfortable with discharge plans to home for tomorrow.       ROS: Denies chest pain or shortness of breath. Other ROS negative. Past, family, social history is unchanged.    Physical Exam      Blood pressure (!) 141/64, pulse 87, temperature 36.8 °C (98.2 °F), temperature source Oral, resp. rate 18, height 1.702 m (5' 7\"), weight 102 kg (225 lb 1.4 oz), SpO2 98 %.  Body mass index is 35.25 kg/m².    General Appearance: Not in acute distress  Head/Ear/Nose/Throat: Normocephalic; Atraumatic.   Eye: EOMI; PERRL.   Neck: No JVD; No Bruits.   Respiratory: Decreased breath sounds at bases.   Cardiovascular: RRR; Normal S1, S2.   Gastrointestinal: Soft; NT; +BS.   Extremities: " Bilateral lower extremity edema noted.  Healing incisions noted on anterior aspect of both knees.  Incisions open to air.  No drainage today.  No erythema around incisions.  Musculoskeletal: Functional active range of motion in both upper extremities.  Some limitation of active range of motion in both hips and knees, limited by pain.  Right knee PROM 0 to 93 degrees.  Left knee PROM 4 to 90 degrees.  Neurological: AAO ×3. Speech is fluent. Cranial nerve examination does not reveal any gross facial asymmetry. Strength testing about 4+/5 strength in both upper extremities.  Bilateral hip flexion is less than 3/5.  Bilateral quadriceps are less than 3/5.  Bilateral ankle dorsi and plantar flexion are 4/5.  He is grossly able to localize touch and position sense in great toes.  Deep tendon reflexes are hypoactive and symmetric bilaterally.  Plantars are flexor.  Coordination is functional upper extremities.  Both knee jerks were not tested.  Neurologically unchanged  Behavior/Emotional: Appropriate; Cooperative.   Skin: No obvious rashes noted.  Bilateral knee incisions healing.  Incisions open to air.  No drainage today.      Current Facility-Administered Medications:   •  acetaminophen (TYLENOL) tablet 650 mg, 650 mg, oral, q4h PRN, Vernon Rob MD, 650 mg at 11/06/19 0751  •  acetaminophen (TYLENOL) tablet 650 mg, 650 mg, oral, With meals & nightly, Vernon Rob MD, 650 mg at 11/11/19 1221  •  aspirin enteric coated tablet 81 mg, 81 mg, oral, BID, Vernon Rob MD, 81 mg at 11/11/19 0824  •  atorvastatin (LIPITOR) tablet 10 mg, 10 mg, oral, Daily (6p), Vernon Rob MD, 10 mg at 11/11/19 1816  •  multivitamin tablet 1 tablet, 1 tablet, oral, Daily, Vernon Rob MD, 1 tablet at 11/11/19 0824  •  oxyCODONE (ROXICODONE) immediate release tablet 5-10 mg, 5-10 mg, oral, q4h PRN, Vernon Rob MD, 10 mg at 11/11/19 1438  •  valsartan (DIOVAN) tablet 80 mg, 80 mg, oral, Daily, Liane  Vernon MADRID MD, 80 mg at 11/11/19 0824       Labs / Radiology    Lab Results   Component Value Date    WBC 7.95 11/11/2019    HGB 11.4 (L) 11/11/2019    HCT 34.2 (L) 11/11/2019    MCV 96.1 11/11/2019     (H) 11/11/2019     Lab Results   Component Value Date    GLUCOSE 106 (H) 11/11/2019    CALCIUM 9.3 11/11/2019     11/11/2019    K 4.8 11/11/2019    CO2 29 11/11/2019     11/11/2019    BUN 20 11/11/2019    CREATININE 0.8 11/11/2019         Assessment and Plan    ASSESSMENT PLAN:  1. 68-year-old right-handed white male with chronic conditions significant for degenerative arthritis, hypertension, prostate cancer status post prostatectomy, dyslipidemia who had progressively worsening bilateral knee pain which failed conservative management and subsequently the patient underwent elective bilateral total knee replacements on 10/29/19 at [facility] by Dr. Ted Zheng.  Postoperatively, he is allowed weightbearing as tolerated on both lower extremities.  He is on Aspirin for deep vein thrombus prophylaxis and also on pneumatic compression boots. He has put on Oxycodone for pain control.  Postoperative course was significant for anemia, but he did not need transfusion. On 10/30/19, hemoglobin was 11.2, WBC count 7.65, BUN 13, creatinine 0.9.  He has been needing assistance for mobility, transfers, self-care postoperatively. He is transferred to [facility] on 10/31/19 for further rehabilitation care.     2. DVT prophylaxis - on Aspirin.  On SCDs.    Platelets 224 on 11/1/2019.  More ambulatory now.  Platelets 377 on 11/5/2019.  Platelets 534 on 11/11/2019.     3.  Orthopedics - status post bilateral total knee replacements.  Continue PT, OT.  Monitor healing of incisions.     4. GI - On Colace, Senokot.  On Dulcolax.  On Zofran ODT for nausea.  On when necessary MOM, Maalox.     5.  - voiding.  Denies dysuria.  Monitor post void residuals.     6.  Hypertension - on Diovan.     7. Pain  - on when necessary Oxycodone.  On Tylenol PRN.  Ice to knees when necessary.  Pain medications are helping.  He does not want increased pain medications at this time.Therapist indicated patient has been complaining of a lot of pain in his knees.  Discussed with patient and with his wife in the evening.  Again he does not want to go on OxyContin.  Will order Lidoderm patches.  Suggested using ice to the knees.  On scheduled Tylenol. He is on PRN oxycodone. He has soreness in his knees.  Tylenol and Lidoderm patches were added.  On oxycodone as needed.  He does not want to go on long-acting pain meds. Discussed with patient and with his wife with him.  Lidoderm patches did not help much so they will be discontinued.  On scheduled Tylenol also.  Ice to knees as needed.        8.  Hematology - monitor hemoglobin, platelets.  Anemia - on MVI.  Hemoglobin 10.7 on 11/1/2019.  Hemoglobin 10.6 on 11/5/19.  Hemoglobin 11.4 on 11/11/2019.     9.  Dyslipidemia - on Lipitor.     10. Rehabilitation medicine - Continue comprehensive rehabilitation care. Continue PT, OT.  We will follow falls precautions, cardiac precautions, monitor pulse oximetry in therapy.Therapist indicated patient has been complaining of a lot of pain in his knees.  Discussed with patient and with his wife in the evening.  Again he does not want to go on OxyContin.  Will order Lidoderm patches.  Suggested using ice to the knees.  On scheduled Tylenol. He is on PRN oxycodone. He has soreness in his knees.  Tylenol and Lidoderm patches were added.  On oxycodone as needed.  He does not want to go on long-acting pain meds.   Lidoderm patches did not help much so they will be discontinued.  On scheduled Tylenol also.  Ice to knees as needed.  Inspected incisions which are stable.  Performance day today.  Feels comfortable with discharge plans to home for tomorrow.    11. Reviewed labs today.  BUN 14, creatinine 1.0 on 11/1/2019.  BUN 17, creatinine 0.8 on  11/5/2019.  BUN 20, creatinine 0.8 on 11/11/2019.        Vernon Rob MD  11/11/2019

## 2019-11-13 ENCOUNTER — HOSPITAL ENCOUNTER (OUTPATIENT)
Dept: PHYSICAL THERAPY | Facility: REHABILITATION | Age: 69
Setting detail: THERAPIES SERIES
Discharge: HOME | End: 2019-11-13
Attending: ORTHOPAEDIC SURGERY
Payer: MEDICARE

## 2019-11-13 DIAGNOSIS — Z96.653 PRESENCE OF ARTIFICIAL KNEE JOINT, BILATERAL: Primary | ICD-10-CM

## 2019-11-13 PROCEDURE — 97162 PT EVAL MOD COMPLEX 30 MIN: CPT

## 2019-11-13 NOTE — OP PT TREATMENT LOG
ORTHO PT FLOWSHEET    Exercises  Current Session Time   MODALITIES- HEAT/ICE  TOTAL TIME FOR SESSION Not performed   Heat     Ice/Vasocompression     MODALITIES - E-STIM  TOTAL TIME FOR SESSION Not performed   E-stim/H-Wave     MODALITIES - US  TOTAL TIME FOR SESSION Not performed   US     Iontophoresis     MODALITIES - TRACTION  TOTAL TIME FOR SESSION Not performed   Mechanical Traction     THER ACT  TOTAL TIME FOR SESSION Not performed   Bed mobility     Transfer training     Body Mechanics/Work Training     THER EX  TOTAL TIME FOR SESSION Not performed   CARDIOVASCULAR      Nu Step     Stationary Bike     Elliptical     Treadmill     UBE     STRENGTHENING     Supine ther-ex  Heel slides with strap      Hip abduction     Quad sets with towel under ankle     HS stretch     LAQ     calf stretch   Seated ther-ex  Seated heel slides   Standing ther-ex     STRETCHING     LE stretching     Other stretching     REPEATED MOVEMENTS     NEURO RE-ED  TOTAL TIME FOR SESSION Not performed   COORDINATION     POSTURAL RE-ED        PRE-GAIT ACTIVITIES      BALANCE TRAINING      Sitting balance     Standing balance     GAIT TRAINING  TOTAL TIME FOR SESSION Not performed   Ambulation   With RW, with SPC as able   Dynamic gait      Stair negotiation  With cane and railing   Curb negotiation     Ramp negotiation     Outdoor ambulation     BWS/vector training     MANUAL  TOTAL TIME FOR SESSION Not performed   Stretching     Mobilization      Massage     Taping

## 2019-11-14 NOTE — PROGRESS NOTES
Referring Provider: By co-signing this Plan of Care (POC), you agree with the planned services and interventions recommended by the therapist.       NAME: __________________________________ DATE: ___________________        [facility] Fax: [phone]        PT EVALUATION FOR OUTPATIENT THERAPY    Patient: Chapincito Yoon    MRN: [id number]  : 1950 68 y.o.   Referring Physician: Ted Zheng MD  Date of Visit: 2019      Certification Dates:   19 through 20    Recommended Frequency & Duration:  3 times/week for up to 3 months     Diagnosis:   1. Presence of artificial knee joint, bilateral        Chief Complaints:   Chief Complaint   Patient presents with   • Difficulty Walking   • Dec ROM   • Pain     3/10 knees       Precautions: fall, cardiac(WBAT)    Past Medical History:   Past Medical History:   Diagnosis Date   • Essential (primary) hypertension    • Hyperlipidemia, unspecified    • Impaired fasting glucose    • Osteoarthritis     Knees, hands    • Prostate cancer (CMS/HCC)     Prostatectomy       Past Surgical History:   Past Surgical History:   Procedure Laterality Date   • COLONOSCOPY W/ POLYPECTOMY     • KNEE SURGERY Right     MCL Repair   • MOHS SURGERY      Right finger   • PROSTATECTOMY     • TOTAL KNEE ARTHROPLASTY Bilateral 10/29/2019         LEARNING ASSESSMENT    Assessment completed: Yes    Learner name:  Chapincito Yoon    Relationship: Patient    Learning Barriers:  Learning barriers: No Barriers    Preferred Language: English     Needed: No    Learning New Concepts: Listening, Reading, Demonstration and Pictures/Video      CO-LEARNER ASSESSMENT:    Completed: No            OBJECTIVE MEASUREMENTS/DATA:    Eval Assessment    Evaluation Assessment and Plan - 19 0750        Evaluation Assessment and Plan    Plan of Care reviewed and patient/family in agreement  Yes     System Pathology/Pathophysiology Noted  musculoskeletal     Functional  Limitations in Following Categories (PT Eval)  self-care;home management;community/leisure     Rehab Potential/Prognosis  good, to achieve stated therapy goals     Problem List  decreased flexibility;decreased ROM;decreased strength;pain     Clinical Assessment  Chapincito Yoon is a 68 year old male s/P bilat TKR done by DR Zheng on 10/29/19 presents with decreased AROM, strength and oedema, currently walking with a RW and has difficulty walking, on stairs and performing his ADLs and life realted activities due to decreased ROM and pain. He will benefit from PT to improve all of above, progress to a single point cane and maximize physical functioning..     Planned Services  CPT 17038 Gait training;CPT 48150 Neuromuscular Reeducation;CPT 36092 Manual therapy;CPT 64152 Therapeutic activities;CPT 65586 Therapeutic exercises;CPT 56463 Hot/Cold Packs therapy;CPT 09123 Electrical stimulation UNATTENDED         General Info   General Information - 11/13/19 1313        Session Details    Document Type  initial evaluation     Mode of Treatment  individual therapy;physical therapy     OP Specialty  Orthopedics        Time Calculation    Start Time  1259     Stop Time  1400     Time Calculation (min)  61 min        General Information    Patient Profile Reviewed?  yes     Referring Physician  Dr Ted Zheng     Pertinent History of Current Functional Problem  Mr Yoon is a 67 yo M with hypertension, dyslipidemia, prostate CA, osteoarthritis who was evaluated as an outpatient PT today , s/P  bilateral total knee replacement done by Dr Zheng on 10/29/19. He had a brief Inpatient rehab stay annd is now starting Op therapy. C/o include oedema R knee more than L, decreased AROM and difficulty walking and on steps.     Existing Precautions/Restrictions  fall;cardiac    WBAT        Pain and Vitals   Pain/Vitals - 11/13/19 1303        Pain Assessment    Currently in pain  Yes     Preferred Pain Scale  number (Numeric Rating Pain  Scale)     Pain: Body location  Knee     Pain Rating (0-10): Pre Activity  3     Pain Rating (0-10): Activity  5     Pain Rating (0-10): Post Activity  3        Pre Activity Vital Signs    Pulse  99     BP  135/68     BP Location  Right upper arm     BP Method  Automatic     Patient Position  Sitting        Pain Intervention    Intervention   exercises, cold packs at home     Post Intervention Comments  pain tolerable         Falls Assessment    Falls - 11/14/19 0738        Initial Falls Assessment    One or more falls in the last year  No         Living Environment    Living Environment - 11/13/19 1340        Living Environment    Lives With  spouse     Living Arrangements  house     Living Environment Comment  FF of steps inside with partly bilat and partly unilat steps. 3STE house with bilat rails         PLOF:   Prior Level of Function - 11/14/19 0737        OTHER    Previous level of function  Active walking, golfing , independent walking, driving         Type and Frequency:   PT - 11/13/19 1314        Physical Therapy    Physical Therapy  Ortho        PT Plan    Frequency of treatment  3 times/week     PT Duration  3 months     PT Cert From  11/13/19     PT Cert To  02/11/20     Date PT POC was sent to provider  11/13/19     Signed PT Plan of Care received?   Yes         ROM   Range of Motion - 11/14/19 0800        RIGHT: Lower Extremity PROM Assessment    Knee Flexion Deficit  95     Knee Extension Deficit  -2        LEFT: Lower Extremity PROM Assessment    Knee Flexion Deficit  88     Knee Extension Deficit  -3        RIGHT: Lower Extremity AROM Assessment    Knee Flexion Deficit  88     Knee Extension Deficit  -3        LEFT: Lower Extremity AROM Assessment    Knee Flexion Deficit  86     Knee Extension Deficit  -4         MMT   Manual Muscle Tests - 11/13//19 0738        RIGHT: Lower Extremity Manual Muscle Test Assessment    Right LE MMT comments:  gross LE strength is 4- to 4/5 in hip and knee 3+/5         LEFT: Lower Extremity Manual Muscle Test Assessment    Left LE MMT comments:  gross knee strength 3+/5 and hip is 4- to 4/5         Transfers   Transfers - 11/13/19 0744        Bed Mobility    [place], Roll Left  independent     [place], Roll Right  independent     [place], Supine to Sit  independent     [place], Sit to Supine  independent        Stand to Sit Transfer    [place], Stand to Sit Transfer  modified independence     Assistive Device  walker, front-wheeled         Gait and Mobility   Gait and Mobility - 11/14/19 0744        Gait Training    [place], Gait  modified independence     Variable surfaces  Flat surface     Assistive Device  walker, front-wheeled     Deviations/Abnormal Patterns (Gait)  willian decreased;gait speed decreased;stride length decreased         test 11/13/19       WOMAC  40/96 using RW       LEFS 39/80 graded using RW                                   Goals        Patient Stated    • <enter goal here> (pt-stated)      Return to walking and golf again         Other    • PT Knee Goals      Short term goals {[facility] PT    Short Term Goals Time Frame Result Comment/Progress   Pt will increase bilat LE  MMT into flexion, abduction, ER and IR >/= ½ grade 4 weeks     Pt will increase bilat knee AROM >/= 10 degrees into flexion, extension to improve functional mobility 4 weeks     Increase LEFS >/= 9 points to increase function   4 weeks     Increase WOMAC >/= 20 points to increase function  4 weeks     Increase TUG </= 12.5 sec to decrease fall risk 4 weeks     Pt will increase gait speed (SSV) by 0.2 meters/seconds to improve function 4 weeks     Pt will be independent with HEP to increase carryover at home 4 weeks     Pt will be able to walk 200 feet  With SPC and CS and no loss of balance 4 weeks             • PT Knee Goals      Long term goals    Long Term Goals Time Frame Result Comment/Progress   Pt will increase bialt LE strength to 4+/5 or more 12 weeks  All LTG are for 8-12 weeks    Pt will increase bilat  knee ROM >/= 0-120 degrees into flexion, extension to improve functional mobility      Increase LEFS >/= 20 points from eval to increase function        Increase WOMAC >/= 20 points to increase function      Increase TUG </= 10 with SPC sec to decrease fall risk      Pt will increase gait speed (SSV) >/= 0.2 meters/seconds to improve function, with SPC      Decrease pain at worst </= 3//10       Pt will be able to ascend/descent 12 steps with reciprocal pattern with 1 UE       Pt will be able to walk community distances with SPC , without gait deviation                          TREATMENT PLAN:      ORTHO PT FLOWSHEET    Exercises  Current Session Time   MODALITIES- HEAT/ICE  TOTAL TIME FOR SESSION Not performed   Heat     Ice/Vasocompression     MODALITIES - E-STIM  TOTAL TIME FOR SESSION Not performed   E-stim/H-Wave     MODALITIES - US  TOTAL TIME FOR SESSION Not performed   US     Iontophoresis     MODALITIES - TRACTION  TOTAL TIME FOR SESSION Not performed   Mechanical Traction     THER ACT  TOTAL TIME FOR SESSION Not performed   Bed mobility     Transfer training     Body Mechanics/Work Training     THER EX  TOTAL TIME FOR SESSION Not performed   CARDIOVASCULAR      Nu Step     Stationary Bike     Elliptical     Treadmill     UBE     STRENGTHENING     Supine ther-ex  Heel slides with strap      Hip abduction     Quad sets with towel under ankle     HS stretch     LAQ     calf stretch   Seated ther-ex  Seated heel slides   Standing ther-ex     STRETCHING     LE stretching     Other stretching     REPEATED MOVEMENTS     NEURO RE-ED  TOTAL TIME FOR SESSION Not performed   COORDINATION     POSTURAL RE-ED        PRE-GAIT ACTIVITIES      BALANCE TRAINING      Sitting balance     Standing balance     GAIT TRAINING  TOTAL TIME FOR SESSION Not performed   Ambulation   With RW, with SPC as able   Dynamic gait      Stair negotiation  With cane and railing   Curb  negotiation     Ramp negotiation     Outdoor ambulation     BWS/vector training     MANUAL  TOTAL TIME FOR SESSION Not performed   Stretching     Mobilization      Massage     Taping              ASSESSMENT:    This 68 y.o. year old male presents to PT with above stated diagnosis. Physical Therapy evaluation reveals decreased flexibility, decreased ROM, decreased strength, pain resulting in self-care, home management, community/leisure limitations. Chapincito Yoon will benefit from skilled PT services to address limitation, work towards rehab and patient goals and maximize PLOF of chosen ADLs.     Planned Services: The patient's treatment will include CPT 46001 Gait training, CPT 20864 Neuromuscular Reeducation, CPT 01860 Manual therapy, CPT 43726 Therapeutic activities, CPT 90357 Therapeutic exercises, CPT 24336 Hot/Cold Packs therapy, CPT 88837 Electrical stimulation UNATTENDED, .

## 2019-11-15 ENCOUNTER — HOSPITAL ENCOUNTER (OUTPATIENT)
Dept: PHYSICAL THERAPY | Facility: REHABILITATION | Age: 69
Setting detail: THERAPIES SERIES
Discharge: HOME | End: 2019-11-15
Attending: ORTHOPAEDIC SURGERY
Payer: MEDICARE

## 2019-11-15 DIAGNOSIS — Z96.653 PRESENCE OF ARTIFICIAL KNEE JOINT, BILATERAL: Primary | ICD-10-CM

## 2019-11-15 PROCEDURE — 97116 GAIT TRAINING THERAPY: CPT | Mod: GP

## 2019-11-15 PROCEDURE — 97110 THERAPEUTIC EXERCISES: CPT | Mod: GP

## 2019-11-15 NOTE — PROGRESS NOTES
PT DAILY NOTE FOR OUTPATIENT THERAPY    Patient: Chapincito Yoon   MRN: [id number]  : 1950 68 y.o.  Referring Physician: Ted Zheng MD  Date of Visit: 11/15/2019      Certification Dates: 19 through 20    Diagnosis:   1. Presence of artificial knee joint, bilateral        Chief Complaints:   Chief Complaint   Patient presents with   • Dec ROM   • Difficulty Walking   • Balance Deficits       Precautions: fall, cardiac      TODAY'S VISIT    General Information - 11/15/19 1122        Session Details    Document Type  daily treatment     Mode of Treatment  individual therapy;physical therapy     OP Specialty  Orthopedics        Time Calculation    Start Time  1100     Stop Time  1200     Time Calculation (min)  60 min        General Information    Referring Physician  Dr Ted Zheng     Pertinent History of Current Functional Problem  Mr Yoon is a 67 yo M with hypertension, dyslipidemia, prostate CA, osteoarthritis who was evaluated as an outpatient PT today , s/P  bilateral total knee replacement done by Dr Zheng on 10/29/19. He had a brief Inpatient rehab stay annd is now starting Op therapy. C/o include oedema R knee more than L, decreased AROM and difficulty walking and on steps.     Existing Precautions/Restrictions  fall;cardiac         Pain/Vitals - 11/15/19 1116        Pain Assessment    Currently in pain  Yes     Preferred Pain Scale  number (Numeric Rating Pain Scale)     Pain: Body location  Knee     Pain Rating (0-10): Pre Activity  3     Pain Rating (0-10): Activity  4     Pain Rating (0-10): Post Activity  3        Pain Intervention    Intervention   exercises , cold packs     Post Intervention Comments  pain tolerable limits         Daily Falls Screen - 11/15/19 1127        Daily Falls Assessment    Patient reported fall since last visit  No         Daily Treatment Assessment and Plan - 11/15/19 1243        Daily Treatment Assessment and Plan    Progress toward goals   Progressing     Daily Outcome Summary  Added standing heel arises and marching. Continued with mat exs per sheet. Worked on gait training with RW, 200 feet to get bilat knee flex on swing, increase step length and avoid stiff knee gait.     Plan and Recommendations  continue per POC           OBJECTIVE DATA TAKEN TODAY:    None taken    Today's Treatment:      ORTHO PT FLOWSHEET    Exercises  Current Session Time   MODALITIES- HEAT/ICE  TOTAL TIME FOR SESSION Not performed   Heat     Ice/Vasocompression     MODALITIES - E-STIM  TOTAL TIME FOR SESSION Not performed   E-stim/H-Wave     MODALITIES - US  TOTAL TIME FOR SESSION Not performed   US     Iontophoresis     MODALITIES - TRACTION  TOTAL TIME FOR SESSION Not performed   Mechanical Traction     THER ACT  TOTAL TIME FOR SESSION Not performed   Bed mobility     Transfer training     Body Mechanics/Work Training     THER EX  TOTAL TIME FOR SESSION 38-52 Minutes   CARDIOVASCULAR      Nu Step     Stationary Bike     Elliptical     Treadmill     UBE     STRENGTHENING     Supine ther-ex y Heel slides with strap  10x2    N Hip abduction    y Quad sets with towel under ankle    Y HS stretch    Y LAQ    N calf stretch   Seated ther-ex N Seated heel slides   Standing ther-ex Y Heel raises 10x2, hip march 10x2   STRETCHING     LE stretching     Other stretching     REPEATED MOVEMENTS     NEURO RE-ED  TOTAL TIME FOR SESSION Not performed   COORDINATION     POSTURAL RE-ED        PRE-GAIT ACTIVITIES      BALANCE TRAINING      Sitting balance     Standing balance     GAIT TRAINING  TOTAL TIME FOR SESSION 8-22 Minutes   Ambulation  Y With RW, with SPC as able   Dynamic gait      Stair negotiation Y Step up on 4 inch box 10x, 2 HR   Curb negotiation     Ramp negotiation     Outdoor ambulation     BWS/vector training     MANUAL  TOTAL TIME FOR SESSION Not performed   Stretching     Mobilization      Massage     Taping

## 2019-11-15 NOTE — OP PT TREATMENT LOG
ORTHO PT FLOWSHEET    Exercises  Current Session Time   MODALITIES- HEAT/ICE  TOTAL TIME FOR SESSION Not performed   Heat     Ice/Vasocompression     MODALITIES - E-STIM  TOTAL TIME FOR SESSION Not performed   E-stim/H-Wave     MODALITIES - US  TOTAL TIME FOR SESSION Not performed   US     Iontophoresis     MODALITIES - TRACTION  TOTAL TIME FOR SESSION Not performed   Mechanical Traction     THER ACT  TOTAL TIME FOR SESSION Not performed   Bed mobility     Transfer training     Body Mechanics/Work Training     THER EX  TOTAL TIME FOR SESSION 38-52 Minutes   CARDIOVASCULAR      Nu Step     Stationary Bike     Elliptical     Treadmill     UBE     STRENGTHENING     Supine ther-ex y Heel slides with strap  10x2    N Hip abduction    y Quad sets with towel under ankle    Y HS stretch    Y LAQ    N calf stretch   Seated ther-ex N Seated heel slides   Standing ther-ex Y Heel raises 10x2, hip march 10x2   STRETCHING     LE stretching     Other stretching     REPEATED MOVEMENTS     NEURO RE-ED  TOTAL TIME FOR SESSION Not performed   COORDINATION     POSTURAL RE-ED        PRE-GAIT ACTIVITIES      BALANCE TRAINING      Sitting balance     Standing balance     GAIT TRAINING  TOTAL TIME FOR SESSION 8-22 Minutes   Ambulation  Y With RW, with SPC as able   Dynamic gait      Stair negotiation Y Step up on 4 inch box 10x, 2 HR   Curb negotiation     Ramp negotiation     Outdoor ambulation     BWS/vector training     MANUAL  TOTAL TIME FOR SESSION Not performed   Stretching     Mobilization      Massage     Taping

## 2019-11-18 ENCOUNTER — HOSPITAL ENCOUNTER (OUTPATIENT)
Dept: PHYSICAL THERAPY | Facility: REHABILITATION | Age: 69
Setting detail: THERAPIES SERIES
Discharge: HOME | End: 2019-11-18
Attending: ORTHOPAEDIC SURGERY
Payer: MEDICARE

## 2019-11-18 DIAGNOSIS — Z96.653 PRESENCE OF ARTIFICIAL KNEE JOINT, BILATERAL: Primary | ICD-10-CM

## 2019-11-18 PROCEDURE — 97016 VASOPNEUMATIC DEVICE THERAPY: CPT

## 2019-11-18 PROCEDURE — 97110 THERAPEUTIC EXERCISES: CPT | Mod: GP

## 2019-11-18 NOTE — OP PT TREATMENT LOG
ORTHO PT FLOWSHEET    Exercises  Current Session Time   MODALITIES- HEAT/ICE  TOTAL TIME FOR SESSION 8-22 Minutes   Heat     Ice/Vasocompression  Game ready 10 min, R knee   MODALITIES - E-STIM  TOTAL TIME FOR SESSION Not performed   E-stim/H-Wave     MODALITIES - US  TOTAL TIME FOR SESSION Not performed   US     Iontophoresis     MODALITIES - TRACTION  TOTAL TIME FOR SESSION Not performed   Mechanical Traction     THER ACT  TOTAL TIME FOR SESSION Not performed   Bed mobility     Transfer training     Body Mechanics/Work Training     THER EX  TOTAL TIME FOR SESSION 38-52 Minutes 40   CARDIOVASCULAR      Nu Step     Stationary Bike     Elliptical     Treadmill     UBE     STRENGTHENING     Supine ther-ex y Heel slides with strap  10x1    Y SLR 10x1    y Quad sets with towel under ankle    Y HS stretch seated 20 sec, 2x    Y LAQ 10x2    Y calf stretch 20 sec, 2x   Seated ther-ex N Seated heel slides    Y Sit to stand high mat 7x   Standing ther-ex Y Heel raises 10x2, hip march 10x2   STRETCHING     LE stretching     Other stretching     REPEATED MOVEMENTS     NEURO RE-ED  TOTAL TIME FOR SESSION Not performed   COORDINATION     POSTURAL RE-ED        PRE-GAIT ACTIVITIES      BALANCE TRAINING      Sitting balance     Standing balance     GAIT TRAINING  TOTAL TIME FOR SESSION 8-22 Minutes   Ambulation  Y  with  feet, CS to mini asssit   Dynamic gait      Stair negotiation N Step up on 4 inch box 10x, 2 HR   Curb negotiation     Ramp negotiation     Outdoor ambulation     BWS/vector training     MANUAL  TOTAL TIME FOR SESSION Not performed   Stretching     Mobilization      Massage     Taping

## 2019-11-18 NOTE — PROGRESS NOTES
PT DAILY NOTE FOR OUTPATIENT THERAPY    Patient: Chapincito Yoon   MRN: [id number]  : 1950 68 y.o.  Referring Physician: Ted Zheng MD  Date of Visit: 2019      Certification Dates: 19 through 20    Diagnosis:   1. Presence of artificial knee joint, bilateral        Chief Complaints:   Chief Complaint   Patient presents with   • Pain   • Dec ROM   • Dec Strength   • Difficulty Walking       Precautions: fall, cardiac      TODAY'S VISIT    General Information - 19 1012        Session Details    Document Type  daily treatment     Mode of Treatment  individual therapy;physical therapy     OP Specialty  Orthopedics        Time Calculation    Start Time  1001     Stop Time  1100     Time Calculation (min)  59 min        General Information    Referring Physician  Dr Ted Zheng     Pertinent History of Current Functional Problem  Mr Yoon is a 67 yo M with hypertension, dyslipidemia, prostate CA, osteoarthritis who was evaluated as an outpatient PT today , s/P  bilateral total knee replacement done by Dr Zheng on 10/29/19. He had a brief Inpatient rehab stay annd is now starting Op therapy. C/o include oedema R knee more than L, decreased AROM and difficulty walking and on steps.     Existing Precautions/Restrictions  fall;cardiac         Pain/Vitals - 19 1009        Pain Assessment    Currently in pain  Yes     Preferred Pain Scale  number (Numeric Rating Pain Scale)     Pain: Body location  Knee     Pain Rating (0-10): Pre Activity  3     Pain Rating (0-10): Activity  5     Pain Rating (0-10): Post Activity  3        Pain Intervention    Intervention   exs, cold packs     Post Intervention Comments  pain tolerable.         Daily Falls Screen - 19 1014        Daily Falls Assessment    Patient reported fall since last visit  No         Daily Treatment Assessment and Plan - 19 1240        Daily Treatment Assessment and Plan    Progress toward goals  Progressing      Daily Outcome Summary  Doing well, reports compliance with HEP. TE per sheet. Started gait training with SPC, tendency to walk with stiff knee gait but corrects with cues. Progressing as expected. R knee oedema more than L.     Plan and Recommendations  continue per POC           OBJECTIVE DATA TAKEN TODAY:    None taken    Today's Treatment:      ORTHO PT FLOWSHEET    Exercises  Current Session Time   MODALITIES- HEAT/ICE  TOTAL TIME FOR SESSION 8-22 Minutes   Heat     Ice/Vasocompression  Game ready 10 min, R knee   MODALITIES - E-STIM  TOTAL TIME FOR SESSION Not performed   E-stim/H-Wave     MODALITIES - US  TOTAL TIME FOR SESSION Not performed   US     Iontophoresis     MODALITIES - TRACTION  TOTAL TIME FOR SESSION Not performed   Mechanical Traction     THER ACT  TOTAL TIME FOR SESSION Not performed   Bed mobility     Transfer training     Body Mechanics/Work Training     THER EX  TOTAL TIME FOR SESSION 38-52 Minutes 40   CARDIOVASCULAR      Nu Step     Stationary Bike     Elliptical     Treadmill     UBE     STRENGTHENING     Supine ther-ex y Heel slides with strap  10x1    Y SLR 10x1    y Quad sets with towel under ankle    Y HS stretch seated 20 sec, 2x    Y LAQ 10x2    Y calf stretch 20 sec, 2x   Seated ther-ex N Seated heel slides    Y Sit to stand high mat 7x   Standing ther-ex Y Heel raises 10x2, hip march 10x2   STRETCHING     LE stretching     Other stretching     REPEATED MOVEMENTS     NEURO RE-ED  TOTAL TIME FOR SESSION Not performed   COORDINATION     POSTURAL RE-ED        PRE-GAIT ACTIVITIES      BALANCE TRAINING      Sitting balance     Standing balance     GAIT TRAINING  TOTAL TIME FOR SESSION 8-22 Minutes   Ambulation  Y  with  feet, CS to mini asssit   Dynamic gait      Stair negotiation N Step up on 4 inch box 10x, 2 HR   Curb negotiation     Ramp negotiation     Outdoor ambulation     BWS/vector training     MANUAL  TOTAL TIME FOR SESSION Not performed   Stretching     Mobilization       Massage     Taping

## 2019-11-20 ENCOUNTER — HOSPITAL ENCOUNTER (OUTPATIENT)
Dept: PHYSICAL THERAPY | Facility: REHABILITATION | Age: 69
Setting detail: THERAPIES SERIES
Discharge: HOME | End: 2019-11-20
Attending: ORTHOPAEDIC SURGERY
Payer: MEDICARE

## 2019-11-20 DIAGNOSIS — Z96.653 PRESENCE OF ARTIFICIAL KNEE JOINT, BILATERAL: Primary | ICD-10-CM

## 2019-11-20 PROCEDURE — 97110 THERAPEUTIC EXERCISES: CPT | Mod: GP

## 2019-11-20 PROCEDURE — 97016 VASOPNEUMATIC DEVICE THERAPY: CPT

## 2019-11-20 NOTE — OP PT TREATMENT LOG
ORTHO PT FLOWSHEET    Exercises  Current Session Time   MODALITIES- HEAT/ICE  TOTAL TIME FOR SESSION 8-22 Minutes   Heat     Ice/Vasocompression  Game ready 10 min, R knee   MODALITIES - E-STIM  TOTAL TIME FOR SESSION Not performed   E-stim/H-Wave     MODALITIES - US  TOTAL TIME FOR SESSION Not performed   US     Iontophoresis     MODALITIES - TRACTION  TOTAL TIME FOR SESSION Not performed   Mechanical Traction     THER ACT  TOTAL TIME FOR SESSION Not performed   Bed mobility     Transfer training     Body Mechanics/Work Training     THER EX  TOTAL TIME FOR SESSION 38-52 Minutes 40   CARDIOVASCULAR      Nu Step     Stationary Bike     Elliptical     Treadmill     UBE     STRENGTHENING     Supine ther-ex y Heel slides with strap  10x1    Y SLR 10x1    y Quad sets with towel under ankle    Y HS stretch with strap 20 sec, 2x    Y LAQ 10x2    Y calf stretch 20 sec, 2x   Seated ther-ex y Seated heel slides 10x1    Y Sit to stand high mat 5x   Standing ther-ex Y Heel raises 10x2, hip march 10x2   STRETCHING     LE stretching Y gentle PROM by therapist to get knee flex   Other stretching     REPEATED MOVEMENTS     NEURO RE-ED  TOTAL TIME FOR SESSION Not performed   COORDINATION     POSTURAL RE-ED        PRE-GAIT ACTIVITIES      BALANCE TRAINING      Sitting balance     Standing balance     GAIT TRAINING  TOTAL TIME FOR SESSION 8-22 Minutes   Ambulation  Y  with  feet, CS to mini asssit   Dynamic gait      Stair negotiation Y FF of stairs with alternate legs as able , 2 HR CS   Curb negotiation     Ramp negotiation     Outdoor ambulation     BWS/vector training     MANUAL  TOTAL TIME FOR SESSION Not performed   Stretching     Mobilization      Massage     Taping

## 2019-11-20 NOTE — PROGRESS NOTES
PT DAILY NOTE FOR OUTPATIENT THERAPY    Patient: Chapincito Yoon   MRN: [id number]  : 1950 68 y.o.  Referring Physician: Ted Zheng MD  Date of Visit: 2019      Certification Dates: 19 through 20    Diagnosis:   1. Presence of artificial knee joint, bilateral        Chief Complaints:   Chief Complaint   Patient presents with   • Pain   • Dec Strength   • Dec ROM       Precautions: fall, cardiac      TODAY'S VISIT    General Information - 19 1310        Session Details    Document Type  daily treatment     Mode of Treatment  physical therapy;individual therapy     OP Specialty  Orthopedics        Time Calculation    Start Time  1300     Stop Time  1400     Time Calculation (min)  60 min        General Information    Referring Physician  Dr Ted Zheng     Existing Precautions/Restrictions  fall;cardiac         Pain/Vitals - 19 1308        Pain Assessment    Currently in pain  Yes     Preferred Pain Scale  number (Numeric Rating Pain Scale)     Pain: Body location  Knee     Pain Rating (0-10): Pre Activity  4        Pre Activity Vital Signs    Pulse  87     BP  128/68     BP Location  Right upper arm     Patient Position  Sitting        Pain Intervention    Intervention   exs     Post Intervention Comments  pain low         Daily Falls Screen - 19 1317        Daily Falls Assessment    Patient reported fall since last visit  No         Daily Treatment Assessment and Plan - 19 1400        Daily Treatment Assessment and Plan    Progress toward goals  Progressing     Daily Outcome Summary  Continued with mat exs to get gradual increase in RPM. AROM L knee 100 degress, R side 107 degrees. [place] of staeps with alternate legs, 6 inch steps.     Plan and Recommendations  continue per POC           OBJECTIVE DATA TAKEN TODAY:    None taken    Today's Treatment:      ORTHO PT FLOWSHEET    Exercises  Current Session Time   MODALITIES- HEAT/ICE  TOTAL TIME FOR SESSION 8-  Minutes   Heat     Ice/Vasocompression  Game ready 10 min, R knee   MODALITIES - E-STIM  TOTAL TIME FOR SESSION Not performed   E-stim/H-Wave     MODALITIES - US  TOTAL TIME FOR SESSION Not performed   US     Iontophoresis     MODALITIES - TRACTION  TOTAL TIME FOR SESSION Not performed   Mechanical Traction     THER ACT  TOTAL TIME FOR SESSION Not performed   Bed mobility     Transfer training     Body Mechanics/Work Training     THER EX  TOTAL TIME FOR SESSION 38-52 Minutes 40   CARDIOVASCULAR      Nu Step     Stationary Bike     Elliptical     Treadmill     UBE     STRENGTHENING     Supine ther-ex y Heel slides with strap  10x1    Y SLR 10x1    y Quad sets with towel under ankle    Y HS stretch with strap 20 sec, 2x    Y LAQ 10x2    Y calf stretch 20 sec, 2x   Seated ther-ex y Seated heel slides 10x1    Y Sit to stand high mat 5x   Standing ther-ex Y Heel raises 10x2, hip march 10x2   STRETCHING     LE stretching Y gentle PROM by therapist to get knee flex   Other stretching     REPEATED MOVEMENTS     NEURO RE-ED  TOTAL TIME FOR SESSION Not performed   COORDINATION     POSTURAL RE-ED        PRE-GAIT ACTIVITIES      BALANCE TRAINING      Sitting balance     Standing balance     GAIT TRAINING  TOTAL TIME FOR SESSION 8-22 Minutes   Ambulation  Y  with  feet, CS to mini asssit   Dynamic gait      Stair negotiation Y FF of stairs with alternate legs as able , 2 HR CS   Curb negotiation     Ramp negotiation     Outdoor ambulation     BWS/vector training     MANUAL  TOTAL TIME FOR SESSION Not performed   Stretching     Mobilization      Massage     Taping

## 2019-11-22 ENCOUNTER — HOSPITAL ENCOUNTER (OUTPATIENT)
Dept: PHYSICAL THERAPY | Facility: REHABILITATION | Age: 69
Setting detail: THERAPIES SERIES
Discharge: HOME | End: 2019-11-22
Attending: ORTHOPAEDIC SURGERY
Payer: MEDICARE

## 2019-11-22 DIAGNOSIS — Z96.653 PRESENCE OF ARTIFICIAL KNEE JOINT, BILATERAL: Primary | ICD-10-CM

## 2019-11-22 PROCEDURE — 97110 THERAPEUTIC EXERCISES: CPT | Mod: GP

## 2019-11-22 PROCEDURE — 97016 VASOPNEUMATIC DEVICE THERAPY: CPT

## 2019-11-22 NOTE — OP PT TREATMENT LOG
"ORTHO PT FLOWSHEET    Exercises  Current Session Time   MODALITIES- HEAT/ICE  TOTAL TIME FOR SESSION 8-22 Minutes   Heat     Ice/Vasocompression  Game ready 10 min, R knee   MODALITIES - E-STIM  TOTAL TIME FOR SESSION Not performed   E-stim/H-Wave     MODALITIES - US  TOTAL TIME FOR SESSION Not performed   US     Iontophoresis     MODALITIES - TRACTION  TOTAL TIME FOR SESSION Not performed   Mechanical Traction     THER ACT  TOTAL TIME FOR SESSION Not performed   Bed mobility     Transfer training     Body Mechanics/Work Training     THER EX  TOTAL TIME FOR SESSION 38-52 Minutes    CARDIOVASCULAR      Nu Step     Stationary Bike     Elliptical     Treadmill     UBE     STRENGTHENING     Supine ther-ex y Heel slides with strap  10x1    Y SLR 10x1    y Quad sets with towel under ankle 10 x10\"    Y HS stretch with strap 20 sec, 2x    Y LAQ 10x2    Y calf stretch 20 sec, 2x   Seated ther-ex y Seated heel slides 10x1    Y Sit to stand high mat 5x   Standing ther-ex Y Heel raises 10x2, hip march 10x2   STRETCHING     LE stretching Y gentle PROM by therapist to get knee flex   Other stretching     REPEATED MOVEMENTS     NEURO RE-ED  TOTAL TIME FOR SESSION Not performed   COORDINATION     POSTURAL RE-ED        PRE-GAIT ACTIVITIES      BALANCE TRAINING      Sitting balance     Standing balance     GAIT TRAINING  TOTAL TIME FOR SESSION 8-22 Minutes   Ambulation  Y  with  feet, CS to mini asssit   Dynamic gait      Stair negotiation Y FF of stairs with alternate legs as able , 2 HR CS   Curb negotiation     Ramp negotiation     Outdoor ambulation     BWS/vector training     MANUAL  TOTAL TIME FOR SESSION Not performed   Stretching     Mobilization      Massage     Taping        "

## 2019-11-22 NOTE — PROGRESS NOTES
PT DAILY NOTE FOR OUTPATIENT THERAPY    Patient: Chapincito Yoon   MRN: [id number]  : 1950 68 y.o.  Referring Physician: Ted Zheng MD  Date of Visit: 2019      Certification Dates: 19 through 20    Diagnosis:   1. Presence of artificial knee joint, bilateral        Chief Complaints:   Chief Complaint   Patient presents with   • Difficulty Walking   • Pain   • Dec ROM   • Dec Strength       Precautions: fall, cardiac      TODAY'S VISIT    General Information - 19 1407        Session Details    Document Type  daily treatment     Mode of Treatment  individual therapy;physical therapy     Patient/Family/Caregiver Comments/Observations  things are progressing week over week        Time Calculation    Start Time  1400     Stop Time  1500     Time Calculation (min)  60 min        General Information    Pertinent History of Current Functional Problem  Mr Yoon is a 69 yo M with hypertension, dyslipidemia, prostate CA, osteoarthritis who was evaluated as an outpatient PT today , s/P  bilateral total knee replacement done by Dr Zheng on 10/29/19. He had a brief Inpatient rehab stay annd is now starting Op therapy. C/o include oedema R knee more than L, decreased AROM and difficulty walking and on steps.     Existing Precautions/Restrictions  fall;cardiac         Pain/Vitals - 19 1404        Pain Assessment    Currently in pain  Yes     Pain: Body location  Knee    R> L    Pain Rating (0-10): Pre Activity  3        Pre Activity Vital Signs    Pulse  94     BP  120/72     BP Location  Right upper arm     BP Method  Automatic     Patient Position  Sitting        Pain Intervention    Intervention   thermal modalities     Post Intervention Comments  decrease pain         Daily Falls Screen - 19 1404        Daily Falls Assessment    Patient reported fall since last visit  No         Daily Treatment Assessment and Plan - 19 1456        Daily Treatment Assessment and Plan     "Progress toward goals  Progressing     Daily Outcome Summary  continued mat exercises, pt completed treatment without adverse reaction     Plan and Recommendations  progress TKE continue per POC           OBJECTIVE DATA TAKEN TODAY:        Today's Treatment:      ORTHO PT FLOWSHEET    Exercises  Current Session Time   MODALITIES- HEAT/ICE  TOTAL TIME FOR SESSION 8-22 Minutes   Heat     Ice/Vasocompression  Game ready 10 min, R knee   MODALITIES - E-STIM  TOTAL TIME FOR SESSION Not performed   E-stim/H-Wave     MODALITIES - US  TOTAL TIME FOR SESSION Not performed   US     Iontophoresis     MODALITIES - TRACTION  TOTAL TIME FOR SESSION Not performed   Mechanical Traction     THER ACT  TOTAL TIME FOR SESSION Not performed   Bed mobility     Transfer training     Body Mechanics/Work Training     THER EX  TOTAL TIME FOR SESSION 38-52 Minutes    CARDIOVASCULAR      Nu Step     Stationary Bike     Elliptical     Treadmill     UBE     STRENGTHENING     Supine ther-ex y Heel slides with strap  10x1    Y SLR 10x1    y Quad sets with towel under ankle 10 x10\"    Y HS stretch with strap 20 sec, 2x    Y LAQ 10x2    Y calf stretch 20 sec, 2x   Seated ther-ex y Seated heel slides 10x1    Y Sit to stand high mat 5x   Standing ther-ex Y Heel raises 10x2, hip march 10x2   STRETCHING     LE stretching Y gentle PROM by therapist to get knee flex   Other stretching     REPEATED MOVEMENTS     NEURO RE-ED  TOTAL TIME FOR SESSION Not performed   COORDINATION     POSTURAL RE-ED        PRE-GAIT ACTIVITIES      BALANCE TRAINING      Sitting balance     Standing balance     GAIT TRAINING  TOTAL TIME FOR SESSION 8-22 Minutes   Ambulation  Y  with  feet, CS to mini asssit   Dynamic gait      Stair negotiation Y FF of stairs with alternate legs as able , 2 HR CS   Curb negotiation     Ramp negotiation     Outdoor ambulation     BWS/vector training     MANUAL  TOTAL TIME FOR SESSION Not performed   Stretching     Mobilization      Massage  "    Taping

## 2019-11-25 ENCOUNTER — HOSPITAL ENCOUNTER (OUTPATIENT)
Dept: PHYSICAL THERAPY | Facility: REHABILITATION | Age: 69
Setting detail: THERAPIES SERIES
Discharge: HOME | End: 2019-11-25
Attending: ORTHOPAEDIC SURGERY
Payer: MEDICARE

## 2019-11-25 DIAGNOSIS — Z96.653 PRESENCE OF ARTIFICIAL KNEE JOINT, BILATERAL: Primary | ICD-10-CM

## 2019-11-25 PROCEDURE — 97140 MANUAL THERAPY 1/> REGIONS: CPT | Mod: GP

## 2019-11-25 PROCEDURE — 97110 THERAPEUTIC EXERCISES: CPT | Mod: GP

## 2019-11-25 NOTE — OP PT TREATMENT LOG
"ORTHO PT FLOWSHEET    Exercises Done yes/ no Current Session Time   MODALITIES- HEAT/ICE  TOTAL TIME FOR SESSION 0-7 Minutes   Heat     Ice/Vasocompression  CP5   Minutes, will ice at home as wife was waiting   MODALITIES - E-STIM  TOTAL TIME FOR SESSION Not performed   E-stim/H-Wave     MODALITIES - US  TOTAL TIME FOR SESSION Not performed   US     Iontophoresis     MODALITIES - TRACTION  TOTAL TIME FOR SESSION Not performed   Mechanical Traction     THER ACT  TOTAL TIME FOR SESSION Not performed   Bed mobility     Transfer training     Body Mechanics/Work Training     THER EX  TOTAL TIME FOR SESSION 38-52 Minutes 40   CARDIOVASCULAR      Nu Step Y 5 minutes with rest in between as needed   Stationary Bike     Elliptical     Treadmill     UBE     STRENGTHENING     Supine ther-ex y Heel slides with strap  10x1, 5 sec hold    N SLR 10x1    y Quad sets with towel under ankle 10 x10\"    Y HS stretch with strap 20 sec, 2x    Y LAQ 10x2    Y calf stretch 30 sec, 3x incline   Seated ther-ex N Seated heel slides 10x1    N Sit to stand high mat 5x   Standing ther-ex Y Heel raises 10x2, hip march 10x2, hip abd 10x2, hip extension 10x2, knee flex 10x2,    STRETCHING     LE stretching Y gentle PROM by therapist to get knee flex   Other stretching     REPEATED MOVEMENTS N Step ups   NEURO RE-ED  TOTAL TIME FOR SESSION Not performed   COORDINATION     POSTURAL RE-ED        PRE-GAIT ACTIVITIES      BALANCE TRAINING      Sitting balance     Standing balance     GAIT TRAINING  TOTAL TIME FOR SESSION Not performed   Ambulation  n  with  feet, CS to mini asssit   Dynamic gait      Stair negotiation n FF of stairs with alternate legs as able , 2 HR CS   Curb negotiation     Ramp negotiation     Outdoor ambulation     BWS/vector training     MANUAL  TOTAL TIME FOR SESSION 8-22 Minutes   Stretching y HS stretch by therapist, gentle ROM both knees   Mobilization  y Patellar mobs   Massage y R IT band gently    Taping        "

## 2019-11-25 NOTE — PROGRESS NOTES
PT DAILY NOTE FOR OUTPATIENT THERAPY    Patient: Chapincito Yoon   MRN: [id number]  : 1950 68 y.o.  Referring Physician: Ted Zheng MD  Date of Visit: 2019      Certification Dates: 19 through 20    Diagnosis:   1. Presence of artificial knee joint, bilateral        Chief Complaints:   Chief Complaint   Patient presents with   • Dec ROM   • Dec Strength   • Other     difficulty sleeping at night, gets 5 hours       Precautions: fall, cardiac      TODAY'S VISIT    General Information - 19 1120        Session Details    Document Type  daily treatment     Mode of Treatment  individual therapy;physical therapy     OP Specialty  Orthopedics        Time Calculation    Start Time  1100     Stop Time  1200     Time Calculation (min)  60 min        General Information    Referring Physician  Dr Ted Zheng     Pertinent History of Current Functional Problem  Mr Yoon is a 69 yo M with hypertension, dyslipidemia, prostate CA, osteoarthritis who was evaluated as an outpatient PT today , s/P  bilateral total knee replacement done by Dr Zheng on 10/29/19. He had a brief Inpatient rehab stay annd is now starting Op therapy. C/o include oedema R knee more than L, decreased AROM and difficulty walking and on steps.     Existing Precautions/Restrictions  fall;cardiac         Pain/Vitals - 19 1111        Pain Assessment    Currently in pain  Yes     Preferred Pain Scale  number (Numeric Rating Pain Scale)     Pain: Body location  Knee     Pain Rating (0-10): Pre Activity  3     Pain Rating (0-10): Activity  4     Pain Rating (0-10): Post Activity  4        Pain Intervention    Intervention   supervised exs     Post Intervention Comments  pain tolerable         Daily Falls Screen - 19 1128        Daily Falls Assessment    Patient reported fall since last visit  No         Daily Treatment Assessment and Plan - 19 1555        Daily Treatment Assessment and Plan    Progress  "toward goals  Progressing     Daily Outcome Summary  Chris is doing well, continued with exercises on mat. added Nustep to improve knee flex ROM. R knee PROM 110 deg, left side 105 degrees. Patient instructed to bring SPC next vist vs Rw and start to wean off Rw as able.     Plan and Recommendations  add step ups next visit.           OBJECTIVE DATA TAKEN TODAY:    None taken    Today's Treatment:      ORTHO PT FLOWSHEET    Exercises Done yes/ no Current Session Time   MODALITIES- HEAT/ICE  TOTAL TIME FOR SESSION 0-7 Minutes   Heat     Ice/Vasocompression  CP5   Minutes, will ice at home as wife was waiting   MODALITIES - E-STIM  TOTAL TIME FOR SESSION Not performed   E-stim/H-Wave     MODALITIES - US  TOTAL TIME FOR SESSION Not performed   US     Iontophoresis     MODALITIES - TRACTION  TOTAL TIME FOR SESSION Not performed   Mechanical Traction     THER ACT  TOTAL TIME FOR SESSION Not performed   Bed mobility     Transfer training     Body Mechanics/Work Training     THER EX  TOTAL TIME FOR SESSION 38-52 Minutes 40   CARDIOVASCULAR      Nu Step Y 5 minutes with rest in between as needed   Stationary Bike     Elliptical     Treadmill     UBE     STRENGTHENING     Supine ther-ex y Heel slides with strap  10x1, 5 sec hold    N SLR 10x1    y Quad sets with towel under ankle 10 x10\"    Y HS stretch with strap 20 sec, 2x    Y LAQ 10x2    Y calf stretch 30 sec, 3x incline   Seated ther-ex N Seated heel slides 10x1    N Sit to stand high mat 5x   Standing ther-ex Y Heel raises 10x2, hip march 10x2, hip abd 10x2, hip extension 10x2, knee flex 10x2,    STRETCHING     LE stretching Y gentle PROM by therapist to get knee flex   Other stretching     REPEATED MOVEMENTS N Step ups   NEURO RE-ED  TOTAL TIME FOR SESSION Not performed   COORDINATION     POSTURAL RE-ED        PRE-GAIT ACTIVITIES      BALANCE TRAINING      Sitting balance     Standing balance     GAIT TRAINING  TOTAL TIME FOR SESSION Not performed   Ambulation  n  with "  feet, CS to mini asssit   Dynamic gait      Stair negotiation n FF of stairs with alternate legs as able , 2 HR CS   Curb negotiation     Ramp negotiation     Outdoor ambulation     BWS/vector training     MANUAL  TOTAL TIME FOR SESSION 8-22 Minutes   Stretching y HS stretch by therapist, gentle ROM both knees   Mobilization  y Patellar mobs   Massage y R IT band gently    Taping

## 2019-11-27 ENCOUNTER — HOSPITAL ENCOUNTER (OUTPATIENT)
Dept: PHYSICAL THERAPY | Facility: REHABILITATION | Age: 69
Setting detail: THERAPIES SERIES
Discharge: HOME | End: 2019-11-27
Attending: ORTHOPAEDIC SURGERY
Payer: MEDICARE

## 2019-11-27 DIAGNOSIS — Z96.653 PRESENCE OF ARTIFICIAL KNEE JOINT, BILATERAL: Primary | ICD-10-CM

## 2019-11-27 PROCEDURE — 97140 MANUAL THERAPY 1/> REGIONS: CPT | Mod: GP

## 2019-11-27 PROCEDURE — 97110 THERAPEUTIC EXERCISES: CPT | Mod: GP

## 2019-11-27 NOTE — PROGRESS NOTES
PT DAILY NOTE FOR OUTPATIENT THERAPY    Patient: Chapincito Yoon   MRN: [id number]  : 1950 68 y.o.  Referring Physician: Ted Zheng MD  Date of Visit: 2019      Certification Dates: 19 through 20    Diagnosis:   1. Presence of artificial knee joint, bilateral        Chief Complaints: No chief complaint on file.      Precautions: fall, cardiac      TODAY'S VISIT    General Information - 19 0950        Session Details    Document Type  daily treatment     Mode of Treatment  individual therapy;physical therapy     Patient/Family/Caregiver Comments/Observations  2/10 stiffness/soreness at start of session.      OP Specialty  Orthopedics        Time Calculation    Start Time  0905     Stop Time  1000     Time Calculation (min)  55 min        General Information    Patient Profile Reviewed?  yes     Existing Precautions/Restrictions  fall;cardiac         Pain/Vitals - 19 0902        Pain Assessment    Currently in pain  Yes     Preferred Pain Scale  number (Numeric Rating Pain Scale)     Pain: Body location  Knee     Pain Rating (0-10): Pre Activity  2     Pain Rating (0-10): Post Activity  3        Pre Activity Vital Signs    Pulse  81     BP  124/60     BP Location  Right upper arm     BP Method  Manual     Patient Position  Sitting        Pain Intervention    Intervention   manual therapy, exercise     Post Intervention Comments  less stiffness after bike         Daily Falls Screen - 19 0907        Daily Falls Assessment    Patient reported fall since last visit  No         Daily Treatment Assessment and Plan - 19 0952        Daily Treatment Assessment and Plan    Progress toward goals  Progressing     Daily Outcome Summary  Patient is making steady gains. He was able to do full revolutions on the upright bike x 10 minutes.      Plan and Recommendations  Continue with POC per primary PT.                Today's Treatment:      ORTHO PT FLOWSHEET    Exercises Done  yes/ no Current Session Time   MODALITIES- HEAT/ICE  TOTAL TIME FOR SESSION Not performed   Heat     Ice/Vasocompression  CP5   Minutes, will ice at home as wife was waiting   THER ACT  TOTAL TIME FOR SESSION Not performed   Bed mobility     Transfer training     Body Mechanics/Work Training     THER EX  TOTAL TIME FOR SESSION  35 minutes   CARDIOVASCULAR      Nu Step N 5 minutes with rest in between as needed   Upright Bike seat #11 Y Upright bike  x 10 minutes   Elliptical     Treadmill     UBE     STRENGTHENING     Supine ther-ex y Heel slides with strap  10 x 10 sec     N SLR 10x1    y Quad sets in long sitting  10 x 10 sec    Y HS stretch in long sitting 3 x 30 sec    Y LAQ 10 x 2    Y calf stretch 3 x 30 sec level 3 incline   Seated ther-ex N Seated heel slides 10x1    N Sit to stand high mat 5x   Standing ther-ex N Heel raises 10x2, hip march 10x2, hip abd 10x2, hip extension 10x2, knee flex 10x2,    STRETCHING     LE stretching N C/R by therapist to get knee flex   Other stretching     REPEATED MOVEMENTS N Step ups   NEURO RE-ED  TOTAL TIME FOR SESSION Not performed   COORDINATION     POSTURAL RE-ED        PRE-GAIT ACTIVITIES      BALANCE TRAINING      Sitting balance     Standing balance     GAIT TRAINING  TOTAL TIME FOR SESSION Not performed   Ambulation  n  with  feet, CS to mini asssit   Dynamic gait      Stair negotiation n FF of stairs with alternate legs as able , 2 HR CS   Curb negotiation     Ramp negotiation     Outdoor ambulation     BWS/vector training     MANUAL  TOTAL TIME FOR SESSION 20 minutes   Stretching Y C/R for flexion in sitting   Mobilization  N Patellar mobs   STM  Y Bilaterally quad, ITB, hamstring and retro massage to open lymphatic channels   Taping

## 2019-11-27 NOTE — OP PT TREATMENT LOG
ORTHO PT FLOWSHEET    Exercises Done yes/ no Current Session Time   MODALITIES- HEAT/ICE  TOTAL TIME FOR SESSION Not performed   Heat     Ice/Vasocompression  CP5   Minutes, will ice at home as wife was waiting   THER ACT  TOTAL TIME FOR SESSION Not performed   Bed mobility     Transfer training     Body Mechanics/Work Training     THER EX  TOTAL TIME FOR SESSION  35 minutes   CARDIOVASCULAR      Nu Step N 5 minutes with rest in between as needed   Upright Bike seat #11 Y Upright bike  x 10 minutes   Elliptical     Treadmill     UBE     STRENGTHENING     Supine ther-ex y Heel slides with strap  10 x 10 sec     N SLR 10x1    y Quad sets in long sitting  10 x 10 sec    Y HS stretch in long sitting 3 x 30 sec    Y LAQ 10 x 2    Y calf stretch 3 x 30 sec level 3 incline   Seated ther-ex N Seated heel slides 10x1    N Sit to stand high mat 5x   Standing ther-ex N Heel raises 10x2, hip march 10x2, hip abd 10x2, hip extension 10x2, knee flex 10x2,    STRETCHING     LE stretching N C/R by therapist to get knee flex   Other stretching     REPEATED MOVEMENTS N Step ups   NEURO RE-ED  TOTAL TIME FOR SESSION Not performed   COORDINATION     POSTURAL RE-ED        PRE-GAIT ACTIVITIES      BALANCE TRAINING      Sitting balance     Standing balance     GAIT TRAINING  TOTAL TIME FOR SESSION Not performed   Ambulation  n  with  feet, CS to mini asssit   Dynamic gait      Stair negotiation n FF of stairs with alternate legs as able , 2 HR CS   Curb negotiation     Ramp negotiation     Outdoor ambulation     BWS/vector training     MANUAL  TOTAL TIME FOR SESSION 20 minutes   Stretching Y C/R for flexion in sitting   Mobilization  N Patellar mobs   STM  Y Bilaterally quad, ITB, hamstring and retro massage to open lymphatic channels   Taping

## 2019-11-29 ENCOUNTER — HOSPITAL ENCOUNTER (OUTPATIENT)
Dept: PHYSICAL THERAPY | Facility: REHABILITATION | Age: 69
Setting detail: THERAPIES SERIES
Discharge: HOME | End: 2019-11-29
Attending: ORTHOPAEDIC SURGERY
Payer: MEDICARE

## 2019-11-29 DIAGNOSIS — Z96.653 PRESENCE OF ARTIFICIAL KNEE JOINT, BILATERAL: Primary | ICD-10-CM

## 2019-11-29 PROCEDURE — 97110 THERAPEUTIC EXERCISES: CPT | Mod: GP

## 2019-11-29 PROCEDURE — 97116 GAIT TRAINING THERAPY: CPT | Mod: GP

## 2019-11-29 NOTE — PROGRESS NOTES
PT DAILY NOTE FOR OUTPATIENT THERAPY    Patient: Chapincito Yoon   MRN: [id number]  : 1950 68 y.o.  Referring Physician: Ted Zheng MD  Date of Visit: 2019      Certification Dates: 19 through 20    Diagnosis:   1. Presence of artificial knee joint, bilateral        Chief Complaints:   Chief Complaint   Patient presents with   • Pain   • Other     difficulty sleeping at night   • Dec Strength       Precautions: fall, cardiac      TODAY'S VISIT    General Information - 19 1122        Session Details    Document Type  daily treatment     Mode of Treatment  physical therapy     OP Specialty  Orthopedics        Time Calculation    Start Time  1102     Stop Time  1202     Time Calculation (min)  60 min        General Information    Referring Physician  Dr Ted Zheng     Pertinent History of Current Functional Problem  Mr Yoon is a 67 yo M with hypertension, dyslipidemia, prostate CA, osteoarthritis who was evaluated as an outpatient PT today , s/P  bilateral total knee replacement done by Dr Zheng on 10/29/19. He had a brief Inpatient rehab stay annd is now starting Op therapy. C/o include oedema R knee more than L, decreased AROM and difficulty walking and on steps.     Existing Precautions/Restrictions  fall;cardiac         Pain/Vitals - 19 1111        Pain Assessment    Currently in pain  Yes     Preferred Pain Scale  number (Numeric Rating Pain Scale)     Pain Rating (0-10): Pre Activity  2     Pain Rating (0-10): Activity  3     Pain Rating (0-10): Post Activity  4        Pain Intervention    Intervention   supervised exs     Post Intervention Comments  will use CP at home, did it 5 min in therapy         Daily Falls Screen - 19 1128        Daily Falls Assessment    Patient reported fall since last visit  No         Daily Treatment Assessment and Plan - 19 1247        Daily Treatment Assessment and Plan    Progress toward goals  Progressing     Daily  Outcome Summary  Progressing well, nice gains in AROM as noted.Gait training level surfaces without AD, CS. No loss of baalnce noted. Added prone knee flexion .     Plan and Recommendations  continue per POC.           OBJECTIVE DATA TAKEN TODAY:    ROM   Range of Motion - 11/29/19 1100        RIGHT: Lower Extremity PROM Assessment    Knee Flexion Deficit  118        LEFT: Lower Extremity PROM Assessment    Knee Flexion Deficit  114           Today's Treatment:      ORTHO PT FLOWSHEET    Exercises Done yes/ no Current Session Time   MODALITIES- HEAT/ICE  TOTAL TIME FOR SESSION Not performed   Heat     Ice/Vasocompression  CP5   Minutes, will ice at home as wife was waiting   THER ACT  TOTAL TIME FOR SESSION Not performed   Bed mobility     Transfer training     Body Mechanics/Work Training     THER EX  TOTAL TIME FOR SESSION  40 minutes   CARDIOVASCULAR      Nu Step N 5 minutes with rest in between as needed   Upright Bike seat #11 n Upright bike  x 10 minutes   Elliptical     Treadmill     UBE     STRENGTHENING     Supine ther-ex y Heel slides with strap  10 x 10 sec     y SLR 10x1, LLR 10x1    y Quad sets in long sitting  10 x 2, 5 sec hold    Y HS stretch in long sitting 3 x 30 sec    Y LAQ 10 x 2    Y calf stretch 3 x 30 sec level 3 incline    y Sit to stand high mat 5x2    Y Prone knee flex 10x1   Standing ther-ex N Heel raises 10x2, hip march 10x2, hip abd 10x2, hip extension 10x2, knee flex 10x2,    STRETCHING     LE stretching N C/R by therapist to get knee flex   Other stretching Y Lat step ups on 6 inch step 7x    y Toe taps 10x2, no hold   REPEATED MOVEMENTS y Step ups, 10x  Forwards on 6 inch steps   NEURO RE-ED  TOTAL TIME FOR SESSION Not performed   COORDINATION     POSTURAL RE-ED        PRE-GAIT ACTIVITIES      BALANCE TRAINING      Sitting balance     Standing balance     GAIT TRAINING  TOTAL TIME FOR SESSION 10   Ambulation  n  without  feet, CS , 2x, cues to get R hip extension, equal step  length   Dynamic gait      Stair negotiation n FF of stairs with alternate legs as able , 2 HR CS   Curb negotiation     Ramp negotiation     Outdoor ambulation     BWS/vector training     MANUAL  TOTAL TIME FOR SESSION 0 minutes   Stretching n C/R for flexion in sitting   Mobilization  N Patellar mobs   STM  n Bilaterally quad, ITB, hamstring and retro massage to open lymphatic channels   Taping

## 2019-11-29 NOTE — OP PT TREATMENT LOG
ORTHO PT FLOWSHEET    Exercises Done yes/ no Current Session Time   MODALITIES- HEAT/ICE  TOTAL TIME FOR SESSION Not performed   Heat     Ice/Vasocompression  CP5   Minutes, will ice at home as wife was waiting   THER ACT  TOTAL TIME FOR SESSION Not performed   Bed mobility     Transfer training     Body Mechanics/Work Training     THER EX  TOTAL TIME FOR SESSION  40 minutes   CARDIOVASCULAR      Nu Step N 5 minutes with rest in between as needed   Upright Bike seat #11 n Upright bike  x 10 minutes   Elliptical     Treadmill     UBE     STRENGTHENING     Supine ther-ex y Heel slides with strap  10 x 10 sec     y SLR 10x1, LLR 10x1    y Quad sets in long sitting  10 x 2, 5 sec hold    Y HS stretch in long sitting 3 x 30 sec    Y LAQ 10 x 2    Y calf stretch 3 x 30 sec level 3 incline    y Sit to stand high mat 5x2    Y Prone knee flex 10x1   Standing ther-ex N Heel raises 10x2, hip march 10x2, hip abd 10x2, hip extension 10x2, knee flex 10x2,    STRETCHING     LE stretching N C/R by therapist to get knee flex   Other stretching Y Lat step ups on 6 inch step 7x    y Toe taps 10x2, no hold   REPEATED MOVEMENTS y Step ups, 10x  Forwards on 6 inch steps   NEURO RE-ED  TOTAL TIME FOR SESSION Not performed   COORDINATION     POSTURAL RE-ED        PRE-GAIT ACTIVITIES      BALANCE TRAINING      Sitting balance     Standing balance     GAIT TRAINING  TOTAL TIME FOR SESSION 10   Ambulation  n  without  feet, CS , 2x, cues to get R hip extension, equal step length   Dynamic gait      Stair negotiation n FF of stairs with alternate legs as able , 2 HR CS   Curb negotiation     Ramp negotiation     Outdoor ambulation     BWS/vector training     MANUAL  TOTAL TIME FOR SESSION 0 minutes   Stretching n C/R for flexion in sitting   Mobilization  N Patellar mobs   STM  n Bilaterally quad, ITB, hamstring and retro massage to open lymphatic channels   Taping

## 2019-12-03 ENCOUNTER — HOSPITAL ENCOUNTER (OUTPATIENT)
Dept: PHYSICAL THERAPY | Facility: REHABILITATION | Age: 69
Setting detail: THERAPIES SERIES
Discharge: HOME | End: 2019-12-03
Attending: ORTHOPAEDIC SURGERY
Payer: MEDICARE

## 2019-12-03 DIAGNOSIS — Z96.653 PRESENCE OF ARTIFICIAL KNEE JOINT, BILATERAL: Primary | ICD-10-CM

## 2019-12-03 PROCEDURE — 97110 THERAPEUTIC EXERCISES: CPT | Mod: GP,KX

## 2019-12-03 PROCEDURE — 97140 MANUAL THERAPY 1/> REGIONS: CPT | Mod: GP,KX

## 2019-12-03 NOTE — OP PT TREATMENT LOG
ORTHO PT FLOWSHEET    Exercises Done yes/ no Current Session Time   MODALITIES- HEAT/ICE  TOTAL TIME FOR SESSION Not performed   Heat     Ice/Vasocompression  CP5   Minutes, will ice at home as wife was waiting   THER ACT  TOTAL TIME FOR SESSION Not performed   Bed mobility     Transfer training     Body Mechanics/Work Training     THER EX  TOTAL TIME FOR SESSION  45 minutes   CARDIOVASCULAR      Nu Step N 5 minutes with rest in between as needed   Upright Bike seat #11 n Upright bike  x 10 minutes   Elliptical     Treadmill     UBE     STRENGTHENING     Supine ther-ex y Heel slides with strap  10 x 10 sec     n SLR 10x1, LLR 10x1    y Quad sets in long sitting  10 x 1, 5 sec hold    Y HS stretch in long sitting 3 x 30 sec    Y LAQ 10 x 2    Y calf stretch 3 x 30 sec level 3 incline    n Sit to stand high mat 5x2    n Prone knee flex 10x1   Standing ther-ex y Heel raises 10x2, hip march 10x2, hip abd 10x2, hip extension 10x2, knee flex 10x2, with orange TB   STRETCHING     LE stretching N C/R by therapist to get knee flex   Other stretching n Lat step ups on 6 inch step 7x    n Toe taps 10x2, no hold   REPEATED MOVEMENTS y Step overs, 10x  Forwards on 8 inch steps   NEURO RE-ED  TOTAL TIME FOR SESSION Not performed   COORDINATION     POSTURAL RE-ED        PRE-GAIT ACTIVITIES      BALANCE TRAINING      Sitting balance     Standing balance     GAIT TRAINING  TOTAL TIME FOR SESSION 0   Ambulation  n  without  feet, CS , 2x, cues to get R hip extension, equal step length   Dynamic gait      Stair negotiation n FF of stairs with alternate legs as able , 2 HR CS   Curb negotiation     Ramp negotiation     Outdoor ambulation     BWS/vector training     MANUAL  TOTAL TIME FOR SESSION 10  minutes   Stretching n C/R for flexion in sitting   Mobilization  y Patellar mobs   STM  y Bilaterally quad, ITB, hamstring and retro massage to open lymphatic channels   Taping

## 2019-12-03 NOTE — PROGRESS NOTES
PT PROGRESS NOTE FOR OUTPATIENT THERAPY    Patient: Chapincito Yoon   MRN: [id number]  : 1950 68 y.o.  Referring Physician: Ted Zehng MD  Date of Visit: 12/3/2019      Certification Dates: 19 through 20    Recommended Frequency & Duration:  3 times/week for up to 3 months     Diagnosis:   1. Presence of artificial knee joint, bilateral        Chief Complaints:   Chief Complaint   Patient presents with   • Dec ROM   • Dec Strength       Precautions: fall, cardiac    TODAY'S VISIT:    General Information - 19 1005        Session Details    Document Type  progress note     Mode of Treatment  individual therapy;physical therapy        Time Calculation    Start Time  1000     Stop Time  1100     Time Calculation (min)  60 min        General Information    Referring Physician  Dr Ted Zheng     Pertinent History of Current Functional Problem  Mr Yoon is a 67 yo M with hypertension, dyslipidemia, prostate CA, osteoarthritis who was evaluated as an outpatient PT today , s/P  bilateral total knee replacement done by Dr Zheng on 10/29/19. He had a brief Inpatient rehab stay annd is now starting Op therapy. C/o include oedema R knee more than L, decreased AROM and difficulty walking and on steps.     Existing Precautions/Restrictions  fall;cardiac         Pain/Vitals - 19 1001        Pain Assessment    Currently in pain  Yes     Preferred Pain Scale  number (Numeric Rating Pain Scale)     Pain: Body location  Knee     Pain Rating (0-10): Pre Activity  3        Pain Intervention    Intervention   supervised exs     Post Intervention Comments  pain low         Daily Falls Screen - 19 1024        Daily Falls Assessment    Patient reported fall since last visit  No         Daily Treatment Assessment and Plan - 19 1048        Daily Treatment Assessment and Plan    Progress toward goals  Progressing     Daily Outcome Summary  Mr Yoon has made very nice gains in his ROM of  both knees with physical therapy. His TUG and gait speed have also improved and his function is improving. He has progressed from a walker to a SPC now . Still has difficulty on stairs and relies on his UE on stairs a lot. He will benefit from continuing PT to improve his ability on stairs, ramps and curbs, improve quads strength concentric and eccentric and progress to walking without a SPC.     Plan and Recommendations  continue per POC.           OBJECTIVE MEASUREMENTS/DATA:    ROM   Range of Motion - 12/03/19 1000        RIGHT: Lower Extremity PROM Assessment    Knee Flexion Deficit  120     Knee Extension Deficit  -2        LEFT: Lower Extremity PROM Assessment    Knee Flexion Deficit  120     Knee Extension Deficit  -1        RIGHT: Lower Extremity AROM Assessment    Knee Flexion Deficit  118     Knee Extension Deficit  -4        LEFT: Lower Extremity AROM Assessment    Knee Flexion Deficit  112     Knee Extension Deficit  -2         MMT     Balance/Posture   Balance and Posture - 12/03/19 1031        Balance Assessment    Balance Test Results (Other)  Timed Up and Go Test (TUG)        Timed Up and Go Test    Trial One: Timed Up and Go Test  10.16     Trial Two: Timed Up and Go Test  9.03     Trial Three: Timed Up and Go Test  8.75     Mean of 3 Trials: Timed Up and Go Test  [id number]     Comment, Timed Up and Go Test  (S) TUG on 12/3 is 9.31 sec with no AD         Gait and Mobility   Gait and Mobility - 12/03/19 1017        Gait Training    Terral, Gait  modified independence     Variable surfaces  Flat surface     Gait surface comments  able to walk short distances at home with no AD     Assistive Device  cane, straight     Deviations/Abnormal Patterns (Gait)  gait speed decreased;stride length decreased     Gait Speed (m/sec)  (S) 1.06 m/sec    no AD          Today's Treatment::  test 11/13/19  12/3/19         WOMAC  40/96 using RW  26/96         LEFS 39/80 graded using RW 39/80         TUG   22.2 sec with RW  9.32 sec with no AD          SSV   0.27 m/s with RW  1.06 m/s with no AD                               ORTHO PT FLOWSHEET    Exercises Done yes/ no Current Session Time   MODALITIES- HEAT/ICE  TOTAL TIME FOR SESSION Not performed   Heat     Ice/Vasocompression  CP5   Minutes, will ice at home as wife was waiting   THER ACT  TOTAL TIME FOR SESSION Not performed   Bed mobility     Transfer training     Body Mechanics/Work Training     THER EX  TOTAL TIME FOR SESSION  45 minutes   CARDIOVASCULAR      Nu Step N 5 minutes with rest in between as needed   Upright Bike seat #11 n Upright bike  x 10 minutes   Elliptical     Treadmill     UBE     STRENGTHENING     Supine ther-ex y Heel slides with strap  10 x 10 sec     n SLR 10x1, LLR 10x1    y Quad sets in long sitting  10 x 1, 5 sec hold    Y HS stretch in long sitting 3 x 30 sec    Y LAQ 10 x 2    Y calf stretch 3 x 30 sec level 3 incline    n Sit to stand high mat 5x2    n Prone knee flex 10x1   Standing ther-ex y Heel raises 10x2, hip march 10x2, hip abd 10x2, hip extension 10x2, knee flex 10x2, with orange TB   STRETCHING     LE stretching N C/R by therapist to get knee flex   Other stretching n Lat step ups on 6 inch step 7x    n Toe taps 10x2, no hold   REPEATED MOVEMENTS y Step overs, 10x  Forwards on 8 inch steps   NEURO RE-ED  TOTAL TIME FOR SESSION Not performed   COORDINATION     POSTURAL RE-ED        PRE-GAIT ACTIVITIES      BALANCE TRAINING      Sitting balance     Standing balance     GAIT TRAINING  TOTAL TIME FOR SESSION 0   Ambulation  n  without  feet, CS , 2x, cues to get R hip extension, equal step length   Dynamic gait      Stair negotiation n FF of stairs with alternate legs as able , 2 HR CS   Curb negotiation     Ramp negotiation     Outdoor ambulation     BWS/vector training     MANUAL  TOTAL TIME FOR SESSION 10  minutes   Stretching n C/R for flexion in sitting   Mobilization  y Patellar mobs   STM  y Bilaterally quad, ITB,  hamstring and retro massage to open lymphatic channels   Taping            Goals Addressed                 This Visit's Progress    • PT Knee Goals        Short term goals {[facility] PT    Short Term Goals Time Frame Result Comment/Progress   Pt will increase bilat LE  MMT into flexion, abduction, ER and IR >/= ½ grade 4 weeks met    Pt will increase bilat knee AROM >/= 10 degrees into flexion, extension to improve functional mobility 4 weeks met    Increase LEFS >/= 9 points to increase function   4 weeks ongoing    Increase WOMAC >/= 20 points to increase function  4 weeks ongoing    Increase TUG </= 12.5 sec to decrease fall risk 4 weeks met    Pt will increase gait speed (SSV) by 0.2 meters/seconds to improve function 4 weeks met    Pt will be independent with HEP to increase carryover at home 4 weeks     Pt will be able to walk 200 feet  With SPC and CS and no loss of balance 4 weeks met            • PT Knee Goals        Long term goals    Long Term Goals Time Frame Result Comment/Progress   Pt will increase bialt LE strength to 4+/5 or more 12 weeks All LTG are for 8-12 weeks All LTG are ongoing   Pt will increase bilat  knee ROM >/= 0-120 degrees into flexion, extension to improve functional mobility      Increase LEFS >/= 20 points from eval to increase function        Increase WOMAC >/= 20 points to increase function      Increase TUG </= 10 with SPC sec to decrease fall risk      Pt will increase gait speed (SSV) >/= 0.2 meters/seconds to improve function, with SPC      Decrease pain at worst </= 3//10       Pt will be able to ascend/descent 12 steps with reciprocal pattern with 1 UE       Pt will be able to walk community distances with SPC , without gait deviation                        Clinical Impression: See daily statement above, progressing well towards goals. Needs more strengthening, improve endurance,so walking and stairs can be improved.

## 2019-12-04 ENCOUNTER — HOSPITAL ENCOUNTER (OUTPATIENT)
Dept: PHYSICAL THERAPY | Facility: REHABILITATION | Age: 69
Setting detail: THERAPIES SERIES
Discharge: HOME | End: 2019-12-04
Attending: ORTHOPAEDIC SURGERY
Payer: MEDICARE

## 2019-12-04 DIAGNOSIS — Z96.653 PRESENCE OF ARTIFICIAL KNEE JOINT, BILATERAL: Primary | ICD-10-CM

## 2019-12-04 PROCEDURE — 97140 MANUAL THERAPY 1/> REGIONS: CPT | Mod: GP,KX

## 2019-12-04 PROCEDURE — 97110 THERAPEUTIC EXERCISES: CPT | Mod: GP,KX

## 2019-12-04 NOTE — OP PT TREATMENT LOG
ORTHO PT FLOWSHEET    Exercises Done yes/ no Current Session Time   MODALITIES- HEAT/ICE  TOTAL TIME FOR SESSION Not performed   Heat     Ice/Vasocompression     THER ACT  TOTAL TIME FOR SESSION Not performed   Bed mobility     Transfer training     Body Mechanics/Work Training     THER EX  TOTAL TIME FOR SESSION  42 minutes   CARDIOVASCULAR      Nu Step N 5 minutes with rest in between as needed   Upright Bike seat #6 (main gym) Y Upright bike x 10 minutes   Elliptical     Treadmill     UBE     STRENGTHENING     Supine ther-ex N Heel slides with strap  10 x 10 sec     N SLR 10x1, LLR 10x1    Y Quad sets in long sitting  10 x 10 sec    Y HS stretch in long sitting 3 x 30 sec    Y LAQ 10 x 2    Y calf stretch 3 x 30 sec level 3 incline    N Sit to stand high mat 5x2    N Prone knee flex 10x1   Standing ther-ex N Hip abduction and hip extension with orange band around ankles 2 x 10    STRETCHING     LE stretching N C/R by therapist to get knee flex   Other stretching N Lat step ups on 6 inch step 7x    N Toe taps 10x2, no hold   REPEATED MOVEMENTS Y 8 inch forward step overs 2 x 10   NEURO RE-ED  TOTAL TIME FOR SESSION Not performed   COORDINATION     POSTURAL RE-ED        PRE-GAIT ACTIVITIES      BALANCE TRAINING      Sitting balance     Standing balance     GAIT TRAINING  TOTAL TIME FOR SESSION Not performed   Ambulation  N  without  feet, CS , 2x, cues to get R hip extension, equal step length   Dynamic gait      Stair negotiation N FF of stairs with alternate legs as able , 2 HR CS   Curb negotiation     Ramp negotiation     Outdoor ambulation     BWS/vector training     MANUAL  TOTAL TIME FOR SESSION 15  minutes   Stretching N C/R for flexion in sitting   Mobilization  N Patellar mobs   STM  Y Bilaterally quad, ITB, hamstring and retro massage to open lymphatic channels   Taping

## 2019-12-04 NOTE — PROGRESS NOTES
"PT DAILY NOTE FOR OUTPATIENT THERAPY    Patient: Chapincito Yoon   MRN: [id number]  : 1950 68 y.o.  Referring Physician: Ted Zheng MD  Date of Visit: 2019      Certification Dates: 19 through 20    Diagnosis:   1. Presence of artificial knee joint, bilateral        Chief Complaints: No chief complaint on file.      Precautions: cardiac, fall      TODAY'S VISIT    General Information - 19 1001        Session Details    Document Type  daily treatment     Mode of Treatment  individual therapy;physical therapy     Patient/Family/Caregiver Comments/Observations  Just stiffness no real pain \"except when I  bend it too far.\" Patient reports improvements in functional tasks that used to hurt prior to surgery are no longer painful.      OP Specialty  Orthopedics        Time Calculation    Start Time  1003     Stop Time  1100     Time Calculation (min)  57 min        General Information    Patient Profile Reviewed?  yes     Referring Physician  Dr. Zheng     Existing Precautions/Restrictions  cardiac;fall         Pain/Vitals - 19 1007        Pain Assessment    Currently in pain  No/Denies        Pain Intervention    Intervention   manual work, exercise     Post Intervention Comments  none         Daily Falls Screen - 19 1008        Daily Falls Assessment    Patient reported fall since last visit  No         Daily Treatment Assessment and Plan - 19 1101        Daily Treatment Assessment and Plan    Progress toward goals  Progressing     Daily Outcome Summary  Patient is making excellent progress in PT. He had full extension passively and only lacking a couple degrees actively.      Plan and Recommendations  Continue with POC per mariah PT.               Today's Treatment:      ORTHO PT FLOWSHEET    Exercises Done yes/ no Current Session Time   MODALITIES- HEAT/ICE  TOTAL TIME FOR SESSION Not performed   Heat     Ice/Vasocompression     THER ACT  TOTAL TIME FOR SESSION " Not performed   Bed mobility     Transfer training     Body Mechanics/Work Training     THER EX  TOTAL TIME FOR SESSION  42 minutes   CARDIOVASCULAR      Nu Step N 5 minutes with rest in between as needed   Upright Bike seat #6 (main gym) Y Upright bike x 10 minutes   Elliptical     Treadmill     UBE     STRENGTHENING     Supine ther-ex N Heel slides with strap  10 x 10 sec     N SLR 10x1, LLR 10x1    Y Quad sets in long sitting  10 x 10 sec    Y HS stretch in long sitting 3 x 30 sec    Y LAQ 10 x 2    Y calf stretch 3 x 30 sec level 3 incline    N Sit to stand high mat 5x2    N Prone knee flex 10x1   Standing ther-ex N Hip abduction and hip extension with orange band around ankles 2 x 10    STRETCHING     LE stretching N C/R by therapist to get knee flex   Other stretching N Lat step ups on 6 inch step 7x    N Toe taps 10x2, no hold   REPEATED MOVEMENTS Y 8 inch forward step overs 2 x 10   NEURO RE-ED  TOTAL TIME FOR SESSION Not performed   COORDINATION     POSTURAL RE-ED        PRE-GAIT ACTIVITIES      BALANCE TRAINING      Sitting balance     Standing balance     GAIT TRAINING  TOTAL TIME FOR SESSION Not performed   Ambulation  N  without  feet, CS , 2x, cues to get R hip extension, equal step length   Dynamic gait      Stair negotiation N FF of stairs with alternate legs as able , 2 HR CS   Curb negotiation     Ramp negotiation     Outdoor ambulation     BWS/vector training     MANUAL  TOTAL TIME FOR SESSION 15  minutes   Stretching N C/R for flexion in sitting   Mobilization  N Patellar mobs   STM  Y Bilaterally quad, ITB, hamstring and retro massage to open lymphatic channels   Taping

## 2019-12-06 ENCOUNTER — HOSPITAL ENCOUNTER (OUTPATIENT)
Dept: PHYSICAL THERAPY | Facility: REHABILITATION | Age: 69
Setting detail: THERAPIES SERIES
Discharge: HOME | End: 2019-12-06
Attending: ORTHOPAEDIC SURGERY
Payer: MEDICARE

## 2019-12-06 DIAGNOSIS — Z96.653 PRESENCE OF ARTIFICIAL KNEE JOINT, BILATERAL: Primary | ICD-10-CM

## 2019-12-06 PROCEDURE — 97140 MANUAL THERAPY 1/> REGIONS: CPT | Mod: GP,KX

## 2019-12-06 PROCEDURE — 97110 THERAPEUTIC EXERCISES: CPT | Mod: GP,KX

## 2019-12-06 NOTE — PROGRESS NOTES
PT DAILY NOTE FOR OUTPATIENT THERAPY    Patient: Chapincito Yoon   MRN: [id number]  : 1950 68 y.o.  Referring Physician: Ted Zheng MD  Date of Visit: 2019      Certification Dates: 19 through 20    Diagnosis:   1. Presence of artificial knee joint, bilateral        Chief Complaints:   Chief Complaint   Patient presents with   • Pain   • Dec ROM   • Dec Strength   • Difficulty Walking       Precautions: cardiac, fall      TODAY'S VISIT    General Information - 19 1038        Session Details    Document Type  daily treatment     Mode of Treatment  physical therapy        Time Calculation    Start Time  1002     Stop Time  1100     Time Calculation (min)  58 min        General Information    Referring Physician  Dr Zheng     Pertinent History of Current Functional Problem  Mr Yoon is a 67 yo M with hypertension, dyslipidemia, prostate CA, osteoarthritis who was evaluated as an outpatient PT today , s/P  bilateral total knee replacement done by Dr Zheng on 10/29/19. He had a brief Inpatient rehab stay annd is now starting Op therapy. C/o include oedema R knee more than L, decreased AROM and difficulty walking and on steps.     Existing Precautions/Restrictions  cardiac;fall         Pain/Vitals - 19 1037        Pain Assessment    Currently in pain  Yes     Preferred Pain Scale  number (Numeric Rating Pain Scale)     Pain Rating (0-10): Pre Activity  4     Pain Rating (0-10): Activity  2     Pain Rating (0-10): Post Activity  2        Pain Intervention    Intervention   exs, manual     Post Intervention Comments  pain low         Daily Falls Screen - 19 1038        Daily Falls Assessment    Patient reported fall since last visit  No         Daily Treatment Assessment and Plan - 19 1110        Daily Treatment Assessment and Plan    Progress toward goals  Progressing     Daily Outcome Summary  TE per sheet, added hip flexors stretch today.Difficulty with step  overs noted.     Plan and Recommendations  continue per poc           OBJECTIVE DATA TAKEN TODAY:    None taken    Today's Treatment:      ORTHO PT FLOWSHEET    Exercises Done yes/ no Current Session Time   MODALITIES- HEAT/ICE  TOTAL TIME FOR SESSION Not performed   Heat     Ice/Vasocompression     THER ACT  TOTAL TIME FOR SESSION Not performed   Bed mobility     Transfer training     Body Mechanics/Work Training     THER EX  TOTAL TIME FOR SESSION  40 minutes   CARDIOVASCULAR      Nu Step N 5 minutes with rest in between as needed   Upright Bike seat #6 (main gym) Y Upright bike x 10 minutes   Elliptical     Treadmill     UBE     STRENGTHENING     Supine ther-ex N Heel slides with strap  10 x 10 sec     y SLR 10x1    n Quad sets in long sitting  10 x 10 sec    Y HS stretch in long sitting 3 x 30 sec, hip flexors ar EOM  2x, 1 min    n LAQ 10 x 2    Y calf stretch 3 x 30 sec level 3 incline    y Sit to stand high mat 5x2    y Prone knee flex 10x1, hip ext 10x1   Standing ther-ex N Hip abduction and hip extension with orange band around ankles 2 x 10    STRETCHING     LE stretching N C/R by therapist to get knee flex   Other stretching N Lat step ups on 6 inch step 7x    N Toe taps 10x2, no hold   REPEATED MOVEMENTS Y 8 inch forward step overs 2 x 10   NEURO RE-ED  TOTAL TIME FOR SESSION Not performed   COORDINATION     POSTURAL RE-ED        PRE-GAIT ACTIVITIES      BALANCE TRAINING      Sitting balance     Standing balance     GAIT TRAINING  TOTAL TIME FOR SESSION Not performed   Ambulation  N  without  feet, CS , 2x, cues to get R hip extension, equal step length   Dynamic gait      Stair negotiation N FF of stairs with alternate legs as able , 2 HR CS   Curb negotiation     Ramp negotiation     Outdoor ambulation     BWS/vector training     MANUAL  TOTAL TIME FOR SESSION 15  minutes   Stretching N C/R for flexion in sitting   Mobilization  y Patellar mobs   STM  Y Bilaterally quad, ITB, hamstring and retro  massage to open lymphatic channels   Taping

## 2019-12-06 NOTE — OP PT TREATMENT LOG
ORTHO PT FLOWSHEET    Exercises Done yes/ no Current Session Time   MODALITIES- HEAT/ICE  TOTAL TIME FOR SESSION Not performed   Heat     Ice/Vasocompression     THER ACT  TOTAL TIME FOR SESSION Not performed   Bed mobility     Transfer training     Body Mechanics/Work Training     THER EX  TOTAL TIME FOR SESSION  40 minutes   CARDIOVASCULAR      Nu Step N 5 minutes with rest in between as needed   Upright Bike seat #6 (main gym) Y Upright bike x 10 minutes   Elliptical     Treadmill     UBE     STRENGTHENING     Supine ther-ex N Heel slides with strap  10 x 10 sec     y SLR 10x1    n Quad sets in long sitting  10 x 10 sec    Y HS stretch in long sitting 3 x 30 sec, hip flexors ar EOM  2x, 1 min    n LAQ 10 x 2    Y calf stretch 3 x 30 sec level 3 incline    y Sit to stand high mat 5x2    y Prone knee flex 10x1, hip ext 10x1   Standing ther-ex N Hip abduction and hip extension with orange band around ankles 2 x 10    STRETCHING     LE stretching N C/R by therapist to get knee flex   Other stretching N Lat step ups on 6 inch step 7x    N Toe taps 10x2, no hold   REPEATED MOVEMENTS Y 8 inch forward step overs 2 x 10   NEURO RE-ED  TOTAL TIME FOR SESSION Not performed   COORDINATION     POSTURAL RE-ED        PRE-GAIT ACTIVITIES      BALANCE TRAINING      Sitting balance     Standing balance     GAIT TRAINING  TOTAL TIME FOR SESSION Not performed   Ambulation  N  without  feet, CS , 2x, cues to get R hip extension, equal step length   Dynamic gait      Stair negotiation N FF of stairs with alternate legs as able , 2 HR CS   Curb negotiation     Ramp negotiation     Outdoor ambulation     BWS/vector training     MANUAL  TOTAL TIME FOR SESSION 15  minutes   Stretching N C/R for flexion in sitting   Mobilization  y Patellar mobs   STM  Y Bilaterally quad, ITB, hamstring and retro massage to open lymphatic channels   Taping

## 2019-12-09 ENCOUNTER — HOSPITAL ENCOUNTER (OUTPATIENT)
Dept: PHYSICAL THERAPY | Facility: REHABILITATION | Age: 69
Setting detail: THERAPIES SERIES
Discharge: HOME | End: 2019-12-09
Attending: ORTHOPAEDIC SURGERY
Payer: MEDICARE

## 2019-12-09 DIAGNOSIS — Z96.653 PRESENCE OF ARTIFICIAL KNEE JOINT, BILATERAL: Primary | ICD-10-CM

## 2019-12-09 PROCEDURE — 97140 MANUAL THERAPY 1/> REGIONS: CPT | Mod: GP,KX

## 2019-12-09 PROCEDURE — 97110 THERAPEUTIC EXERCISES: CPT | Mod: GP,KX

## 2019-12-09 NOTE — OP PT TREATMENT LOG
ORTHO PT FLOWSHEET    Exercises Done yes/ no Current Session Time   MODALITIES- HEAT/ICE  TOTAL TIME FOR SESSION Not performed   Heat     Ice/Vasocompression     THER ACT  TOTAL TIME FOR SESSION Not performed   Bed mobility     Transfer training     Body Mechanics/Work Training     THER EX  TOTAL TIME FOR SESSION  40 minutes   CARDIOVASCULAR      Nu Step N 5 minutes with rest in between as needed   Upright Bike seat #6 (main gym) Y Upright bike x 10 minutes   Elliptical     Treadmill     UBE     STRENGTHENING     Supine ther-ex N Heel slides with strap  10 x 10 sec     y SLR 10x1    n Quad sets in long sitting  10 x 10 sec    Y HS stretch in long sitting 3 x 30 sec, hip flexors ar EOM  2x, 1 min    y LAQ 10 x 2, 2.5 #    Y calf stretch 3 x 30 sec level 3 incline    n Sit to stand high mat 5x2    y Prone knee flex 10x2,2.5#, prone quads stretch with strap 2x, 20 sec    Standing ther-ex y Hip abduction and hip extension with orange band around ankles 2 x 10    STRETCHING     balance y Balancing on blue disc 30 sec, 3x   Other stretching N Lat step ups on 6 inch step 7x    N Toe taps 10x2, no hold   REPEATED MOVEMENTS Y 8 inch forward step overs 2 x 10   NEURO RE-ED  TOTAL TIME FOR SESSION Not performed   COORDINATION     POSTURAL RE-ED        PRE-GAIT ACTIVITIES      BALANCE TRAINING      Sitting balance     Standing balance     GAIT TRAINING  TOTAL TIME FOR SESSION Not performed   Ambulation  N  without  feet, CS , 2x, cues to get R hip extension, equal step length   Dynamic gait      Stair negotiation N FF of stairs with alternate legs as able , 2 HR CS   Curb negotiation     Ramp negotiation     Outdoor ambulation     BWS/vector training     MANUAL  TOTAL TIME FOR SESSION 15  minutes   Stretching N C/R for flexion in sitting   Mobilization  y Patellar mobs   STM  Y Bilaterally quad, ITB, hamstring and retro massage to open lymphatic channels   Taping

## 2019-12-09 NOTE — PROGRESS NOTES
PT DAILY NOTE FOR OUTPATIENT THERAPY    Patient: Chapincito Yoon   MRN: [id number]  : 1950 68 y.o.  Referring Physician: Ted Zheng MD  Date of Visit: 2019      Certification Dates: 19 through 20    Diagnosis:   1. Presence of artificial knee joint, bilateral        Chief Complaints:   Chief Complaint   Patient presents with   • Dec ROM   • Dec Strength   • Other     difficulty sleeping at night       Precautions: cardiac, fall      TODAY'S VISIT    General Information - 19 1132        Session Details    Document Type  daily treatment     Mode of Treatment  physical therapy     OP Specialty  Orthopedics        Time Calculation    Start Time  1100     Stop Time  1200     Time Calculation (min)  60 min        General Information    Referring Physician  Dr Zheng     Pertinent History of Current Functional Problem  Mr Yoon is a 67 yo M with hypertension, dyslipidemia, prostate CA, osteoarthritis who was evaluated as an outpatient PT today , s/P  bilateral total knee replacement done by Dr Zheng on 10/29/19. He had a brief Inpatient rehab stay annd is now starting Op therapy. C/o include oedema R knee more than L, decreased AROM and difficulty walking and on steps.     Existing Precautions/Restrictions  cardiac;fall         Pain/Vitals - 19 1130        Pain Assessment    Currently in pain  Yes     Preferred Pain Scale  number (Numeric Rating Pain Scale)     Pain: Body location  Knee     Pain Rating (0-10): Pre Activity  2     Pain Rating (0-10): Activity  3        Pre Activity Vital Signs    BP  147/68     BP Method  Automatic        Pain Intervention    Intervention   ex, manual     Post Intervention Comments  pain tolerable         Daily Falls Screen - 19 1134        Daily Falls Assessment    Patient reported fall since last visit  No         Daily Treatment Assessment and Plan - 19 1137        Daily Treatment Assessment and Plan    Progress toward goals   Progressing     Daily Outcome Summary  added quads stretch in prone and weights for quads strengthening. Hads decreased eccentric contro of quads on stairs. Scar more red today and small area of redness on distal end of scar on R knee. Patient to contact MD if redeness increases..     Plan and Recommendations  continue per poc. Follow with MD is tomorrow           OBJECTIVE DATA TAKEN TODAY:    None taken    Today's Treatment:      ORTHO PT FLOWSHEET    Exercises Done yes/ no Current Session Time   MODALITIES- HEAT/ICE  TOTAL TIME FOR SESSION Not performed   Heat     Ice/Vasocompression     THER ACT  TOTAL TIME FOR SESSION Not performed   Bed mobility     Transfer training     Body Mechanics/Work Training     THER EX  TOTAL TIME FOR SESSION  40 minutes   CARDIOVASCULAR      Nu Step N 5 minutes with rest in between as needed   Upright Bike seat #6 (main gym) Y Upright bike x 10 minutes   Elliptical     Treadmill     UBE     STRENGTHENING     Supine ther-ex N Heel slides with strap  10 x 10 sec     y SLR 10x1    n Quad sets in long sitting  10 x 10 sec    Y HS stretch in long sitting 3 x 30 sec, hip flexors ar EOM  2x, 1 min    y LAQ 10 x 2, 2.5 #    Y calf stretch 3 x 30 sec level 3 incline    n Sit to stand high mat 5x2    y Prone knee flex 10x2,2.5#, prone quads stretch with strap 2x, 20 sec    Standing ther-ex y Hip abduction and hip extension with orange band around ankles 2 x 10    STRETCHING     balance y Balancing on blue disc 30 sec, 3x   Other stretching N Lat step ups on 6 inch step 7x    N Toe taps 10x2, no hold   REPEATED MOVEMENTS Y 8 inch forward step overs 2 x 10   NEURO RE-ED  TOTAL TIME FOR SESSION Not performed   COORDINATION     POSTURAL RE-ED        PRE-GAIT ACTIVITIES      BALANCE TRAINING      Sitting balance     Standing balance     GAIT TRAINING  TOTAL TIME FOR SESSION Not performed   Ambulation  N  without  feet, CS , 2x, cues to get R hip extension, equal step length   Dynamic gait       Stair negotiation N FF of stairs with alternate legs as able , 2 HR CS   Curb negotiation     Ramp negotiation     Outdoor ambulation     BWS/vector training     MANUAL  TOTAL TIME FOR SESSION 15  minutes   Stretching N C/R for flexion in sitting   Mobilization  y Patellar mobs   STM  Y Bilaterally quad, ITB, hamstring and retro massage to open lymphatic channels   Taping

## 2019-12-11 ENCOUNTER — HOSPITAL ENCOUNTER (OUTPATIENT)
Dept: PHYSICAL THERAPY | Facility: REHABILITATION | Age: 69
Setting detail: THERAPIES SERIES
Discharge: HOME | End: 2019-12-11
Attending: ORTHOPAEDIC SURGERY
Payer: MEDICARE

## 2019-12-11 DIAGNOSIS — Z96.653 PRESENCE OF ARTIFICIAL KNEE JOINT, BILATERAL: Primary | ICD-10-CM

## 2019-12-11 PROCEDURE — 97110 THERAPEUTIC EXERCISES: CPT | Mod: GP

## 2019-12-11 PROCEDURE — 97140 MANUAL THERAPY 1/> REGIONS: CPT | Mod: GP

## 2019-12-11 NOTE — PROGRESS NOTES
PT DAILY NOTE FOR OUTPATIENT THERAPY    Patient: Chapincito Yoon   MRN: [id number]  : 1950 68 y.o.  Referring Physician: Ted Zheng MD  Date of Visit: 2019      Certification Dates: 19 through 20    Diagnosis:   1. Presence of artificial knee joint, bilateral        Chief Complaints:   Chief Complaint   Patient presents with   • Pain   • Dec Strength   • Dec ROM       Precautions: cardiac, fall      TODAY'S VISIT    General Information - 19 1121        Session Details    Document Type  daily treatment     Mode of Treatment  individual therapy;physical therapy     Patient/Family/Caregiver Comments/Observations  Patient saw MD, pleased with progress to continue PT.        Time Calculation    Start Time  1100     Stop Time  1202     Time Calculation (min)  62 min        General Information    Referring Physician  Dr Zheng     Pertinent History of Current Functional Problem  Mr Yoon is a 69 yo M with hypertension, dyslipidemia, prostate CA, osteoarthritis who was evaluated as an outpatient PT today , s/P  bilateral total knee replacement done by Dr Zheng on 10/29/19. He had a brief Inpatient rehab stay annd is now starting Op therapy. C/o include oedema R knee more than L, decreased AROM and difficulty walking and on steps.     Existing Precautions/Restrictions  cardiac;fall         Pain/Vitals - 19 1119        Pain Assessment    Currently in pain  Yes     Preferred Pain Scale  number (Numeric Rating Pain Scale)     Pain: Body location  Knee     Pain Rating (0-10): Pre Activity  2     Pain Rating (0-10): Activity  4     Pain Rating (0-10): Post Activity  2        Pain Intervention    Intervention   supervised exs     Post Intervention Comments  pain low and tolerable         Daily Falls Screen - 19 1123        Daily Falls Assessment    Patient reported fall since last visit  No         Daily Treatment Assessment and Plan - 19 1131        Daily Treatment  Assessment and Plan    Progress toward goals  Progressing     Daily Outcome Summary  MD appointment went well yesterday, MD pleased with progress, has new scriot to continue stretching. Focussed on flexibilty exs today, has tight hip flexors and quads, also R IT band is tight. Diffculty on steps still noted.      Plan and Recommendations  continue per poc. Focus on step overs next visit.           OBJECTIVE DATA TAKEN TODAY:    None taken    Today's Treatment:      ORTHO PT FLOWSHEET    Exercises Done yes/ no Current Session Time   MODALITIES- HEAT/ICE  TOTAL TIME FOR SESSION Not performed   Heat     Ice/Vasocompression     THER ACT  TOTAL TIME FOR SESSION Not performed   Bed mobility     Transfer training     Body Mechanics/Work Training     THER EX  TOTAL TIME FOR SESSION  45 minutes   CARDIOVASCULAR      Nu Step N 5 minutes with rest in between as needed   Upright Bike seat #6 (main gym)  Upright bike x 10 minutes   Elliptical     Treadmill     UBE     STRENGTHENING     Supine ther-ex N Heel slides with strap  10 x 10 sec     n SLR 10x1    n Quad sets in long sitting  10 x 10 sec    Y HS stretch in long sitting 3 x 30 sec, hip flexors ar EOM  2x, 1 min    y LAQ 10 x 2, 3 #    n calf stretch 3 x 30 sec level 3 incline    y Sit to stand high mat 5x2 on airex with 4 kg ball with dynamic arms    y Prone knee flex 10x2,2.5#, prone quads stretch with strap 2x, 20 sec     y Prone hip extension 1x10   Standing ther-ex y Hip abduction and hip extension with orange band around ankles 5 x 15 feet   STRETCHING     balance y Balancing on airex 30 sec, 3x   Other stretching N Lat step ups on 6 inch step 7x    N Toe taps 10x2, no hold   REPEATED MOVEMENTS n 8 inch forward step overs 2 x 10   NEURO RE-ED  TOTAL TIME FOR SESSION Not performed   COORDINATION     POSTURAL RE-ED        PRE-GAIT ACTIVITIES      BALANCE TRAINING      Sitting balance     Standing balance     GAIT TRAINING  TOTAL TIME FOR SESSION Not performed    Ambulation  N  without  feet, CS , 2x, cues to get R hip extension, equal step length   Dynamic gait      Stair negotiation N FF of stairs with alternate legs as able , 2 HR CS   Curb negotiation     Ramp negotiation     Outdoor ambulation     BWS/vector training     MANUAL  TOTAL TIME FOR SESSION 10  minutes   Stretching N C/R for flexion in sitting   Mobilization  y Patellar mobs   STM  Y Bilaterally quad, ITB, hamstring and retro massage to open lymphatic channels   Taping

## 2019-12-11 NOTE — OP PT TREATMENT LOG
ORTHO PT FLOWSHEET    Exercises Done yes/ no Current Session Time   MODALITIES- HEAT/ICE  TOTAL TIME FOR SESSION Not performed   Heat     Ice/Vasocompression     THER ACT  TOTAL TIME FOR SESSION Not performed   Bed mobility     Transfer training     Body Mechanics/Work Training     THER EX  TOTAL TIME FOR SESSION  45 minutes   CARDIOVASCULAR      Nu Step N 5 minutes with rest in between as needed   Upright Bike seat #6 (main gym)  Upright bike x 10 minutes   Elliptical     Treadmill     UBE     STRENGTHENING     Supine ther-ex N Heel slides with strap  10 x 10 sec     n SLR 10x1    n Quad sets in long sitting  10 x 10 sec    Y HS stretch in long sitting 3 x 30 sec, hip flexors ar EOM  2x, 1 min    y LAQ 10 x 2, 3 #    n calf stretch 3 x 30 sec level 3 incline    y Sit to stand high mat 5x2 on airex with 4 kg ball with dynamic arms    y Prone knee flex 10x2,2.5#, prone quads stretch with strap 2x, 20 sec     y Prone hip extension 1x10   Standing ther-ex y Hip abduction and hip extension with orange band around ankles 5 x 15 feet   STRETCHING     balance y Balancing on airex 30 sec, 3x   Other stretching N Lat step ups on 6 inch step 7x    N Toe taps 10x2, no hold   REPEATED MOVEMENTS n 8 inch forward step overs 2 x 10   NEURO RE-ED  TOTAL TIME FOR SESSION Not performed   COORDINATION     POSTURAL RE-ED        PRE-GAIT ACTIVITIES      BALANCE TRAINING      Sitting balance     Standing balance     GAIT TRAINING  TOTAL TIME FOR SESSION Not performed   Ambulation  N  without  feet, CS , 2x, cues to get R hip extension, equal step length   Dynamic gait      Stair negotiation N FF of stairs with alternate legs as able , 2 HR CS   Curb negotiation     Ramp negotiation     Outdoor ambulation     BWS/vector training     MANUAL  TOTAL TIME FOR SESSION 10  minutes   Stretching N C/R for flexion in sitting   Mobilization  y Patellar mobs   STM  Y Bilaterally quad, ITB, hamstring and retro massage to open lymphatic channels    Taping

## 2019-12-13 ENCOUNTER — HOSPITAL ENCOUNTER (OUTPATIENT)
Dept: PHYSICAL THERAPY | Facility: REHABILITATION | Age: 69
Setting detail: THERAPIES SERIES
Discharge: HOME | End: 2019-12-13
Attending: ORTHOPAEDIC SURGERY
Payer: MEDICARE

## 2019-12-13 DIAGNOSIS — Z96.653 PRESENCE OF ARTIFICIAL KNEE JOINT, BILATERAL: Primary | ICD-10-CM

## 2019-12-13 PROCEDURE — 97110 THERAPEUTIC EXERCISES: CPT | Mod: GP

## 2019-12-13 PROCEDURE — 97140 MANUAL THERAPY 1/> REGIONS: CPT | Mod: GP

## 2019-12-13 NOTE — OP PT TREATMENT LOG
ORTHO PT FLOWSHEET    Exercises Done yes/ no Current Session Time   MODALITIES- HEAT/ICE  TOTAL TIME FOR SESSION Not performed   Heat     Ice/Vasocompression     THER ACT  TOTAL TIME FOR SESSION Not performed   Bed mobility     Transfer training     Body Mechanics/Work Training     THER EX  TOTAL TIME FOR SESSION  45 minutes   CARDIOVASCULAR      Nu Step N 5 minutes with rest in between as needed   Upright Bike seat #6 (main gym) Y Upright bike x 10 minutes   Elliptical     Treadmill     UBE     STRENGTHENING     Supine ther-ex y Heel slides with strap  10 x 10 sec     y SLR 10x2    Y Clamshell 10x2    n Quad sets in long sitting  10 x 10 sec    Y HS stretch in long sitting 3 x 30 sec, hip flexors ar EOM  2x, 1 min    y LAQ 10 x 2, 3 #    n calf stretch 3 x 30 sec level 3 incline    y Sit to stand high mat 5x2 on airex with 4 kg ball with dynamic arms    y Prone knee flex 10x2,2.5#, prone quads stretch with strap 2x, 20 sec     y Prone hip extension 1x10   Standing ther-ex y Hip abduction and hip extension with orange band around ankles 5 x 15 feet   STRETCHING     balance y Balancing on airex 30 sec, 3x   Other stretching N Lat step ups on 6 inch step 7x    N Toe taps 10x2, no hold   REPEATED MOVEMENTS n 8 inch forward step overs 2 x 10   NEURO RE-ED  TOTAL TIME FOR SESSION Not performed   COORDINATION     POSTURAL RE-ED        PRE-GAIT ACTIVITIES      BALANCE TRAINING      Sitting balance     Standing balance     GAIT TRAINING  TOTAL TIME FOR SESSION Not performed   Ambulation  N  without  feet, CS , 2x, cues to get R hip extension, equal step length   Dynamic gait      Stair negotiation N FF of stairs with alternate legs as able , 2 HR CS   Curb negotiation     Ramp negotiation     Outdoor ambulation     BWS/vector training     MANUAL  TOTAL TIME FOR SESSION 10  minutes   Stretching N C/R for flexion in sitting   Mobilization  y Patellar mobs   STM  Y Bilaterally quad, ITB, hamstring and retro massage to  open lymphatic channels   Taping

## 2019-12-13 NOTE — PROGRESS NOTES
PT DAILY NOTE FOR OUTPATIENT THERAPY    Patient: Chapincito Yoon   MRN: [id number]  : 1950 68 y.o.  Referring Physician: Ted Zheng MD  Date of Visit: 2019      Certification Dates: 19 through 20    Diagnosis:   1. Presence of artificial knee joint, bilateral        Chief Complaints:   Chief Complaint   Patient presents with   • Dec ROM   • Dec Strength   • Other     difficulty on stairs       Precautions: cardiac, fall      TODAY'S VISIT    General Information - 19 1021        Session Details    Document Type  daily treatment     Mode of Treatment  individual therapy;physical therapy        Time Calculation    Start Time  1003     Stop Time  1100     Time Calculation (min)  57 min        General Information    Referring Physician  Dr Zheng     Pertinent History of Current Functional Problem  Mr Yoon is a 67 yo M with hypertension, dyslipidemia, prostate CA, osteoarthritis who was evaluated as an outpatient PT today , s/P  bilateral total knee replacement done by Dr Zheng on 10/29/19. He had a brief Inpatient rehab stay annd is now starting Op therapy. C/o include oedema R knee more than L, decreased AROM and difficulty walking and on steps.     Existing Precautions/Restrictions  cardiac;fall         Pain/Vitals - 19 1016        Pain Assessment    Currently in pain  Yes     Preferred Pain Scale  number (Numeric Rating Pain Scale)     Pain: Body location  Knee     Pain Rating (0-10): Pre Activity  2     Pain Rating (0-10): Activity  2     Pain Rating (0-10): Post Activity  3        Pain Intervention    Intervention   exs     Post Intervention Comments  slight increase in pain         Daily Falls Screen - 19 1025        Daily Falls Assessment    Patient reported fall since last visit  No         Daily Treatment Assessment and Plan - 19 1109        Daily Treatment Assessment and Plan    Progress toward goals  Progressing     Daily Outcome Summary  Some  soreness in hips from last session. Has tight hip flexors. worked  on stretching them. TE per flow sheet.     Plan and Recommendations  continue per poc. Focus on step overs next visit.           OBJECTIVE DATA TAKEN TODAY:    None taken    Today's Treatment:      ORTHO PT FLOWSHEET    Exercises Done yes/ no Current Session Time   MODALITIES- HEAT/ICE  TOTAL TIME FOR SESSION Not performed   Heat     Ice/Vasocompression     THER ACT  TOTAL TIME FOR SESSION Not performed   Bed mobility     Transfer training     Body Mechanics/Work Training     THER EX  TOTAL TIME FOR SESSION  45 minutes   CARDIOVASCULAR      Nu Step N 5 minutes with rest in between as needed   Upright Bike seat #6 (main gym) Y Upright bike x 10 minutes   Elliptical     Treadmill     UBE     STRENGTHENING     Supine ther-ex y Heel slides with strap  10 x 10 sec     y SLR 10x2    Y Clamshell 10x2    n Quad sets in long sitting  10 x 10 sec    Y HS stretch in long sitting 3 x 30 sec, hip flexors ar EOM  2x, 1 min    y LAQ 10 x 2, 3 #    n calf stretch 3 x 30 sec level 3 incline    y Sit to stand high mat 5x2 on airex with 4 kg ball with dynamic arms    y Prone knee flex 10x2,2.5#, prone quads stretch with strap 2x, 20 sec     y Prone hip extension 1x10   Standing ther-ex y Hip abduction and hip extension with orange band around ankles 5 x 15 feet   STRETCHING     balance y Balancing on airex 30 sec, 3x   Other stretching N Lat step ups on 6 inch step 7x    N Toe taps 10x2, no hold   REPEATED MOVEMENTS n 8 inch forward step overs 2 x 10   NEURO RE-ED  TOTAL TIME FOR SESSION Not performed   COORDINATION     POSTURAL RE-ED        PRE-GAIT ACTIVITIES      BALANCE TRAINING      Sitting balance     Standing balance     GAIT TRAINING  TOTAL TIME FOR SESSION Not performed   Ambulation  N  without  feet, CS , 2x, cues to get R hip extension, equal step length   Dynamic gait      Stair negotiation N FF of stairs with alternate legs as able , 2 HR CS   Curb  negotiation     Ramp negotiation     Outdoor ambulation     BWS/vector training     MANUAL  TOTAL TIME FOR SESSION 10  minutes   Stretching N C/R for flexion in sitting   Mobilization  y Patellar mobs   STM  Y Bilaterally quad, ITB, hamstring and retro massage to open lymphatic channels   Taping

## 2019-12-16 ENCOUNTER — HOSPITAL ENCOUNTER (OUTPATIENT)
Dept: PHYSICAL THERAPY | Facility: REHABILITATION | Age: 69
Setting detail: THERAPIES SERIES
Discharge: HOME | End: 2019-12-16
Attending: ORTHOPAEDIC SURGERY
Payer: MEDICARE

## 2019-12-16 DIAGNOSIS — Z96.653 PRESENCE OF ARTIFICIAL KNEE JOINT, BILATERAL: Primary | ICD-10-CM

## 2019-12-16 PROCEDURE — 97140 MANUAL THERAPY 1/> REGIONS: CPT | Mod: GP

## 2019-12-16 PROCEDURE — 97110 THERAPEUTIC EXERCISES: CPT | Mod: GP

## 2019-12-16 NOTE — OP PT TREATMENT LOG
ORTHO PT FLOWSHEET    Exercises Done yes/ no Current Session Time   MODALITIES- HEAT/ICE  TOTAL TIME FOR SESSION Not performed   Heat     Ice/Vasocompression     THER ACT  TOTAL TIME FOR SESSION Not performed   Bed mobility     Transfer training     Body Mechanics/Work Training     THER EX  TOTAL TIME FOR SESSION  45 minutes   CARDIOVASCULAR      Nu Step N 5 minutes with rest in between as needed   Upright Bike seat #6 (main gym) Y Upright bike x 10 minutes   Elliptical     Treadmill     UBE     STRENGTHENING     Supine ther-ex y Heel slides with strap  10 x 10 sec     y SLR 10x1    y LLR 10x1    n Quad sets in long sitting  10 x 10 sec    n HS stretch in long sitting 3 x 30 sec, hip flexors ar EOM  2x, 1 min    y LAQ 10 x 2, 3 #    y calf stretch 3 x 30 sec level 3 incline    n Sit to stand high mat 5x2 on airex with 4 kg ball with dynamic arms    y Prone knee flex 10x2,2.5#, prone quads stretch with strap 2x, 20 sec     y Prone hip extension 1x10   Standing ther-ex n Hip abduction and hip extension with orange band around ankles 5 x 15 feet   STRETCHING     balance y Balancing on airex 30 sec, 3x   Other stretching y Lat step ups on 6 inch step 5x    N Toe taps 10x2, no hold   REPEATED MOVEMENTS y 8 inch forward step overs 2 x 10   NEURO RE-ED  TOTAL TIME FOR SESSION Not performed   COORDINATION     POSTURAL RE-ED        PRE-GAIT ACTIVITIES      BALANCE TRAINING      Sitting balance     Standing balance     GAIT TRAINING  TOTAL TIME FOR SESSION Not performed   Ambulation  N  without  feet, CS , 2x, cues to get R hip extension, equal step length   Dynamic gait      Stair negotiation N FF of stairs with alternate legs as able , 2 HR CS   Curb negotiation     Ramp negotiation     Outdoor ambulation     BWS/vector training     MANUAL  TOTAL TIME FOR SESSION 10  minutes   Stretching y Hamstrings in supine by therapist, PROM for flexion bilat   Mobilization  y Patellar mobs   STM  n Bilaterally quad, ITB,  hamstring and retro massage to open lymphatic channels   Taping

## 2019-12-16 NOTE — PROGRESS NOTES
PT DAILY NOTE FOR OUTPATIENT THERAPY    Patient: Chapincito Yoon   MRN: [id number]  : 1950 69 y.o.  Referring Physician: Ted Zheng MD  Date of Visit: 2019      Certification Dates: 19 through 20    Diagnosis:   1. Presence of artificial knee joint, bilateral        Chief Complaints:   Chief Complaint   Patient presents with   • Difficulty Walking   • Other     difficulty on stairs       Precautions: cardiac, fall      TODAY'S VISIT    General Information - 19 1041        Session Details    Document Type  daily treatment     Mode of Treatment  physical therapy     OP Specialty  Orthopedics        Time Calculation    Start Time  1000     Stop Time  1100     Time Calculation (min)  60 min        General Information    Referring Physician  Dr Zheng     Pertinent History of Current Functional Problem  Mr Yoon is a 69 yo M with hypertension, dyslipidemia, prostate CA, osteoarthritis who was evaluated as an outpatient PT today , s/P  bilateral total knee replacement done by Dr Zheng on 10/29/19. He had a brief Inpatient rehab stay annd is now starting Op therapy. C/o include oedema R knee more than L, decreased AROM and difficulty walking and on steps.     Existing Precautions/Restrictions  cardiac;fall         Pain/Vitals - 19 1029        Pain Assessment    Currently in pain  Yes     Preferred Pain Scale  number (Numeric Rating Pain Scale)     Pain: Body location  Knee     Pain Rating (0-10): Pre Activity  2     Pain Rating (0-10): Activity  3     Pain Rating (0-10): Post Activity  2        Pre Activity Vital Signs    Pulse  77     BP  126/59     BP Location  Right upper arm     BP Method  Automatic     Patient Position  Sitting        Pain Intervention    Intervention   supervised exs     Post Intervention Comments  pain tolerable         Daily Falls Screen - 19 1044        Daily Falls Assessment    Patient reported fall since last visit  No         Daily Treatment  Assessment and Plan - 12/16/19 1544        Daily Treatment Assessment and Plan    Progress toward goals  Progressing     Daily Outcome Summary  Worked on step overs , still has difficulty with decending higher steps at home, also has decreased quads control. R IT band still tight. Overall good improvement.     Plan and Recommendations  continue per poc.            OBJECTIVE DATA TAKEN TODAY:    None taken    Today's Treatment:      ORTHO PT FLOWSHEET    Exercises Done yes/ no Current Session Time   MODALITIES- HEAT/ICE  TOTAL TIME FOR SESSION Not performed   Heat     Ice/Vasocompression     THER ACT  TOTAL TIME FOR SESSION Not performed   Bed mobility     Transfer training     Body Mechanics/Work Training     THER EX  TOTAL TIME FOR SESSION  45 minutes   CARDIOVASCULAR      Nu Step N 5 minutes with rest in between as needed   Upright Bike seat #6 (main gym) Y Upright bike x 10 minutes   Elliptical     Treadmill     UBE     STRENGTHENING     Supine ther-ex y Heel slides with strap  10 x 10 sec     y SLR 10x1    y LLR 10x1    n Quad sets in long sitting  10 x 10 sec    n HS stretch in long sitting 3 x 30 sec, hip flexors ar EOM  2x, 1 min    y LAQ 10 x 2, 3 #    y calf stretch 3 x 30 sec level 3 incline    n Sit to stand high mat 5x2 on airex with 4 kg ball with dynamic arms    y Prone knee flex 10x2,2.5#, prone quads stretch with strap 2x, 20 sec     y Prone hip extension 1x10   Standing ther-ex n Hip abduction and hip extension with orange band around ankles 5 x 15 feet   STRETCHING     balance y Balancing on airex 30 sec, 3x   Other stretching y Lat step ups on 6 inch step 5x    N Toe taps 10x2, no hold   REPEATED MOVEMENTS y 8 inch forward step overs 2 x 10   NEURO RE-ED  TOTAL TIME FOR SESSION Not performed   COORDINATION     POSTURAL RE-ED        PRE-GAIT ACTIVITIES      BALANCE TRAINING      Sitting balance     Standing balance     GAIT TRAINING  TOTAL TIME FOR SESSION Not performed   Ambulation  N  without AD  200 feet, CS , 2x, cues to get R hip extension, equal step length   Dynamic gait      Stair negotiation N FF of stairs with alternate legs as able , 2 HR CS   Curb negotiation     Ramp negotiation     Outdoor ambulation     BWS/vector training     MANUAL  TOTAL TIME FOR SESSION 10  minutes   Stretching y Hamstrings in supine by therapist, PROM for flexion bilat   Mobilization  y Patellar mobs   STM  n Bilaterally quad, ITB, hamstring and retro massage to open lymphatic channels   Taping

## 2019-12-18 ENCOUNTER — HOSPITAL ENCOUNTER (OUTPATIENT)
Dept: PHYSICAL THERAPY | Facility: REHABILITATION | Age: 69
Setting detail: THERAPIES SERIES
Discharge: HOME | End: 2019-12-18
Attending: ORTHOPAEDIC SURGERY
Payer: MEDICARE

## 2019-12-18 DIAGNOSIS — Z96.653 PRESENCE OF ARTIFICIAL KNEE JOINT, BILATERAL: Primary | ICD-10-CM

## 2019-12-18 PROCEDURE — 97110 THERAPEUTIC EXERCISES: CPT | Mod: GP

## 2019-12-18 PROCEDURE — 97140 MANUAL THERAPY 1/> REGIONS: CPT | Mod: GP

## 2019-12-18 NOTE — OP PT TREATMENT LOG
ORTHO PT FLOWSHEET    Exercises Done yes/ no Current Session Time   MODALITIES- HEAT/ICE  TOTAL TIME FOR SESSION Not performed   Heat     Ice/Vasocompression     THER ACT  TOTAL TIME FOR SESSION Not performed   Bed mobility     Transfer training     Body Mechanics/Work Training     THER EX  TOTAL TIME FOR SESSION  45 minutes   CARDIOVASCULAR      Nu Step N 5 minutes with rest in between as needed   Upright Bike seat #6 (main gym) Y Upright bike x 7 minutes   Elliptical     Treadmill y 7 min, speed 2.2 mph    UBE     STRENGTHENING     Supine ther-ex n Heel slides with strap  10 x 10 sec     n SLR 10x1    n LLR 10x1    n Quad sets in long sitting  10 x 10 sec    y HS stretch in long sitting 3 x 30 sec, hip flexors ar EOM  2x, 1 min    n LAQ 10 x 2, 3 #    y calf stretch 3 x 30 sec level 3 on steps    n Sit to stand high mat 5x2 on airex with 4 kg ball with dynamic arms    n Prone knee flex 10x2,2.5#, prone quads stretch with strap 2x, 20 sec     n Prone hip extension 1x10   Standing ther-ex n Hip abduction and hip extension with orange band around ankles 5 x 15 feet   STRETCHING     balance n Balancing on airex 30 sec, 3x   Other stretching y Lat step ups on 6 inch step 5x with hip abd with opposite leg 10x    y Step overs 8 inch with 1 UE hold 7x   REPEATED MOVEMENTS y 6 inch forward step ups  10x1, with opp leg tap to second step   NEURO RE-ED  TOTAL TIME FOR SESSION Not performed   COORDINATION     POSTURAL RE-ED        PRE-GAIT ACTIVITIES      BALANCE TRAINING      Sitting balance     Standing balance     GAIT TRAINING  TOTAL TIME FOR SESSION Not performed   Ambulation  N  without  feet, CS , 2x, cues to get R hip extension, equal step length   Dynamic gait      Stair negotiation N FF of stairs with alternate legs as able , 2 HR CS   Curb negotiation     Ramp negotiation     Outdoor ambulation     BWS/vector training     MANUAL  TOTAL TIME FOR SESSION 10  minutes   Stretching y Hamstrings in supine by  therapist, PROM for flexion bilat   Mobilization  y Patellar mobs   STM  n Bilaterally quad, ITB, hamstring and retro massage to open lymphatic channels   Taping

## 2019-12-18 NOTE — PROGRESS NOTES
PT DAILY NOTE FOR OUTPATIENT THERAPY    Patient: Chapincito Yoon   MRN: [id number]  : 1950 69 y.o.  Referring Physician: Ted Zheng MD  Date of Visit: 2019      Certification Dates: 19 through 20    Diagnosis:   1. Presence of artificial knee joint, bilateral        Chief Complaints:   Chief Complaint   Patient presents with   • Dec ROM   • Dec Strength       Precautions: cardiac, fall      TODAY'S VISIT    General Information - 19 1015        Session Details    Document Type  daily treatment     Mode of Treatment  individual therapy;physical therapy        Time Calculation    Start Time  1002     Stop Time  1100     Time Calculation (min)  58 min        General Information    Patient Profile Reviewed?  yes     Referring Physician  Dr Zheng     Pertinent History of Current Functional Problem  Mr Yoon is a 67 yo M with hypertension, dyslipidemia, prostate CA, osteoarthritis who was evaluated as an outpatient PT today , s/P  bilateral total knee replacement done by Dr Zheng on 10/29/19. He had a brief Inpatient rehab stay annd is now starting Op therapy. C/o include oedema R knee more than L, decreased AROM and difficulty walking and on steps.     Existing Precautions/Restrictions  cardiac;fall         Pain/Vitals - 19 1010        Pain Assessment    Currently in pain  Yes     Preferred Pain Scale  number (Numeric Rating Pain Scale)     Pain: Body location  Knee     Pain Rating (0-10): Pre Activity  2        Pain Intervention    Intervention   supervised exs     Post Intervention Comments  pain low         Daily Falls Screen - 19 1016        Daily Falls Assessment    Patient reported fall since last visit  No         Daily Treatment Assessment and Plan - 19 1037        Daily Treatment Assessment and Plan    Progress toward goals  Not progressing     Daily Outcome Summary  Added more standing functional exsercises on steps, taps and challenged with same. Also  added 7 min of TM walking for endurance. as aerobic capacity is related to improving anticipatory postural control in patients. Tolerated well.     Plan and Recommendations  continue per poc.            OBJECTIVE DATA TAKEN TODAY:    None taken    Today's Treatment:      ORTHO PT FLOWSHEET    Exercises Done yes/ no Current Session Time   MODALITIES- HEAT/ICE  TOTAL TIME FOR SESSION Not performed   Heat     Ice/Vasocompression     THER ACT  TOTAL TIME FOR SESSION Not performed   Bed mobility     Transfer training     Body Mechanics/Work Training     THER EX  TOTAL TIME FOR SESSION  45 minutes   CARDIOVASCULAR      Nu Step N 5 minutes with rest in between as needed   Upright Bike seat #6 (main gym) Y Upright bike x 7 minutes   Elliptical     Treadmill y 7 min, speed 2.2 mph    UBE     STRENGTHENING     Supine ther-ex n Heel slides with strap  10 x 10 sec     n SLR 10x1    n LLR 10x1    n Quad sets in long sitting  10 x 10 sec    y HS stretch in long sitting 3 x 30 sec, hip flexors ar EOM  2x, 1 min    n LAQ 10 x 2, 3 #    y calf stretch 3 x 30 sec level 3 on steps    n Sit to stand high mat 5x2 on airex with 4 kg ball with dynamic arms    n Prone knee flex 10x2,2.5#, prone quads stretch with strap 2x, 20 sec     n Prone hip extension 1x10   Standing ther-ex n Hip abduction and hip extension with orange band around ankles 5 x 15 feet   STRETCHING     balance n Balancing on airex 30 sec, 3x   Other stretching y Lat step ups on 6 inch step 5x with hip abd with opposite leg 10x    y Step overs 8 inch with 1 UE hold 7x   REPEATED MOVEMENTS y 6 inch forward step ups  10x1, with opp leg tap to second step   NEURO RE-ED  TOTAL TIME FOR SESSION Not performed   COORDINATION     POSTURAL RE-ED        PRE-GAIT ACTIVITIES      BALANCE TRAINING      Sitting balance     Standing balance     GAIT TRAINING  TOTAL TIME FOR SESSION Not performed   Ambulation  N  without  feet, CS , 2x, cues to get R hip extension, equal step  length   Dynamic gait      Stair negotiation N FF of stairs with alternate legs as able , 2 HR CS   Curb negotiation     Ramp negotiation     Outdoor ambulation     BWS/vector training     MANUAL  TOTAL TIME FOR SESSION 10  minutes   Stretching y Hamstrings in supine by therapist, PROM for flexion bilat   Mobilization  y Patellar mobs   STM  n Bilaterally quad, ITB, hamstring and retro massage to open lymphatic channels   Taping

## 2019-12-20 ENCOUNTER — HOSPITAL ENCOUNTER (OUTPATIENT)
Dept: PHYSICAL THERAPY | Facility: REHABILITATION | Age: 69
Setting detail: THERAPIES SERIES
Discharge: HOME | End: 2019-12-20
Attending: ORTHOPAEDIC SURGERY
Payer: MEDICARE

## 2019-12-20 DIAGNOSIS — Z96.653 PRESENCE OF ARTIFICIAL KNEE JOINT, BILATERAL: Primary | ICD-10-CM

## 2019-12-20 PROCEDURE — 97110 THERAPEUTIC EXERCISES: CPT | Mod: GP

## 2019-12-20 PROCEDURE — 97140 MANUAL THERAPY 1/> REGIONS: CPT | Mod: GP

## 2019-12-20 NOTE — PROGRESS NOTES
PT DAILY NOTE FOR OUTPATIENT THERAPY    Patient: Chapincito Yoon   MRN: [id number]  : 1950 69 y.o.  Referring Physician: Ted Zheng MD  Date of Visit: 2019      Certification Dates: 19 through 20    Diagnosis:   1. Presence of artificial knee joint, bilateral        Chief Complaints:   Chief Complaint   Patient presents with   • Dec Strength   • Other     difficulty on steps       Precautions: cardiac, fall      TODAY'S VISIT    General Information - 19 1114        Session Details    Document Type  daily treatment     Mode of Treatment  individual therapy;physical therapy        Time Calculation    Start Time  1100     Stop Time  1200     Time Calculation (min)  60 min        General Information    Referring Physician  Dr Zheng     Pertinent History of Current Functional Problem  Mr Yoon is a 69 yo M with hypertension, dyslipidemia, prostate CA, osteoarthritis who was evaluated as an outpatient PT today , s/P  bilateral total knee replacement done by Dr Zheng on 10/29/19. He had a brief Inpatient rehab stay annd is now starting Op therapy. C/o include oedema R knee more than L, decreased AROM and difficulty walking and on steps.     Existing Precautions/Restrictions  cardiac;fall         Pain/Vitals - 19 1110        Pain Assessment    Currently in pain  Yes     Preferred Pain Scale  number (Numeric Rating Pain Scale)     Pain: Body location  Knee     Pain Rating (0-10): Pre Activity  2     Pain Rating (0-10): Activity  2     Pain Rating (0-10): Post Activity  2        Pain Intervention    Intervention   therapeutic exs     Post Intervention Comments  more stiffness than pain         Daily Falls Screen - 19 1116        Daily Falls Assessment    Patient reported fall since last visit  No         Daily Treatment Assessment and Plan - 19 1232        Daily Treatment Assessment and Plan    Progress toward goals  Progressing     Daily Outcome Summary  Focussed on  strengthening exercises as patient lacks confidence on stairs, partly due to decreased strength and partly due to decreased ROM. Provided and reviewed HEp and gave wriiten sheet.      Plan and Recommendations  continue per poc.            OBJECTIVE DATA TAKEN TODAY:    None taken    Today's Treatment:      ORTHO PT FLOWSHEET    Exercises Done yes/ no Current Session Time   MODALITIES- HEAT/ICE  TOTAL TIME FOR SESSION Not performed   Heat     Ice/Vasocompression     THER ACT  TOTAL TIME FOR SESSION Not performed   Bed mobility     Transfer training     Body Mechanics/Work Training     THER EX  TOTAL TIME FOR SESSION  45 minutes   CARDIOVASCULAR      Nu Step N 5 minutes with rest in between as needed   Upright Bike seat #6 (main gym) n Upright bike x 7 minutes   Elliptical     Treadmill n 7 min, speed 2.2 mph    UBE     STRENGTHENING     Supine ther-ex y Heel slides with strap  10 x 10 sec     y SLR 10x2, 2#    y LLR 10x1, 2#, clamshell 10x,2#    n Quad sets in long sitting  10 x 10 sec    y HS stretch in long sitting 3 x 30 sec, hip flexors ar EOM  2x, 1 min    n LAQ 10 x 2, 3 #    y calf stretch 3 x 30 sec level 3 on steps    y Sit to stand high mat 5x2 on airex with 4 kg ball with dynamic arms    y Prone knee flex 10x2,2.5#, prone quads stretch with strap 2x, 20 sec     n Prone hip extension 1x10   Standing ther-ex y Hip abduction and hip extension with orange band around ankles 5 x 15 feet   STRETCHING     balance y Balancing on airex 30 sec, 3x  Eyes open / EC, NBOS 30 sec, 4x  Mod SR 30 sec, 4x   Other stretching n Lat step ups on 6 inch step 5x with hip abd with opposite leg 10x    n Step overs 8 inch with 1 UE hold 7x   REPEATED MOVEMENTS n 6 inch forward step ups  10x1, with opp leg tap to second step   NEURO RE-ED  TOTAL TIME FOR SESSION Not performed   COORDINATION     POSTURAL RE-ED        PRE-GAIT ACTIVITIES      BALANCE TRAINING      Sitting balance     Standing balance     GAIT TRAINING  TOTAL TIME FOR  SESSION Not performed   Ambulation  N  without  feet, CS , 2x, cues to get R hip extension, equal step length   Dynamic gait      Stair negotiation N FF of stairs with alternate legs as able , 2 HR CS   Curb negotiation     Ramp negotiation     Outdoor ambulation     BWS/vector training     MANUAL  TOTAL TIME FOR SESSION 10  minutes   Stretching y Hamstrings in supine by therapist, PROM for flexion bilat   Mobilization  y Patellar mobs   STM  n Bilaterally quad, ITB, hamstring and retro massage to open lymphatic channels   Taping

## 2019-12-20 NOTE — OP PT TREATMENT LOG
ORTHO PT FLOWSHEET    Exercises Done yes/ no Current Session Time   MODALITIES- HEAT/ICE  TOTAL TIME FOR SESSION Not performed   Heat     Ice/Vasocompression     THER ACT  TOTAL TIME FOR SESSION Not performed   Bed mobility     Transfer training     Body Mechanics/Work Training     THER EX  TOTAL TIME FOR SESSION  45 minutes   CARDIOVASCULAR      Nu Step N 5 minutes with rest in between as needed   Upright Bike seat #6 (main gym) n Upright bike x 7 minutes   Elliptical     Treadmill n 7 min, speed 2.2 mph    UBE     STRENGTHENING     Supine ther-ex y Heel slides with strap  10 x 10 sec     y SLR 10x2, 2#    y LLR 10x1, 2#, clamshell 10x,2#    n Quad sets in long sitting  10 x 10 sec    y HS stretch in long sitting 3 x 30 sec, hip flexors ar EOM  2x, 1 min    n LAQ 10 x 2, 3 #    y calf stretch 3 x 30 sec level 3 on steps    y Sit to stand high mat 5x2 on airex with 4 kg ball with dynamic arms    y Prone knee flex 10x2,2.5#, prone quads stretch with strap 2x, 20 sec     n Prone hip extension 1x10   Standing ther-ex y Hip abduction and hip extension with orange band around ankles 5 x 15 feet   STRETCHING     balance y Balancing on airex 30 sec, 3x  Eyes open / EC, NBOS 30 sec, 4x  Mod SR 30 sec, 4x   Other stretching n Lat step ups on 6 inch step 5x with hip abd with opposite leg 10x    n Step overs 8 inch with 1 UE hold 7x   REPEATED MOVEMENTS n 6 inch forward step ups  10x1, with opp leg tap to second step   NEURO RE-ED  TOTAL TIME FOR SESSION Not performed   COORDINATION     POSTURAL RE-ED        PRE-GAIT ACTIVITIES      BALANCE TRAINING      Sitting balance     Standing balance     GAIT TRAINING  TOTAL TIME FOR SESSION Not performed   Ambulation  N  without  feet, CS , 2x, cues to get R hip extension, equal step length   Dynamic gait      Stair negotiation N FF of stairs with alternate legs as able , 2 HR CS   Curb negotiation     Ramp negotiation     Outdoor ambulation     BWS/vector training     MANUAL   TOTAL TIME FOR SESSION 10  minutes   Stretching y Hamstrings in supine by therapist, PROM for flexion bilat   Mobilization  y Patellar mobs   STM  n Bilaterally quad, ITB, hamstring and retro massage to open lymphatic channels   Taping

## 2019-12-23 ENCOUNTER — HOSPITAL ENCOUNTER (OUTPATIENT)
Dept: PHYSICAL THERAPY | Facility: REHABILITATION | Age: 69
Setting detail: THERAPIES SERIES
Discharge: HOME | End: 2019-12-23
Attending: ORTHOPAEDIC SURGERY
Payer: MEDICARE

## 2019-12-23 DIAGNOSIS — Z96.653 PRESENCE OF ARTIFICIAL KNEE JOINT, BILATERAL: Primary | ICD-10-CM

## 2019-12-23 PROCEDURE — 97110 THERAPEUTIC EXERCISES: CPT | Mod: GP

## 2019-12-23 PROCEDURE — 97112 NEUROMUSCULAR REEDUCATION: CPT | Mod: GP,KX

## 2019-12-23 NOTE — OP PT TREATMENT LOG
ORTHO PT FLOWSHEET    Exercises Done yes/ no Current Session Time   MODALITIES- HEAT/ICE  TOTAL TIME FOR SESSION Not performed   Heat     Ice/Vasocompression     THER ACT  TOTAL TIME FOR SESSION Not performed   Bed mobility     Transfer training     Body Mechanics/Work Training     THER EX  TOTAL TIME FOR SESSION  45 minutes   CARDIOVASCULAR      Nu Step N 5 minutes with rest in between as needed   Upright Bike seat #6 (main gym) y Upright bike x10 minutes   Elliptical     Treadmill n 7 min, speed 2.2 mph    UBE     STRENGTHENING     Supine ther-ex y Heel slides with strap  10 x 10 sec     n SLR 10x2, 2#    n LLR 10x1, 2#, clamshell 10x,2#    n Quad sets in long sitting  10 x 10 sec    y HS stretch with strap 3 x 30 sec, hip flexors ar EOM  2x, 1 min( hip flexors not done 12/23)    Y IT band stretch 30 sec, 2x with strap 30 sec, 2x    Y SAQ 10 x 2, 3 #    y calf stretch 3 x 30 sec level 3 on steps    y Sit to stand high mat 10x2 on airex with 6 kg ball with dynamic arms    N Prone knee flex 10x2,2.5#, prone quads stretch with strap 2x, 20 sec     n Prone hip extension 1x10   Standing ther-ex N Hip abduction and hip extension with orange band around ankles 5 x 15 feet   STRETCHING     balance y  Y  Y Balancing on airex 30 sec, 3x  Eyes open / EC, NBOS 30 sec, 4x  Mod SR 30 sec, 4xBalancing on airex 30 sec, 3x  Eyes open / EC, NBOS 30 sec, 4x  Mod SR 30 sec, 4x   Other stretching n Lat step ups on 6 inch step 5x with hip abd with opposite leg 10x    Y Step overs 8 inch with 1 UE hold 7x   REPEATED MOVEMENTS y 4 inch forward step downs  10x1   NEURO RE-ED  TOTAL TIME FOR SESSION 10 min   COORDINATION     POSTURAL RE-ED        PRE-GAIT ACTIVITIES      BALANCE TRAINING      Sitting balance     Standing balance y  Y  Y  Y Balancing on airex 30 sec, 3x  Eyes open / EC, NBOS 30 sec, 4x  Mod SR 30 sec, 4x  Walk with high march 10 feet, 2x   GAIT TRAINING  TOTAL TIME FOR SESSION 5 min   Ambulation  y  without  feet, CS  , 2x, cues to get R hip extension, equal step length   Dynamic gait      Stair negotiation y FF of stairs with alternate legs as able , 1 HR   Curb negotiation     Ramp negotiation     Outdoor ambulation     BWS/vector training     MANUAL  TOTAL TIME FOR SESSION 0  minutes   Stretching n Hamstrings in supine by therapist, PROM for flexion bilat   Mobilization  n Patellar mobs   STM  n Bilaterally quad, ITB, hamstring and retro massage to open lymphatic channels   Taping

## 2019-12-23 NOTE — PROGRESS NOTES
PT DAILY NOTE FOR OUTPATIENT THERAPY    Patient: Chapincito Yoon   MRN: [id number]  : 1950 69 y.o.  Referring Physician: Ted Zheng MD  Date of Visit: 2019      Certification Dates:   through      Diagnosis:   1. Presence of artificial knee joint, bilateral        Chief Complaints:   Chief Complaint   Patient presents with   • Dec Strength   • Dec ROM   • Other     difficulty on stairs       Precautions: cardiac, fall      TODAY'S VISIT    General Information - 19 1309        Session Details    Document Type  daily treatment        Time Calculation    Start Time  1300     Stop Time  1400     Time Calculation (min)  60 min        General Information    Referring Physician  Dr Zheng     Pertinent History of Current Functional Problem  Mr Yoon is a 69 yo M with hypertension, dyslipidemia, prostate CA, osteoarthritis who was evaluated as an outpatient PT today , s/P  bilateral total knee replacement done by Dr Zheng on 10/29/19. He had a brief Inpatient rehab stay annd is now starting Op therapy. C/o include oedema R knee more than L, decreased AROM and difficulty walking and on steps.     Existing Precautions/Restrictions  cardiac;fall         Pain/Vitals - 19 1308        Pain Assessment    Currently in pain  Yes     Preferred Pain Scale  number (Numeric Rating Pain Scale)     Pain: Body location  Knee     Pain Rating (0-10): Pre Activity  2     Pain Rating (0-10): Activity  2        Pain Intervention    Intervention   exs     Post Intervention Comments  pain low         Daily Falls Screen - 19 1310        Daily Falls Assessment    Patient reported fall since last visit  No         Daily Treatment Assessment and Plan - 19 1355        Daily Treatment Assessment and Plan    Progress toward goals  Progressing     Daily Outcome Summary  Focussed on strengthening exercises as patient lacks confidence on stairs, partly due to decreased strength and partly due to decreased ROM.  Added 4 ich step downs to improve quads eccentric control.     Plan and Recommendations  continue per poc.            OBJECTIVE DATA TAKEN TODAY:    None taken    Today's Treatment:      ORTHO PT FLOWSHEET    Exercises Done yes/ no Current Session Time   MODALITIES- HEAT/ICE  TOTAL TIME FOR SESSION Not performed   Heat     Ice/Vasocompression     THER ACT  TOTAL TIME FOR SESSION Not performed   Bed mobility     Transfer training     Body Mechanics/Work Training     THER EX  TOTAL TIME FOR SESSION  45 minutes   CARDIOVASCULAR      Nu Step N 5 minutes with rest in between as needed   Upright Bike seat #6 (main gym) y Upright bike x10 minutes   Elliptical     Treadmill n 7 min, speed 2.2 mph    UBE     STRENGTHENING     Supine ther-ex y Heel slides with strap  10 x 10 sec     n SLR 10x2, 2#    n LLR 10x1, 2#, clamshell 10x,2#    n Quad sets in long sitting  10 x 10 sec    y HS stretch with strap 3 x 30 sec, hip flexors ar EOM  2x, 1 min( hip flexors not done 12/23)    Y IT band stretch 30 sec, 2x with strap 30 sec, 2x    Y SAQ 10 x 2, 3 #    y calf stretch 3 x 30 sec level 3 on steps    y Sit to stand high mat 10x2 on airex with 6 kg ball with dynamic arms    N Prone knee flex 10x2,2.5#, prone quads stretch with strap 2x, 20 sec     n Prone hip extension 1x10   Standing ther-ex N Hip abduction and hip extension with orange band around ankles 5 x 15 feet   STRETCHING     balance y  Y  Y Balancing on airex 30 sec, 3x  Eyes open / EC, NBOS 30 sec, 4x  Mod SR 30 sec, 4xBalancing on airex 30 sec, 3x  Eyes open / EC, NBOS 30 sec, 4x  Mod SR 30 sec, 4x   Other stretching n Lat step ups on 6 inch step 5x with hip abd with opposite leg 10x    Y Step overs 8 inch with 1 UE hold 7x   REPEATED MOVEMENTS y 4 inch forward step downs  10x1   NEURO RE-ED  TOTAL TIME FOR SESSION 10 min   COORDINATION     POSTURAL RE-ED        PRE-GAIT ACTIVITIES      BALANCE TRAINING      Sitting balance     Standing balance y  Y  Y  Y Balancing on  airex 30 sec, 3x  Eyes open / EC, NBOS 30 sec, 4x  Mod SR 30 sec, 4x  Walk with high march 10 feet, 2x   GAIT TRAINING  TOTAL TIME FOR SESSION 5 min   Ambulation  y  without  feet, CS , 2x, cues to get R hip extension, equal step length   Dynamic gait      Stair negotiation y FF of stairs with alternate legs as able , 1 HR   Curb negotiation     Ramp negotiation     Outdoor ambulation     BWS/vector training     MANUAL  TOTAL TIME FOR SESSION 0  minutes   Stretching n Hamstrings in supine by therapist, PROM for flexion bilat   Mobilization  n Patellar mobs   STM  n Bilaterally quad, ITB, hamstring and retro massage to open lymphatic channels   Taping

## 2019-12-26 ENCOUNTER — HOSPITAL ENCOUNTER (OUTPATIENT)
Dept: PHYSICAL THERAPY | Facility: REHABILITATION | Age: 69
Setting detail: THERAPIES SERIES
Discharge: HOME | End: 2019-12-26
Attending: ORTHOPAEDIC SURGERY
Payer: MEDICARE

## 2019-12-26 DIAGNOSIS — Z96.653 PRESENCE OF ARTIFICIAL KNEE JOINT, BILATERAL: Primary | ICD-10-CM

## 2019-12-26 PROCEDURE — 97110 THERAPEUTIC EXERCISES: CPT | Mod: GP

## 2019-12-26 NOTE — PROGRESS NOTES
PT DAILY NOTE FOR OUTPATIENT THERAPY    Patient: Chapincito Yoon   MRN: [id number]  : 1950 69 y.o.  Referring Physician: Ted Zheng MD  Date of Visit: 2019      Certification Dates: 19 through 20    Diagnosis:   1. Presence of artificial knee joint, bilateral        Chief Complaints: No chief complaint on file.      Precautions: cardiac, fall      TODAY'S VISIT    General Information - 19 1106        Session Details    Document Type  daily treatment     Mode of Treatment  individual therapy;physical therapy     Patient/Family/Caregiver Comments/Observations  1/10 discomfort at start of session.     OP Specialty  Orthopedics        Time Calculation    Start Time  1105     Stop Time  1200     Time Calculation (min)  55 min        General Information    Patient Profile Reviewed?  yes     Referring Physician  Dr. Zheng     Existing Precautions/Restrictions  cardiac;fall         Pain/Vitals - 19 1105        Pain Assessment    Currently in pain  Yes     Preferred Pain Scale  number (Numeric Rating Pain Scale)     Pain: Body location  Knee     Pain Rating (0-10): Pre Activity  1     Pain Rating (0-10): Post Activity  1        Pain Intervention    Intervention   exercise     Post Intervention Comments  none         Daily Falls Screen - 19 1110        Daily Falls Assessment    Patient reported fall since last visit  No         Daily Treatment Assessment and Plan - 19 1158        Daily Treatment Assessment and Plan    Progress toward goals  Progressing     Daily Outcome Summary  Patient is making steady gains. His ROM is WNL and his strength, stability and gait mechanics are all improved.      Plan and Recommendations  Patient to be seen for 2 more sessions then discharge to independent [facility].               Today's Treatment:      ORTHO PT FLOWSHEET    Exercises Done yes/ no Current Session Time   MODALITIES- HEAT/ICE  TOTAL TIME FOR SESSION Not performed   Heat      Ice/Vasocompression     THER ACT  TOTAL TIME FOR SESSION Not performed   Bed mobility     Transfer training     Body Mechanics/Work Training     THER EX  TOTAL TIME FOR SESSION  55 minutes   CARDIOVASCULAR      Nu Step N 5 minutes with rest in between as needed   Upright Bike seat #10  y x 10 minutes   Elliptical     Treadmill n 7 min, speed 2.2 mph    UBE     STRENGTHENING     Supine ther-ex n Heel slides with strap  10 x 10 sec     n SLR 10x2, 2#    n LLR 10x1, 2#, clamshell 10x,2#    n Quad sets in long sitting  10 x 10 sec    y HS stretch with strap 3 x 30 sec, hip flexors with strap  2 x 60 sec Bilateral    Y IT band stretch cross over the opposite leg 3 x 30 sec Bilateral    Y LAQ with DF in TKE 3#   2 x 10  5 sec hold    n calf stretch 3 x 30 sec on steps    y Sit to stand high mat on airex with 6# ball with chest press 2 x 10     N Prone knee flex 10x2,2.5#, prone quads stretch with strap 2x, 20 sec     n Prone hip extension 1x10    y 8 inch step up/over 2 x 10 bilaterally   STRETCHING     balance n  n  n Balancing on airex 30 sec, 3x  Eyes open / EC, NBOS 30 sec, 4x  Mod SR 30 sec, 4xBalancing on airex 30 sec, 3x  Eyes open / EC, NBOS 30 sec, 4x  Mod SR 30 sec, 4x   Other stretching n Lat step ups on 6 inch step 5x with hip abd with opposite leg 10x      l   REPEATED MOVEMENTS     NEURO RE-ED  TOTAL TIME FOR SESSION 10 min   COORDINATION     POSTURAL RE-ED        PRE-GAIT ACTIVITIES      BALANCE TRAINING      Sitting balance     Standing balance n  n  n  n Balancing on airex 30 sec, 3x  Eyes open / EC, NBOS 30 sec, 4x  Mod SR 30 sec, 4x  Walk with high march 10 feet, 2x   GAIT TRAINING  TOTAL TIME FOR SESSION Not performed   Ambulation  n  without  feet, CS , 2x, cues to get R hip extension, equal step length   Dynamic gait      Stair negotiation n FF of stairs with alternate legs as able , 1 HR   Curb negotiation     Ramp negotiation     Outdoor ambulation     BWS/vector training     MANUAL  TOTAL  TIME FOR SESSION Not performed   Stretching n Hamstrings in supine by therapist, PROM for flexion bilat   Mobilization  n Patellar mobs   STM  n Bilaterally quad, ITB, hamstring and retro massage to open lymphatic channels   Taping

## 2019-12-26 NOTE — OP PT TREATMENT LOG
ORTHO PT FLOWSHEET    Exercises Done yes/ no Current Session Time   MODALITIES- HEAT/ICE  TOTAL TIME FOR SESSION Not performed   Heat     Ice/Vasocompression     THER ACT  TOTAL TIME FOR SESSION Not performed   Bed mobility     Transfer training     Body Mechanics/Work Training     THER EX  TOTAL TIME FOR SESSION  55 minutes   CARDIOVASCULAR      Nu Step N 5 minutes with rest in between as needed   Upright Bike seat #10  y x 10 minutes   Elliptical     Treadmill n 7 min, speed 2.2 mph    UBE     STRENGTHENING     Supine ther-ex n Heel slides with strap  10 x 10 sec     n SLR 10x2, 2#    n LLR 10x1, 2#, clamshell 10x,2#    n Quad sets in long sitting  10 x 10 sec    y HS stretch with strap 3 x 30 sec, hip flexors with strap  2 x 60 sec Bilateral    Y IT band stretch cross over the opposite leg 3 x 30 sec Bilateral    Y LAQ with DF in TKE 3#   2 x 10  5 sec hold    n calf stretch 3 x 30 sec on steps    y Sit to stand high mat on airex with 6# ball with chest press 2 x 10     N Prone knee flex 10x2,2.5#, prone quads stretch with strap 2x, 20 sec     n Prone hip extension 1x10    y 8 inch step up/over 2 x 10 bilaterally   STRETCHING     balance n  n  n Balancing on airex 30 sec, 3x  Eyes open / EC, NBOS 30 sec, 4x  Mod SR 30 sec, 4xBalancing on airex 30 sec, 3x  Eyes open / EC, NBOS 30 sec, 4x  Mod SR 30 sec, 4x   Other stretching n Lat step ups on 6 inch step 5x with hip abd with opposite leg 10x      l   REPEATED MOVEMENTS     NEURO RE-ED  TOTAL TIME FOR SESSION 10 min   COORDINATION     POSTURAL RE-ED        PRE-GAIT ACTIVITIES      BALANCE TRAINING      Sitting balance     Standing balance n  n  n  n Balancing on airex 30 sec, 3x  Eyes open / EC, NBOS 30 sec, 4x  Mod SR 30 sec, 4x  Walk with high march 10 feet, 2x   GAIT TRAINING  TOTAL TIME FOR SESSION Not performed   Ambulation  n  without  feet, CS , 2x, cues to get R hip extension, equal step length   Dynamic gait      Stair negotiation n FF of stairs with  alternate legs as able , 1 HR   Curb negotiation     Ramp negotiation     Outdoor ambulation     BWS/vector training     MANUAL  TOTAL TIME FOR SESSION Not performed   Stretching n Hamstrings in supine by therapist, PROM for flexion bilat   Mobilization  n Patellar mobs   STM  n Bilaterally quad, ITB, hamstring and retro massage to open lymphatic channels   Taping

## 2019-12-27 ENCOUNTER — HOSPITAL ENCOUNTER (OUTPATIENT)
Dept: PHYSICAL THERAPY | Facility: REHABILITATION | Age: 69
Setting detail: THERAPIES SERIES
Discharge: HOME | End: 2019-12-27
Attending: ORTHOPAEDIC SURGERY
Payer: MEDICARE

## 2019-12-27 DIAGNOSIS — Z96.653 PRESENCE OF ARTIFICIAL KNEE JOINT, BILATERAL: Primary | ICD-10-CM

## 2019-12-27 PROCEDURE — 97110 THERAPEUTIC EXERCISES: CPT | Mod: GP

## 2019-12-27 NOTE — OP PT TREATMENT LOG
ORTHO PT FLOWSHEET    Exercises Done yes/ no Current Session Time   MODALITIES- HEAT/ICE  TOTAL TIME FOR SESSION Not performed   Heat     Ice/Vasocompression     THER ACT  TOTAL TIME FOR SESSION Not performed   Bed mobility     Transfer training     Body Mechanics/Work Training     THER EX  TOTAL TIME FOR SESSION  55 minutes   CARDIOVASCULAR      Nu Step N 5 minutes with rest in between as needed   recumbent Bike seat  y x 7 minutes   Elliptical     Treadmill Y 7 min, speed 2.5 mph , incline 2    UBE     STRENGTHENING     Supine ther-ex n Heel slides with strap  10 x 10 sec     n SLR 10x2, 2#    n LLR 10x1, 2#, clamshell 10x,2#    n Quad sets in long sitting  10 x 10 sec    y HS stretch with strap 3 x 30 sec, hip flexors with strap  2 x 60 sec Bilateral    n IT band stretch cross over the opposite leg 3 x 30 sec Bilateral    n LAQ with DF in TKE 3#   2 x 10  5 sec hold    y calf stretch 3 x 30 sec on steps    n Sit to stand high mat on airex with 6# ball with chest press 2 x 10     Y  prone quads stretch with strap 3x, 30 sec     Y Prone hip extension 1x10    y 8 inch step up/over 2 x 10 bilaterally   STRETCHING     balance y  n  n  N  Y Balancing on airex 30 sec, 3x  Eyes open / EC, NBOS 30 sec, 4x  Mod SR 30 sec, 4xBalancing on airex 30 sec, 3x  Eyes open / EC, NBOS 30 sec, 4x  Balancing on rocker board 30 sec, 3x   Other stretching n Lat step ups on 6 inch step 5x with hip abd with opposite leg 10x   other  Yes WOMAC, TUG, LEFS, Gait speed done   REPEATED MOVEMENTS     NEURO RE-ED  TOTAL TIME FOR SESSION  min   COORDINATION     POSTURAL RE-ED        PRE-GAIT ACTIVITIES      BALANCE TRAINING      Sitting balance     Standing balance     GAIT TRAINING  TOTAL TIME FOR SESSION Not performed   Ambulation  n  without  feet, CS , 2x, cues to get R hip extension, equal step length   Dynamic gait      Stair negotiation n FF of stairs with alternate legs as able , 1 HR   Curb negotiation     Ramp negotiation      Outdoor ambulation     BWS/vector training     MANUAL  TOTAL TIME FOR SESSION Not performed   Stretching n Hamstrings in supine by therapist, PROM for flexion bilat   Mobilization  n Patellar mobs   STM  n Bilaterally quad, ITB, hamstring and retro massage to open lymphatic channels   Taping

## 2019-12-27 NOTE — PROGRESS NOTES
PT PROGRESS NOTE FOR OUTPATIENT THERAPY    Patient: Chapincito Yoon   MRN: [id number]  : 1950 69 y.o.  Referring Physician: Ted Zheng MD  Date of Visit: 2019      Certification Dates: 19 through 20    Recommended Frequency & Duration:  3 times/week for up to 3 months     Diagnosis:   1. Presence of artificial knee joint, bilateral        Chief Complaints:   Chief Complaint   Patient presents with   • Other     difficulty on stairs   • Dec Strength       Precautions: cardiac, fall    TODAY'S VISIT:    General Information - 19 1313        Session Details    Document Type  progress note     Mode of Treatment  physical therapy     OP Specialty  Orthopedics        Time Calculation    Start Time  1302     Stop Time  1400     Time Calculation (min)  58 min        General Information    Patient Profile Reviewed?  yes     Pertinent History of Current Functional Problem  Mr Yoon is a 67 yo M with hypertension, dyslipidemia, prostate CA, osteoarthritis who was evaluated as an outpatient PT today , s/P  bilateral total knee replacement done by Dr Zheng on 10/29/19. He had a brief Inpatient rehab stay annd is now starting Op therapy. C/o include oedema R knee more than L, decreased AROM and difficulty walking and on steps.     Existing Precautions/Restrictions  cardiac;fall         Pain/Vitals - 19 1311        Pain Assessment    Currently in pain  Yes     Preferred Pain Scale  number (Numeric Rating Pain Scale)     Pain: Body location  Knee     Pain Rating (0-10): Pre Activity  2        Pain Intervention    Intervention   supervised exs     Post Intervention Comments  mini pain         Daily Falls Screen - 19 1316        Daily Falls Assessment    Patient reported fall since last visit  No         Daily Treatment Assessment and Plan - 19 1425        Daily Treatment Assessment and Plan    Progress toward goals  Progressing     Daily Outcome Summary  Chris continues to make  nice gains in his function, his gait speed and TUG scores have improved since his last re-assess. Main difficulty remains is on descending stairs due to decreased strength and control. Plan is to work on finalizing his HEP, transition to a gym program and DC on 1/3/20.     Plan and Recommendations  As above           OBJECTIVE MEASUREMENTS/DATA:    None taken    Today's Treatment::      ORTHO PT FLOWSHEET    Exercises Done yes/ no Current Session Time   MODALITIES- HEAT/ICE  TOTAL TIME FOR SESSION Not performed   Heat     Ice/Vasocompression     THER ACT  TOTAL TIME FOR SESSION Not performed   Bed mobility     Transfer training     Body Mechanics/Work Training     THER EX  TOTAL TIME FOR SESSION  55 minutes   CARDIOVASCULAR      Nu Step N 5 minutes with rest in between as needed   recumbent Bike seat  y x 7 minutes   Elliptical     Treadmill Y 7 min, speed 2.5 mph , incline 2    UBE     STRENGTHENING     Supine ther-ex n Heel slides with strap  10 x 10 sec     n SLR 10x2, 2#    n LLR 10x1, 2#, clamshell 10x,2#    n Quad sets in long sitting  10 x 10 sec    y HS stretch with strap 3 x 30 sec, hip flexors with strap  2 x 60 sec Bilateral    n IT band stretch cross over the opposite leg 3 x 30 sec Bilateral    n LAQ with DF in TKE 3#   2 x 10  5 sec hold    y calf stretch 3 x 30 sec on steps    n Sit to stand high mat on airex with 6# ball with chest press 2 x 10     Y  prone quads stretch with strap 3x, 30 sec     Y Prone hip extension 1x10    y 8 inch step up/over 2 x 10 bilaterally   STRETCHING     balance y  n  n  N  Y Balancing on airex 30 sec, 3x  Eyes open / EC, NBOS 30 sec, 4x  Mod SR 30 sec, 4xBalancing on airex 30 sec, 3x  Eyes open / EC, NBOS 30 sec, 4x  Balancing on rocker board 30 sec, 3x   Other stretching n Lat step ups on 6 inch step 5x with hip abd with opposite leg 10x   other  Yes WOMAC, TUG, LEFS, Gait speed done   REPEATED MOVEMENTS     NEURO RE-ED  TOTAL TIME FOR SESSION  min   COORDINATION      POSTURAL RE-ED        PRE-GAIT ACTIVITIES      BALANCE TRAINING      Sitting balance     Standing balance     GAIT TRAINING  TOTAL TIME FOR SESSION Not performed   Ambulation  n  without  feet, CS , 2x, cues to get R hip extension, equal step length   Dynamic gait      Stair negotiation n FF of stairs with alternate legs as able , 1 HR   Curb negotiation     Ramp negotiation     Outdoor ambulation     BWS/vector training     MANUAL  TOTAL TIME FOR SESSION Not performed   Stretching n Hamstrings in supine by therapist, PROM for flexion bilat   Mobilization  n Patellar mobs   STM  n Bilaterally quad, ITB, hamstring and retro massage to open lymphatic channels   Taping            Goals Addressed                 This Visit's Progress    • PT Knee Goals        Short term goals {ML PT    Short Term Goals Time Frame Result Comment/Progress   Pt will increase bilat LE  MMT into flexion, abduction, ER and IR >/= ½ grade 4 weeks met    Pt will increase bilat knee AROM >/= 10 degrees into flexion, extension to improve functional mobility 4 weeks met    Increase LEFS >/= 9 points to increase function   4 weeks met    Increase WOMAC >/= 20 points to increase function  4 weeks ongoing    Increase TUG </= 12.5 sec to decrease fall risk 4 weeks met    Pt will increase gait speed (SSV) by 0.2 meters/seconds to improve function 4 weeks met    Pt will be independent with HEP to increase carryover at home 4 weeks met    Pt will be able to walk 200 feet  With SPC and CS and no loss of balance 4 weeks met                test 11/13/19  12/3/19         WOMAC  40/96 using RW  26/96 18/96       LEFS 39/80 graded using RW 39/80  48/80       TUG  22.2 sec with RW  9.32 sec with no AD 7.5 sec        SSV   0.27 m/s with RW  1.06 m/s with no AD 1.19 m/s CGS  FGS 1.3 m/s            Clinical Impression: nice gains in function, some difficulty on stairs.

## 2019-12-31 ENCOUNTER — HOSPITAL ENCOUNTER (OUTPATIENT)
Dept: PHYSICAL THERAPY | Facility: REHABILITATION | Age: 69
Setting detail: THERAPIES SERIES
Discharge: HOME | End: 2019-12-31
Attending: ORTHOPAEDIC SURGERY
Payer: MEDICARE

## 2019-12-31 DIAGNOSIS — Z96.653 PRESENCE OF ARTIFICIAL KNEE JOINT, BILATERAL: Primary | ICD-10-CM

## 2019-12-31 PROCEDURE — 97110 THERAPEUTIC EXERCISES: CPT | Mod: GP

## 2019-12-31 NOTE — OP PT TREATMENT LOG
ORTHO PT FLOWSHEET    Exercises Done yes/ no Current Session Time   MODALITIES- HEAT/ICE  TOTAL TIME FOR SESSION Not performed   Heat     Ice/Vasocompression     THER ACT  TOTAL TIME FOR SESSION Not performed   Bed mobility     Transfer training     Body Mechanics/Work Training     THER EX  TOTAL TIME FOR SESSION  55 minutes   CARDIOVASCULAR      Nu Step n 5 minutes with rest in between as needed   recumbent Bike seat #7 y Level 2 x 10 minutes   Elliptical     Treadmill n 7 min, speed 2.5 mph , incline 2    UBE     STRENGTH/STRETCH     Supine ther-ex n Heel slides with strap  10 x 10 sec     n SLR 10x2, 2#    n LLR 10x1, 2#, clamshell 10x,2#    n Quad sets in long sitting  10 x 10 sec    y Long sitting HS stretch 3 x 30 sec, hip flexors with strap  3 x 30 sec Bilateral    n IT band stretch cross over the opposite leg 3 x 30 sec Bilateral    y LAQ with DF in TKE 3#   2 x 10  5 sec hold    y calf stretch 3 x 30 sec on steps    n Sit to stand high mat on airex with 6# ball with chest press 2 x 10     Y  prone quads stretch with strap 3 x 30 sec     Y Prone hip extension 1x10    y Forward step downs 4    y 8 inch step up/over 2 x 10 bilaterally   balance n  n  n  N  n Balancing on airex 30 sec, 3x  Eyes open / EC, NBOS 30 sec, 4x  Mod SR 30 sec, 4xBalancing on airex 30 sec, 3x  Eyes open / EC, NBOS 30 sec, 4x  Balancing on rocker board 30 sec, 3x   Other stretching n Lat step ups on 6 inch step 5x with hip abd with opposite leg 10x   other n WOMAC, TUG, LEFS, Gait speed done   REPEATED MOVEMENTS     NEURO RE-ED  TOTAL TIME FOR SESSION Not performed   COORDINATION     POSTURAL RE-ED        PRE-GAIT ACTIVITIES      BALANCE TRAINING      Sitting balance     Standing balance     GAIT TRAINING  TOTAL TIME FOR SESSION brief   Ambulation  n  without  feet, CS , 2x, cues to get R hip extension, equal step length   Dynamic gait      Stair negotiation y 6 inch stairs with alternate legs as able , 1 HR   Curb negotiation      Ramp negotiation     Outdoor ambulation     BWS/vector training     MANUAL  TOTAL TIME FOR SESSION Not performed   Stretching n Hamstrings in supine by therapist, PROM for flexion bilat   Mobilization  n Patellar mobs   STM  n Bilaterally quad, ITB, hamstring and retro massage to open lymphatic channels   Taping

## 2019-12-31 NOTE — PROGRESS NOTES
PT DAILY NOTE FOR OUTPATIENT THERAPY    Patient: Chapincito Yoon   MRN: [id number]  : 1950 69 y.o.  Referring Physician: Ted Zheng MD  Date of Visit: 2019      Certification Dates: 19 through 20    Diagnosis:   1. Presence of artificial knee joint, bilateral        Chief Complaints: No chief complaint on file.      Precautions: cardiac, fall      TODAY'S VISIT    General Information - 19 1105        Session Details    Document Type  daily treatment     Mode of Treatment  individual therapy;physical therapy     Patient/Family/Caregiver Comments/Observations  No pain, just stiffness     OP Specialty  Orthopedics        Time Calculation    Start Time  1105     Stop Time  1200     Time Calculation (min)  55 min        General Information    Patient Profile Reviewed?  yes     Referring Physician  Dr. Zheng     Existing Precautions/Restrictions  cardiac;fall         Pain/Vitals - 19 1104        Pain Assessment    Currently in pain  No/Denies    just stiffness       Pain Intervention    Intervention   exercise     Post Intervention Comments  none         Daily Falls Screen - 19 1107        Daily Falls Assessment    Patient reported fall since last visit  No         Daily Treatment Assessment and Plan - 19 1426        Daily Treatment Assessment and Plan    Progress toward goals  Progressing     Daily Outcome Summary  Patient is improving. He still has some discomfort going down stairs, but overall is making excellent progress.     Plan and Recommendations  Continue with POC per primary PT.               Today's Treatment:      ORTHO PT FLOWSHEET    Exercises Done yes/ no Current Session Time   MODALITIES- HEAT/ICE  TOTAL TIME FOR SESSION Not performed   Heat     Ice/Vasocompression     THER ACT  TOTAL TIME FOR SESSION Not performed   Bed mobility     Transfer training     Body Mechanics/Work Training     THER EX  TOTAL TIME FOR SESSION  55 minutes   CARDIOVASCULAR       Nu Step n 5 minutes with rest in between as needed   recumbent Bike seat #7 y Level 2 x 10 minutes   Elliptical     Treadmill n 7 min, speed 2.5 mph , incline 2    UBE     STRENGTH/STRETCH     Supine ther-ex n Heel slides with strap  10 x 10 sec     n SLR 10x2, 2#    n LLR 10x1, 2#, clamshell 10x,2#    n Quad sets in long sitting  10 x 10 sec    y Long sitting HS stretch 3 x 30 sec, hip flexors with strap  3 x 30 sec Bilateral    n IT band stretch cross over the opposite leg 3 x 30 sec Bilateral    y LAQ with DF in TKE 3#   2 x 10  5 sec hold    y calf stretch 3 x 30 sec on steps    n Sit to stand high mat on airex with 6# ball with chest press 2 x 10     Y  prone quads stretch with strap 3 x 30 sec     Y Prone hip extension 1x10    y Forward step downs 4    y 8 inch step up/over 2 x 10 bilaterally   balance n  n  n  N  n Balancing on airex 30 sec, 3x  Eyes open / EC, NBOS 30 sec, 4x  Mod SR 30 sec, 4xBalancing on airex 30 sec, 3x  Eyes open / EC, NBOS 30 sec, 4x  Balancing on rocker board 30 sec, 3x   Other stretching n Lat step ups on 6 inch step 5x with hip abd with opposite leg 10x   other n WOMAC, TUG, LEFS, Gait speed done   REPEATED MOVEMENTS     NEURO RE-ED  TOTAL TIME FOR SESSION Not performed   COORDINATION     POSTURAL RE-ED        PRE-GAIT ACTIVITIES      BALANCE TRAINING      Sitting balance     Standing balance     GAIT TRAINING  TOTAL TIME FOR SESSION brief   Ambulation  n  without  feet, CS , 2x, cues to get R hip extension, equal step length   Dynamic gait      Stair negotiation y 6 inch stairs with alternate legs as able , 1 HR   Curb negotiation     Ramp negotiation     Outdoor ambulation     BWS/vector training     MANUAL  TOTAL TIME FOR SESSION Not performed   Stretching n Hamstrings in supine by therapist, PROM for flexion bilat   Mobilization  n Patellar mobs   STM  n Bilaterally quad, ITB, hamstring and retro massage to open lymphatic channels   Taping

## 2020-01-03 ENCOUNTER — HOSPITAL ENCOUNTER (OUTPATIENT)
Dept: PHYSICAL THERAPY | Facility: REHABILITATION | Age: 70
Setting detail: THERAPIES SERIES
Discharge: HOME | End: 2020-01-03
Attending: ORTHOPAEDIC SURGERY
Payer: MEDICARE

## 2020-01-03 DIAGNOSIS — Z96.653 PRESENCE OF ARTIFICIAL KNEE JOINT, BILATERAL: Primary | ICD-10-CM

## 2020-01-03 PROCEDURE — 97110 THERAPEUTIC EXERCISES: CPT | Mod: GP

## 2020-01-03 NOTE — OP PT TREATMENT LOG
ORTHO PT FLOWSHEET    Exercises Done yes/ no Current Session Time   MODALITIES- HEAT/ICE  TOTAL TIME FOR SESSION Not performed   Heat     Ice/Vasocompression     THER ACT  TOTAL TIME FOR SESSION Not performed   Bed mobility     Transfer training     Body Mechanics/Work Training     THER EX  TOTAL TIME FOR SESSION  55 minutes   CARDIOVASCULAR      Nu Step n 5 minutes with rest in between as needed   recumbent Bike seat #7 y Level 2 x 10 minutes   Elliptical     Treadmill n 7 min, speed 2.5 mph , incline 2    UBE     STRENGTH/STRETCH     Supine ther-ex Y Heel slides with strap  10 x 10 sec     n SLR 10x2, 2#    n LLR 10x1, 2#, clamshell 10x,2#    y Quad sets in long sitting  10 x 10 sec    y Long sitting HS stretch 3 x 30 sec, hip flexors with strap  3 x 30 sec Bilateral    n IT band stretch cross over the opposite leg 3 x 30 sec Bilateral    Y LAQ with DF in TKE 3#   2 x 10  5 sec hold    y calf stretch 3 x 30 sec on steps    n Sit to stand high mat on airex with 6# ball with chest press 2 x 10     N  prone quads stretch with strap 3 x 30 sec     N Prone hip extension 1x10    y Forward step downs 4    y 8 inch step up/over 1 x 10 bilaterally   balance n  n  n  N  n Balancing on airex 30 sec, 3x  Eyes open / EC, NBOS 30 sec, 4x  Mod SR 30 sec, 4xBalancing on airex 30 sec, 3x  Eyes open / EC, NBOS 30 sec, 4x  Balancing on rocker board 30 sec, 3x   Other stretching n Lat step ups on 6 inch step 5x with hip abd with opposite leg 10x   other Y WOMAC, TUG, LEFS, Gait speed done for discharge   REPEATED MOVEMENTS     NEURO RE-ED  TOTAL TIME FOR SESSION Not performed   COORDINATION     POSTURAL RE-ED        PRE-GAIT ACTIVITIES      BALANCE TRAINING      Sitting balance     Standing balance     GAIT TRAINING  TOTAL TIME FOR SESSION brief   Ambulation  n  without  feet, CS , 2x, cues to get R hip extension, equal step length   Dynamic gait      Stair negotiation y 6 inch stairs with alternate legs as able , 1 HR   Curb  negotiation     Ramp negotiation     Outdoor ambulation     BWS/vector training     MANUAL  TOTAL TIME FOR SESSION Not performed   Stretching n Hamstrings in supine by therapist, PROM for flexion bilat   Mobilization  n Patellar mobs   STM  n Bilaterally quad, ITB, hamstring and retro massage to open lymphatic channels   Taping

## 2020-01-03 NOTE — PROGRESS NOTES
PT DISCHARGE NOTE FOR OUTPATIENT THERAPY    Patient: Chapincito Yoon   MRN: [id number]  : 1950 69 y.o.  Referring Physician: Ted Zheng MD  Date of Visit: 1/3/2020      Certification Dates: 19 through 20        Chief Complaints:   Chief Complaint   Patient presents with   • Dec Strength       Precautions: cardiac, fall    TODAY'S VISIT:    General Information - 20 1001        Session Details    Document Type  discharge evaluation     Mode of Treatment  physical therapy     OP Specialty  Orthopedics        Time Calculation    Start Time  1000     Stop Time  1100     Time Calculation (min)  60 min        General Information    Patient Profile Reviewed?  yes     Pertinent History of Current Functional Problem  Mr Yoon is a 69 yo M with hypertension, dyslipidemia, prostate CA, osteoarthritis who was evaluated as an outpatient PT today , s/P  bilateral total knee replacement done by Dr Zheng on 10/29/19. He had a brief Inpatient rehab stay annd is now starting Op therapy. C/o include oedema R knee more than L, decreased AROM and difficulty walking and on steps.     Existing Precautions/Restrictions  cardiac;fall         Pain/Vitals - 20 1001        Pain Assessment    Currently in pain  No/Denies        Pain Intervention    Intervention   na     Post Intervention Comments  na           Daily Treatment Assessment and Plan - 20 1050        Daily Treatment Assessment and Plan    Progress toward goals  Progressing     Daily Outcome Summary  Chris Yoon has made very nice gains in physical therapy. On Dc his AROM of both knees is WFL, he is able to do stairs independently now with slight diffcult for dexcending. He can do curbs  and ramps independently, He has met his goals and is Dc with a HEP. He will return to [employer] for general fitness exercises gradually.      Plan and Recommendations  Dc with HEP.           OBJECTIVE MEASUREMENTS/DATA:    ROM   Range of Motion -  01/03/20 1100        RIGHT: Lower Extremity AROM Assessment    Knee Flexion Deficit  124     Knee Extension Deficit  0        LEFT: Lower Extremity AROM Assessment    Knee Flexion Deficit  121     Knee Extension Deficit  0         MMT     Overall 4+/5 LE strength    Today's Treatment:  test eval Progress note Progress note Dc 1/3/20    WOMAC  40/96 using RW  26/96 18/96 6/96      LEFS 39/80 graded using RW 39/80  48/80 53/80     TUG  22.2 sec with RW  9.32 sec with no AD 7.5 sec  7.4 sec      SSV   0.27 m/s with RW  1.06 m/s with no AD 1.19 m/s CGS  FGS 1.3 m/s 1.44 m/s SSV  FGS 1.65 m/s               ORTHO PT FLOWSHEET    Exercises Done yes/ no Current Session Time   MODALITIES- HEAT/ICE  TOTAL TIME FOR SESSION Not performed   Heat     Ice/Vasocompression     THER ACT  TOTAL TIME FOR SESSION Not performed   Bed mobility     Transfer training     Body Mechanics/Work Training     THER EX  TOTAL TIME FOR SESSION  55 minutes   CARDIOVASCULAR      Nu Step n 5 minutes with rest in between as needed   recumbent Bike seat #7 y Level 2 x 10 minutes   Elliptical     Treadmill n 7 min, speed 2.5 mph , incline 2    UBE     STRENGTH/STRETCH     Supine ther-ex Y Heel slides with strap  10 x 10 sec     n SLR 10x2, 2#    n LLR 10x1, 2#, clamshell 10x,2#    y Quad sets in long sitting  10 x 10 sec    y Long sitting HS stretch 3 x 30 sec, hip flexors with strap  3 x 30 sec Bilateral    n IT band stretch cross over the opposite leg 3 x 30 sec Bilateral    Y LAQ with DF in TKE 3#   2 x 10  5 sec hold    y calf stretch 3 x 30 sec on steps    n Sit to stand high mat on airex with 6# ball with chest press 2 x 10     N  prone quads stretch with strap 3 x 30 sec     N Prone hip extension 1x10    y Forward step downs 4    y 8 inch step up/over 1 x 10 bilaterally   balance n  n  n  N  n Balancing on airex 30 sec, 3x  Eyes open / EC, NBOS 30 sec, 4x  Mod SR 30 sec, 4xBalancing on airex 30 sec, 3x  Eyes open / EC, NBOS 30 sec, 4x  Balancing on  rocker board 30 sec, 3x   Other stretching n Lat step ups on 6 inch step 5x with hip abd with opposite leg 10x   other Y WOMAC, TUG, LEFS, Gait speed done for discharge   REPEATED MOVEMENTS     NEURO RE-ED  TOTAL TIME FOR SESSION Not performed   COORDINATION     POSTURAL RE-ED        PRE-GAIT ACTIVITIES      BALANCE TRAINING      Sitting balance     Standing balance     GAIT TRAINING  TOTAL TIME FOR SESSION brief   Ambulation  n  without  feet, CS , 2x, cues to get R hip extension, equal step length   Dynamic gait      Stair negotiation y 6 inch stairs with alternate legs as able , 1 HR   Curb negotiation     Ramp negotiation     Outdoor ambulation     BWS/vector training     MANUAL  TOTAL TIME FOR SESSION Not performed   Stretching n Hamstrings in supine by therapist, PROM for flexion bilat   Mobilization  n Patellar mobs   STM  n Bilaterally quad, ITB, hamstring and retro massage to open lymphatic channels   Taping          Goals Addressed                 This Visit's Progress    • PT Knee Goals        Short term goals {ML PT    Short Term Goals Time Frame Result Comment/Progress   Pt will increase bilat LE  MMT into flexion, abduction, ER and IR >/= ½ grade 4 weeks met    Pt will increase bilat knee AROM >/= 10 degrees into flexion, extension to improve functional mobility 4 weeks met    Increase LEFS >/= 9 points to increase function   4 weeks met    Increase WOMAC >/= 20 points to increase function  4 weeks met    Increase TUG </= 12.5 sec to decrease fall risk 4 weeks met    Pt will increase gait speed (SSV) by 0.2 meters/seconds to improve function 4 weeks met    Pt will be independent with HEP to increase carryover at home 4 weeks met    Pt will be able to walk 200 feet  With SPC and CS and no loss of balance 4 weeks met            • PT Knee Goals        Long term goals    Long Term Goals Time Frame Result Comment/Progress   Pt will increase bialt LE strength to 4+/5 or more 12 weeks met    Pt will  increase bilat  knee ROM >/= 0-120 degrees into flexion, extension to improve functional mobility  met    Increase LEFS >/= 20 points from eval to increase function    met    Increase WOMAC >/= 20 points to increase function  met    Increase TUG </= 10 with SPC sec to decrease fall risk  met    Pt will increase gait speed (SSV) >/= 0.2 meters/seconds to improve function, with SPC  met    Decrease pain at worst </= 3//10   met    Pt will be able to ascend/descent 12 steps with reciprocal pattern with 1 UE   met    Pt will be able to walk community distances with SPC , without gait deviation  met                    Clinical Impression: met goals.    Discharge information for CARF:    Reason for D/C: Goals met  Hospitalization during treatment: No  Increased independence: [place] in HEP, Increased functional activity  Increased production assessment: Increased community independence  Interrupted for medical reason: No  Skin integrity: Intact

## 2020-11-13 ENCOUNTER — APPOINTMENT (OUTPATIENT)
Dept: URBAN - METROPOLITAN AREA CLINIC 200 | Age: 70
Setting detail: DERMATOLOGY
End: 2020-12-01

## 2020-11-13 DIAGNOSIS — L81.4 OTHER MELANIN HYPERPIGMENTATION: ICD-10-CM

## 2020-11-13 DIAGNOSIS — L57.8 OTHER SKIN CHANGES DUE TO CHRONIC EXPOSURE TO NONIONIZING RADIATION: ICD-10-CM

## 2020-11-13 DIAGNOSIS — Z85.828 PERSONAL HISTORY OF OTHER MALIGNANT NEOPLASM OF SKIN: ICD-10-CM

## 2020-11-13 PROCEDURE — OTHER COUNSELING: OTHER

## 2020-11-13 PROCEDURE — 99213 OFFICE O/P EST LOW 20 MIN: CPT

## 2020-11-13 PROCEDURE — OTHER REASSURANCE: OTHER

## 2020-11-13 PROCEDURE — OTHER MIPS QUALITY: OTHER

## 2020-11-13 ASSESSMENT — LOCATION ZONE DERM
LOCATION ZONE: HAND
LOCATION ZONE: TRUNK

## 2020-11-13 ASSESSMENT — LOCATION DETAILED DESCRIPTION DERM
LOCATION DETAILED: PERIUMBILICAL SKIN
LOCATION DETAILED: RIGHT RADIAL DORSAL HAND
LOCATION DETAILED: LEFT MEDIAL SUPERIOR CHEST

## 2020-11-13 ASSESSMENT — LOCATION SIMPLE DESCRIPTION DERM
LOCATION SIMPLE: CHEST
LOCATION SIMPLE: RIGHT HAND
LOCATION SIMPLE: ABDOMEN

## 2021-01-06 NOTE — PROCEDURE: MIPS QUALITY
PEG: ***, on 01/06/2021    ORT: ***, on 01/06/2021        GENERAL: Denies fatigue, fever, loss of appetite, or unexplained weight loss.  SKIN: Denies rash, itching, or jaundice.  ?HEENT: Denies blurred vision, glaucoma, or hoarseness.  ??CV: Denies palpitations or leg swelling?.  ?PULM: Denies shortness of breath or cough.  ?GI: Denies heartburn, loss of bowel control, or constipation.  ?: Denies loss of bladder control, or urinary retention. ?  MSK: Denies joint pain, joint swelling, back pain, neck pain, pain shooting to arm/leg, or joint stiffness.  ??HEME/LYMPH: Denies unusual bleeding or easy bruising.  ?NEURO: Denies headache, numbness, weakness, memory loss, seizures, or frequent dizziness/lightheadedness.   ?PSYCH: Denies anxiety, insomnia, panic attacks, or rage.  Above bolded signs/symptoms are positive. All others are negative.     
Quality 110: Preventive Care And Screening: Influenza Immunization: Influenza Immunization Administered during Influenza season
Detail Level: Detailed

## 2021-05-13 ENCOUNTER — APPOINTMENT (OUTPATIENT)
Dept: URBAN - METROPOLITAN AREA CLINIC 200 | Age: 71
Setting detail: DERMATOLOGY
End: 2021-05-23

## 2021-05-13 DIAGNOSIS — L57.8 OTHER SKIN CHANGES DUE TO CHRONIC EXPOSURE TO NONIONIZING RADIATION: ICD-10-CM

## 2021-05-13 DIAGNOSIS — Z85.828 PERSONAL HISTORY OF OTHER MALIGNANT NEOPLASM OF SKIN: ICD-10-CM

## 2021-05-13 PROBLEM — I10 ESSENTIAL (PRIMARY) HYPERTENSION: Status: ACTIVE | Noted: 2021-05-13

## 2021-05-13 PROCEDURE — OTHER COUNSELING: OTHER

## 2021-05-13 PROCEDURE — 99212 OFFICE O/P EST SF 10 MIN: CPT

## 2021-05-13 ASSESSMENT — LOCATION SIMPLE DESCRIPTION DERM
LOCATION SIMPLE: CHEST
LOCATION SIMPLE: LEFT ANTERIOR NECK

## 2021-05-13 ASSESSMENT — LOCATION ZONE DERM
LOCATION ZONE: NECK
LOCATION ZONE: TRUNK

## 2021-05-13 ASSESSMENT — LOCATION DETAILED DESCRIPTION DERM
LOCATION DETAILED: LEFT INFERIOR ANTERIOR NECK
LOCATION DETAILED: LEFT MEDIAL SUPERIOR CHEST

## 2021-06-08 NOTE — DISCHARGE INSTR - ACTIVITY
"2275-0851: Pt admitted 6/3 with rectal bleeding r/t UC flare.  Remains A&O, vitally stable on 2L NC overnight, and up IND around room.  Reporting ongoing L sided abd pain, PRN IV dilaudid and oxycodone given with some relief.  1 small loose bloody stool overnight.  Hgb has been stable.  Voiding WNL.  Overnight pt .  Calls to make needs known.  Possible discharge 6/8.  Will continue to monitor    /73 (BP Location: Right arm)   Pulse 79   Temp 97.8  F (36.6  C) (Oral)   Resp 20   Ht 1.6 m (5' 3\")   Wt 112 kg (247 lb)   SpO2 93%   BMI 43.75 kg/m       " Occupational Therapy - Keya Hargrove MS, OTR/L    Toilet Transfers: Modified Independent. Before sitting down, make sure to reach back for the commode. When standing up, make sure to use the commode to push up from.    Shower/Tub Transfers: Supervision. Complete the step-over entry to your shower chair in your shower stall. I recommend having someone close by when showering for your overall safety. Prior to exiting the tub, dry the floor and walls to decrease your risk of falls. Also, utilize a non-slip mat outside of your shower for your overall safety.    Upper Body Dressing: Independent. I recommend completing all dressing tasks seated for your overall safety. When collecting your clothes for the day, utilize the top walker or top of dresser for support. Utilize the reacher as needed to collect items out of safe reach. Place clothing items on the front bar of your walker or on your walker bag to avoid holding items in your hands when walking with the walker.    Lower Body Dressing: Modified Independent. I recommend completing dressing tasks seated for your overall safety. When standing to manage your clothing over your hips, make sure you have your balance and support on your walker. Utilize the reacher, sock aid, and long-handled shoe horn as needed.    Bathing: Supervision. Remain seated on the tub bench throughout your shower, standing only when the floor is dry and when using a grab bar for support. I recommend having someone close-by for the initial shower at home for your overall safety.    Toileting: Independent. When completing toileting tasks, remain seated and only stand when needed. Utilize the walker for support for managing your clothing as needed.    Grooming: Independent. I recommend (if there is room available) to have a chair close by when completing grooming tasks (I.e. Shaving) in case you begin to fatigue from standing. If becoming fatigued, take a seat to conserve your energy.       Household Mobility/Household Activity: Modified Independent. When using the walker throughout your home, place the walker bag on the front bar of your walker to safely transport items throughout the house. Never walk with items in your hands when using a walker. In the kitchen, slide items across the counter or use the walker bag. A travel thermos, water bottles, and ziploc containers make transporting snacks/drinks easier.     Driving: Driving is unsafe and not recommended at this time. Consult your physician for approval before resuming driving.     Entered by: Keya Hargrove OT on 11/11/2019        Physical Therapy: Alice Yee PT, DPT, [facility] on 11/11/19    Bed Mobility: Mr. oYon is modified independent with rolling and supine to/from sit.  Transfers: Mr. Yoon is modified independent with rolling walker for stand pivot transfers. She requires setup assist for car transfers with rolling walker.  Ambulation:  Mr. Yoon is modified independent with a rolling walker for 200 feet.  Elevations:  Mr. Yoon requires set up for stair climbing. When returning to his home, recommend second walker remain on second floor while he navigates a full flight of steps with 1 railing if able or have the helper place the rolling walker at the top of the steps/bottom of steps. Mr. Yoon can complete 6 inch curb and ramp with rolling walker with modified independence.    Left knee range of motion: 0-97 degrees  Right knee range of motion: 1-97 degrees  Equipment:  Mr. Yoon received a rolling walker from [facility]. Please contact [employer] [phone] regarding issues with rolling walker.  Family Training: Mrs. Yoon completed family training prior to  discharge. He is competent to instruct a caregiver if assistance is needed.    Recommendations: Recommend perform home exercise program per written instructions 1-2 times per day. Contact surgeon with any concerns. Do not progress to single point cane until  cleared by surgeon or physical therapist.

## 2021-11-16 ENCOUNTER — APPOINTMENT (OUTPATIENT)
Dept: URBAN - METROPOLITAN AREA CLINIC 200 | Age: 71
Setting detail: DERMATOLOGY
End: 2021-11-17

## 2021-11-16 DIAGNOSIS — Z41.9 ENCOUNTER FOR PROCEDURE FOR PURPOSES OTHER THAN REMEDYING HEALTH STATE, UNSPECIFIED: ICD-10-CM

## 2021-11-16 DIAGNOSIS — L57.8 OTHER SKIN CHANGES DUE TO CHRONIC EXPOSURE TO NONIONIZING RADIATION: ICD-10-CM

## 2021-11-16 DIAGNOSIS — Z11.52 ENCOUNTER FOR SCREENING FOR COVID-19: ICD-10-CM

## 2021-11-16 DIAGNOSIS — Z85.828 PERSONAL HISTORY OF OTHER MALIGNANT NEOPLASM OF SKIN: ICD-10-CM

## 2021-11-16 DIAGNOSIS — L82.1 OTHER SEBORRHEIC KERATOSIS: ICD-10-CM

## 2021-11-16 PROBLEM — L57.0 ACTINIC KERATOSIS: Status: ACTIVE | Noted: 2021-11-16

## 2021-11-16 PROCEDURE — OTHER SUNSCREEN RECOMMENDATIONS: OTHER

## 2021-11-16 PROCEDURE — OTHER REASSURANCE: OTHER

## 2021-11-16 PROCEDURE — OTHER MIPS QUALITY: OTHER

## 2021-11-16 PROCEDURE — OTHER SCREENING FOR COVID-19: OTHER

## 2021-11-16 PROCEDURE — OTHER RECOMMENDATIONS: OTHER

## 2021-11-16 PROCEDURE — 99213 OFFICE O/P EST LOW 20 MIN: CPT

## 2021-11-16 PROCEDURE — OTHER COUNSELING: OTHER

## 2021-11-16 ASSESSMENT — LOCATION SIMPLE DESCRIPTION DERM
LOCATION SIMPLE: ABDOMEN
LOCATION SIMPLE: LEFT EAR
LOCATION SIMPLE: RIGHT EAR
LOCATION SIMPLE: LEFT FOREHEAD
LOCATION SIMPLE: LEFT ANTERIOR NECK

## 2021-11-16 ASSESSMENT — LOCATION DETAILED DESCRIPTION DERM
LOCATION DETAILED: LEFT CRUS OF HELIX
LOCATION DETAILED: PERIUMBILICAL SKIN
LOCATION DETAILED: LEFT INFERIOR ANTERIOR NECK
LOCATION DETAILED: RIGHT CAVUM CONCHA
LOCATION DETAILED: LEFT FOREHEAD

## 2021-11-16 ASSESSMENT — LOCATION ZONE DERM
LOCATION ZONE: TRUNK
LOCATION ZONE: FACE
LOCATION ZONE: NECK
LOCATION ZONE: EAR

## 2021-11-16 ASSESSMENT — PAIN INTENSITY VAS: HOW INTENSE IS YOUR PAIN 0 BEING NO PAIN, 10 BEING THE MOST SEVERE PAIN POSSIBLE?: NO PAIN

## 2021-11-16 NOTE — PROCEDURE: RECOMMENDATIONS
Recommendations (Free Text): [employer] card  given today
Render Risk Assessment In Note?: no
Detail Level: Simple

## 2022-05-17 ENCOUNTER — APPOINTMENT (OUTPATIENT)
Dept: URBAN - METROPOLITAN AREA CLINIC 200 | Age: 72
Setting detail: DERMATOLOGY
End: 2022-05-17

## 2022-05-17 DIAGNOSIS — Z11.52 ENCOUNTER FOR SCREENING FOR COVID-19: ICD-10-CM

## 2022-05-17 DIAGNOSIS — L57.8 OTHER SKIN CHANGES DUE TO CHRONIC EXPOSURE TO NONIONIZING RADIATION: ICD-10-CM

## 2022-05-17 DIAGNOSIS — L82.1 OTHER SEBORRHEIC KERATOSIS: ICD-10-CM

## 2022-05-17 DIAGNOSIS — Z85.828 PERSONAL HISTORY OF OTHER MALIGNANT NEOPLASM OF SKIN: ICD-10-CM

## 2022-05-17 PROBLEM — I10 ESSENTIAL (PRIMARY) HYPERTENSION: Status: ACTIVE | Noted: 2022-05-17

## 2022-05-17 PROBLEM — L57.0 ACTINIC KERATOSIS: Status: ACTIVE | Noted: 2022-05-17

## 2022-05-17 PROCEDURE — OTHER COUNSELING: OTHER

## 2022-05-17 PROCEDURE — OTHER SCREENING FOR COVID-19: OTHER

## 2022-05-17 PROCEDURE — OTHER REASSURANCE: OTHER

## 2022-05-17 PROCEDURE — 99213 OFFICE O/P EST LOW 20 MIN: CPT

## 2022-05-17 PROCEDURE — OTHER SUNSCREEN RECOMMENDATIONS: OTHER

## 2022-05-17 ASSESSMENT — LOCATION SIMPLE DESCRIPTION DERM
LOCATION SIMPLE: RIGHT MIDDLE FINGER
LOCATION SIMPLE: LEFT FOREHEAD
LOCATION SIMPLE: ABDOMEN

## 2022-05-17 ASSESSMENT — PAIN INTENSITY VAS: HOW INTENSE IS YOUR PAIN 0 BEING NO PAIN, 10 BEING THE MOST SEVERE PAIN POSSIBLE?: NO PAIN

## 2022-05-17 ASSESSMENT — LOCATION DETAILED DESCRIPTION DERM
LOCATION DETAILED: PERIUMBILICAL SKIN
LOCATION DETAILED: RIGHT MID DORSAL MIDDLE FINGER
LOCATION DETAILED: LEFT INFERIOR FOREHEAD

## 2022-05-17 ASSESSMENT — LOCATION ZONE DERM
LOCATION ZONE: TRUNK
LOCATION ZONE: FACE
LOCATION ZONE: FINGER

## 2023-05-09 ENCOUNTER — APPOINTMENT (OUTPATIENT)
Dept: URBAN - METROPOLITAN AREA CLINIC 203 | Age: 73
Setting detail: DERMATOLOGY
End: 2023-05-16

## 2023-05-09 DIAGNOSIS — L57.0 ACTINIC KERATOSIS: ICD-10-CM

## 2023-05-09 DIAGNOSIS — L57.8 OTHER SKIN CHANGES DUE TO CHRONIC EXPOSURE TO NONIONIZING RADIATION: ICD-10-CM

## 2023-05-09 DIAGNOSIS — Z85.828 PERSONAL HISTORY OF OTHER MALIGNANT NEOPLASM OF SKIN: ICD-10-CM

## 2023-05-09 PROCEDURE — OTHER LIQUID NITROGEN: OTHER

## 2023-05-09 PROCEDURE — 17000 DESTRUCT PREMALG LESION: CPT

## 2023-05-09 PROCEDURE — 99213 OFFICE O/P EST LOW 20 MIN: CPT | Mod: 25

## 2023-05-09 PROCEDURE — OTHER COUNSELING: OTHER

## 2023-05-09 PROCEDURE — OTHER SUNSCREEN RECOMMENDATIONS: OTHER

## 2023-05-09 PROCEDURE — 17003 DESTRUCT PREMALG LES 2-14: CPT

## 2023-05-09 ASSESSMENT — LOCATION SIMPLE DESCRIPTION DERM
LOCATION SIMPLE: CHEST
LOCATION SIMPLE: SCALP
LOCATION SIMPLE: LEFT CHEEK
LOCATION SIMPLE: LEFT FOREHEAD
LOCATION SIMPLE: LEFT ZYGOMA
LOCATION SIMPLE: ABDOMEN
LOCATION SIMPLE: RIGHT FOREHEAD

## 2023-05-09 ASSESSMENT — LOCATION ZONE DERM
LOCATION ZONE: FACE
LOCATION ZONE: SCALP
LOCATION ZONE: TRUNK

## 2023-05-09 ASSESSMENT — LOCATION DETAILED DESCRIPTION DERM
LOCATION DETAILED: LEFT FOREHEAD
LOCATION DETAILED: LEFT MEDIAL INFERIOR CHEST
LOCATION DETAILED: RIGHT FOREHEAD
LOCATION DETAILED: LEFT SUPERIOR MEDIAL MALAR CHEEK
LOCATION DETAILED: LEFT SUPERIOR FOREHEAD
LOCATION DETAILED: RIGHT SUPERIOR FOREHEAD
LOCATION DETAILED: LEFT MEDIAL ZYGOMA
LOCATION DETAILED: PERIUMBILICAL SKIN
LOCATION DETAILED: RIGHT CENTRAL FRONTAL SCALP
LOCATION DETAILED: LEFT INFERIOR FOREHEAD

## 2023-05-09 ASSESSMENT — PAIN INTENSITY VAS: HOW INTENSE IS YOUR PAIN 0 BEING NO PAIN, 10 BEING THE MOST SEVERE PAIN POSSIBLE?: NO PAIN

## 2023-05-09 NOTE — PROCEDURE: LIQUID NITROGEN
Consent: The patient's consent was obtained including but not limited to risks of crusting, scabbing, blistering, scarring, darker or lighter pigmentary change, recurrence, incomplete removal and infection.
Post-Care Instructions: I reviewed with the patient in detail post-care instructions. Patient is to wear sunprotection, and avoid picking at any of the treated lesions. Pt may apply Vaseline to crusted or scabbing areas.
Show Applicator Variable?: Yes
Detail Level: Detailed
Render Post-Care Instructions In Note?: no
Number Of Freeze-Thaw Cycles: 2 freeze-thaw cycles
Duration Of Freeze Thaw-Cycle (Seconds): 5
Application Tool (Optional): Liquid Nitrogen Sprayer

## 2023-10-24 NOTE — PROGRESS NOTES
Cardiology  Office Consult Note         Reason for visit:   Chief Complaint   Patient presents with    Initial Evaluation       HPI     Chapincito Yoon is a 72 y.o. male who presents to the office for cardiovascular evaluation.    His past medical history is significant for HLD and HTN.    He has been seen in our office in 2019 for pre op clearance prior to a knee replacement with Dr Ku. He currently follows with Dr Kelley for his primary care and notes that during a recent colonoscopy the physician mentioned a murmur and advised that  I see cardiology. He also notes that he has been having issues with elevated BP and his valsartan has been titrated up but his BP remains elevated.     He denies any complaints of chest pain, shortness of breath, lightheadedness or dizziness. He is active golfing a few days a week without any cardiac complaints.     FLP 10/18/23 , Tgl 132, HDL 74, LDL 95   CMP BUN 14, Creat 0.93 eGFR 86     Past Medical History:   Diagnosis Date    Essential (primary) hypertension     Hyperlipidemia, unspecified     Impaired fasting glucose     Osteoarthritis     Knees, hands     Prostate cancer (CMS/HCC) 2011    Prostatectomy       Past Surgical History:   Procedure Laterality Date    COLONOSCOPY W/ POLYPECTOMY  2015    KNEE SURGERY Right     MCL Repair    MOHS SURGERY      Right finger    PROSTATECTOMY  2011    TOTAL KNEE ARTHROPLASTY Bilateral 10/29/2019        Social History     Tobacco Use   Smoking Status Former    Types: Cigarettes    Quit date:     Years since quittin.8   Smokeless Tobacco Never   Tobacco Comments    quit 40 years ago     Social History     Tobacco Use    Smoking status: Former     Types: Cigarettes     Quit date:      Years since quittin.8    Smokeless tobacco: Never    Tobacco comments:     quit 40 years ago   Substance Use Topics    Alcohol use: Yes     Alcohol/week: 3.0 standard drinks of alcohol     Types: 3 Cans of beer per  week     Comment: socially    Drug use: Never       Family History   Problem Relation Age of Onset    Liver cancer Biological Mother     Early death Biological Father     Heart disease Biological Father     No Known Problems Biological Sister     No Known Problems Biological Brother        Allergies:  Patient has no known allergies.    Current Outpatient Medications   Medication Sig Dispense Refill    amLODIPine (NORVASC) 5 mg tablet Take 1 tablet (5 mg total) by mouth daily. 90 tablet 1    aspirin 81 mg chewable tablet Take 1 tablet (81 mg total) by mouth 2 (two) times a day. RESUME FOR 4 WEEKS AFTER SURGERY 60 tablet 0    atorvastatin (LIPITOR) 10 mg tablet Take 10 mg by mouth daily.      multivitamin/iron/folic acid (CENTRUM ORAL) Take 1 tablet by mouth daily.      psyllium husk (METAMUCIL ORAL) Take by mouth.      valsartan (DIOVAN) 80 mg tablet Take 360 mg by mouth daily.       No current facility-administered medications for this visit.       Review of Systems   Constitutional: Negative for malaise/fatigue.   Cardiovascular: Negative for chest pain, dyspnea on exertion, irregular heartbeat, leg swelling and palpitations.   Respiratory: Negative for shortness of breath, sleep disturbances due to breathing and snoring.    Neurological: Negative for dizziness and light-headedness.       Objective     Vitals:    10/25/23 1357   BP: (!) 182/80   Pulse: 69   SpO2: 99%       Wt Readings from Last 1 Encounters:   10/25/23 99.3 kg (219 lb)       Body mass index is 33.3 kg/m².    Physical Exam  Vitals reviewed.   Cardiovascular:      Rate and Rhythm: Normal rate and regular rhythm.      Pulses:           Carotid pulses are 2+ on the right side and 2+ on the left side.     Heart sounds: Murmur heard.   Pulmonary:      Effort: Pulmonary effort is normal.   Musculoskeletal:         General: Normal range of motion.   Skin:     General: Skin is warm.      Capillary Refill: Capillary refill takes less than 2  seconds.   Neurological:      Mental Status: He is alert and oriented to person, place, and time.       ECG   NSR    Hematology  Lab Results   Component Value Date    WBC 7.95 11/11/2019    HGB 11.4 (L) 11/11/2019    HCT 34.2 (L) 11/11/2019     (H) 11/11/2019    INR 1.0 10/16/2019       Chemistries  Lab Results   Component Value Date     11/11/2019    K 4.8 11/11/2019     11/11/2019    CREATININE 0.8 11/11/2019    BUN 20 11/11/2019    CO2 29 11/11/2019    GLUCOSE 106 (H) 11/11/2019    CALCIUM 9.3 11/11/2019    MG 2.1 11/11/2019    ALT 12 (L) 11/01/2019    AST 17 11/01/2019     Endocrine  Lab Results   Component Value Date    HGBA1C 5.6 10/16/2019       Assessment/Plan     Essential (primary) hypertension  Continue valsartan 360 mg daily  Add amlodipine 5 mg daily  F/u 3 mo and I asked him to check BP, monitor and d/w me in the interim if it remains elevated >130/80 prior to next visit    Hyperlipidemia, unspecified  Continue atorvastatin 10 mg daily  LDL 95,  on 10/18/23    Murmur  [place] on exam perioperatively  Check TTE with next visit      New Medications Ordered This Visit   Medications    amLODIPine (NORVASC) 5 mg tablet     Sig: Take 1 tablet (5 mg total) by mouth daily.     Dispense:  90 tablet     Refill:  1         Orders Placed This Encounter   Procedures    ECG 12 lead     Scheduling Instructions:      PLEASE USE THIS ORDER FOR ECG'S PERFORMED IN PHYSICIAN OFFICES     Order Specific Question:   Release to patient     Answer:   Immediate     Order Specific Question:   Release to patient     Answer:   Immediate [1]    Transthoracic echo (TTE) complete     Standing Status:   Future     Standing Expiration Date:   10/25/2025     Scheduling Instructions:      To schedule cardiology appointments with [facility], call Central Scheduling at [phone]. Thank you.     Order Specific Question:   Is contrast and/or agitated saline (bubbles) indicated for the study? (Contrast =  Definity OR Lumason)     Answer:   No     Order Specific Question:   Release to patient     Answer:   Immediate [1]       Follow Up Plans:  Return in about 3 months (around 1/25/2024).          I, Linda Baron, am scribing for, and in the presence of, Braxton Garcia MD.     IBraxton MD, personally performed the services described in this documentation as scribed by Linda Baron in my presence, and it is both accurate and complete.     Braxton Garcia MD  10/25/2023

## 2023-10-25 ENCOUNTER — OFFICE VISIT (OUTPATIENT)
Dept: CARDIOLOGY | Facility: CLINIC | Age: 73
End: 2023-10-25
Payer: MEDICARE

## 2023-10-25 VITALS
OXYGEN SATURATION: 99 % | WEIGHT: 219 LBS | BODY MASS INDEX: 33.19 KG/M2 | SYSTOLIC BLOOD PRESSURE: 182 MMHG | HEIGHT: 68 IN | HEART RATE: 69 BPM | DIASTOLIC BLOOD PRESSURE: 80 MMHG

## 2023-10-25 DIAGNOSIS — R01.1 MURMUR: Primary | ICD-10-CM

## 2023-10-25 DIAGNOSIS — I10 ESSENTIAL (PRIMARY) HYPERTENSION: ICD-10-CM

## 2023-10-25 DIAGNOSIS — E78.2 MIXED HYPERLIPIDEMIA: ICD-10-CM

## 2023-10-25 LAB
ATRIAL RATE: 68
P AXIS: 63
PR INTERVAL: 150
QRS DURATION: 102
QT INTERVAL: 408
QTC CALCULATION(BAZETT): 433
R AXIS: 59
T WAVE AXIS: 16
VENTRICULAR RATE: 68

## 2023-10-25 PROCEDURE — 99205 OFFICE O/P NEW HI 60 MIN: CPT | Performed by: INTERNAL MEDICINE

## 2023-10-25 PROCEDURE — G8754 DIAS BP LESS 90: HCPCS | Performed by: INTERNAL MEDICINE

## 2023-10-25 PROCEDURE — G8753 SYS BP > OR = 140: HCPCS | Performed by: INTERNAL MEDICINE

## 2023-10-25 PROCEDURE — 93000 ELECTROCARDIOGRAM COMPLETE: CPT | Performed by: INTERNAL MEDICINE

## 2023-10-25 RX ORDER — AMLODIPINE BESYLATE 5 MG/1
5 TABLET ORAL DAILY
Qty: 90 TABLET | Refills: 1 | Status: SHIPPED | OUTPATIENT
Start: 2023-10-25 | End: 2024-04-17

## 2023-10-25 RX ORDER — AMLODIPINE BESYLATE 5 MG/1
5 TABLET ORAL DAILY
Qty: 90 TABLET | Refills: 1 | Status: SHIPPED | OUTPATIENT
Start: 2023-10-25 | End: 2023-10-25 | Stop reason: SDUPTHER

## 2023-10-25 ASSESSMENT — ENCOUNTER SYMPTOMS
SLEEP DISTURBANCES DUE TO BREATHING: 0
LIGHT-HEADEDNESS: 0
DIZZINESS: 0
SHORTNESS OF BREATH: 0
SNORING: 0
PALPITATIONS: 0
IRREGULAR HEARTBEAT: 0
DYSPNEA ON EXERTION: 0

## 2023-10-25 NOTE — ASSESSMENT & PLAN NOTE
Continue valsartan 360 mg daily  Add amlodipine 5 mg daily  F/u 3 mo and I asked him to check BP, monitor and d/w me in the interim if it remains elevated >130/80 prior to next visit

## 2024-02-08 NOTE — PROGRESS NOTES
Cardiology  Office Progress Note         Reason for visit:   Chief Complaint   Patient presents with   • Follow-up       HPI     Chapincito Yoon is a 73 y.o. male who presents to the office for cardiovascular follow up and management of HTN and HLD.    He was initially seen in the office for a murmur that was mentioned when he had his colonoscopy and he was advised to see cardiology in follow up. He had also been having issues with elevated BP despite his valsartan being titrated up. I added amlodipine 5 mg daily and he would follow up in 3 months for a BP check. He returns today for a TTE for murmur on exam.     Today he is feeling well and taking all medications as prescribed. He remains active walking when the weather is nice and golfs for activitiy without any cardiac complaints.   Past Medical History:   Diagnosis Date   • Essential (primary) hypertension    • Hyperlipidemia, unspecified    • Impaired fasting glucose    • Osteoarthritis     Knees, hands    • Prostate cancer (CMS/HCC)     Prostatectomy       Past Surgical History:   Procedure Laterality Date   • COLONOSCOPY W/ POLYPECTOMY     • KNEE SURGERY Right     MCL Repair   • MOHS SURGERY      Right finger   • PROSTATECTOMY     • TOTAL KNEE ARTHROPLASTY Bilateral 10/29/2019       Social History     Tobacco Use   • Smoking status: Former     Types: Cigarettes     Quit date:      Years since quittin.1   • Smokeless tobacco: Never   • Tobacco comments:     quit 40 years ago   Substance Use Topics   • Alcohol use: Yes     Alcohol/week: 3.0 standard drinks of alcohol     Types: 3 Cans of beer per week     Comment: socially   • Drug use: Never       Family History   Problem Relation Age of Onset   • Liver cancer Biological Mother    • Early death Biological Father    • Heart disease Biological Father    • No Known Problems Biological Sister    • No Known Problems Biological Brother        Allergies:  Patient has no known  allergies.    Current Outpatient Medications   Medication Sig Dispense Refill   • amLODIPine (NORVASC) 5 mg tablet Take 1 tablet (5 mg total) by mouth daily. 90 tablet 1   • aspirin 81 mg chewable tablet Take 1 tablet (81 mg total) by mouth 2 (two) times a day. RESUME FOR 4 WEEKS AFTER SURGERY 60 tablet 0   • atorvastatin (LIPITOR) 10 mg tablet Take 10 mg by mouth daily.     • multivitamin/iron/folic acid (CENTRUM ORAL) Take 1 tablet by mouth daily.     • psyllium husk (METAMUCIL ORAL) Take by mouth.     • valsartan (DIOVAN) 80 mg tablet Take 360 mg by mouth daily.       No current facility-administered medications for this visit.       Review of Systems   Constitutional: Negative for malaise/fatigue.   Cardiovascular: Negative for chest pain, dyspnea on exertion, irregular heartbeat, leg swelling and palpitations.   Respiratory: Negative for shortness of breath and sleep disturbances due to breathing.    Neurological: Negative for dizziness and light-headedness.       Objective     Vitals:    02/09/24 0957   BP: 129/73   Pulse: 81   SpO2: 98%       Wt Readings from Last 3 Encounters:   02/09/24 98.9 kg (218 lb)   02/09/24 99.3 kg (219 lb)   10/25/23 99.3 kg (219 lb)       Body mass index is 33.15 kg/m².    Physical Exam  Vitals reviewed.   Cardiovascular:      Rate and Rhythm: Normal rate and regular rhythm.      Pulses:           Carotid pulses are 2+ on the right side and 2+ on the left side.     Heart sounds: Murmur heard.   Pulmonary:      Effort: Pulmonary effort is normal.   Musculoskeletal:         General: Normal range of motion.   Skin:     General: Skin is warm.      Capillary Refill: Capillary refill takes less than 2 seconds.   Neurological:      Mental Status: He is alert and oriented to person, place, and time.         ECG   Normal sinus rhythm  Normal ECG  When compared with ECG of 25-OCT-2023 14:01,  No significant change was found         Hematology  Lab Results   Component Value Date    WBC 7.95  11/11/2019    HGB 11.4 (L) 11/11/2019    HCT 34.2 (L) 11/11/2019     (H) 11/11/2019    INR 1.0 10/16/2019       Chemistries  Lab Results   Component Value Date     11/11/2019    K 4.8 11/11/2019     11/11/2019    CREATININE 0.8 11/11/2019    BUN 20 11/11/2019    CO2 29 11/11/2019    GLUCOSE 106 (H) 11/11/2019    CALCIUM 9.3 11/11/2019    MG 2.1 11/11/2019    ALT 12 (L) 11/01/2019    AST 17 11/01/2019       Endocrine  Lab Results   Component Value Date    HGBA1C 5.6 10/16/2019           Assessment/Plan     Essential (primary) hypertension  Continue valsartan 360 mg daily  Continue amlodipine 5 mg daily   Mild LVH on TTE 2/9/24  Continue good BP management. 130/70 or a little less when he checks at home.   He will monitor and review further with Dr. Kelley  He plans to work on weight loss as well and this is likely to help BP    Hyperlipidemia, unspecified  Continue atorvastatin 10 mg daily  LDL 95,  on 10/18/23    Murmur  [place] on exam perioperatively  TTE today    Aortic regurgitation  Mild AI, mild PI on 2/9/24 TTE  Plan 1-2 y TTE to ensure stability then longer interval monitoring reasonable if no significant change      No orders of the defined types were placed in this encounter.      There are no discontinued medications.    Orders Placed This Encounter   Procedures   • ECG 12 lead     Scheduling Instructions:      PLEASE USE THIS ORDER FOR ECG'S PERFORMED IN PHYSICIAN OFFICES     Order Specific Question:   Release to patient     Answer:   Immediate     Order Specific Question:   Release to patient     Answer:   Immediate [1]   • Transthoracic echo (TTE) complete     Standing Status:   Future     Standing Expiration Date:   2/9/2026     Scheduling Instructions:      To schedule cardiology appointments with [facility], call Central Scheduling at [phone]. Thank you.     Order Specific Question:   Is contrast and/or agitated saline (bubbles) indicated for the study? (Contrast  = Definity OR Lumason)     Answer:   No     Order Specific Question:   Release to patient     Answer:   Immediate [1]       Follow Up Plans:  Return in about 1 year (around 2/9/2025).          I, Linda Baron, am scribing for, and in the presence of, Braxton Garcia MD.     I, Braxton Garcia MD, personally performed the services described in this documentation as scribed by Linda Baron in my presence, and it is both accurate and complete.     Braxton Garcia MD  2/9/2024

## 2024-02-09 ENCOUNTER — HOSPITAL ENCOUNTER (OUTPATIENT)
Dept: CARDIOLOGY | Facility: CLINIC | Age: 74
Discharge: HOME | End: 2024-02-09
Attending: INTERNAL MEDICINE
Payer: MEDICARE

## 2024-02-09 ENCOUNTER — OFFICE VISIT (OUTPATIENT)
Dept: CARDIOLOGY | Facility: CLINIC | Age: 74
End: 2024-02-09
Payer: MEDICARE

## 2024-02-09 VITALS
HEIGHT: 68 IN | DIASTOLIC BLOOD PRESSURE: 66 MMHG | WEIGHT: 219 LBS | SYSTOLIC BLOOD PRESSURE: 140 MMHG | BODY MASS INDEX: 33.19 KG/M2

## 2024-02-09 VITALS
OXYGEN SATURATION: 98 % | BODY MASS INDEX: 33.04 KG/M2 | WEIGHT: 218 LBS | DIASTOLIC BLOOD PRESSURE: 73 MMHG | HEIGHT: 68 IN | SYSTOLIC BLOOD PRESSURE: 129 MMHG | HEART RATE: 81 BPM

## 2024-02-09 DIAGNOSIS — I10 ESSENTIAL (PRIMARY) HYPERTENSION: Primary | ICD-10-CM

## 2024-02-09 DIAGNOSIS — I35.1 NONRHEUMATIC AORTIC VALVE INSUFFICIENCY: ICD-10-CM

## 2024-02-09 DIAGNOSIS — E78.2 MIXED HYPERLIPIDEMIA: ICD-10-CM

## 2024-02-09 DIAGNOSIS — R01.1 MURMUR: ICD-10-CM

## 2024-02-09 LAB
AORTIC ROOT ANNULUS: 3.6 CM
AORTIC VALVE MEAN VELOCITY: 1.04 M/S
AORTIC VALVE VELOCITY TIME INTEGRAL: 30.6 CM
ASCENDING AORTA: 3.5 CM
ATRIAL RATE: 75
AV MEAN GRADIENT: 5 MMHG
AV PEAK GRADIENT: 13 MMHG
AV PEAK VELOCITY-S: 1.79 M/S
AV VALVE AREA INDEX: 1.08
AV VALVE AREA: 1.75 CM2
AV VELOCITY RATIO: 0.68
AVA (VTI): 2.35 CM2
BSA FOR ECHO PROCEDURE: 2.18 M2
CUSP SEPARATION: 2.3 CM
DOP CALC LVOT STROKE VOLUME: 72.01 CM3
E WAVE DECELERATION TIME: 224 MS
E/A RATIO: 0.6
E/E' RATIO: 9.3
E/LAT E' RATIO: 7.1
EDV (BP): 66.7 CM3
EF (A4C): 67.1 %
EF A2C: 68.2 %
EJECTION FRACTION: 64 %
EST RIGHT VENT SYSTOLIC PRESSURE BY TRICUSPID REGURGITATION JET: 29 MMHG
ESV (BP): 24 CM3
FRACTIONAL SHORTENING: 39.66 %
INTERVENTRICULAR SEPTUM: 1.21 CM
LA ESV (BP): 50 CM3
LA ESV INDEX (A2C): 22.2 CM3/M2
LA ESV INDEX (BP): 22.94 CM3/M2
LA/AORTA RATIO: 1.11
LAAS-AP2: 18.5 CM2
LAAS-AP4: 19.5 CM2
LAD 2D: 4 CM
LALD A4C: 5.62 CM
LALD A4C: 6.16 CM
LAV-S: 48.4 CM3
LEFT ATRIUM VOLUME INDEX: 22.34 CM3/M2
LEFT ATRIUM VOLUME: 48.7 CM3
LEFT INTERNAL DIMENSION IN SYSTOLE: 2.45 CM (ref 3.32–5.03)
LEFT VENTRICLE DIASTOLIC VOLUME INDEX: 34.4 CM3/M2
LEFT VENTRICLE DIASTOLIC VOLUME: 75 CM3
LEFT VENTRICLE SYSTOLIC VOLUME INDEX: 11.33 CM3/M2
LEFT VENTRICLE SYSTOLIC VOLUME: 24.7 CM3
LEFT VENTRICULAR INTERNAL DIMENSION IN DIASTOLE: 4.06 CM (ref 5.69–7.9)
LEFT VENTRICULAR POSTERIOR WALL IN END DIASTOLE: 1.21 CM (ref 0.72–1.34)
LV DIASTOLIC VOLUME: 59.5 CM3
LV ESV (APICAL 2 CHAMBER): 18.9 CM3
LVAD-AP2: 25.2 CM2
LVAD-AP4: 28.6 CM2
LVAS-AP2: 12.1 CM2
LVAS-AP4: 15.4 CM2
LVEDVI(A2C): 27.29 CM3/M2
LVEDVI(BP): 30.6 CM3/M2
LVESVI(A2C): 8.67 CM3/M2
LVESVI(BP): 11.01 CM3/M2
LVLD-AP2: 8.98 CM
LVLD-AP4: 8.95 CM
LVLS-AP2: 6.71 CM
LVLS-AP4: 8.31 CM
LVOT 2D: 2.1 CM
LVOT A: 3.46 CM2
LVOT MG: 3 MMHG
LVOT MV: 0.75 M/S
LVOT PEAK VELOCITY: 1.15 M/S
LVOT PG: 5 MMHG
LVOT STROKE VOLUME INDEX: 33.03 ML/M2
LVOT VTI: 20.8 CM
MLH CV ECHO AVA INDEX VELOCITY RATIO: 0.8
MV E'TISSUE VEL-LAT: 0.1 M/S
MV E'TISSUE VEL-MED: 0.08 M/S
MV PEAK A VEL: 1.09 M/S
MV PEAK E VEL: 0.7 M/S
MV STENOSIS PRESSURE HALF TIME: 65 MS
MV VALVE AREA P 1/2 METHOD: 3.38 CM2
P AXIS: 48
POSTERIOR WALL: 1.21 CM
PR INTERVAL: 154
PULMONARY REGURGITATION LATE DIASTOLIC VELOCITY: 0.99 M/S
PV PEAK GRADIENT: 5 MMHG
PV PV: 1.1 M/S
QRS DURATION: 100
QT INTERVAL: 380
QTC CALCULATION(BAZETT): 424
R AXIS: 56
RAP: 5 MMHG
RVOT VMAX: 0.78 M/S
RVOT VTI: 14.6 CM
SEPTAL TISSUE DOPPLER FREE WALL LATE DIA VELOCITY (APICAL 4 CHAMBER VIEW): 0.15 M/S
T WAVE AXIS: 6
TR MAX PG: 24.21 MMHG
TRICUSPID VALVE PEAK REGURGITATION VELOCITY: 2.46 M/S
VENTRICULAR RATE: 75
Z-SCORE OF LEFT VENTRICULAR DIMENSION IN END DIASTOLE: -5.01
Z-SCORE OF LEFT VENTRICULAR DIMENSION IN END SYSTOLE: -4.05
Z-SCORE OF LEFT VENTRICULAR POSTERIOR WALL IN END DIASTOLE: 1.11

## 2024-02-09 PROCEDURE — 93000 ELECTROCARDIOGRAM COMPLETE: CPT | Performed by: INTERNAL MEDICINE

## 2024-02-09 PROCEDURE — G8752 SYS BP LESS 140: HCPCS | Performed by: INTERNAL MEDICINE

## 2024-02-09 PROCEDURE — G8754 DIAS BP LESS 90: HCPCS | Performed by: INTERNAL MEDICINE

## 2024-02-09 PROCEDURE — 99214 OFFICE O/P EST MOD 30 MIN: CPT | Performed by: INTERNAL MEDICINE

## 2024-02-09 PROCEDURE — 93306 TTE W/DOPPLER COMPLETE: CPT | Performed by: INTERNAL MEDICINE

## 2024-02-09 ASSESSMENT — ENCOUNTER SYMPTOMS
SHORTNESS OF BREATH: 0
LIGHT-HEADEDNESS: 0
PALPITATIONS: 0
DYSPNEA ON EXERTION: 0
SLEEP DISTURBANCES DUE TO BREATHING: 0
DIZZINESS: 0
IRREGULAR HEARTBEAT: 0

## 2024-02-09 NOTE — PATIENT INSTRUCTIONS
Please start checking blood pressure a few times a week. Bring in your cuff and readings to your next appointment. If you do not already have a cuff, we recommend an Omron upper arm cuff. Please message/call if your blood pressure readings are consistently above your goal of <130/80 and we can adjust medications as needed.

## 2024-02-09 NOTE — ASSESSMENT & PLAN NOTE
Mild AI, mild PI on 2/9/24 TTE  Plan 1-2 y TTE to ensure stability then longer interval monitoring reasonable if no significant change

## 2024-04-17 RX ORDER — AMLODIPINE BESYLATE 5 MG/1
5 TABLET ORAL DAILY
Qty: 90 TABLET | Refills: 1 | Status: SHIPPED | OUTPATIENT
Start: 2024-04-17 | End: 2024-09-09

## 2024-04-17 NOTE — TELEPHONE ENCOUNTER
"[facility] at [facility] Refill Request      MA Notes:      Nurse Notes:      Last Office Visit: 02/09/2024  Last Telemedicine Visit: Visit date not found    Next Office Visit: Visit date not found  Next Telemedicine Visit: Visit date not found     Most Recent BP Readings:  BP Readings from Last 3 Encounters:   02/09/24 140/66   02/09/24 129/73   10/25/23 (!) 182/80       Most recent Lab results:  No results found for: \"CHOL\", \"HDL\", \"TRIG\", \"NONHDLCALC\"    Lab Results   Component Value Date    AST 17 11/01/2019    ALT 12 (L) 11/01/2019       Lab Results   Component Value Date     11/11/2019    K 4.8 11/11/2019    BUN 20 11/11/2019    CREATININE 0.8 11/11/2019       Current Medications:    Current Outpatient Medications:     amLODIPine (NORVASC) 5 mg tablet, Take 1 tablet (5 mg total) by mouth daily., Disp: 90 tablet, Rfl: 1    aspirin 81 mg chewable tablet, Take 1 tablet (81 mg total) by mouth 2 (two) times a day. RESUME FOR 4 WEEKS AFTER SURGERY, Disp: 60 tablet, Rfl: 0    atorvastatin (LIPITOR) 10 mg tablet, Take 10 mg by mouth daily., Disp: , Rfl:     multivitamin/iron/folic acid (CENTRUM ORAL), Take 1 tablet by mouth daily., Disp: , Rfl:     psyllium husk (METAMUCIL ORAL), Take by mouth., Disp: , Rfl:     valsartan (DIOVAN) 80 mg tablet, Take 360 mg by mouth daily., Disp: , Rfl:    "

## 2024-06-06 ENCOUNTER — APPOINTMENT (OUTPATIENT)
Dept: URBAN - METROPOLITAN AREA CLINIC 203 | Age: 74
Setting detail: DERMATOLOGY
End: 2024-06-06

## 2024-06-06 DIAGNOSIS — D18.0 HEMANGIOMA: ICD-10-CM

## 2024-06-06 DIAGNOSIS — Z85.828 PERSONAL HISTORY OF OTHER MALIGNANT NEOPLASM OF SKIN: ICD-10-CM

## 2024-06-06 DIAGNOSIS — Z71.89 OTHER SPECIFIED COUNSELING: ICD-10-CM

## 2024-06-06 DIAGNOSIS — L81.4 OTHER MELANIN HYPERPIGMENTATION: ICD-10-CM

## 2024-06-06 DIAGNOSIS — L57.8 OTHER SKIN CHANGES DUE TO CHRONIC EXPOSURE TO NONIONIZING RADIATION: ICD-10-CM

## 2024-06-06 DIAGNOSIS — D485 NEOPLASM OF UNCERTAIN BEHAVIOR OF SKIN: ICD-10-CM

## 2024-06-06 PROBLEM — D48.5 NEOPLASM OF UNCERTAIN BEHAVIOR OF SKIN: Status: ACTIVE | Noted: 2024-06-06

## 2024-06-06 PROBLEM — D18.01 HEMANGIOMA OF SKIN AND SUBCUTANEOUS TISSUE: Status: ACTIVE | Noted: 2024-06-06

## 2024-06-06 PROCEDURE — OTHER PHOTO-DOCUMENTATION: OTHER

## 2024-06-06 PROCEDURE — OTHER SUNSCREEN RECOMMENDATIONS: OTHER

## 2024-06-06 PROCEDURE — OTHER COUNSELING: OTHER

## 2024-06-06 PROCEDURE — OTHER DERMTECH: PLA - PIGMENTED LESION ASSAY: OTHER

## 2024-06-06 PROCEDURE — OTHER REASSURANCE: OTHER

## 2024-06-06 PROCEDURE — 99213 OFFICE O/P EST LOW 20 MIN: CPT

## 2024-06-06 ASSESSMENT — LOCATION DETAILED DESCRIPTION DERM
LOCATION DETAILED: PERIUMBILICAL SKIN
LOCATION DETAILED: INFERIOR MID FOREHEAD
LOCATION DETAILED: LEFT SUPERIOR UPPER BACK
LOCATION DETAILED: LEFT LATERAL MALAR CHEEK
LOCATION DETAILED: EPIGASTRIC SKIN
LOCATION DETAILED: LEFT MEDIAL INFERIOR CHEST

## 2024-06-06 ASSESSMENT — LOCATION SIMPLE DESCRIPTION DERM
LOCATION SIMPLE: LEFT CHEEK
LOCATION SIMPLE: ABDOMEN
LOCATION SIMPLE: INFERIOR FOREHEAD
LOCATION SIMPLE: LEFT UPPER BACK
LOCATION SIMPLE: CHEST

## 2024-06-06 ASSESSMENT — LOCATION ZONE DERM
LOCATION ZONE: TRUNK
LOCATION ZONE: FACE

## 2024-06-06 NOTE — PROCEDURE: [facility]: PLA - PIGMENTED LESION ASSAY
Detail Level: Detailed
Size Of Lesion In Cm (Optional): 0
Biopsy Type: RNA evaluation
Lab: [id number]
Pre Procedure Details (Documentation Required By [facility]: The lesion was visually assessed and meets one or more clinical signs suggestive of melanoma (ABCDE). The skin is intact without other conditions or scarring and the area has not been biopsied before. I have the requisite knowledge, skills and experience to evaluate and manage pigmented lesions. The lesion submitted is for a patient having skin type Cervantes I, II or III. Patient was counseled that Lesions with positive results will be considered for biopsy. Lesions with negative results will be clinically followed and monitored for changes.
Procedure Details: The first patch was applied to the lesion, adhesive side down, and rubbed in a circular motion 15 times.  Following this the lesion was outlined with a permanent marker and the patch was then secured, adhesive side down, in the provided transport case.  This process was repeated for patches 2 through 4 and then sent to [facility] in the provided self-addressed envelope.
Render Path Notes In Note?: No
Consent: Written consent was obtained and risks were reviewed including but not limited to mild pain and skin irritation.
Post-Care Instructions: I reviewed with the patient in detail post-care instructions.
Notification Instructions: Patient will be notified of biopsy results. However, patient instructed to call the office if not contacted within 2 weeks.
Billing Type: Third-Party Bill

## 2024-08-30 ENCOUNTER — TELEPHONE (OUTPATIENT)
Dept: CARDIOLOGY | Facility: CLINIC | Age: 74
End: 2024-08-30
Payer: MEDICARE

## 2024-08-30 NOTE — TELEPHONE ENCOUNTER
----- Message from Kary Pitt sent at 2024 10:33 AM EST -----  Regardin yr with TTE  Please call the patient to schedule a 1 year appt with Dr. Garcia  with TTE at the same time CCV if available

## 2024-08-30 NOTE — TELEPHONE ENCOUNTER
LVM for pt to return call to schedule a visit with Dr. Garcia and an echo the clarita day @  location in Feb 2025.

## 2024-09-09 RX ORDER — AMLODIPINE BESYLATE 5 MG/1
5 TABLET ORAL DAILY
Qty: 90 TABLET | Refills: 1 | Status: SHIPPED | OUTPATIENT
Start: 2024-09-09

## 2024-09-09 NOTE — TELEPHONE ENCOUNTER
"[facility] at [facility] Refill Request      MA Notes:      Nurse Notes:      Last Office Visit: 02/09/2024  Last Telemedicine Visit: Visit date not found    Next Office Visit:02/13/2025  Next Telemedicine Visit: Visit date not found     Most Recent BP Readings:  BP Readings from Last 3 Encounters:   02/09/24 140/66   02/09/24 129/73   10/25/23 (!) 182/80       Most recent Lab results:  No results found for: \"CHOL\", \"HDL\", \"TRIG\", \"NONHDLCALC\"    Lab Results   Component Value Date    AST 17 11/01/2019    ALT 12 (L) 11/01/2019       Lab Results   Component Value Date     11/11/2019    K 4.8 11/11/2019    BUN 20 11/11/2019    CREATININE 0.8 11/11/2019       Current Medications:    Current Outpatient Medications:     amLODIPine (NORVASC) 5 mg tablet, TAKE 1 TABLET (5 MG TOTAL) BY MOUTH DAILY., Disp: 90 tablet, Rfl: 1    aspirin 81 mg chewable tablet, Take 1 tablet (81 mg total) by mouth 2 (two) times a day. RESUME FOR 4 WEEKS AFTER SURGERY, Disp: 60 tablet, Rfl: 0    atorvastatin (LIPITOR) 10 mg tablet, Take 10 mg by mouth daily., Disp: , Rfl:     multivitamin/iron/folic acid (CENTRUM ORAL), Take 1 tablet by mouth daily., Disp: , Rfl:     psyllium husk (METAMUCIL ORAL), Take by mouth., Disp: , Rfl:     valsartan (DIOVAN) 80 mg tablet, Take 360 mg by mouth daily., Disp: , Rfl:    "

## 2025-02-12 NOTE — PROGRESS NOTES
Cardiology  Office Progress Note         Reason for visit:   Chief Complaint   Patient presents with    Follow-up       HPI     Chapincito Yoon is a 74 y.o. male who presents to the office for cardiovascular follow up and management of HTN , murmur and HLD.    He was last seen in the office 24 and he felt well and was taking all of his medications as prescribed. He remained active walking when the weather was nice and golfing for activity without any cardiac complaints. He completed a TTE with our visit that showed mild LVH. We planned for continued good BP control. He noted that his Bp was 130/70 or less at home. He also planned to work on wt loss as well. He was asked to continue to check his Bp a few times weekly and to bring his cuff to our next visit. I made no changes to his medication at this visit and asked him to follow up in 1 year. I planned for a TTE in 1-2 years to ensure stability of his mild AI, Mild PI.     He would follow up in 1 year with TTE. He is feeling well today and notes that he is taking his medication as prescribed. He checks his BP at home and typically gets 120/70-80.     Past Medical History:   Diagnosis Date    Essential (primary) hypertension     Hyperlipidemia, unspecified     Impaired fasting glucose     Osteoarthritis     Knees, hands     Prostate cancer (CMS/HCC)     Prostatectomy       Past Surgical History   Procedure Laterality Date    Bilateral total knee replacement Bilateral 10/29/2019    Performed by Ted Zheng MD at  OR    Colonoscopy w/ polypectomy      Knee surgery Right     MCL Repair    Mohs surgery      Right finger    Prostatectomy      Total knee arthroplasty Bilateral 10/29/2019       Social History     Tobacco Use    Smoking status: Former     Current packs/day: 0.00     Types: Cigarettes     Quit date:      Years since quittin.1    Smokeless tobacco: Never    Tobacco comments:     quit 40 years ago   Substance Use Topics     Alcohol use: Yes     Alcohol/week: 3.0 standard drinks of alcohol     Types: 3 Cans of beer per week     Comment: socially    Drug use: Never       Family History   Problem Relation Name Age of Onset    Liver cancer Biological Mother 66     Early death Biological Father 55     Heart disease Biological Father 55     No Known Problems Biological Sister 72,59,55     No Known Problems Biological Brother 61        Allergies:  Patient has no known allergies.    Current Outpatient Medications   Medication Sig Dispense Refill    amLODIPine (NORVASC) 5 mg tablet TAKE 1 TABLET (5 MG TOTAL) BY MOUTH DAILY. 90 tablet 1    atorvastatin (LIPITOR) 10 mg tablet Take 10 mg by mouth daily.      multivitamin/iron/folic acid (CENTRUM ORAL) Take 1 tablet by mouth daily.      psyllium husk (METAMUCIL ORAL) Take by mouth.      valsartan (DIOVAN) 80 mg tablet Take 360 mg by mouth daily.       No current facility-administered medications for this visit.       Review of Systems   Constitutional: Negative for malaise/fatigue.   Cardiovascular:  Negative for chest pain, dyspnea on exertion, irregular heartbeat, leg swelling and palpitations.   Respiratory:  Negative for shortness of breath and sleep disturbances due to breathing.    Neurological:  Negative for dizziness and light-headedness.   All other systems reviewed and are negative.      Objective     Vitals:    02/13/25 1133   BP: 134/80   Pulse: 83   SpO2: 97%       Wt Readings from Last 3 Encounters:   02/13/25 96.6 kg (213 lb)   02/13/25 96.6 kg (213 lb)   02/09/24 99.3 kg (219 lb)       Body mass index is 32.39 kg/m².    Physical Exam  Vitals reviewed.   Cardiovascular:      Rate and Rhythm: Normal rate and regular rhythm.      Pulses: Normal pulses.           Carotid pulses are 2+ on the right side and 2+ on the left side.     Heart sounds: Normal heart sounds.   Pulmonary:      Effort: Pulmonary effort is normal.   Musculoskeletal:         General: Normal range of motion.   Skin:      General: Skin is warm.      Capillary Refill: Capillary refill takes less than 2 seconds.   Neurological:      Mental Status: He is alert and oriented to person, place, and time.       ECG   Normal sinus rhythm  Normal ECG     Hematology  Lab Results   Component Value Date    WBC 7.95 11/11/2019    HGB 11.4 (L) 11/11/2019    HCT 34.2 (L) 11/11/2019     (H) 11/11/2019    INR 1.0 10/16/2019     Chemistries  Lab Results   Component Value Date     11/11/2019    K 4.8 11/11/2019     11/11/2019    CREATININE 0.8 11/11/2019    BUN 20 11/11/2019    CO2 29 11/11/2019    GLUCOSE 106 (H) 11/11/2019    CALCIUM 9.3 11/11/2019    MG 2.1 11/11/2019    ALT 12 (L) 11/01/2019    AST 17 11/01/2019         Endocrine  Lab Results   Component Value Date    HGBA1C 5.6 10/16/2019       Cardiac Imaging    TRANSTHORACIC ECHO (TTE) COMPLETE 02/09/2024    Interpretation Summary    Left Ventricle: Normal ventricle size. Mild concentric left ventricular hypertrophy. Preserved systolic function. Estimated EF 65%. No regional wall motion abnormalities. Grade I diastolic dysfunction.    Right Ventricle: Normal ventricle size. Normal systolic function.    Left Atrium: Normal sized atrium.    Right Atrium: Normal sized atrium.    Aortic Valve: Tricuspid valve.  Sclerotic leaflets. Mild regurgitation. No stenosis. Calculated dimensionless index = 0.68.    Mitral Valve: Mild mitral annular calcification. Trace regurgitation. No stenosis.    Tricuspid Valve: Trace regurgitation. Estimated RVSP = 29 mmHg.    Pulmonic Valve: Mild regurgitation.    Aorta: Aortic root normal. Ascending aorta normal-sized.    IVC/SVC: Inferior vena cava not well visualized. Normal sized inferior vena cava. Inferior vena cava demonstrates normal respiratory collapse.    Pericardium: No evidence of pericardial effusion.      Assessment/Plan     Essential (primary) hypertension  Continue valsartan 360 mg daily  Continue amlodipine 5 mg daily   Mild LVH on  TTE 2/9/24 2/13/25 TTE with top-normal LV thickness  Home BP range 120s/70s. He was 130/70 on repeat check in office today     Hyperlipidemia, unspecified  Continue atorvastatin 10 mg daily  LDL 95,  on 10/18/23  11/7/24 LDL 82, HDL 71, TGL 85  Update lipid panel, screen Lp(a) prior to next visit     Aortic regurgitation  Mild AI, mild PI on 2/9/24 TTE  2/13/25 TTE: Mild AI, trace PI  Plan for 3-5 y interval TTE to confirm stability    We confirmed his apple watch is set up for afib detection and he knows to call if alerts    I attest that this visit supports the complexity inherent to evaluation and management associated with medical care services that serve as the continuing focal point for all needed health care services and/or medical care services that are part of ongoing care related to this patient's single, serious, or complex condition.        Orders Placed This Encounter   Procedures    Lipid panel     Standing Status:   Future     Number of Occurrences:   1     Standing Expiration Date:   2/13/2026     Order Specific Question:   Release to patient     Answer:   Immediate [1]    Lipoprotein (a)     Standing Status:   Future     Number of Occurrences:   1     Standing Expiration Date:   2/13/2026     Order Specific Question:   Release to patient     Answer:   Immediate [1]    Lipid panel     Standing Status:   Future     Number of Occurrences:   1     Standing Expiration Date:   2/13/2026     Order Specific Question:   Release to patient     Answer:   Immediate [1]    Lipoprotein (a)     Standing Status:   Future     Number of Occurrences:   1     Standing Expiration Date:   2/13/2026     Order Specific Question:   Release to patient     Answer:   Immediate [1]    Basic metabolic panel     Standing Status:   Future     Number of Occurrences:   1     Standing Expiration Date:   2/13/2026     Order Specific Question:   Release to patient     Answer:   Immediate [1]    ECG 12 lead     Scheduling  Instructions:      PLEASE USE THIS ORDER FOR ECG'S PERFORMED IN PHYSICIAN OFFICES     Order Specific Question:   Release to patient     Answer:   Immediate     Order Specific Question:   Release to patient     Answer:   Immediate [1]    Transthoracic echo (TTE) complete     Standing Status:   Future     Standing Expiration Date:   2/13/2027     Order Specific Question:   Is contrast and/or agitated saline (bubbles) indicated for the study? (Contrast = Definity OR Lumason)     Answer:   No     Order Specific Question:   Release to patient     Answer:   Immediate     Order Specific Question:   Release to patient     Answer:   Immediate [1]       Follow Up Plans:  Return in about 1 year (around 2/13/2026).          Linda GARCIA, am scribing for, and in the presence of, Braxton Garcia MD.     IBraxton MD, personally performed the services described in this documentation as scribed by Linda Baron in my presence, and it is both accurate and complete.     Braxton Garcia MD  2/13/2025

## 2025-02-13 ENCOUNTER — OFFICE VISIT (OUTPATIENT)
Dept: CARDIOLOGY | Facility: CLINIC | Age: 75
End: 2025-02-13
Payer: MEDICARE

## 2025-02-13 ENCOUNTER — HOSPITAL ENCOUNTER (OUTPATIENT)
Dept: CARDIOLOGY | Facility: CLINIC | Age: 75
Discharge: HOME | End: 2025-02-13
Attending: INTERNAL MEDICINE
Payer: MEDICARE

## 2025-02-13 VITALS
OXYGEN SATURATION: 97 % | SYSTOLIC BLOOD PRESSURE: 134 MMHG | HEIGHT: 68 IN | DIASTOLIC BLOOD PRESSURE: 80 MMHG | HEART RATE: 83 BPM | BODY MASS INDEX: 32.28 KG/M2 | WEIGHT: 213 LBS

## 2025-02-13 VITALS
SYSTOLIC BLOOD PRESSURE: 130 MMHG | HEIGHT: 68 IN | DIASTOLIC BLOOD PRESSURE: 70 MMHG | WEIGHT: 213 LBS | BODY MASS INDEX: 32.28 KG/M2

## 2025-02-13 DIAGNOSIS — E78.2 MIXED HYPERLIPIDEMIA: ICD-10-CM

## 2025-02-13 DIAGNOSIS — I10 ESSENTIAL (PRIMARY) HYPERTENSION: ICD-10-CM

## 2025-02-13 DIAGNOSIS — I35.1 NONRHEUMATIC AORTIC VALVE INSUFFICIENCY: ICD-10-CM

## 2025-02-13 DIAGNOSIS — I35.1 NONRHEUMATIC AORTIC VALVE INSUFFICIENCY: Primary | ICD-10-CM

## 2025-02-13 LAB
AORTIC ROOT ANNULUS: 3.7 CM
ASCENDING AORTA: 3.6 CM
ATRIAL RATE: 84
AV PEAK GRADIENT: 9 MMHG
AV PEAK VELOCITY-S: 1.48 M/S
AV REG PEAK VEL: 4.56 M/S
AV REGURGITATION PRESSURE HALF TIME: 556 MS
AV VALVE AREA: 1.3 CM2
BSA FOR ECHO PROCEDURE: 2.15 M2
CUSP SEPARATION: 2.3 CM
E WAVE DECELERATION TIME: 325 MS
E/A RATIO: 0.6
E/E' RATIO: 9.9
E/LAT E' RATIO: 8.2
EDV (BP): 102 CM3
EF (A4C): 63.6 %
EF A2C: 64.1 %
EJECTION FRACTION: 62.1 %
EST RIGHT VENT SYSTOLIC PRESSURE BY TRICUSPID REGURGITATION JET: 30 MMHG
ESV (BP): 42.4 CM3
FRACTIONAL SHORTENING: 32.17 %
INTERVENTRICULAR SEPTUM: 1.15 CM
LA ESV (BP): 39.4 CM3
LA ESV INDEX (A2C): 20.6 CM3/M2
LA ESV INDEX (BP): 18.33 CM3/M2
LA/AORTA RATIO: 0.95
LAAS-AP2: 16.4 CM2
LAAS-AP4: 13.4 CM2
LAD 2D: 3.5 CM
LALD A4C: 4.37 CM
LALD A4C: 4.74 CM
LAV-S: 44.3 CM3
LEFT ATRIUM VOLUME INDEX: 15.12 CM3/M2
LEFT ATRIUM VOLUME: 32.5 CM3
LEFT INTERNAL DIMENSION IN SYSTOLE: 2.72 CM (ref 3.24–4.91)
LEFT VENTRICLE DIASTOLIC VOLUME INDEX: 56.28 CM3/M2
LEFT VENTRICLE DIASTOLIC VOLUME: 121 CM3
LEFT VENTRICLE SYSTOLIC VOLUME INDEX: 20.47 CM3/M2
LEFT VENTRICLE SYSTOLIC VOLUME: 44 CM3
LEFT VENTRICULAR INTERNAL DIMENSION IN DIASTOLE: 4.01 CM (ref 5.54–7.69)
LEFT VENTRICULAR POSTERIOR WALL IN END DIASTOLE: 1.06 CM (ref 0.7–1.31)
LV ESV (APICAL 2 CHAMBER): 36.6 CM3
LVAD-AP2: 34.1 CM2
LVAD-AP4: 36.6 CM2
LVAS-AP2: 18.8 CM2
LVAS-AP4: 19.4 CM2
LVEDVI(BP): 47.44 CM3/M2
LVESVI(A2C): 17.02 CM3/M2
LVESVI(BP): 19.72 CM3/M2
LVLD-AP2: 9.37 CM
LVLD-AP4: 9.1 CM
LVLS-AP2: 8.08 CM
LVLS-AP4: 7.2 CM
LVOT 2D: 1.8 CM
LVOT A: 2.54 CM2
LVOT PEAK VELOCITY: 0.96 M/S
LVOT PG: 4 MMHG
MLH CV ECHO AVA INDEX VELOCITY RATIO: 0.6
MV E'TISSUE VEL-LAT: 0.07 M/S
MV E'TISSUE VEL-MED: 0.06 M/S
MV PEAK A VEL: 0.99 M/S
MV PEAK E VEL: 0.6 M/S
MV STENOSIS PRESSURE HALF TIME: 95 MS
MV VALVE AREA P 1/2 METHOD: 2.32 CM2
P AXIS: 50
PISA AR MAX VEL: 4.47 M/S
PISA AR MAX VEL: 4.65 M/S
POSTERIOR WALL: 1.06 CM
PR INTERVAL: 160
PULMONARY REGURGITATION LATE DIASTOLIC VELOCITY: 1.11 M/S
PV PEAK GRADIENT: 5 MMHG
PV PV: 1.07 M/S
QRS DURATION: 98
QT INTERVAL: 362
QTC CALCULATION(BAZETT): 427
R AXIS: 22
RAP: 3 MMHG
RVOT VMAX: 0.51 M/S
SEPTAL TISSUE DOPPLER FREE WALL LATE DIA VELOCITY (APICAL 4 CHAMBER VIEW): 0.11 M/S
T WAVE AXIS: 37
TR MAX PG: 27.25 MMHG
TRICUSPID VALVE PEAK REGURGITATION VELOCITY: 2.61 M/S
VENTRICULAR RATE: 84
Z-SCORE OF LEFT VENTRICULAR DIMENSION IN END DIASTOLE: -4.87
Z-SCORE OF LEFT VENTRICULAR DIMENSION IN END SYSTOLE: -3.03
Z-SCORE OF LEFT VENTRICULAR POSTERIOR WALL IN END DIASTOLE: 0.53

## 2025-02-13 PROCEDURE — G8752 SYS BP LESS 140: HCPCS | Performed by: INTERNAL MEDICINE

## 2025-02-13 PROCEDURE — 93306 TTE W/DOPPLER COMPLETE: CPT | Performed by: INTERNAL MEDICINE

## 2025-02-13 PROCEDURE — 99214 OFFICE O/P EST MOD 30 MIN: CPT | Performed by: INTERNAL MEDICINE

## 2025-02-13 PROCEDURE — G2211 COMPLEX E/M VISIT ADD ON: HCPCS | Performed by: INTERNAL MEDICINE

## 2025-02-13 PROCEDURE — G8754 DIAS BP LESS 90: HCPCS | Performed by: INTERNAL MEDICINE

## 2025-02-13 PROCEDURE — 93000 ELECTROCARDIOGRAM COMPLETE: CPT | Performed by: INTERNAL MEDICINE

## 2025-02-13 ASSESSMENT — ENCOUNTER SYMPTOMS
SLEEP DISTURBANCES DUE TO BREATHING: 0
SHORTNESS OF BREATH: 0
LIGHT-HEADEDNESS: 0
IRREGULAR HEARTBEAT: 0
DIZZINESS: 0
PALPITATIONS: 0
DYSPNEA ON EXERTION: 0

## 2025-04-10 RX ORDER — AMLODIPINE BESYLATE 5 MG/1
5 TABLET ORAL DAILY
Qty: 90 TABLET | Refills: 1 | Status: SHIPPED | OUTPATIENT
Start: 2025-04-10

## 2025-04-10 NOTE — TELEPHONE ENCOUNTER
"[facility] at [facility] Refill Request      MA Notes:      Nurse Notes:      Last Office Visit: 2/13/2025  Last Telemedicine Visit: Visit date not found    Next Office Visit: Visit date not found  Next Telemedicine Visit: Visit date not found     Most Recent BP Readings:  BP Readings from Last 3 Encounters:   02/13/25 130/70   02/13/25 134/80   02/09/24 140/66       Most recent Lab results:  No results found for: \"CHOL\", \"HDL\", \"TRIG\", \"NONHDLCALC\"    Lab Results   Component Value Date    AST 17 11/01/2019    ALT 12 (L) 11/01/2019       Lab Results   Component Value Date     11/11/2019    K 4.8 11/11/2019    BUN 20 11/11/2019    CREATININE 0.8 11/11/2019       Current Medications:    Current Outpatient Medications:     amLODIPine (NORVASC) 5 mg tablet, TAKE 1 TABLET (5 MG TOTAL) BY MOUTH DAILY., Disp: 90 tablet, Rfl: 1    atorvastatin (LIPITOR) 10 mg tablet, Take 10 mg by mouth daily., Disp: , Rfl:     multivitamin/iron/folic acid (CENTRUM ORAL), Take 1 tablet by mouth daily., Disp: , Rfl:     psyllium husk (METAMUCIL ORAL), Take by mouth., Disp: , Rfl:     valsartan (DIOVAN) 80 mg tablet, Take 360 mg by mouth daily., Disp: , Rfl:     "

## 2025-05-15 LAB
BUN SERPL-MCNC: 18 MG/DL (ref 8–27)
BUN/CREAT SERPL: 18 (ref 10–24)
CALCIUM SERPL-MCNC: 9.6 MG/DL (ref 8.6–10.2)
CHLORIDE SERPL-SCNC: 107 MMOL/L (ref 96–106)
CHOLEST SERPL-MCNC: 179 MG/DL (ref 100–199)
CO2 SERPL-SCNC: 20 MMOL/L (ref 20–29)
CREAT SERPL-MCNC: 0.98 MG/DL (ref 0.76–1.27)
EGFRCR SERPLBLD CKD-EPI 2021: 80 ML/MIN/1.73
GLUCOSE SERPL-MCNC: 119 MG/DL (ref 70–99)
HDLC SERPL-MCNC: 73 MG/DL
LDLC SERPL CALC-MCNC: 87 MG/DL (ref 0–99)
LPA SERPL-SCNC: 21 NMOL/L
POTASSIUM SERPL-SCNC: 4.4 MMOL/L (ref 3.5–5.2)
SODIUM SERPL-SCNC: 143 MMOL/L (ref 134–144)
TRIGL SERPL-MCNC: 106 MG/DL (ref 0–149)
VLDLC SERPL CALC-MCNC: 19 MG/DL (ref 5–40)

## 2025-06-12 ENCOUNTER — APPOINTMENT (OUTPATIENT)
Dept: URBAN - METROPOLITAN AREA CLINIC 203 | Age: 75
Setting detail: DERMATOLOGY
End: 2025-06-17

## 2025-06-12 DIAGNOSIS — L81.4 OTHER MELANIN HYPERPIGMENTATION: ICD-10-CM

## 2025-06-12 DIAGNOSIS — D22 MELANOCYTIC NEVI: ICD-10-CM

## 2025-06-12 DIAGNOSIS — D18.0 HEMANGIOMA: ICD-10-CM

## 2025-06-12 DIAGNOSIS — L57.0 ACTINIC KERATOSIS: ICD-10-CM

## 2025-06-12 DIAGNOSIS — L30.0 NUMMULAR DERMATITIS: ICD-10-CM

## 2025-06-12 DIAGNOSIS — L57.8 OTHER SKIN CHANGES DUE TO CHRONIC EXPOSURE TO NONIONIZING RADIATION: ICD-10-CM

## 2025-06-12 PROBLEM — D22.5 MELANOCYTIC NEVI OF TRUNK: Status: ACTIVE | Noted: 2025-06-12

## 2025-06-12 PROBLEM — D18.01 HEMANGIOMA OF SKIN AND SUBCUTANEOUS TISSUE: Status: ACTIVE | Noted: 2025-06-12

## 2025-06-12 PROCEDURE — 99213 OFFICE O/P EST LOW 20 MIN: CPT | Mod: 25

## 2025-06-12 PROCEDURE — OTHER COUNSELING: OTHER

## 2025-06-12 PROCEDURE — OTHER LIQUID NITROGEN: OTHER

## 2025-06-12 PROCEDURE — 17003 DESTRUCT PREMALG LES 2-14: CPT

## 2025-06-12 PROCEDURE — 17000 DESTRUCT PREMALG LESION: CPT

## 2025-06-12 PROCEDURE — OTHER REASSURANCE: OTHER

## 2025-06-12 PROCEDURE — OTHER PRESCRIPTION MEDICATION MANAGEMENT: OTHER

## 2025-06-12 PROCEDURE — OTHER SUNSCREEN RECOMMENDATIONS: OTHER

## 2025-06-12 ASSESSMENT — LOCATION SIMPLE DESCRIPTION DERM
LOCATION SIMPLE: LEFT THIGH
LOCATION SIMPLE: CHEST
LOCATION SIMPLE: ABDOMEN
LOCATION SIMPLE: LEFT TEMPLE
LOCATION SIMPLE: LEFT UPPER BACK
LOCATION SIMPLE: LEFT ZYGOMA
LOCATION SIMPLE: LEFT FOREHEAD
LOCATION SIMPLE: LEFT CHEEK

## 2025-06-12 ASSESSMENT — LOCATION DETAILED DESCRIPTION DERM
LOCATION DETAILED: RIGHT LATERAL SUPERIOR CHEST
LOCATION DETAILED: LEFT CENTRAL MALAR CHEEK
LOCATION DETAILED: LEFT CENTRAL TEMPLE
LOCATION DETAILED: LEFT CENTRAL ZYGOMA
LOCATION DETAILED: LEFT SUPERIOR UPPER BACK
LOCATION DETAILED: LEFT SUPERIOR CENTRAL MALAR CHEEK
LOCATION DETAILED: LEFT MEDIAL INFERIOR CHEST
LOCATION DETAILED: EPIGASTRIC SKIN
LOCATION DETAILED: LEFT INFERIOR LATERAL FOREHEAD
LOCATION DETAILED: LEFT ANTERIOR PROXIMAL THIGH

## 2025-06-12 ASSESSMENT — LOCATION ZONE DERM
LOCATION ZONE: FACE
LOCATION ZONE: LEG
LOCATION ZONE: TRUNK

## 2025-06-12 ASSESSMENT — BSA RASH: BSA RASH: 1

## 2025-06-12 ASSESSMENT — ITCH NUMERIC RATING SCALE: HOW SEVERE IS YOUR ITCHING?: 2

## 2025-08-04 ENCOUNTER — TELEPHONE (OUTPATIENT)
Dept: CARDIOLOGY | Facility: CLINIC | Age: 75
End: 2025-08-04
Payer: MEDICARE

## 2025-08-04 NOTE — TELEPHONE ENCOUNTER
----- Message from Manda TOVAR sent at 2/13/2025 12:13 PM EST -----  schedule pt for echo and annual fu with Jose

## (undated) DEVICE — SUTURE QUILL PDO 2 RX-1066Q

## (undated) DEVICE — CONTAINER SPECIMEN 4OZ

## (undated) DEVICE — SOLN IV 0.9% NSS 1000ML

## (undated) DEVICE — KIT TOTAL KNEE BILATERAL RIDDL

## (undated) DEVICE — HANDPIECE SUCTION INTERPULSE W/BONE CLEANING TIP

## (undated) DEVICE — UNDERPAD DURASORB 30 X 30

## (undated) DEVICE — GLOVE PROTEXIS PI ORTHO 9.0

## (undated) DEVICE — ***USE 138652*** SUTURE VICRYL 1 J569H CPX 27IN UNDYED

## (undated) DEVICE — SYRINGE DISP LUER-LOK 30 CC

## (undated) DEVICE — SUTURE QUILL PDO 0 RX-2068Q 1/2 CIRCLE 36MM REVERSE CUTTER 4

## (undated) DEVICE — TOURNIQUET HEMACLEAR BLACK AND WHITE

## (undated) DEVICE — MANIFOLD FOUR PORT NEPTUNE

## (undated) DEVICE — SOLN DURAPREP WAND

## (undated) DEVICE — NEEDLE 18GX1.5IN SHORT

## (undated) DEVICE — KIT CEMENT MIXING CARTRIDGE AND NOZZLE

## (undated) DEVICE — Device

## (undated) DEVICE — ADHESIVE SKIN DERMABOND ADVANCED 0.7ML

## (undated) DEVICE — LABEL MEDICATION NEUO ORTHO

## (undated) DEVICE — COVER CAMERA LIGHT HANDLE

## (undated) DEVICE — DRESSING AQUACEL AG 12 INCH

## (undated) DEVICE — SOLN IRRIG .9%SOD 1000ML

## (undated) DEVICE — GLOVE LINER PROTEXIS 9.0 PI BLUE

## (undated) DEVICE — BLADE SAGITTAL DUAL CUT 4125-127-090

## (undated) DEVICE — SOLUTION PROV-IODINE .75 OZ

## (undated) DEVICE — GLOVES PI ORTHO SZ 9 LF PF

## (undated) DEVICE — DRESSING AQUACEL ADVA ANTIMCROBIAL 3.5 X 10IN